# Patient Record
Sex: FEMALE | Race: BLACK OR AFRICAN AMERICAN | NOT HISPANIC OR LATINO | Employment: OTHER | URBAN - METROPOLITAN AREA
[De-identification: names, ages, dates, MRNs, and addresses within clinical notes are randomized per-mention and may not be internally consistent; named-entity substitution may affect disease eponyms.]

---

## 2017-03-27 ENCOUNTER — TELEPHONE (OUTPATIENT)
Dept: INTERNAL MEDICINE | Facility: CLINIC | Age: 67
End: 2017-03-27

## 2017-03-27 NOTE — TELEPHONE ENCOUNTER
----- Message from Jeanine Cobb sent at 3/27/2017  7:48 AM CDT -----  Contact: Self.  Call her [phone]  Patient upset that she can't see you today. She want to be seen for lost of voice, sore throat.

## 2017-03-27 NOTE — TELEPHONE ENCOUNTER
Spoke with patient , she lost her part of her house,and is under stress, she was notified that she can take levocetirizine as has been ordered.

## 2017-04-03 ENCOUNTER — TELEPHONE (OUTPATIENT)
Dept: INTERNAL MEDICINE | Facility: CLINIC | Age: 67
End: 2017-04-03

## 2017-04-03 NOTE — TELEPHONE ENCOUNTER
----- Message from Markus Casey sent at 4/3/2017  3:16 PM CDT -----  Contact: self   Patient would like to get medical advice.  Symptoms (please be specific):  Coughing   How long has patient had these symptoms:  1wk  Pharmacy name and phone #:  [id number]   [phone] (Phone)  [phone] (Fax)  Any drug allergies:  None   Comments: Pt state Dr. Jimenez call in a medication for her and she is still not able to sleep, pt didn't give name of medicine being rude

## 2017-04-04 NOTE — TELEPHONE ENCOUNTER
Tried to accomodate patient with an appt., she angry and wants to the ER. Called patient back and she took an appt. With Dr. Metz.

## 2017-05-18 ENCOUNTER — OFFICE VISIT (OUTPATIENT)
Dept: OPTOMETRY | Facility: CLINIC | Age: 67
End: 2017-05-18
Payer: MEDICARE

## 2017-05-18 DIAGNOSIS — H10.13 ALLERGIC CONJUNCTIVITIS, BILATERAL: Primary | ICD-10-CM

## 2017-05-18 DIAGNOSIS — H25.13 NUCLEAR SCLEROSIS, BILATERAL: ICD-10-CM

## 2017-05-18 PROCEDURE — 99212 OFFICE O/P EST SF 10 MIN: CPT | Mod: PBBFAC,PO | Performed by: OPTOMETRIST

## 2017-05-18 PROCEDURE — 99999 PR PBB SHADOW E&M-EST. PATIENT-LVL II: CPT | Mod: PBBFAC,,, | Performed by: OPTOMETRIST

## 2017-05-18 PROCEDURE — 92014 COMPRE OPH EXAM EST PT 1/>: CPT | Mod: S$PBB,,, | Performed by: OPTOMETRIST

## 2017-05-18 RX ORDER — FLUOROMETHOLONE 1 MG/ML
1 SUSPENSION/ DROPS OPHTHALMIC 3 TIMES DAILY
Qty: 5 ML | Refills: 0 | Status: SHIPPED | OUTPATIENT
Start: 2017-05-18 | End: 2017-05-28

## 2017-05-18 NOTE — PROGRESS NOTES
HPI     FB sensation OU past week, AT's help very little, uncomfortable to read.   Seasonal allergies.         Last edited by Scotty Rivera, OD on 5/18/2017  8:51 AM.     ROS     Negative for: Constitutional, Gastrointestinal, Neurological, Skin,   Genitourinary, Musculoskeletal, HENT, Endocrine, Cardiovascular, Eyes,   Respiratory, Psychiatric, Allergic/Imm, Heme/Lymph    Last edited by Scotty Rivera, OD on 5/18/2017  8:51 AM. (History)        Assessment /Plan     For exam results, see Encounter Report.    Allergic conjunctivitis, bilateral  -     fluorometholone 0.1% (FML) 0.1 % DrpS; Place 1 drop into both eyes 3 (three) times daily.  Dispense: 5 mL; Refill: 0    Nuclear sclerosis, bilateral      1. Start FML drops 1 drop 3x/day x 1 week, then resume art tears daily. RTC if no better with steroid.  2. Educated pt on presence of cataracts and effects on vision. No surgery at this time. Recheck in one year.

## 2017-05-31 ENCOUNTER — TELEPHONE (OUTPATIENT)
Dept: INTERNAL MEDICINE | Facility: CLINIC | Age: 67
End: 2017-05-31

## 2017-05-31 DIAGNOSIS — Z12.31 VISIT FOR SCREENING MAMMOGRAM: Primary | ICD-10-CM

## 2017-05-31 NOTE — TELEPHONE ENCOUNTER
----- Message from Eddie Valdes sent at 5/31/2017 12:09 PM CDT -----  Contact: Pt at [phone]  Pt requesting orders to be put in for her to receive a mammogram.

## 2017-06-14 ENCOUNTER — OFFICE VISIT (OUTPATIENT)
Dept: SURGERY | Facility: CLINIC | Age: 67
End: 2017-06-14
Payer: MEDICARE

## 2017-06-14 VITALS
HEART RATE: 55 BPM | HEIGHT: 66 IN | DIASTOLIC BLOOD PRESSURE: 77 MMHG | TEMPERATURE: 98 F | WEIGHT: 200.94 LBS | SYSTOLIC BLOOD PRESSURE: 166 MMHG | BODY MASS INDEX: 32.29 KG/M2

## 2017-06-14 DIAGNOSIS — N64.4 BREAST PAIN: Primary | ICD-10-CM

## 2017-06-14 PROCEDURE — 99214 OFFICE O/P EST MOD 30 MIN: CPT | Mod: S$PBB,,, | Performed by: NURSE PRACTITIONER

## 2017-06-14 PROCEDURE — 1125F AMNT PAIN NOTED PAIN PRSNT: CPT | Mod: ,,, | Performed by: NURSE PRACTITIONER

## 2017-06-14 PROCEDURE — 99999 PR PBB SHADOW E&M-EST. PATIENT-LVL IV: CPT | Mod: PBBFAC,,, | Performed by: NURSE PRACTITIONER

## 2017-06-14 PROCEDURE — 99214 OFFICE O/P EST MOD 30 MIN: CPT | Mod: PBBFAC,PO | Performed by: NURSE PRACTITIONER

## 2017-06-14 PROCEDURE — 1159F MED LIST DOCD IN RCRD: CPT | Mod: ,,, | Performed by: NURSE PRACTITIONER

## 2017-06-14 RX ORDER — LOSARTAN POTASSIUM AND HYDROCHLOROTHIAZIDE 25; 100 MG/1; MG/1
TABLET ORAL
Refills: 0 | COMMUNITY
Start: 2017-05-02 | End: 2019-12-09

## 2017-06-20 ENCOUNTER — HOSPITAL ENCOUNTER (OUTPATIENT)
Dept: RADIOLOGY | Facility: HOSPITAL | Age: 67
Discharge: HOME | End: 2017-06-20
Attending: NURSE PRACTITIONER
Payer: MEDICARE

## 2017-06-20 VITALS — WEIGHT: 200 LBS | HEIGHT: 65 IN | BODY MASS INDEX: 33.32 KG/M2

## 2017-06-20 DIAGNOSIS — N64.4 BREAST PAIN: ICD-10-CM

## 2017-06-20 PROCEDURE — 77066 DX MAMMO INCL CAD BI: CPT | Mod: TC

## 2017-06-20 PROCEDURE — 77062 BREAST TOMOSYNTHESIS BI: CPT | Mod: 26,,, | Performed by: RADIOLOGY

## 2017-06-20 PROCEDURE — 77066 DX MAMMO INCL CAD BI: CPT | Mod: 26,,, | Performed by: RADIOLOGY

## 2017-08-01 ENCOUNTER — TELEPHONE (OUTPATIENT)
Dept: INTERNAL MEDICINE | Facility: CLINIC | Age: 67
End: 2017-08-01

## 2017-08-01 DIAGNOSIS — N64.4 PAINFUL BREASTS: Primary | ICD-10-CM

## 2017-08-01 NOTE — TELEPHONE ENCOUNTER
----- Message from Maria Fernanda Simon sent at 8/1/2017  9:38 AM CDT -----  Contact: self/ [phone]  Patient had a mammogram done on June 20, 2017 and patient says that she need orders from Dr. Jimenez for ultra sound of her breast. Patient is still experiencing some pain. Please call and advise.

## 2017-08-01 NOTE — TELEPHONE ENCOUNTER
The patient is still noting breast pain.  Symptoms are more prominent on the right side.  A breast ultrasound as requested.  The mammogram report was reviewed.

## 2017-08-04 ENCOUNTER — HOSPITAL ENCOUNTER (OUTPATIENT)
Dept: RADIOLOGY | Facility: HOSPITAL | Age: 67
Discharge: HOME | End: 2017-08-04
Attending: INTERNAL MEDICINE
Payer: MEDICARE

## 2017-08-04 DIAGNOSIS — N64.4 PAINFUL BREASTS: ICD-10-CM

## 2017-11-10 ENCOUNTER — TELEPHONE (OUTPATIENT)
Dept: INTERNAL MEDICINE | Facility: CLINIC | Age: 67
End: 2017-11-10

## 2017-11-10 RX ORDER — LORAZEPAM 1 MG/1
1 TABLET ORAL EVERY 8 HOURS PRN
Qty: 60 TABLET | Refills: 1 | Status: SHIPPED | OUTPATIENT
Start: 2017-11-10 | End: 2018-02-24 | Stop reason: SDUPTHER

## 2017-11-10 NOTE — TELEPHONE ENCOUNTER
Brother being put in hospice. She is very anxious and not sleeping well.    She is on lexapro but out of ativan. Wants something to help her cope. Just refill ativan?    Pharmacy [facility] is correct.    please advise.  Thanks lorin

## 2017-11-10 NOTE — TELEPHONE ENCOUNTER
I called rx to pharmacy recorder as dr martinez approved.  Patient advised and to continue lexapro with it.

## 2017-12-26 ENCOUNTER — OFFICE VISIT (OUTPATIENT)
Dept: URGENT CARE | Facility: CLINIC | Age: 67
End: 2017-12-26
Payer: MEDICARE

## 2017-12-26 VITALS
OXYGEN SATURATION: 95 % | RESPIRATION RATE: 18 BRPM | HEART RATE: 69 BPM | DIASTOLIC BLOOD PRESSURE: 85 MMHG | SYSTOLIC BLOOD PRESSURE: 145 MMHG | BODY MASS INDEX: 33.32 KG/M2 | TEMPERATURE: 99 F | HEIGHT: 65 IN | WEIGHT: 200 LBS

## 2017-12-26 DIAGNOSIS — J20.9 ACUTE BRONCHITIS, UNSPECIFIED ORGANISM: ICD-10-CM

## 2017-12-26 DIAGNOSIS — J02.9 SORE THROAT: Primary | ICD-10-CM

## 2017-12-26 LAB
CTP QC/QA: YES
S PYO RRNA THROAT QL PROBE: NEGATIVE

## 2017-12-26 PROCEDURE — 87880 STREP A ASSAY W/OPTIC: CPT | Mod: QW,S$GLB,, | Performed by: EMERGENCY MEDICINE

## 2017-12-26 PROCEDURE — 99214 OFFICE O/P EST MOD 30 MIN: CPT | Mod: S$GLB,,, | Performed by: EMERGENCY MEDICINE

## 2017-12-26 RX ORDER — POLYETHYLENE GLYCOL-3350 AND ELECTROLYTES WITH FLAVOR PACK 240; 5.84; 2.98; 6.72; 22.72 G/278.26G; G/278.26G; G/278.26G; G/278.26G; G/278.26G
POWDER, FOR SOLUTION ORAL
Refills: 0 | COMMUNITY
Start: 2017-09-19 | End: 2018-08-14

## 2017-12-26 RX ORDER — PREDNISONE 20 MG/1
20 TABLET ORAL DAILY
Qty: 5 TABLET | Refills: 0 | Status: SHIPPED | OUTPATIENT
Start: 2017-12-26 | End: 2017-12-31

## 2017-12-26 RX ORDER — HYDROCODONE POLISTIREX AND CHLORPHENIRAMINE POLISTIREX 10; 8 MG/5ML; MG/5ML
5 SUSPENSION, EXTENDED RELEASE ORAL EVERY 12 HOURS PRN
Qty: 100 ML | Refills: 0 | Status: SHIPPED | OUTPATIENT
Start: 2017-12-26 | End: 2018-07-13

## 2017-12-26 RX ORDER — OMEPRAZOLE 40 MG/1
CAPSULE, DELAYED RELEASE ORAL
Refills: 0 | COMMUNITY
Start: 2017-12-15 | End: 2019-12-09

## 2017-12-26 NOTE — PROGRESS NOTES
Subjective:       Patient ID: Nan Palafox is a 67 y.o. female.    Vitals:    12/26/17 1507   BP: (!) 145/85   Pulse: 69   Resp: 18   Temp: 98.7 °F (37.1 °C)       Chief Complaint: Cough    Pt comes into the clinic with the complaint of a cough, chills and low grade fever for the past 3 days. Pt has tried taking day/nyquil for symptoms.       Cough   This is a new problem. The current episode started in the past 7 days. The problem has been unchanged. The cough is non-productive. Associated symptoms include chills, headaches, myalgias and a sore throat. Pertinent negatives include no chest pain, ear pain, eye redness, fever, shortness of breath or wheezing. Nothing aggravates the symptoms. She has tried nothing for the symptoms. The treatment provided no relief. Her past medical history is significant for pneumonia.     Review of Systems   Constitution: Positive for chills and malaise/fatigue. Negative for fever.   HENT: Positive for sore throat. Negative for congestion, ear pain and hoarse voice.    Eyes: Negative for discharge and redness.   Cardiovascular: Negative for chest pain, dyspnea on exertion and leg swelling.   Respiratory: Positive for cough. Negative for shortness of breath, sputum production and wheezing.    Musculoskeletal: Positive for myalgias.   Gastrointestinal: Negative for abdominal pain and nausea.   Neurological: Positive for headaches.       Objective:      Physical Exam   Constitutional: She is oriented to person, place, and time. She appears well-developed and well-nourished. She is cooperative.  Non-toxic appearance. She does not appear ill. No distress.   Non productive cough present through out the exam       HENT:   Head: Normocephalic and atraumatic.   Right Ear: Hearing, tympanic membrane, external ear and ear canal normal.   Left Ear: Hearing, tympanic membrane, external ear and ear canal normal.   Nose: Nose normal. No mucosal edema, rhinorrhea or nasal deformity. No epistaxis.  Right sinus exhibits no maxillary sinus tenderness and no frontal sinus tenderness. Left sinus exhibits no maxillary sinus tenderness and no frontal sinus tenderness.   Mouth/Throat: Uvula is midline, oropharynx is clear and moist and mucous membranes are normal. No trismus in the jaw. Normal dentition. No uvula swelling. No posterior oropharyngeal erythema.   Eyes: Conjunctivae and lids are normal. No scleral icterus.   Sclera clear bilat   Neck: Trachea normal, full passive range of motion without pain and phonation normal. Neck supple.   Cardiovascular: Normal rate, regular rhythm, normal heart sounds, intact distal pulses and normal pulses.    Pulmonary/Chest: Effort normal and breath sounds normal. No respiratory distress.   Abdominal: Soft. Normal appearance and bowel sounds are normal. She exhibits no distension. There is no tenderness.   Musculoskeletal: Normal range of motion. She exhibits no edema or deformity.   Neurological: She is alert and oriented to person, place, and time. She exhibits normal muscle tone. Coordination normal.   Skin: Skin is warm, dry and intact. She is not diaphoretic. No pallor.   Psychiatric: She has a normal mood and affect. Her speech is normal and behavior is normal. Judgment and thought content normal. Cognition and memory are normal.   Nursing note and vitals reviewed.      Assessment:       1. Sore throat    2. Acute bronchitis, unspecified organism        Plan:       Nan was seen today for cough.    Diagnoses and all orders for this visit:    Sore throat  -     POCT rapid strep A  -     predniSONE (DELTASONE) 20 MG tablet; Take 1 tablet (20 mg total) by mouth once daily.    Acute bronchitis, unspecified organism  -     predniSONE (DELTASONE) 20 MG tablet; Take 1 tablet (20 mg total) by mouth once daily.  -     hydrocodone-chlorpheniramine (TUSSIONEX) 10-8 mg/5 mL suspension; Take 5 mLs by mouth every 12 (twelve) hours as needed for Cough.

## 2017-12-26 NOTE — PATIENT INSTRUCTIONS
Please return here or go to the Emergency Department for any concerns or worsening of condition.  If you were prescribed antibiotics, please take them to completion.  If you were prescribed a narcotic medication, do not drive or operate heavy equipment or machinery while taking these medications.  Please follow up with your primary care doctor or specialist as needed.  If you  smoke, please stop smoking.      What Is Acute Bronchitis?  Acute bronchitis is when the airways in your lungs (bronchial tubes) become red and swollen (inflamed). It is usually caused by a viral infection. But it can also occur because of a bacteria or allergen. Symptoms include a cough that produces yellow or greenish mucus and can last for days or sometimes weeks.  Inside healthy lungs    Air travels in and out of the lungs through the airways. The linings of these airways produce sticky mucus. This mucus traps particles that enter the lungs. Tiny structures called cilia then sweep the particles out of the airways.     Healthy airway: Airways are normally open. Air moves in and out easily.      Healthy cilia: Tiny, hairlike cilia sweep mucus and particles up and out of the airways.   Lungs with bronchitis  Bronchitis often occurs with a cold or the flu virus. The airways become inflamed (red and swollen). There is a deep hacking cough from the extra mucus. Other symptoms may include:  · Wheezing  · Chest discomfort  · Shortness of breath  · Mild fever  A second infection, this time due to bacteria, may then occur. And airways irritated by allergens or smoke are more likely to get infected.        Inflamed airway: Inflammation and extra mucus narrow the airway, causing shortness of breath.      Impaired cilia: Extra mucus impairs cilia, causing congestion and wheezing. Smoking makes the problem worse.   Making a diagnosis  A physical exam, health history, and certain tests help your healthcare provider make the  diagnosis.  Health history  Your healthcare provider will ask you about your symptoms.  The exam  Your provider listens to your chest for signs of congestion. He or she may also check your ears, nose, and throat.  Possible tests  · A sputum test for bacteria. This requires a sample of mucus from your lungs.  · A nasal or throat swab. This tests to see if you have a bacterial infection.  · A chest X-ray. This is done if your healthcare provider thinks you have pneumonia.  · Tests to check for an underlying condition. Other tests may be done to check for things such as allergies, asthma, or COPD (chronic obstructive pulmonary disease). You may need to see a specialist for more lung function testing.  Treating a cough  The main treatment for bronchitis is easing symptoms. Avoiding smoke, allergens, and other things that trigger coughing can often help. If the infection is bacterial, you may be given antibiotics. During the illness, it's important to get plenty of sleep. To ease symptoms:  · Dont smoke. Also avoid secondhand smoke.  · Use a humidifier. Or try breathing in steam from a hot shower. This may help loosen mucus.  · Drink a lot of water and juice. They can soothe the throat and may help thin mucus.  · Sit up or use extra pillows when in bed. This helps to lessen coughing and congestion.  · Ask your provider about using medicine. Ask about using cough medicine, pain and fever medicine, or a decongestant.  Antibiotics  Most cases of bronchitis are caused by cold or flu viruses. They dont need antibiotics to treat them, even if your mucus is thick and green or yellow. Antibiotics dont treat viral illness and antibiotics have not been shown to have any benefit in cases of acute bronchitis. Taking antibiotics when they are not needed increases your risk of getting an infection later that is antibiotic-resistant. Antibiotics can also cause severe cases of diarrhea that require other antibiotics to treat.  It is  important that you accept your healthcare provider's opinion to not use antibiotics. Your provider will prescribe antibiotics if the infection is caused by bacteria. If they are prescribed:  · Take all of the medicine. Take the medicine until it is used up, even if symptoms have improved. If you dont, the bronchitis may come back.  · Take the medicines as directed. For instance, some medicines should be taken with food.  · Ask about side effects. Ask your provider or pharmacist what side effects are common, and what to do about them.  Follow-up care  You should see your provider again in 2 to 3 weeks. By this time, symptoms should have improved. An infection that lasts longer may mean you have a more serious problem.  Prevention  · Avoid tobacco smoke. If you smoke, quit. Stay away from smoky places. Ask friends and family not to smoke around you, or in your home or car.  · Get checked for allergies.  · Ask your provider about getting a yearly flu shot. Also ask about pneumococcal or pneumonia shots.  · Wash your hands often. This helps reduce the chance of picking up viruses that cause colds and flu.  Call your healthcare provider if:  · Symptoms worsen, or you have new symptoms  · Breathing problems worsen or  become severe  · Symptoms dont get better within a week, or within 3 days of taking antibiotics   Date Last Reviewed: 2/1/2017  © 2707-2121 [employer]. [address] [place] [address], [place] [address]. All rights reserved. This information is not intended as a substitute for professional medical care. Always follow your healthcare professional's instructions.

## 2018-02-14 ENCOUNTER — TELEPHONE (OUTPATIENT)
Dept: INTERNAL MEDICINE | Facility: CLINIC | Age: 68
End: 2018-02-14

## 2018-02-14 NOTE — TELEPHONE ENCOUNTER
----- Message from Adri Harris sent at 2/12/2018 10:38 AM CST -----  Contact: self/[phone]  Pt called in regards to getting a er f/u. She would like to get fit into the schedule. She did not want the first available.      Please advise

## 2018-02-26 RX ORDER — LORAZEPAM 1 MG/1
TABLET ORAL
Qty: 60 TABLET | Refills: 0 | Status: SHIPPED | OUTPATIENT
Start: 2018-02-26 | End: 2018-04-18 | Stop reason: SDUPTHER

## 2018-04-18 RX ORDER — LORAZEPAM 1 MG/1
TABLET ORAL
Qty: 60 TABLET | Refills: 0 | Status: SHIPPED | OUTPATIENT
Start: 2018-04-18 | End: 2018-07-09 | Stop reason: SDUPTHER

## 2018-07-03 ENCOUNTER — HOSPITAL ENCOUNTER (OUTPATIENT)
Dept: RADIOLOGY | Facility: HOSPITAL | Age: 68
Discharge: HOME | End: 2018-07-03
Attending: INTERNAL MEDICINE
Payer: MEDICARE

## 2018-07-03 DIAGNOSIS — Z12.31 VISIT FOR SCREENING MAMMOGRAM: ICD-10-CM

## 2018-07-03 PROCEDURE — 77063 BREAST TOMOSYNTHESIS BI: CPT | Mod: TC

## 2018-07-03 PROCEDURE — 77067 SCR MAMMO BI INCL CAD: CPT | Mod: 26,,, | Performed by: RADIOLOGY

## 2018-07-03 PROCEDURE — 77063 BREAST TOMOSYNTHESIS BI: CPT | Mod: 26,,, | Performed by: RADIOLOGY

## 2018-07-09 RX ORDER — LORAZEPAM 1 MG/1
TABLET ORAL
Qty: 60 TABLET | Refills: 0 | Status: SHIPPED | OUTPATIENT
Start: 2018-07-09 | End: 2018-07-13 | Stop reason: SDUPTHER

## 2018-07-13 ENCOUNTER — OFFICE VISIT (OUTPATIENT)
Dept: INTERNAL MEDICINE | Facility: CLINIC | Age: 68
End: 2018-07-13
Payer: MEDICARE

## 2018-07-13 VITALS
OXYGEN SATURATION: 99 % | BODY MASS INDEX: 33.65 KG/M2 | TEMPERATURE: 98 F | HEIGHT: 65 IN | DIASTOLIC BLOOD PRESSURE: 72 MMHG | SYSTOLIC BLOOD PRESSURE: 110 MMHG | HEART RATE: 62 BPM | WEIGHT: 201.94 LBS

## 2018-07-13 DIAGNOSIS — I10 ESSENTIAL HYPERTENSION: Primary | Chronic | ICD-10-CM

## 2018-07-13 DIAGNOSIS — K21.9 GASTROESOPHAGEAL REFLUX DISEASE, ESOPHAGITIS PRESENCE NOT SPECIFIED: ICD-10-CM

## 2018-07-13 DIAGNOSIS — Z96.652 STATUS POST TOTAL LEFT KNEE REPLACEMENT: ICD-10-CM

## 2018-07-13 DIAGNOSIS — D63.8 ANEMIA OF CHRONIC DISEASE: ICD-10-CM

## 2018-07-13 PROCEDURE — 99214 OFFICE O/P EST MOD 30 MIN: CPT | Mod: S$PBB,,, | Performed by: INTERNAL MEDICINE

## 2018-07-13 PROCEDURE — 99213 OFFICE O/P EST LOW 20 MIN: CPT | Mod: PBBFAC,PO,25 | Performed by: INTERNAL MEDICINE

## 2018-07-13 PROCEDURE — 90732 PPSV23 VACC 2 YRS+ SUBQ/IM: CPT | Mod: PBBFAC,PO

## 2018-07-13 PROCEDURE — 99999 PR PBB SHADOW E&M-EST. PATIENT-LVL III: CPT | Mod: PBBFAC,,, | Performed by: INTERNAL MEDICINE

## 2018-07-13 RX ORDER — LORAZEPAM 1 MG/1
1 TABLET ORAL EVERY 12 HOURS PRN
Qty: 60 TABLET | Refills: 2 | Status: SHIPPED | OUTPATIENT
Start: 2018-07-13 | End: 2018-11-27 | Stop reason: SDUPTHER

## 2018-07-23 NOTE — PROGRESS NOTES
Subjective:       Patient ID: Nan Palafox is a 68 y.o. female.    Chief Complaint: Follow-up (knee replacement)    HPI     The patient presents for follow-up of medical conditions which include hypertension, anemia of chronic disease, GERD, and arthritis.  She is status post left total knee replacement in May 2018 by Dr. Kurt Davis. She is participating in outpatient physical therapy twice a week.  She reports that she is recovering well.    She recently saw her cardiologist Dr. ELIZABET molina for cardiac evaluation.  Some mild fluid retention was noted.  She denies having any shortness of breath, PND, orthopnea, or chest pain.  She does have a history of GERD however her symptoms have been stable.  She does report symptoms of situational anxiety at times.  She has taken herself off of Lexapro for the past 5 months.  She feels she has remained fairly stable off the medication.    She is using Zyrtec daily for control of rhinitis symptoms.    Her last colonoscopy was on 10/26/2017.  A follow-up examination in 5 years was recommended.    Review of Systems   Constitutional: Negative for activity change, appetite change, fatigue and unexpected weight change.   Eyes: Negative for visual disturbance.   Respiratory: Negative for cough and shortness of breath.    Cardiovascular: Positive for leg swelling. Negative for chest pain and palpitations.   Gastrointestinal: Negative for abdominal pain, blood in stool, constipation and diarrhea.   Genitourinary: Negative for dysuria and hematuria.   Musculoskeletal: Negative for arthralgias, back pain, neck pain and neck stiffness.   Skin: Negative for rash.   Neurological: Negative for dizziness, syncope and headaches.   Psychiatric/Behavioral: Negative for sleep disturbance. The patient is nervous/anxious.        Objective:      Physical Exam   Constitutional: She is oriented to person, place, and time. She appears well-developed and well-nourished. No distress.    HENT:   Head: Normocephalic and atraumatic.   Eyes: Conjunctivae and EOM are normal. No scleral icterus.   Neck: Normal range of motion. Neck supple. No JVD present. No thyromegaly present.   Cardiovascular: Normal rate, regular rhythm, normal heart sounds and intact distal pulses.  Exam reveals no gallop and no friction rub.    No murmur heard.  Pulmonary/Chest: Effort normal and breath sounds normal. No respiratory distress. She has no wheezes. She has no rales.   Abdominal: Soft. Bowel sounds are normal. She exhibits no mass. There is no tenderness.   Musculoskeletal: She exhibits no tenderness.   Mild postop swelling of the left knee is noted.  Surgical incision is intact.  Minimal discomfort is reported with flexion and extension.   Lymphadenopathy:     She has no cervical adenopathy.   Neurological: She is alert and oriented to person, place, and time.   Skin: Skin is warm and dry. No rash noted.   Psychiatric: She has a normal mood and affect. Her behavior is normal. Judgment and thought content normal.   There is no suicidal or homicidal ideation.  No hallucinosis or delusions are noted.   Nursing note and vitals reviewed.      Assessment:       1. Essential hypertension    2. Anemia of chronic disease    3. Gastroesophageal reflux disease, esophagitis presence not specified    4. Status post total left knee replacement        Plan:       Nan was seen today for follow-up.  The patient may use Tylenol as needed for knee pain.  Lorazepam will be renewed for occasional acute anxiety symptoms.  Pneumovax will be administered today.  A prescription for the Shingrix shingles vaccine was also given to the patient.    Diagnoses and all orders for this visit:    Essential hypertension    Anemia of chronic disease    Gastroesophageal reflux disease, esophagitis presence not specified    Status post total left knee replacement    Other orders  -     LORazepam (ATIVAN) 1 MG tablet; Take 1 tablet (1 mg total) by  mouth every 12 (twelve) hours as needed for Anxiety.  -     (In Office Administered) Pneumococcal Polysaccharide Vaccine (23 Valent) (SQ/IM)  -     varicella-zoster [id number], PF, (SHINGRIX, PF,) 50 mcg/0.5 mL injection; Inject 0.5 mLs into the muscle once. for 1 dose

## 2018-08-14 ENCOUNTER — OFFICE VISIT (OUTPATIENT)
Dept: OPTOMETRY | Facility: CLINIC | Age: 68
End: 2018-08-14
Payer: MEDICARE

## 2018-08-14 DIAGNOSIS — H52.7 REFRACTIVE ERROR: ICD-10-CM

## 2018-08-14 DIAGNOSIS — H25.13 NUCLEAR SCLEROSIS, BILATERAL: Primary | ICD-10-CM

## 2018-08-14 PROCEDURE — 92014 COMPRE OPH EXAM EST PT 1/>: CPT | Mod: S$PBB,,, | Performed by: OPTOMETRIST

## 2018-08-14 PROCEDURE — 99212 OFFICE O/P EST SF 10 MIN: CPT | Mod: PBBFAC,PO | Performed by: OPTOMETRIST

## 2018-08-14 PROCEDURE — 99999 PR PBB SHADOW E&M-EST. PATIENT-LVL II: CPT | Mod: PBBFAC,,, | Performed by: OPTOMETRIST

## 2018-08-14 RX ORDER — LORAZEPAM 0.5 MG/1
1 TABLET ORAL
COMMUNITY
End: 2018-08-14

## 2018-08-14 RX ORDER — ASPIRIN 325 MG
325 TABLET, DELAYED RELEASE (ENTERIC COATED) ORAL
COMMUNITY
Start: 2018-05-03 | End: 2018-08-14

## 2018-08-14 RX ORDER — CELECOXIB 200 MG/1
CAPSULE ORAL
COMMUNITY
Start: 2018-06-04 | End: 2018-12-07

## 2018-08-14 RX ORDER — DIAZEPAM 5 MG/1
5 TABLET ORAL EVERY 8 HOURS PRN
Refills: 3 | COMMUNITY
Start: 2018-07-11 | End: 2018-12-07 | Stop reason: ALTCHOICE

## 2018-08-14 RX ORDER — GABAPENTIN 300 MG/1
300 CAPSULE ORAL 2 TIMES DAILY
Refills: 0 | COMMUNITY
Start: 2018-05-29 | End: 2018-08-14

## 2018-08-14 RX ORDER — CHOLECALCIFEROL (VITAMIN D3) 25 MCG
1000 TABLET ORAL
Status: ON HOLD | COMMUNITY
End: 2021-02-08 | Stop reason: HOSPADM

## 2018-08-14 RX ORDER — GABAPENTIN 300 MG/1
300 CAPSULE ORAL
COMMUNITY
Start: 2018-05-03 | End: 2018-10-02

## 2018-08-14 RX ORDER — ROSUVASTATIN CALCIUM 20 MG/1
20 TABLET, COATED ORAL DAILY
Refills: 4 | COMMUNITY
Start: 2018-07-18 | End: 2021-08-16

## 2018-08-14 RX ORDER — TRAMADOL HYDROCHLORIDE 50 MG/1
50 TABLET ORAL 2 TIMES DAILY
Refills: 0 | COMMUNITY
Start: 2018-05-29 | End: 2018-10-02

## 2018-08-14 RX ORDER — AMLODIPINE BESYLATE 10 MG/1
10 TABLET ORAL
COMMUNITY
End: 2018-08-14

## 2018-08-14 RX ORDER — CHOLECALCIFEROL (VITAMIN D3) 1250 MCG
CAPSULE ORAL
Refills: 4 | COMMUNITY
Start: 2018-07-18 | End: 2019-12-09

## 2018-08-14 NOTE — PROGRESS NOTES
LESLIE DASILVA 05/2017 Near not as clear, has to hold reading farther away.    Distance seems fine. Glasses about 2 yrs. Old.  Not using  Any drops.    Last edited by Cookie Redd on 8/14/2018  1:59 PM. (History)            Assessment /Plan     For exam results, see Encounter Report.    Nuclear sclerosis, bilateral    Refractive error      1. Educated pt on presence of cataracts and effects on vision. No surgery at this time. Recheck in one year.  2. Spec Rx given. Different lens options discussed with patient. RTC 1 year full exam.

## 2018-08-28 ENCOUNTER — TELEPHONE (OUTPATIENT)
Dept: OBSTETRICS AND GYNECOLOGY | Facility: CLINIC | Age: 68
End: 2018-08-28

## 2018-08-28 NOTE — TELEPHONE ENCOUNTER
----- Message from Lorelei Lazo sent at 8/28/2018 11:33 AM CDT -----  Contact: self  Pt called in about wanting to schedule appt. annual/pt having issues and didnt want to discuss  Pt would like the nurse to give her a call back        Pt can be reached at [phone] ty

## 2018-09-04 ENCOUNTER — TELEPHONE (OUTPATIENT)
Dept: INTERNAL MEDICINE | Facility: CLINIC | Age: 68
End: 2018-09-04

## 2018-09-04 DIAGNOSIS — R53.83 FATIGUE, UNSPECIFIED TYPE: Primary | ICD-10-CM

## 2018-09-04 DIAGNOSIS — R23.2 HOT FLASHES: ICD-10-CM

## 2018-09-04 DIAGNOSIS — R09.89 ABNORMAL VASOMOTOR FUNCTION: ICD-10-CM

## 2018-09-04 NOTE — TELEPHONE ENCOUNTER
----- Message from Wing Mann sent at 9/4/2018  8:43 AM CDT -----  Contact: Patient [phone]  Patient calling regarding informing the PCP about the challenges that patient is having after the test, wants to know if Dr office place lab orders for more testing requesting a call back regarding please.    Please call an advise  Thank you

## 2018-09-04 NOTE — TELEPHONE ENCOUNTER
Patient c/o fatigue, hot and cold flashes,having  Trouble focusing ,feeling drained.Please advise.

## 2018-09-08 ENCOUNTER — LAB VISIT (OUTPATIENT)
Dept: LAB | Facility: HOSPITAL | Age: 68
End: 2018-09-08
Attending: INTERNAL MEDICINE
Payer: MEDICARE

## 2018-09-08 DIAGNOSIS — R23.2 HOT FLASHES: ICD-10-CM

## 2018-09-08 DIAGNOSIS — R09.89 ABNORMAL VASOMOTOR FUNCTION: ICD-10-CM

## 2018-09-08 DIAGNOSIS — R53.83 FATIGUE, UNSPECIFIED TYPE: ICD-10-CM

## 2018-09-08 LAB
ALBUMIN SERPL BCP-MCNC: 3.7 G/DL
ALP SERPL-CCNC: 48 U/L
ALT SERPL W/O P-5'-P-CCNC: 11 U/L
ANION GAP SERPL CALC-SCNC: 9 MMOL/L
AST SERPL-CCNC: 16 U/L
BASOPHILS # BLD AUTO: 0.04 K/UL
BASOPHILS NFR BLD: 0.7 %
BILIRUB SERPL-MCNC: 0.5 MG/DL
BUN SERPL-MCNC: 16 MG/DL
CALCIUM SERPL-MCNC: 9.7 MG/DL
CHLORIDE SERPL-SCNC: 99 MMOL/L
CO2 SERPL-SCNC: 29 MMOL/L
CORTIS SERPL-MCNC: 11.8 UG/DL
CREAT SERPL-MCNC: 0.7 MG/DL
DIFFERENTIAL METHOD: ABNORMAL
EOSINOPHIL # BLD AUTO: 0.3 K/UL
EOSINOPHIL NFR BLD: 4.3 %
ERYTHROCYTE [DISTWIDTH] IN BLOOD BY AUTOMATED COUNT: 15.9 %
EST. GFR  (AFRICAN AMERICAN): >60 ML/MIN/1.73 M^2
EST. GFR  (NON AFRICAN AMERICAN): >60 ML/MIN/1.73 M^2
ESTRADIOL SERPL-MCNC: <10 PG/ML
FSH SERPL-ACNC: 56.8 MIU/ML
GLUCOSE SERPL-MCNC: 94 MG/DL
HCT VFR BLD AUTO: 35.8 %
HGB BLD-MCNC: 11.3 G/DL
IMM GRANULOCYTES # BLD AUTO: 0.01 K/UL
IMM GRANULOCYTES NFR BLD AUTO: 0.2 %
LYMPHOCYTES # BLD AUTO: 2.4 K/UL
LYMPHOCYTES NFR BLD: 41.1 %
MCH RBC QN AUTO: 26.2 PG
MCHC RBC AUTO-ENTMCNC: 31.6 G/DL
MCV RBC AUTO: 83 FL
MONOCYTES # BLD AUTO: 0.4 K/UL
MONOCYTES NFR BLD: 7.3 %
NEUTROPHILS # BLD AUTO: 2.7 K/UL
NEUTROPHILS NFR BLD: 46.4 %
NRBC BLD-RTO: 0 /100 WBC
PLATELET # BLD AUTO: 389 K/UL
PMV BLD AUTO: 10.1 FL
POTASSIUM SERPL-SCNC: 3.8 MMOL/L
PROT SERPL-MCNC: 7.6 G/DL
RBC # BLD AUTO: 4.32 M/UL
SODIUM SERPL-SCNC: 137 MMOL/L
T3FREE SERPL-MCNC: 2.1 PG/ML
T4 FREE SERPL-MCNC: 0.94 NG/DL
TSH SERPL DL<=0.005 MIU/L-ACNC: 1.38 UIU/ML
WBC # BLD AUTO: 5.79 K/UL

## 2018-09-08 PROCEDURE — 84439 ASSAY OF FREE THYROXINE: CPT

## 2018-09-08 PROCEDURE — 82670 ASSAY OF TOTAL ESTRADIOL: CPT

## 2018-09-08 PROCEDURE — 83001 ASSAY OF GONADOTROPIN (FSH): CPT

## 2018-09-08 PROCEDURE — 84481 FREE ASSAY (FT-3): CPT

## 2018-09-08 PROCEDURE — 85025 COMPLETE CBC W/AUTO DIFF WBC: CPT

## 2018-09-08 PROCEDURE — 80053 COMPREHEN METABOLIC PANEL: CPT

## 2018-09-08 PROCEDURE — 36415 COLL VENOUS BLD VENIPUNCTURE: CPT | Mod: PO

## 2018-09-08 PROCEDURE — 84443 ASSAY THYROID STIM HORMONE: CPT

## 2018-09-08 PROCEDURE — 82533 TOTAL CORTISOL: CPT

## 2018-09-14 ENCOUNTER — TELEPHONE (OUTPATIENT)
Dept: INTERNAL MEDICINE | Facility: CLINIC | Age: 68
End: 2018-09-14

## 2018-09-14 NOTE — TELEPHONE ENCOUNTER
----- Message from Radha Rdz sent at 9/13/2018  4:06 PM CDT -----  Contact: [phone]  Patient would like to get test results    Name of test (lab, mammo, etc.):   labs    Date of test:  9/8    Ordering provider: Tony    Where was the test performed:  [place]    Would you prefer a response via Webcruncht?:  no    Comments:

## 2018-09-15 NOTE — TELEPHONE ENCOUNTER
Lab results were reviewed with the patient.  No acute changes noted.  She has fatigue.  She does not wish to restsart Lexapro at this time.

## 2018-10-02 ENCOUNTER — OFFICE VISIT (OUTPATIENT)
Dept: OBSTETRICS AND GYNECOLOGY | Facility: CLINIC | Age: 68
End: 2018-10-02
Payer: MEDICARE

## 2018-10-02 VITALS
BODY MASS INDEX: 33.43 KG/M2 | HEIGHT: 65 IN | SYSTOLIC BLOOD PRESSURE: 118 MMHG | WEIGHT: 200.63 LBS | DIASTOLIC BLOOD PRESSURE: 80 MMHG

## 2018-10-02 DIAGNOSIS — Z78.0 MENOPAUSE PRESENT: ICD-10-CM

## 2018-10-02 DIAGNOSIS — Z01.419 ENCOUNTER FOR GYNECOLOGICAL EXAMINATION WITHOUT ABNORMAL FINDING: ICD-10-CM

## 2018-10-02 DIAGNOSIS — Z63.4 DEATH OF RELATIVE: ICD-10-CM

## 2018-10-02 DIAGNOSIS — Z12.31 VISIT FOR SCREENING MAMMOGRAM: ICD-10-CM

## 2018-10-02 DIAGNOSIS — F43.20 ADJUSTMENT DISORDER, UNSPECIFIED TYPE: ICD-10-CM

## 2018-10-02 DIAGNOSIS — I10 ESSENTIAL HYPERTENSION: Primary | Chronic | ICD-10-CM

## 2018-10-02 DIAGNOSIS — Z87.42 HISTORY OF ABNORMAL CERVICAL PAP SMEAR: ICD-10-CM

## 2018-10-02 PROCEDURE — G0101 CA SCREEN;PELVIC/BREAST EXAM: HCPCS | Mod: PBBFAC

## 2018-10-02 PROCEDURE — 99999 PR PBB SHADOW E&M-EST. PATIENT-LVL III: CPT | Mod: PBBFAC,,, | Performed by: OBSTETRICS & GYNECOLOGY

## 2018-10-02 PROCEDURE — G0101 CA SCREEN;PELVIC/BREAST EXAM: HCPCS | Mod: S$PBB,,, | Performed by: OBSTETRICS & GYNECOLOGY

## 2018-10-02 PROCEDURE — 88175 CYTOPATH C/V AUTO FLUID REDO: CPT

## 2018-10-02 PROCEDURE — 99213 OFFICE O/P EST LOW 20 MIN: CPT | Mod: PBBFAC | Performed by: OBSTETRICS & GYNECOLOGY

## 2018-10-02 SDOH — SOCIAL DETERMINANTS OF HEALTH (SDOH): DISSAPEARANCE AND DEATH OF FAMILY MEMBER: Z63.4

## 2018-10-02 NOTE — PROGRESS NOTES
HISTORY OF PRESENT ILLNESS:    Nan Palafox is a 68 y.o. female, , No LMP recorded. Patient has had a hysterectomy.,  presents for a routine exam..  Patient does report increased hot flashes and mood swings.  Long history of menopause.  Patient also reports decreased tolerance and increased mood swings secondary to death of her family member recently.  Patient interested in seeing therapist for evaluation.  She denies any suicidal or homicidal ideations.    Past Medical History:   Diagnosis Date    Cataract     Cervical spondylosis with radiculopathy 2016    Hypertension     Nuclear sclerosis - Both Eyes 2013    PNA (pneumonia)        Past Surgical History:   Procedure Laterality Date    BREAST BIOPSY Right     excisional bx    BREAST SURGERY Right     Excisional bx    RETINAL DETACHMENT SURGERY      patient states that she had a retinal tear that was repaired in the past at [facility] but she is uncertain of which eye    STOMACH SURGERY      TOTAL KNEE ARTHROPLASTY Right        MEDICATIONS AND ALLERGIES:      Current Outpatient Medications:     amlodipine (NORVASC) 10 MG tablet, Take 1 tablet (10 mg total) by mouth once daily. For blood pressure control., Disp: 30 tablet, Rfl: 11    ASCORBATE CALCIUM (VITAMIN C ORAL), Take by mouth., Disp: , Rfl:     aspirin (ECOTRIN) 81 MG EC tablet, Take 81 mg by mouth once daily., Disp: , Rfl:     atorvastatin (LIPITOR) 40 MG tablet, Take 40 mg by mouth every evening., Disp: , Rfl: 0    celecoxib (CELEBREX) 200 MG capsule, TAKE 1 CAPSULE BY MOUTH EVERY DAY, Disp: , Rfl:     DECARA 50,000 unit capsule, TK 1 C PO WEEKLY, Disp: , Rfl: 4    diazePAM (VALIUM) 5 MG tablet, Take 5 mg by mouth every 8 (eight) hours as needed. ANXIETY, Disp: , Rfl: 3    fluticasone (FLONASE) 50 mcg/actuation nasal spray, 2 sprays by Each Nare route once daily., Disp: 1 Bottle, Rfl: 12    LORazepam (ATIVAN) 1 MG tablet, Take 1 tablet (1 mg total) by mouth every 12  (twelve) hours as needed for Anxiety., Disp: 60 tablet, Rfl: 2    losartan-hydrochlorothiazide 100-25 mg (HYZAAR) 100-25 mg per tablet, , Disp: , Rfl: 0    multivitamin-iron-folic acid (CENTRUM ULTRA WOMEN'S)  mg-mcg Tab, Take by mouth., Disp: , Rfl:     omeprazole (PRILOSEC) 40 MG capsule, , Disp: , Rfl: 0    rosuvastatin (CRESTOR) 20 MG tablet, TK 1 T PO QD, Disp: , Rfl: 4    vitamin D 1000 units Tab, Take 1,000 Units by mouth., Disp: , Rfl:     Review of patient's allergies indicates:  No Known Allergies    Family History   Problem Relation Age of Onset    Glaucoma Mother     Cataracts Mother     Blindness Mother     Cancer Mother 68        colon    Glaucoma Brother     Cataracts Brother     Cancer Brother         esophageal    No Known Problems Father     No Known Problems Sister     No Known Problems Maternal Aunt     No Known Problems Maternal Uncle     No Known Problems Paternal Aunt     No Known Problems Paternal Uncle     No Known Problems Maternal Grandmother     No Known Problems Maternal Grandfather     No Known Problems Paternal Grandmother     No Known Problems Paternal Grandfather     Amblyopia Neg Hx     Macular degeneration Neg Hx     Retinal detachment Neg Hx     Strabismus Neg Hx     Diabetes Neg Hx     Hypertension Neg Hx     Stroke Neg Hx     Thyroid disease Neg Hx     Breast cancer Neg Hx     Ovarian cancer Neg Hx        Social History     Socioeconomic History    Marital status:      Spouse name: Not on file    Number of children: Not on file    Years of education: Not on file    Highest education level: Not on file   Social Needs    Financial resource strain: Not on file    Food insecurity - worry: Not on file    Food insecurity - inability: Not on file    Transportation needs - medical: Not on file    Transportation needs - non-medical: Not on file   Occupational History    Not on file   Tobacco Use    Smoking status: Never Smoker     "Smokeless tobacco: Never Used   Substance and Sexual Activity    Alcohol use: Yes     Alcohol/week: 1.2 oz     Types: 2 Glasses of wine per week    Drug use: Not on file    Sexual activity: No     Partners: Male   Other Topics Concern    Not on file   Social History Narrative    Not on file       COMPREHENSIVE GYN HISTORY:  PAP History: Denies abnormal Paps.  Infection History: Denies STDs. Denies PID.  Benign History: Denies uterine fibroids. Denies ovarian cysts. Denies endometriosis. Denies other conditions.  Cancer History: Denies cervical cancer. Denies uterine cancer or hyperplasia. Denies ovarian cancer. Denies vulvar cancer or pre-cancer. Denies vaginal cancer or pre-cancer. Denies breast cancer. Denies colon cancer.  Sexual Activity History: Reports currently being sexually active  Menstrual History: Monthly. Mod then light flow.   Dysmenorrhea History: Reports mild dysmenorrhea.       ROS:  GENERAL: No weight changes. No swelling. No fatigue. No fever.  CARDIOVASCULAR: No chest pain. No shortness of breath. No leg cramps.   NEUROLOGICAL: No headaches. No vision changes.  BREASTS: No pain. No lumps. No discharge.  ABDOMEN: No pain. No nausea. No vomiting. No diarrhea. No constipation.  REPRODUCTIVE: No abnormal bleeding.   VULVA: No pain. No lesions. No itching.  VAGINA: No relaxation. No itching. No odor. No discharge. No lesions.  URINARY: No incontinence. No nocturia. No frequency. No dysuria.    /80   Ht 5' 5" (1.651 m)   Wt 91 kg (200 lb 9.9 oz)   BMI 33.38 kg/m²     PE:  APPEARANCE: Well nourished, well developed, in no acute distress.  AFFECT: WNL, alert and oriented x 3.  SKIN: No acne or hirsutism.  NECK: Neck symmetric, without masses or thyromegaly.  NODES: No inguinal, cervical, axillary or femoral lymph node enlargement.  CHEST: Good respiratory effort.   ABDOMEN: Soft. No tenderness or masses. No hepatosplenomegaly. No hernias.  BREASTS: Symmetrical, no skin changes, visible " lesions, palpable masses or nipple discharge bilaterally.  PELVIC: External female genitalia without lesions.  Female hair distribution. Adequate perineal body, Normal urethral meatus. Vagina moist and well rugated without lesions or discharge.  No significant cystocele or rectocele present. Cervix pink without lesions, discharge or tenderness. Uterus is 4-6 week size, regular, mobile and nontender. Adnexa without masses or tenderness.  EXTREMITIES: No edema    DIAGNOSIS:  1. Essential hypertension    2. Encounter for gynecological examination without abnormal finding    3. Visit for screening mammogram    4. Adjustment disorder, unspecified type    5. Death of relative    6. History of abnormal cervical Pap smear    7. Menopause present        PLAN:    Orders Placed This Encounter    Ambulatory consult to Psychology    Liquid-based pap smear, screening       COUNSELING:  The patient was counseled today on:  -A.C.S. Pap and pelvic exam guidelines (pap every 3 years), recomendations for yearly mammogram;  -to follow up with her PCP for other health maintenance.    FOLLOW-UP with me annually.

## 2018-10-02 NOTE — PATIENT INSTRUCTIONS
Chantal Edmonds, PhD   [address] [facility] [address]   Phone: [phone]   Fax: [phone]

## 2018-10-24 ENCOUNTER — PES CALL (OUTPATIENT)
Dept: ADMINISTRATIVE | Facility: CLINIC | Age: 68
End: 2018-10-24

## 2018-11-16 ENCOUNTER — TELEPHONE (OUTPATIENT)
Dept: INTERNAL MEDICINE | Facility: CLINIC | Age: 68
End: 2018-11-16

## 2018-11-16 RX ORDER — ESCITALOPRAM OXALATE 10 MG/1
10 TABLET ORAL DAILY
Qty: 30 TABLET | Refills: 3 | Status: SHIPPED | OUTPATIENT
Start: 2018-11-16 | End: 2019-05-17 | Stop reason: SDUPTHER

## 2018-11-16 NOTE — TELEPHONE ENCOUNTER
The patient is experiencing symptoms of depression.  She has had deaths in the family.  She is having crying spells.  She has not had any suicidal or homicidal thoughts.  She does feel somewhat anxious.  A review of her medical record shows that she has used Lexapro previously.  We will start with a low dose of Lexapro 10 mg daily.  The patient was advised that it takes a while for the antidepressant effect to kick in.  She will notify me if she has any adverse side effects.  A new prescription will be sent to the patient's [facility]'s pharmacy.

## 2018-11-16 NOTE — TELEPHONE ENCOUNTER
----- Message from Mildred Fair sent at 11/16/2018  2:21 PM CST -----  Contact: self/[phone]  Pt would like to speak with the doctor in regards to a private matter.

## 2018-11-28 RX ORDER — LORAZEPAM 1 MG/1
TABLET ORAL
Qty: 60 TABLET | Refills: 0 | Status: SHIPPED | OUTPATIENT
Start: 2018-11-28 | End: 2018-12-07 | Stop reason: SDUPTHER

## 2018-12-04 ENCOUNTER — NURSE TRIAGE (OUTPATIENT)
Dept: ADMINISTRATIVE | Facility: CLINIC | Age: 68
End: 2018-12-04

## 2018-12-04 NOTE — TELEPHONE ENCOUNTER
Reason for Disposition   Requesting regular office appointment    Protocols used: ST INFORMATION ONLY CALL-A-AH    Pt calling to schedule appt with PCP. States that last night she was dizzy but is not dizzy this AM or at this time. Requesting appt to refill anxiety medication and also to discuss BP medicine that she has been on for 12 years and is now linked to cancer. appt made for Friday.

## 2018-12-07 ENCOUNTER — HOSPITAL ENCOUNTER (OUTPATIENT)
Dept: RADIOLOGY | Facility: HOSPITAL | Age: 68
Discharge: HOME | End: 2018-12-07
Attending: INTERNAL MEDICINE
Payer: MEDICARE

## 2018-12-07 ENCOUNTER — OFFICE VISIT (OUTPATIENT)
Dept: INTERNAL MEDICINE | Facility: CLINIC | Age: 68
End: 2018-12-07
Payer: MEDICARE

## 2018-12-07 VITALS
HEART RATE: 67 BPM | HEIGHT: 65 IN | BODY MASS INDEX: 33.76 KG/M2 | OXYGEN SATURATION: 97 % | DIASTOLIC BLOOD PRESSURE: 62 MMHG | TEMPERATURE: 99 F | WEIGHT: 202.63 LBS | SYSTOLIC BLOOD PRESSURE: 118 MMHG

## 2018-12-07 DIAGNOSIS — K21.9 GASTROESOPHAGEAL REFLUX DISEASE, ESOPHAGITIS PRESENCE NOT SPECIFIED: ICD-10-CM

## 2018-12-07 DIAGNOSIS — S99.922A INJURY OF LEFT FOOT, INITIAL ENCOUNTER: ICD-10-CM

## 2018-12-07 DIAGNOSIS — D63.8 ANEMIA OF CHRONIC DISEASE: ICD-10-CM

## 2018-12-07 DIAGNOSIS — I10 ESSENTIAL HYPERTENSION: Primary | Chronic | ICD-10-CM

## 2018-12-07 DIAGNOSIS — R58 ECCHYMOSIS ON EXAMINATION: ICD-10-CM

## 2018-12-07 PROCEDURE — 73630 X-RAY EXAM OF FOOT: CPT | Mod: 26,LT,, | Performed by: RADIOLOGY

## 2018-12-07 PROCEDURE — 99214 OFFICE O/P EST MOD 30 MIN: CPT | Mod: S$PBB,,, | Performed by: INTERNAL MEDICINE

## 2018-12-07 PROCEDURE — 99999 PR PBB SHADOW E&M-EST. PATIENT-LVL III: CPT | Mod: PBBFAC,,, | Performed by: INTERNAL MEDICINE

## 2018-12-07 PROCEDURE — 73630 X-RAY EXAM OF FOOT: CPT | Mod: TC,PO,LT

## 2018-12-07 PROCEDURE — 99213 OFFICE O/P EST LOW 20 MIN: CPT | Mod: PBBFAC,25,PO | Performed by: INTERNAL MEDICINE

## 2018-12-07 RX ORDER — LORAZEPAM 1 MG/1
1 TABLET ORAL EVERY 8 HOURS PRN
Qty: 60 TABLET | Refills: 2 | Status: SHIPPED | OUTPATIENT
Start: 2018-12-07 | End: 2019-05-19 | Stop reason: SDUPTHER

## 2018-12-17 NOTE — PROGRESS NOTES
Subjective:       Patient ID: Nan Palafox is a 68 y.o. female.    Chief Complaint: Follow-up (medications); Neck Pain; Flank Pain (left side); and Bleeding/Bruising (left shoulder)    HPI   The patient presents for follow-up of medical conditions.  The patient has been experiencing emotional stress.  Two brothers  over the past year.  She is the only remaining sibling.  She has been using lorazepam usually once at night to help symptoms of anxiety and restlessness.    She has noted a bruise of the left shoulder for the past 2 days.  She does not recall sustain any injury to the area.    Slight tingling of the left lateral chest wall has been noted.    The patient also injured her left foot recently.  Minimal swelling has been noted.    She has hypertension and has been tolerating her medications well without side effects.  Other chronic conditions include anemia of chronic disease, GERD, and arthritis.  She status post left total knee replacement in May 2018 by Dr. Kurt Davis.     Review of Systems   Constitutional: Negative for activity change, appetite change, chills, fatigue, fever and unexpected weight change.   Eyes: Negative for visual disturbance.   Respiratory: Negative for cough and shortness of breath.    Cardiovascular: Negative for chest pain, palpitations and leg swelling.   Gastrointestinal: Negative for abdominal pain, blood in stool, constipation and diarrhea.   Genitourinary: Negative for dysuria and hematuria.   Musculoskeletal: Positive for arthralgias. Negative for neck pain and neck stiffness.   Skin: Negative for rash.   Neurological: Negative for dizziness, syncope and headaches.   Hematological: Negative for adenopathy. Does not bruise/bleed easily.   Psychiatric/Behavioral: Positive for dysphoric mood and sleep disturbance. Negative for hallucinations and suicidal ideas. The patient is nervous/anxious.        Objective:      Physical Exam   Constitutional: She is oriented to  person, place, and time. She appears well-developed and well-nourished. No distress.   HENT:   Head: Normocephalic and atraumatic.   Eyes: Conjunctivae are normal. No scleral icterus.   Neck: Normal range of motion. Neck supple. No JVD present. No thyromegaly present.   Cardiovascular: Normal rate, regular rhythm, normal heart sounds and intact distal pulses. Exam reveals no gallop and no friction rub.   No murmur heard.  Pulmonary/Chest: Effort normal and breath sounds normal. No respiratory distress. She has no wheezes. She has no rales.   Abdominal: Soft. Bowel sounds are normal. She exhibits no mass. There is no tenderness.   Musculoskeletal: Normal range of motion. She exhibits tenderness.   Tenderness to palpation is noted along the dorsal metatarsal area of the left foot.   Lymphadenopathy:     She has no cervical adenopathy.   Neurological: She is alert and oriented to person, place, and time.   Skin: Skin is warm and dry. No rash noted.   A small nontender area of ecchymosis is noted overlying the left shoulder.   Nursing note and vitals reviewed.      Assessment:       1. Essential hypertension    2. Anemia of chronic disease    3. Gastroesophageal reflux disease, esophagitis presence not specified    4. Injury of left foot, initial encounter    5. Ecchymosis on examination        Plan:       Nan was seen today for follow-up.  We discussed use of antidepressant medication however the patient does not wish to utilize any additional medications at this time.  Intermittent use of lorazepam will be allowed.  An x-ray of the left foot will be obtained.  Blood tests will also be obtained today.  A routine follow-up in 4 months is recommended.    Diagnoses and all orders for this visit:    Essential hypertension  -     Comprehensive metabolic panel; Future  -     CBC auto differential; Future    Anemia of chronic disease  -     Comprehensive metabolic panel; Future  -     CBC auto differential;  Future    Gastroesophageal reflux disease, esophagitis presence not specified    Injury of left foot, initial encounter  -     X-Ray Foot Complete Left; Future    Ecchymosis on examination  -     CBC auto differential; Future    Other orders  -     LORazepam (ATIVAN) 1 MG tablet; Take 1 tablet (1 mg total) by mouth every 8 (eight) hours as needed.

## 2019-02-28 ENCOUNTER — PES CALL (OUTPATIENT)
Dept: ADMINISTRATIVE | Facility: CLINIC | Age: 69
End: 2019-02-28

## 2019-03-08 ENCOUNTER — OFFICE VISIT (OUTPATIENT)
Dept: OPTOMETRY | Facility: CLINIC | Age: 69
End: 2019-03-08
Payer: MEDICARE

## 2019-03-08 DIAGNOSIS — H40.053 BILATERAL OCULAR HYPERTENSION: Primary | ICD-10-CM

## 2019-03-08 PROCEDURE — 92012 INTRM OPH EXAM EST PATIENT: CPT | Mod: S$PBB,,, | Performed by: OPTOMETRIST

## 2019-03-08 PROCEDURE — 92012 PR EYE EXAM, EST PATIENT,INTERMED: ICD-10-PCS | Mod: S$PBB,,, | Performed by: OPTOMETRIST

## 2019-03-08 PROCEDURE — 92020 GONIOSCOPY: CPT | Mod: S$PBB,,, | Performed by: OPTOMETRIST

## 2019-03-08 PROCEDURE — 92020 PR SPECIAL EYE EVAL,GONIOSCOPY: ICD-10-PCS | Mod: S$PBB,,, | Performed by: OPTOMETRIST

## 2019-03-08 PROCEDURE — 92020 GONIOSCOPY: CPT | Mod: PBBFAC,PO | Performed by: OPTOMETRIST

## 2019-03-08 PROCEDURE — 99999 PR PBB SHADOW E&M-EST. PATIENT-LVL II: CPT | Mod: PBBFAC,,, | Performed by: OPTOMETRIST

## 2019-03-08 PROCEDURE — 99999 PR PBB SHADOW E&M-EST. PATIENT-LVL II: ICD-10-PCS | Mod: PBBFAC,,, | Performed by: OPTOMETRIST

## 2019-03-08 PROCEDURE — 99212 OFFICE O/P EST SF 10 MIN: CPT | Mod: PBBFAC,PO,25 | Performed by: OPTOMETRIST

## 2019-03-08 RX ORDER — PANTOPRAZOLE SODIUM 40 MG/1
TABLET, DELAYED RELEASE ORAL
Refills: 2 | COMMUNITY
Start: 2018-12-30 | End: 2020-12-30 | Stop reason: CLARIF

## 2019-03-08 RX ORDER — LATANOPROST 50 UG/ML
1 SOLUTION/ DROPS OPHTHALMIC NIGHTLY
Qty: 2.5 ML | Refills: 12 | Status: SHIPPED | OUTPATIENT
Start: 2019-03-08 | End: 2020-03-25 | Stop reason: SDUPTHER

## 2019-03-08 NOTE — MEDICAL/APP STUDENT
C/o difficulty focusing at near x 1 wk  Pt reports issues were viral because it was itching along with focusing issue  FBS OD   (-) gtts

## 2019-03-08 NOTE — PROGRESS NOTES
HPI     Blur ou at dist, x mos, no assoc pain or red, no relief over time,   constant  Some foreign body sensation right eye  Neighbor recently diagnosed with toxo from cat  Did not get recent glasses update  No eye pain    Last edited by Scotty Rivera, OD on 3/8/2019 11:39 AM. (History)            Assessment /Plan     For exam results, see Encounter Report.    Bilateral ocular hypertension  -     latanoprost 0.005 % ophthalmic solution; Place 1 drop into both eyes every evening.  Dispense: 2.5 mL; Refill: 12      1. Not symptomatic, no pain, no steroid drops, no inhalers, gonio appears open, start Latanaprost qhs OU, RTC 1 week with [place] for eval.

## 2019-03-11 ENCOUNTER — INITIAL CONSULT (OUTPATIENT)
Dept: OPHTHALMOLOGY | Facility: CLINIC | Age: 69
End: 2019-03-11
Payer: MEDICARE

## 2019-03-11 DIAGNOSIS — H25.13 NUCLEAR SCLEROSIS OF BOTH EYES: ICD-10-CM

## 2019-03-11 DIAGNOSIS — H40.053 BILATERAL OCULAR HYPERTENSION: Primary | ICD-10-CM

## 2019-03-11 DIAGNOSIS — H40.1132 PRIMARY OPEN ANGLE GLAUCOMA (POAG) OF BOTH EYES, MODERATE STAGE: ICD-10-CM

## 2019-03-11 PROCEDURE — 92020 GONIOSCOPY: CPT | Mod: PBBFAC | Performed by: OPHTHALMOLOGY

## 2019-03-11 PROCEDURE — 99212 OFFICE O/P EST SF 10 MIN: CPT | Mod: PBBFAC,25 | Performed by: OPHTHALMOLOGY

## 2019-03-11 PROCEDURE — 99999 PR PBB SHADOW E&M-EST. PATIENT-LVL II: CPT | Mod: PBBFAC,,, | Performed by: OPHTHALMOLOGY

## 2019-03-11 PROCEDURE — 76514 ECHO EXAM OF EYE THICKNESS: CPT | Mod: PBBFAC | Performed by: OPHTHALMOLOGY

## 2019-03-11 PROCEDURE — 76514 PR  US, EYE, FOR CORNEAL THICKNESS: ICD-10-PCS | Mod: 26,S$PBB,, | Performed by: OPHTHALMOLOGY

## 2019-03-11 PROCEDURE — 92020 GONIOSCOPY: CPT | Mod: S$PBB,,, | Performed by: OPHTHALMOLOGY

## 2019-03-11 PROCEDURE — 99999 PR PBB SHADOW E&M-EST. PATIENT-LVL II: ICD-10-PCS | Mod: PBBFAC,,, | Performed by: OPHTHALMOLOGY

## 2019-03-11 PROCEDURE — 92014 PR EYE EXAM, EST PATIENT,COMPREHESV: ICD-10-PCS | Mod: S$PBB,,, | Performed by: OPHTHALMOLOGY

## 2019-03-11 PROCEDURE — 92020 PR SPECIAL EYE EVAL,GONIOSCOPY: ICD-10-PCS | Mod: S$PBB,,, | Performed by: OPHTHALMOLOGY

## 2019-03-11 PROCEDURE — 92250 COLOR FUNDUS PHOTOGRAPHY - OU - BOTH EYES: ICD-10-PCS | Mod: 26,S$PBB,, | Performed by: OPHTHALMOLOGY

## 2019-03-11 PROCEDURE — 92250 FUNDUS PHOTOGRAPHY W/I&R: CPT | Mod: PBBFAC | Performed by: OPHTHALMOLOGY

## 2019-03-11 PROCEDURE — 92014 COMPRE OPH EXAM EST PT 1/>: CPT | Mod: S$PBB,,, | Performed by: OPHTHALMOLOGY

## 2019-03-11 PROCEDURE — 76514 ECHO EXAM OF EYE THICKNESS: CPT | Mod: 26,S$PBB,, | Performed by: OPHTHALMOLOGY

## 2019-03-11 NOTE — PROGRESS NOTES
Assessment /Plan     For exam results, see Encounter Report.    Bilateral ocular hypertension  -     Posterior Segment OCT Optic Nerve- Both eyes; Future  -     Feliciano Visual Field - OU - Extended - Both Eyes; Future  -     Color Fundus Photography - OU - Both Eyes    Nuclear sclerosis of both eyes    Primary open angle glaucoma (POAG) of both eyes, moderate stage        Dr Miguel [facility] x 17 years  Now --> financial education    OHT  POAG OD > OS  Presented 2019  40 // 24    + FMHx Mother ( 96 yo) & Brother  Denies Blindness    CCT  568 // 567    Both eyes  Xal q day --> started 2019    NSC OU  CE PRN    Hx Retinal Tear OD  RD precautions:  Discussed symptoms of RD with increased flashes, floaters, decreasing vision.  Patient/Family to call and return immediately to clinic should the symptoms of RD occur. Voiced good understanding with Q & A.      Plan  RTC 3 weeks IOP, HVF / OCT RNFL  RTC sooner prn with good understanding

## 2019-03-21 ENCOUNTER — OFFICE VISIT (OUTPATIENT)
Dept: INTERNAL MEDICINE | Facility: CLINIC | Age: 69
End: 2019-03-21
Payer: MEDICARE

## 2019-03-21 VITALS
HEIGHT: 66 IN | SYSTOLIC BLOOD PRESSURE: 121 MMHG | HEART RATE: 60 BPM | TEMPERATURE: 98 F | WEIGHT: 203.94 LBS | BODY MASS INDEX: 32.78 KG/M2 | DIASTOLIC BLOOD PRESSURE: 77 MMHG

## 2019-03-21 DIAGNOSIS — J02.9 SORE THROAT: Primary | ICD-10-CM

## 2019-03-21 LAB — DEPRECATED S PYO AG THROAT QL EIA: NEGATIVE

## 2019-03-21 PROCEDURE — 87081 CULTURE SCREEN ONLY: CPT

## 2019-03-21 PROCEDURE — 99999 PR PBB SHADOW E&M-EST. PATIENT-LVL III: CPT | Mod: PBBFAC,,, | Performed by: INTERNAL MEDICINE

## 2019-03-21 PROCEDURE — 99213 OFFICE O/P EST LOW 20 MIN: CPT | Mod: PBBFAC | Performed by: INTERNAL MEDICINE

## 2019-03-21 PROCEDURE — 87880 STREP A ASSAY W/OPTIC: CPT

## 2019-03-21 PROCEDURE — 99212 PR OFFICE/OUTPT VISIT, EST, LEVL II, 10-19 MIN: ICD-10-PCS | Mod: S$PBB,,, | Performed by: INTERNAL MEDICINE

## 2019-03-21 PROCEDURE — 87147 CULTURE TYPE IMMUNOLOGIC: CPT

## 2019-03-21 PROCEDURE — 99212 OFFICE O/P EST SF 10 MIN: CPT | Mod: S$PBB,,, | Performed by: INTERNAL MEDICINE

## 2019-03-21 PROCEDURE — 99999 PR PBB SHADOW E&M-EST. PATIENT-LVL III: ICD-10-PCS | Mod: PBBFAC,,, | Performed by: INTERNAL MEDICINE

## 2019-03-21 NOTE — PROGRESS NOTES
Clinic Note  3/21/2019      Subjective:       Patient ID:  Nan is a 69 y.o. female being seen for an urgent care visit.    Chief Complaint: Chills; Fever (low grade); Cough (slight); and Fatigue    Cough   This is a new problem. Episode onset: 2 weeks. The problem has been gradually improving. The cough is non-productive. Associated symptoms include chills and a sore throat. Treatments tried: nyquil. The treatment provided moderate relief. Her past medical history is significant for pneumonia (3 years ago).   Fatigue   Associated symptoms include chills, coughing, fatigue and a sore throat.       Review of Systems   Constitutional: Positive for chills and fatigue.   HENT: Positive for sore throat.    Respiratory: Positive for cough.        Medication List with Changes/Refills   Current Medications    AMLODIPINE (NORVASC) 10 MG TABLET    Take 1 tablet (10 mg total) by mouth once daily. For blood pressure control.    ASCORBATE CALCIUM (VITAMIN C ORAL)    Take by mouth.    ASPIRIN (ECOTRIN) 81 MG EC TABLET    Take 81 mg by mouth once daily.    ATORVASTATIN (LIPITOR) 40 MG TABLET    Take 40 mg by mouth every evening.    DECARA 50,000 UNIT CAPSULE    TK 1 C PO WEEKLY    ESCITALOPRAM OXALATE (LEXAPRO) 10 MG TABLET    Take 1 tablet (10 mg total) by mouth once daily.    FLUTICASONE (FLONASE) 50 MCG/ACTUATION NASAL SPRAY    2 sprays by Each Nare route once daily.    LATANOPROST 0.005 % OPHTHALMIC SOLUTION    Place 1 drop into both eyes every evening.    LORAZEPAM (ATIVAN) 1 MG TABLET    Take 1 tablet (1 mg total) by mouth every 8 (eight) hours as needed.    LOSARTAN-HYDROCHLOROTHIAZIDE 100-25 MG (HYZAAR) 100-25 MG PER TABLET        MULTIVITAMIN-IRON-FOLIC ACID (CENTRUM ULTRA WOMEN'S)  MG-MCG TAB    Take by mouth.    OMEPRAZOLE (PRILOSEC) 40 MG CAPSULE        PANTOPRAZOLE (PROTONIX) 40 MG TABLET    TK 1 T PO QAM    ROSUVASTATIN (CRESTOR) 20 MG TABLET    TK 1 T PO QD    VITAMIN D 1000 UNITS TAB    Take 1,000 Units by  "mouth.       Patient Active Problem List   Diagnosis    Nuclear sclerosis - Both Eyes    History of colon polyps    GIST (gastrointestinal stromal tumor), non-malignant    Benign hypertension    Anemia of chronic disease    GERD without esophagitis    Cervical syndrome    Cervical spondylosis with radiculopathy    Bilateral ocular hypertension    Primary open angle glaucoma (POAG) of both eyes, moderate stage           Objective:      /77   Pulse 60   Temp 98.2 °F (36.8 °C)   Ht 5' 6" (1.676 m)   Wt 92.5 kg (203 lb 14.8 oz)   BMI 32.91 kg/m²   Estimated body mass index is 32.91 kg/m² as calculated from the following:    Height as of this encounter: 5' 6" (1.676 m).    Weight as of this encounter: 92.5 kg (203 lb 14.8 oz).  Physical Exam   Constitutional: She is well-developed, well-nourished, and in no distress.   HENT:   Right Ear: Tympanic membrane normal.   Left Ear: Tympanic membrane normal.   Mouth/Throat: Posterior oropharyngeal edema and posterior oropharyngeal erythema present. No oropharyngeal exudate or tonsillar abscesses.   Cardiovascular: Normal rate and normal heart sounds.   Pulmonary/Chest: Effort normal and breath sounds normal.         Assessment and Plan:         Problem List Items Addressed This Visit     None      Visit Diagnoses     Sore throat    -  Primary    Relevant Orders    Throat Screen, Rapid - if + will give abx o/w conservative otc meds for symptoms.           Follow Up:   Follow-up if symptoms worsen or fail to improve.        Gonzalo Kerr"

## 2019-03-23 LAB — BACTERIA THROAT CULT: NORMAL

## 2019-04-18 NOTE — PROGRESS NOTES
Assessment /Plan     For exam results, see Encounter Report.    Primary open angle glaucoma (POAG) of both eyes, moderate stage    Bilateral ocular hypertension    Nuclear sclerosis of both eyes        Dr Rivera      [address]  Now --> financial education      [employer] full scholarship  Business // law // technology    OHT  POAG OD > OS  Presented 2019  40 // 24    + FMHx Mother ( 96 yo) & Brother  Denies Blindness    CCT  568 // 567    Both eyes  Xal q day --> started 2019    NSC OU  CE PRN    Hx Retinal Tear OD  RD precautions:  Discussed symptoms of RD with increased flashes, floaters, decreasing vision.  Patient/Family to call and return immediately to clinic should the symptoms of RD occur. Voiced good understanding with Q & A.      Plan  RTC 2 months IOP,  OCT RNFL  RTC sooner prn with good understanding

## 2019-04-23 ENCOUNTER — CLINICAL SUPPORT (OUTPATIENT)
Dept: OPHTHALMOLOGY | Facility: CLINIC | Age: 69
End: 2019-04-23
Payer: MEDICARE

## 2019-04-23 ENCOUNTER — OFFICE VISIT (OUTPATIENT)
Dept: OPHTHALMOLOGY | Facility: CLINIC | Age: 69
End: 2019-04-23
Payer: MEDICARE

## 2019-04-23 DIAGNOSIS — H40.1132 PRIMARY OPEN ANGLE GLAUCOMA (POAG) OF BOTH EYES, MODERATE STAGE: Primary | ICD-10-CM

## 2019-04-23 DIAGNOSIS — H40.053 BILATERAL OCULAR HYPERTENSION: ICD-10-CM

## 2019-04-23 DIAGNOSIS — H25.13 NUCLEAR SCLEROSIS OF BOTH EYES: ICD-10-CM

## 2019-04-23 PROCEDURE — 92083 EXTENDED VISUAL FIELD XM: CPT | Mod: 26,S$PBB,, | Performed by: OPHTHALMOLOGY

## 2019-04-23 PROCEDURE — 92012 PR EYE EXAM, EST PATIENT,INTERMED: ICD-10-PCS | Mod: S$PBB,,, | Performed by: OPHTHALMOLOGY

## 2019-04-23 PROCEDURE — 92083 HUMPHREY VISUAL FIELD - OU - BOTH EYES: ICD-10-PCS | Mod: 26,S$PBB,, | Performed by: OPHTHALMOLOGY

## 2019-04-23 PROCEDURE — 92083 EXTENDED VISUAL FIELD XM: CPT | Mod: PBBFAC

## 2019-04-23 PROCEDURE — 99999 PR PBB SHADOW E&M-EST. PATIENT-LVL II: CPT | Mod: PBBFAC,,, | Performed by: OPHTHALMOLOGY

## 2019-04-23 PROCEDURE — 92012 INTRM OPH EXAM EST PATIENT: CPT | Mod: S$PBB,,, | Performed by: OPHTHALMOLOGY

## 2019-04-23 PROCEDURE — 99999 PR PBB SHADOW E&M-EST. PATIENT-LVL II: ICD-10-PCS | Mod: PBBFAC,,, | Performed by: OPHTHALMOLOGY

## 2019-04-23 PROCEDURE — 99212 OFFICE O/P EST SF 10 MIN: CPT | Mod: PBBFAC,25 | Performed by: OPHTHALMOLOGY

## 2019-05-06 ENCOUNTER — HOSPITAL ENCOUNTER (EMERGENCY)
Facility: OTHER | Age: 69
Discharge: HOME | End: 2019-05-06
Attending: EMERGENCY MEDICINE
Payer: MEDICARE

## 2019-05-06 VITALS
RESPIRATION RATE: 18 BRPM | SYSTOLIC BLOOD PRESSURE: 133 MMHG | WEIGHT: 201 LBS | HEART RATE: 73 BPM | OXYGEN SATURATION: 98 % | TEMPERATURE: 98 F | HEIGHT: 65 IN | DIASTOLIC BLOOD PRESSURE: 74 MMHG | BODY MASS INDEX: 33.49 KG/M2

## 2019-05-06 DIAGNOSIS — R05.9 COUGH: ICD-10-CM

## 2019-05-06 PROCEDURE — 99283 EMERGENCY DEPT VISIT LOW MDM: CPT

## 2019-05-06 RX ORDER — AZITHROMYCIN 250 MG/1
TABLET, FILM COATED ORAL
Qty: 6 TABLET | Refills: 0 | Status: SHIPPED | OUTPATIENT
Start: 2019-05-06 | End: 2019-06-26

## 2019-05-06 NOTE — ED PROVIDER NOTES
Encounter Date: 5/6/2019    SCRIBE #1 NOTE: I, Natalya Chan, am scribing for, and in the presence of, Dr. Anton.       History     Chief Complaint   Patient presents with    Cough     Pt reports clear productive cough and SOB with exertion for the past two weeks.      Time seen by provider: 4:25 PM    This is a 69 y.o. female who presents with complaint of a productive cough and shortness of breath that has been intermittent for two weeks. The patient reports shortness of breath is exacerbated with minor exertion such as walking across the street, and worse at night. She denies having to sit up in the middle of the night to breath better. She denies night diaphoresis, unintentional weight loss, fever, or chills. The patient reports taking cough medication with codeine in it, but had no relief. She denies sick contact. The patient also denies heart or kidney problems. She denies tobacco use.    The history is provided by the patient.     Review of patient's allergies indicates:  No Known Allergies  Past Medical History:   Diagnosis Date    Cataract     Cervical spondylosis with radiculopathy 4/11/2016    Hypertension     Nuclear sclerosis - Both Eyes 2/8/2013    PNA (pneumonia)     Primary open angle glaucoma (POAG) of both eyes, moderate stage 3/11/2019     Past Surgical History:   Procedure Laterality Date    BREAST BIOPSY Right     excisional bx    BREAST SURGERY Right     Excisional bx    RETINAL DETACHMENT SURGERY      patient states that she had a retinal tear that was repaired in the past at [facility] but she is uncertain of which eye    STOMACH SURGERY      TOTAL KNEE ARTHROPLASTY Right      Family History   Problem Relation Age of Onset    Glaucoma Mother     Cataracts Mother     Blindness Mother     Cancer Mother 68        colon    Glaucoma Brother     Cataracts Brother     Cancer Brother         esophageal    No Known Problems Father     No Known Problems Sister     No Known Problems  Maternal Aunt     No Known Problems Maternal Uncle     No Known Problems Paternal Aunt     No Known Problems Paternal Uncle     No Known Problems Maternal Grandmother     No Known Problems Maternal Grandfather     No Known Problems Paternal Grandmother     No Known Problems Paternal Grandfather     Amblyopia Neg Hx     Macular degeneration Neg Hx     Retinal detachment Neg Hx     Strabismus Neg Hx     Diabetes Neg Hx     Hypertension Neg Hx     Stroke Neg Hx     Thyroid disease Neg Hx     Breast cancer Neg Hx     Ovarian cancer Neg Hx      Social History     Tobacco Use    Smoking status: Never Smoker    Smokeless tobacco: Never Used   Substance Use Topics    Alcohol use: Yes     Alcohol/week: 1.2 oz     Types: 2 Glasses of wine per week    Drug use: Not on file     Review of Systems   Constitutional: Negative for chills, diaphoresis, fever and unexpected weight change.   HENT: Negative for sore throat.    Respiratory: Positive for cough and shortness of breath.    Cardiovascular: Negative for chest pain.   Gastrointestinal: Negative for nausea.   Genitourinary: Negative for dysuria.   Musculoskeletal: Negative for back pain.   Skin: Negative for rash.   Neurological: Negative for weakness.   Hematological: Does not bruise/bleed easily.       Physical Exam     Initial Vitals [05/06/19 1526]   BP Pulse Resp Temp SpO2   126/61 76 18 98.7 °F (37.1 °C) 99 %      MAP       --         Physical Exam    Nursing note and vitals reviewed.  Constitutional: She appears well-developed and well-nourished. She is not diaphoretic. No distress.   HENT:   Head: Normocephalic and atraumatic.   Minimal erythema of posterior oropharynx.   Eyes: Conjunctivae and EOM are normal. No scleral icterus.   Neck: Normal range of motion. Neck supple. No JVD present.   Cardiovascular: Normal rate, regular rhythm and normal heart sounds. Exam reveals no gallop and no friction rub.    No murmur heard.  Pulmonary/Chest: Breath  sounds normal. No respiratory distress. She has no wheezes. She has no rhonchi. She has no rales.   Musculoskeletal: Normal range of motion. She exhibits no edema or tenderness.   No leg edema.   Lymphadenopathy:     She has no cervical adenopathy.   Neurological: She is alert and oriented to person, place, and time.   Skin: Skin is warm and dry. No rash noted. No erythema. No pallor.         ED Course   Procedures  Labs Reviewed - No data to display       Imaging Results          X-Ray Chest PA And Lateral (Final result)  Result time 05/06/19 15:43:16    Final result by Perry Rhodes MD (05/06/19 15:43:16)                 Impression:      1. No acute cardiopulmonary process.      Electronically signed by: Perry Rhodes MD  Date:    05/06/2019  Time:    15:43             Narrative:    EXAMINATION:  XR CHEST PA AND LATERAL    CLINICAL HISTORY:  Cough    TECHNIQUE:  PA and lateral views of the chest were performed.    COMPARISON:  09/23/2016    FINDINGS:  The cardiomediastinal silhouette is not enlarged, noting calcification and tortuosity of the aorta..  There is no pleural effusion.  The trachea is midline.  The lungs are symmetrically expanded bilaterally without evidence of acute parenchymal process. No large focal consolidation seen.  There is no pneumothorax.  The osseous structures are remarkable for degenerative changes.                              X-Rays:   Independently Interpreted Readings:   Chest X-Ray: No large consolidation or effusion.     Medical Decision Making:   Independently Interpreted Test(s):   I have ordered and independently interpreted X-rays - see prior notes.  Clinical Tests:   Lab Tests: Ordered and Reviewed  Radiological Study: Ordered and Reviewed    Additional MDM:   Comments: 69-year-old female presents complaining of a nonproductive cough that has been intermittent x2 weeks with associated shortness of breath.  Vital signs within normal limits. Physical exam unremarkable. No  evidence of fluid overload or respiratory distress. Chest x-ray within normal limits. Presentation concerning for pertussis.  I did discuss this with the patient as well as the plan to tests were for testis.  Unfortunately, there was no nasal swab available in the entire hospital.  She was given a prescription for azithromycin instructed to follow up with her primary care doctor within the next 3-5 days for re-evaluation..          Scribe Attestation:   Scribe #1: I performed the above scribed service and the documentation accurately describes the services I performed. I attest to the accuracy of the note.    Attending Attestation:           Physician Attestation for Scribe:  Physician Attestation Statement for Scribe #1: I, Dr. Anton, reviewed documentation, as scribed by Natalya Chan in my presence, and it is both accurate and complete.                    Clinical Impression:     1. Cough                                   Staci Anton MD  05/06/19 1963

## 2019-05-06 NOTE — ED TRIAGE NOTES
Pt reports SOB x 2 weeks. Pt reports that she has had a cough on and off for the last week and having her voice going out over last few days. Pt reports no relief with OTC medication. Reports cough is worse at night. Reports cough is productive. Denies any new chills/sweats.

## 2019-05-17 RX ORDER — ESCITALOPRAM OXALATE 10 MG/1
TABLET ORAL
Qty: 30 TABLET | Refills: 0 | Status: SHIPPED | OUTPATIENT
Start: 2019-05-17 | End: 2019-06-26 | Stop reason: SDUPTHER

## 2019-05-21 RX ORDER — LORAZEPAM 1 MG/1
TABLET ORAL
Qty: 60 TABLET | Refills: 0 | Status: SHIPPED | OUTPATIENT
Start: 2019-05-21 | End: 2019-07-24 | Stop reason: SDUPTHER

## 2019-06-05 ENCOUNTER — TELEPHONE (OUTPATIENT)
Dept: OPTOMETRY | Facility: CLINIC | Age: 69
End: 2019-06-05

## 2019-06-23 NOTE — PROGRESS NOTES
Assessment /Plan     For exam results, see Encounter Report.    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  Patient/Family to call and return immediately to clinic should the symptoms of RD occur. Voiced good understanding with Q & A.      Plan  RTC 6 months IOP  RTC sooner prn with good understanding

## 2019-06-26 ENCOUNTER — OFFICE VISIT (OUTPATIENT)
Dept: OPHTHALMOLOGY | Facility: CLINIC | Age: 69
End: 2019-06-26
Payer: MEDICARE

## 2019-06-26 DIAGNOSIS — H40.1132 PRIMARY OPEN ANGLE GLAUCOMA (POAG) OF BOTH EYES, MODERATE STAGE: Primary | ICD-10-CM

## 2019-06-26 DIAGNOSIS — H25.13 NUCLEAR SCLEROSIS OF BOTH EYES: ICD-10-CM

## 2019-06-26 DIAGNOSIS — H40.053 BILATERAL OCULAR HYPERTENSION: ICD-10-CM

## 2019-06-26 PROCEDURE — 92012 INTRM OPH EXAM EST PATIENT: CPT | Mod: S$PBB,,, | Performed by: OPHTHALMOLOGY

## 2019-06-26 PROCEDURE — 92012 PR EYE EXAM, EST PATIENT,INTERMED: ICD-10-PCS | Mod: S$PBB,,, | Performed by: OPHTHALMOLOGY

## 2019-06-26 PROCEDURE — 92133 CPTRZD OPH DX IMG PST SGM ON: CPT | Mod: PBBFAC | Performed by: OPHTHALMOLOGY

## 2019-06-26 PROCEDURE — 99999 PR PBB SHADOW E&M-EST. PATIENT-LVL II: CPT | Mod: PBBFAC,,, | Performed by: OPHTHALMOLOGY

## 2019-06-26 PROCEDURE — 99999 PR PBB SHADOW E&M-EST. PATIENT-LVL II: ICD-10-PCS | Mod: PBBFAC,,, | Performed by: OPHTHALMOLOGY

## 2019-06-26 PROCEDURE — 99212 OFFICE O/P EST SF 10 MIN: CPT | Mod: PBBFAC,25 | Performed by: OPHTHALMOLOGY

## 2019-06-26 PROCEDURE — 92133 POSTERIOR SEGMENT OCT OPTIC NERVE(OCULAR COHERENCE TOMOGRAPHY) - OU - BOTH EYES: ICD-10-PCS | Mod: 26,S$PBB,, | Performed by: OPHTHALMOLOGY

## 2019-06-27 RX ORDER — ESCITALOPRAM OXALATE 10 MG/1
TABLET ORAL
Qty: 30 TABLET | Refills: 3 | Status: SHIPPED | OUTPATIENT
Start: 2019-06-27 | End: 2019-12-09

## 2019-07-24 RX ORDER — LORAZEPAM 1 MG/1
TABLET ORAL
Qty: 60 TABLET | Refills: 0 | Status: SHIPPED | OUTPATIENT
Start: 2019-07-24 | End: 2019-09-11 | Stop reason: SDUPTHER

## 2019-08-27 ENCOUNTER — HOSPITAL ENCOUNTER (OUTPATIENT)
Dept: RADIOLOGY | Facility: HOSPITAL | Age: 69
Discharge: HOME | End: 2019-08-27
Attending: HOSPITALIST
Payer: MEDICARE

## 2019-08-27 ENCOUNTER — TELEPHONE (OUTPATIENT)
Dept: INTERNAL MEDICINE | Facility: CLINIC | Age: 69
End: 2019-08-27

## 2019-08-27 ENCOUNTER — OFFICE VISIT (OUTPATIENT)
Dept: INTERNAL MEDICINE | Facility: CLINIC | Age: 69
End: 2019-08-27
Payer: MEDICARE

## 2019-08-27 VITALS
RESPIRATION RATE: 18 BRPM | BODY MASS INDEX: 33.21 KG/M2 | SYSTOLIC BLOOD PRESSURE: 104 MMHG | HEART RATE: 84 BPM | TEMPERATURE: 99 F | WEIGHT: 199.31 LBS | DIASTOLIC BLOOD PRESSURE: 80 MMHG | HEIGHT: 65 IN

## 2019-08-27 DIAGNOSIS — B96.89 ACUTE BACTERIAL BRONCHITIS: Primary | ICD-10-CM

## 2019-08-27 DIAGNOSIS — J20.8 ACUTE BACTERIAL BRONCHITIS: Primary | ICD-10-CM

## 2019-08-27 DIAGNOSIS — R52 GENERALIZED BODY ACHES: ICD-10-CM

## 2019-08-27 DIAGNOSIS — J20.9 ACUTE BRONCHITIS, UNSPECIFIED ORGANISM: ICD-10-CM

## 2019-08-27 DIAGNOSIS — I10 BENIGN HYPERTENSION: ICD-10-CM

## 2019-08-27 LAB
INFLUENZA A, MOLECULAR: NEGATIVE
INFLUENZA B, MOLECULAR: NEGATIVE
SPECIMEN SOURCE: NORMAL

## 2019-08-27 PROCEDURE — 71046 X-RAY EXAM CHEST 2 VIEWS: CPT | Mod: TC,PO

## 2019-08-27 PROCEDURE — 99214 PR OFFICE/OUTPT VISIT, EST, LEVL IV, 30-39 MIN: ICD-10-PCS | Mod: S$PBB,,, | Performed by: HOSPITALIST

## 2019-08-27 PROCEDURE — 87502 INFLUENZA DNA AMP PROBE: CPT | Mod: PO

## 2019-08-27 PROCEDURE — 71046 XR CHEST PA AND LATERAL: ICD-10-PCS | Mod: 26,,, | Performed by: RADIOLOGY

## 2019-08-27 PROCEDURE — 99999 PR PBB SHADOW E&M-EST. PATIENT-LVL III: CPT | Mod: PBBFAC,,, | Performed by: HOSPITALIST

## 2019-08-27 PROCEDURE — 99213 OFFICE O/P EST LOW 20 MIN: CPT | Mod: PBBFAC,25,PO | Performed by: HOSPITALIST

## 2019-08-27 PROCEDURE — 99999 PR PBB SHADOW E&M-EST. PATIENT-LVL III: ICD-10-PCS | Mod: PBBFAC,,, | Performed by: HOSPITALIST

## 2019-08-27 PROCEDURE — 99214 OFFICE O/P EST MOD 30 MIN: CPT | Mod: S$PBB,,, | Performed by: HOSPITALIST

## 2019-08-27 PROCEDURE — 71046 X-RAY EXAM CHEST 2 VIEWS: CPT | Mod: 26,,, | Performed by: RADIOLOGY

## 2019-08-27 RX ORDER — CODEINE PHOSPHATE AND GUAIFENESIN 10; 100 MG/5ML; MG/5ML
5 SOLUTION ORAL NIGHTLY PRN
Qty: 118 ML | Refills: 0 | Status: SHIPPED | OUTPATIENT
Start: 2019-08-27 | End: 2019-09-06

## 2019-08-27 RX ORDER — AMOXICILLIN AND CLAVULANATE POTASSIUM 875; 125 MG/1; MG/1
1 TABLET, FILM COATED ORAL 2 TIMES DAILY
Qty: 14 TABLET | Refills: 0 | Status: SHIPPED | OUTPATIENT
Start: 2019-08-27 | End: 2019-09-03

## 2019-08-27 RX ORDER — VALSARTAN AND HYDROCHLOROTHIAZIDE 320; 25 MG/1; MG/1
TABLET, FILM COATED ORAL
Refills: 3 | COMMUNITY
Start: 2019-08-19 | End: 2020-12-30 | Stop reason: CLARIF

## 2019-08-27 RX ORDER — BENZONATATE 100 MG/1
100 CAPSULE ORAL 3 TIMES DAILY PRN
Qty: 30 CAPSULE | Refills: 1 | Status: SHIPPED | OUTPATIENT
Start: 2019-08-27 | End: 2019-09-06

## 2019-08-27 NOTE — TELEPHONE ENCOUNTER
----- Message from Lalita Schilling MD sent at 8/27/2019 12:25 PM CDT -----  Please notify pt that CXR is normal. Flu swab is negative

## 2019-08-27 NOTE — PROGRESS NOTES
"Subjective:     @Patient ID: Nan Palafox is a 69 y.o. female.    Chief Complaint: Generalized Body Aches (Since Sunday); Fever; Cough (Dry cough); and Sore Throat    HPI    70 yo F presents for urgent visit with cough, sore throat, fever/chills and pain in L side with breathing that started 3 days ago  Endorses chills, Dry cough,  Endorses postnasal drip  Endorses congestion  No runny nose.  No sob.   Was recently in [place] seeing her grandson at [facility]  Has taken aspirin, tylenol ]  Pt reports concern for pneumonia which she has had in the past       Review of Systems   Constitutional: Positive for chills and fever.   HENT: Positive for congestion, postnasal drip and sore throat. Negative for rhinorrhea.    Eyes: Negative for pain and visual disturbance.   Respiratory: Positive for cough. Negative for shortness of breath.         L Pleuritic pain   Cardiovascular: Negative for chest pain and leg swelling.   Gastrointestinal: Negative for abdominal pain, nausea and vomiting.   Endocrine: Negative for polydipsia and polyuria.   Genitourinary: Negative for difficulty urinating and dysuria.   Musculoskeletal: Positive for myalgias. Negative for arthralgias and back pain.   Skin: Negative for rash and wound.   Neurological: Negative for dizziness, weakness and headaches.   Psychiatric/Behavioral: Negative for agitation and confusion.     Past medical history, surgical history, and family medical history reviewed and updated as appropriate.    Medications and allergies reviewed.     Objective:     Vitals:    08/27/19 0920   BP: 104/80   BP Location: Right arm   Patient Position: Sitting   BP Method: Large (Manual)   Pulse: 84   Resp: 18   Temp: 99 °F (37.2 °C)   TempSrc: Oral   Weight: 90.4 kg (199 lb 4.7 oz)   Height: 5' 5" (1.651 m)     Body mass index is 33.16 kg/m².  Physical Exam   Constitutional: She is oriented to person, place, and time. She appears well-developed. No distress.   HENT:   Head: Normocephalic and " atraumatic.   Right Ear: External ear normal.   Left Ear: External ear normal.   Mouth/Throat: Oropharynx is clear and moist. No oropharyngeal exudate.   Normal TM b/l    Eyes: Conjunctivae are normal. Right eye exhibits no discharge. Left eye exhibits no discharge.   Neck: Normal range of motion. Neck supple.   Cardiovascular: Normal rate, regular rhythm and intact distal pulses. Exam reveals no friction rub.   Pulmonary/Chest: Effort normal and breath sounds normal.   Abdominal: Soft. Bowel sounds are normal. She exhibits no distension. There is no tenderness. There is no guarding.   Musculoskeletal: Normal range of motion. She exhibits no edema.   Lymphadenopathy:     She has no cervical adenopathy.   Neurological: She is alert and oriented to person, place, and time.   Skin: Skin is warm and dry.   Psychiatric: She has a normal mood and affect. Her behavior is normal.   Vitals reviewed.      Lab Results   Component Value Date    WBC 5.84 12/07/2018    HGB 11.1 (L) 12/07/2018    HCT 34.6 (L) 12/07/2018     (H) 12/07/2018    CHOL 179 04/22/2016    TRIG 39 04/22/2016    HDL 72 04/22/2016    ALT 11 12/07/2018    AST 23 12/07/2018     12/07/2018    K 4.1 12/07/2018    CL 99 12/07/2018    CREATININE 0.8 12/07/2018    BUN 15 12/07/2018    CO2 31 (H) 12/07/2018    TSH 1.384 09/08/2018    INR 1.1 11/18/2015    HGBA1C 5.2 03/01/2006       Assessment:     1. Acute bronchitis, unspecified organism      Plan:   Nan was seen today for generalized body aches, fever, cough and sore throat.    Diagnoses and all orders for this visit:    Acute bacterial bronchitis  -     amoxicillin-clavulanate 875-125mg (AUGMENTIN) 875-125 mg per tablet; Take 1 tablet by mouth 2 (two) times daily. for 7 days  -     benzonatate (TESSALON) 100 MG capsule; Take 1 capsule (100 mg total) by mouth 3 (three) times daily as needed.  -     guaifenesin-codeine 100-10 mg/5 ml (TUSSI-ORGANIDIN NR)  mg/5 mL syrup; Take 5 mLs by mouth  nightly as needed for Cough (medically necessary greater than 7 days for bronchitis). Pt counseled on possible sedation  -     X-Ray Chest PA And Lateral; Future- will r/o pneumonia     Generalized body aches  -     Influenza A & B by Molecular    Benign hypertension        - BP controlled. Continue home meds          Follow up if symptoms worsen or fail to improve.    Lalita Schilling MD  Internal Medicine    8/27/2019

## 2019-08-30 RX ORDER — ESCITALOPRAM OXALATE 10 MG/1
TABLET ORAL
Qty: 30 TABLET | Refills: 3 | Status: SHIPPED | OUTPATIENT
Start: 2019-08-30 | End: 2019-12-09

## 2019-09-10 ENCOUNTER — TELEPHONE (OUTPATIENT)
Dept: INTERNAL MEDICINE | Facility: CLINIC | Age: 69
End: 2019-09-10

## 2019-09-10 NOTE — TELEPHONE ENCOUNTER
Patient still coughing. Completed antibiotics. States that the cough syrup did not help. Patient had low grade temp only. Please advise.

## 2019-09-10 NOTE — TELEPHONE ENCOUNTER
----- Message from Clarisa Hills sent at 9/10/2019  1:11 PM CDT -----  Contact: [phone]  Patient would like to get medical advice.  Symptoms (please be specific): cough   How long has patient had these symptoms:    Pharmacy name and phone # (copy/paste from chart): [id number] [address] AT [facility]   [phone] (Phone)  [phone] (Fax)  Would the patient rather a call back or a response via [facility]?:  Call back  Comments:  Patient was seen by MD on 8/27 she still has a severe cough.  Please advise, thanks

## 2019-09-11 ENCOUNTER — OFFICE VISIT (OUTPATIENT)
Dept: INTERNAL MEDICINE | Facility: CLINIC | Age: 69
End: 2019-09-11
Payer: MEDICARE

## 2019-09-11 VITALS
HEIGHT: 61 IN | DIASTOLIC BLOOD PRESSURE: 77 MMHG | TEMPERATURE: 98 F | SYSTOLIC BLOOD PRESSURE: 122 MMHG | WEIGHT: 201.94 LBS | BODY MASS INDEX: 38.13 KG/M2 | HEART RATE: 60 BPM

## 2019-09-11 DIAGNOSIS — B96.89 ACUTE BACTERIAL BRONCHITIS: Primary | ICD-10-CM

## 2019-09-11 DIAGNOSIS — I10 ESSENTIAL HYPERTENSION: Primary | ICD-10-CM

## 2019-09-11 DIAGNOSIS — J20.8 ACUTE BACTERIAL BRONCHITIS: Primary | ICD-10-CM

## 2019-09-11 PROCEDURE — 99999 PR PBB SHADOW E&M-EST. PATIENT-LVL III: CPT | Mod: PBBFAC,,, | Performed by: HOSPITALIST

## 2019-09-11 PROCEDURE — 99213 OFFICE O/P EST LOW 20 MIN: CPT | Mod: S$PBB,,, | Performed by: HOSPITALIST

## 2019-09-11 PROCEDURE — 96372 THER/PROPH/DIAG INJ SC/IM: CPT | Mod: PBBFAC,PO

## 2019-09-11 PROCEDURE — 99999 PR PBB SHADOW E&M-EST. PATIENT-LVL III: ICD-10-PCS | Mod: PBBFAC,,, | Performed by: HOSPITALIST

## 2019-09-11 PROCEDURE — 99213 PR OFFICE/OUTPT VISIT, EST, LEVL III, 20-29 MIN: ICD-10-PCS | Mod: S$PBB,,, | Performed by: HOSPITALIST

## 2019-09-11 PROCEDURE — 99213 OFFICE O/P EST LOW 20 MIN: CPT | Mod: PBBFAC,PO,25 | Performed by: HOSPITALIST

## 2019-09-11 RX ORDER — ALBUTEROL SULFATE 90 UG/1
2 AEROSOL, METERED RESPIRATORY (INHALATION) EVERY 6 HOURS PRN
Qty: 1 INHALER | Refills: 0 | Status: SHIPPED | OUTPATIENT
Start: 2019-09-11 | End: 2021-07-01

## 2019-09-11 RX ORDER — BENZONATATE 200 MG/1
200 CAPSULE ORAL 3 TIMES DAILY PRN
Qty: 30 CAPSULE | Refills: 1 | Status: SHIPPED | OUTPATIENT
Start: 2019-09-11 | End: 2019-09-21

## 2019-09-11 RX ORDER — CODEINE PHOSPHATE AND GUAIFENESIN 10; 100 MG/5ML; MG/5ML
5 SOLUTION ORAL EVERY 4 HOURS PRN
Qty: 236 ML | Refills: 0 | Status: SHIPPED | OUTPATIENT
Start: 2019-09-11 | End: 2019-09-21

## 2019-09-11 RX ORDER — TRIAMCINOLONE ACETONIDE 40 MG/ML
40 INJECTION, SUSPENSION INTRA-ARTICULAR; INTRAMUSCULAR
Status: COMPLETED | OUTPATIENT
Start: 2019-09-11 | End: 2019-09-11

## 2019-09-11 RX ORDER — AZITHROMYCIN 250 MG/1
TABLET, FILM COATED ORAL
Qty: 6 TABLET | Refills: 0 | Status: SHIPPED | OUTPATIENT
Start: 2019-09-11 | End: 2019-09-16

## 2019-09-11 RX ADMIN — TRIAMCINOLONE ACETONIDE 40 MG: 40 INJECTION, SUSPENSION INTRA-ARTICULAR; INTRAMUSCULAR at 09:09

## 2019-09-11 NOTE — PROGRESS NOTES
"Subjective:     @Patient ID: Nan Palafox is a 69 y.o. female.    Chief Complaint: Cough and Sore Throat    HPI  70 yo F   Presents with cough and subjective fevers/chills. Seen 2 weeks ago for similar sx and treated with augmentin for sinusitis. Endorses continued cough. Unable to sleep since then.   Reports she had cough and clear sputum now.   No postnasal drip and congestion  Sore throat is improving.   Not yet feeling herself. Feels tired  Sometimes feels sob with exertion    Review of Systems   Constitutional: Positive for chills, fatigue and fever.   HENT: Negative for congestion, postnasal drip and sore throat.    Eyes: Negative for pain and visual disturbance.   Respiratory: Positive for cough. Negative for shortness of breath.    Cardiovascular: Negative for chest pain and leg swelling.   Gastrointestinal: Negative for abdominal pain, nausea and vomiting.   Endocrine: Negative for polydipsia and polyuria.   Genitourinary: Negative for difficulty urinating and dysuria.   Musculoskeletal: Negative for arthralgias and back pain.   Skin: Negative for rash and wound.   Neurological: Negative for dizziness, weakness and headaches.   Psychiatric/Behavioral: Negative for agitation and confusion.     Past medical history, surgical history, and family medical history reviewed and updated as appropriate.    Medications and allergies reviewed.     Objective:     Vitals:    09/11/19 0914   BP: 122/77   BP Location: Right arm   Patient Position: Sitting   BP Method: Medium (Manual)   Pulse: 60   Temp: 98.4 °F (36.9 °C)   TempSrc: Oral   Weight: 91.6 kg (201 lb 15.1 oz)   Height: 5' 1" (1.549 m)     Body mass index is 38.16 kg/m².  Physical Exam   Constitutional: She is oriented to person, place, and time. She appears well-developed and well-nourished. No distress.   HENT:   Head: Normocephalic and atraumatic.   Mouth/Throat: Oropharynx is clear and moist. No oropharyngeal exudate.   Eyes: Conjunctivae are normal. " Right eye exhibits no discharge. Left eye exhibits no discharge.   Neck: Normal range of motion. Neck supple.   Cardiovascular: Normal rate, regular rhythm and intact distal pulses. Exam reveals no friction rub.   No murmur heard.  Pulmonary/Chest: Effort normal and breath sounds normal.   Musculoskeletal: Normal range of motion. She exhibits no edema.   Neurological: She is alert and oriented to person, place, and time.   Skin: Skin is warm and dry.   Psychiatric: She has a normal mood and affect. Her behavior is normal.   Vitals reviewed.      Lab Results   Component Value Date    WBC 5.84 12/07/2018    HGB 11.1 (L) 12/07/2018    HCT 34.6 (L) 12/07/2018     (H) 12/07/2018    CHOL 179 04/22/2016    TRIG 39 04/22/2016    HDL 72 04/22/2016    ALT 11 12/07/2018    AST 23 12/07/2018     12/07/2018    K 4.1 12/07/2018    CL 99 12/07/2018    CREATININE 0.8 12/07/2018    BUN 15 12/07/2018    CO2 31 (H) 12/07/2018    TSH 1.384 09/08/2018    INR 1.1 11/18/2015    HGBA1C 5.2 03/01/2006       Assessment:     1. Acute bacterial bronchitis      Plan:   Nan was seen today for cough and sore throat.    Diagnoses and all orders for this visit:    Acute bacterial bronchitis  - Encourage hydration  -     guaifenesin-codeine 100-10 mg/5 ml (TUSSI-ORGANIDIN NR)  mg/5 mL syrup; Take 5 mLs by mouth every 4 (four) hours as needed for Cough. Medically necessary for greater than 7 days for bronchitis. Pt aware about possible sedation  -     triamcinolone acetonide injection 40 mg  -     albuterol (VENTOLIN HFA) 90 mcg/actuation inhaler; Inhale 2 puffs into the lungs every 6 (six) hours as needed for Wheezing or Shortness of Breath. Rescue  -     benzonatate (TESSALON) 200 MG capsule; Take 1 capsule (200 mg total) by mouth 3 (three) times daily as needed for Cough.  -     azithromycin (Z-CRISS) 250 MG tablet; Take 2 tablets by mouth on day 1; Take 1 tablet by mouth on days 2-5          Follow up if symptoms worsen or fail  to improve.    Lalita Schilling MD  Internal Medicine    9/11/2019

## 2019-09-12 RX ORDER — LORAZEPAM 1 MG/1
TABLET ORAL
Qty: 60 TABLET | Refills: 0 | Status: SHIPPED | OUTPATIENT
Start: 2019-09-12 | End: 2019-11-09 | Stop reason: SDUPTHER

## 2019-11-11 RX ORDER — LORAZEPAM 1 MG/1
TABLET ORAL
Qty: 60 TABLET | Refills: 0 | Status: SHIPPED | OUTPATIENT
Start: 2019-11-11 | End: 2020-01-15

## 2019-12-06 ENCOUNTER — LAB VISIT (OUTPATIENT)
Dept: LAB | Facility: HOSPITAL | Age: 69
End: 2019-12-06
Attending: INTERNAL MEDICINE
Payer: MEDICARE

## 2019-12-06 DIAGNOSIS — I10 ESSENTIAL HYPERTENSION: ICD-10-CM

## 2019-12-06 LAB
ALBUMIN SERPL BCP-MCNC: 3.5 G/DL (ref 3.5–5.2)
ALP SERPL-CCNC: 50 U/L (ref 55–135)
ALT SERPL W/O P-5'-P-CCNC: 10 U/L (ref 10–44)
ANION GAP SERPL CALC-SCNC: 9 MMOL/L (ref 8–16)
AST SERPL-CCNC: 16 U/L (ref 10–40)
BASOPHILS # BLD AUTO: 0.04 K/UL (ref 0–0.2)
BASOPHILS NFR BLD: 0.7 % (ref 0–1.9)
BILIRUB SERPL-MCNC: 0.5 MG/DL (ref 0.1–1)
BUN SERPL-MCNC: 8 MG/DL (ref 8–23)
CALCIUM SERPL-MCNC: 9.4 MG/DL (ref 8.7–10.5)
CHLORIDE SERPL-SCNC: 103 MMOL/L (ref 95–110)
CHOLEST SERPL-MCNC: 255 MG/DL (ref 120–199)
CHOLEST/HDLC SERPL: 3.8 {RATIO} (ref 2–5)
CO2 SERPL-SCNC: 30 MMOL/L (ref 23–29)
CREAT SERPL-MCNC: 0.7 MG/DL (ref 0.5–1.4)
DIFFERENTIAL METHOD: ABNORMAL
EOSINOPHIL # BLD AUTO: 0.2 K/UL (ref 0–0.5)
EOSINOPHIL NFR BLD: 2.9 % (ref 0–8)
ERYTHROCYTE [DISTWIDTH] IN BLOOD BY AUTOMATED COUNT: 16 % (ref 11.5–14.5)
EST. GFR  (AFRICAN AMERICAN): >60 ML/MIN/1.73 M^2
EST. GFR  (NON AFRICAN AMERICAN): >60 ML/MIN/1.73 M^2
GLUCOSE SERPL-MCNC: 86 MG/DL (ref 70–110)
HCT VFR BLD AUTO: 37.5 % (ref 37–48.5)
HDLC SERPL-MCNC: 67 MG/DL (ref 40–75)
HDLC SERPL: 26.3 % (ref 20–50)
HGB BLD-MCNC: 11.7 G/DL (ref 12–16)
IMM GRANULOCYTES # BLD AUTO: 0.01 K/UL (ref 0–0.04)
IMM GRANULOCYTES NFR BLD AUTO: 0.2 % (ref 0–0.5)
LDLC SERPL CALC-MCNC: 176 MG/DL (ref 63–159)
LYMPHOCYTES # BLD AUTO: 1.6 K/UL (ref 1–4.8)
LYMPHOCYTES NFR BLD: 29.9 % (ref 18–48)
MCH RBC QN AUTO: 26.8 PG (ref 27–31)
MCHC RBC AUTO-ENTMCNC: 31.2 G/DL (ref 32–36)
MCV RBC AUTO: 86 FL (ref 82–98)
MONOCYTES # BLD AUTO: 0.5 K/UL (ref 0.3–1)
MONOCYTES NFR BLD: 8.4 % (ref 4–15)
NEUTROPHILS # BLD AUTO: 3.2 K/UL (ref 1.8–7.7)
NEUTROPHILS NFR BLD: 57.9 % (ref 38–73)
NONHDLC SERPL-MCNC: 188 MG/DL
NRBC BLD-RTO: 0 /100 WBC
PLATELET # BLD AUTO: 390 K/UL (ref 150–350)
PMV BLD AUTO: 10.5 FL (ref 9.2–12.9)
POTASSIUM SERPL-SCNC: 3.8 MMOL/L (ref 3.5–5.1)
PROT SERPL-MCNC: 7.4 G/DL (ref 6–8.4)
RBC # BLD AUTO: 4.36 M/UL (ref 4–5.4)
SODIUM SERPL-SCNC: 142 MMOL/L (ref 136–145)
TRIGL SERPL-MCNC: 60 MG/DL (ref 30–150)
TSH SERPL DL<=0.005 MIU/L-ACNC: 1.24 UIU/ML (ref 0.4–4)
WBC # BLD AUTO: 5.48 K/UL (ref 3.9–12.7)

## 2019-12-06 PROCEDURE — 36415 COLL VENOUS BLD VENIPUNCTURE: CPT | Mod: PO

## 2019-12-06 PROCEDURE — 84443 ASSAY THYROID STIM HORMONE: CPT

## 2019-12-06 PROCEDURE — 80061 LIPID PANEL: CPT

## 2019-12-06 PROCEDURE — 85025 COMPLETE CBC W/AUTO DIFF WBC: CPT

## 2019-12-06 PROCEDURE — 80053 COMPREHEN METABOLIC PANEL: CPT

## 2019-12-09 ENCOUNTER — OFFICE VISIT (OUTPATIENT)
Dept: INTERNAL MEDICINE | Facility: CLINIC | Age: 69
End: 2019-12-09
Payer: MEDICARE

## 2019-12-09 VITALS
SYSTOLIC BLOOD PRESSURE: 112 MMHG | OXYGEN SATURATION: 98 % | BODY MASS INDEX: 37.24 KG/M2 | DIASTOLIC BLOOD PRESSURE: 72 MMHG | TEMPERATURE: 98 F | RESPIRATION RATE: 15 BRPM | HEART RATE: 52 BPM | WEIGHT: 197.06 LBS

## 2019-12-09 DIAGNOSIS — Z12.31 OTHER SCREENING MAMMOGRAM: ICD-10-CM

## 2019-12-09 DIAGNOSIS — Z00.00 WELL ADULT EXAM: Primary | ICD-10-CM

## 2019-12-09 DIAGNOSIS — I10 ESSENTIAL HYPERTENSION: ICD-10-CM

## 2019-12-09 DIAGNOSIS — I10 BENIGN HYPERTENSION: ICD-10-CM

## 2019-12-09 PROCEDURE — 99213 OFFICE O/P EST LOW 20 MIN: CPT | Mod: PBBFAC,PO,25 | Performed by: INTERNAL MEDICINE

## 2019-12-09 PROCEDURE — 99397 PER PM REEVAL EST PAT 65+ YR: CPT | Mod: S$PBB,,, | Performed by: INTERNAL MEDICINE

## 2019-12-09 PROCEDURE — 99999 PR PBB SHADOW E&M-EST. PATIENT-LVL III: ICD-10-PCS | Mod: PBBFAC,,, | Performed by: INTERNAL MEDICINE

## 2019-12-09 PROCEDURE — 99999 PR PBB SHADOW E&M-EST. PATIENT-LVL III: CPT | Mod: PBBFAC,,, | Performed by: INTERNAL MEDICINE

## 2019-12-09 PROCEDURE — 90662 IIV NO PRSV INCREASED AG IM: CPT | Mod: PBBFAC,PO

## 2019-12-09 PROCEDURE — 99397 PR PREVENTIVE VISIT,EST,65 & OVER: ICD-10-PCS | Mod: S$PBB,,, | Performed by: INTERNAL MEDICINE

## 2019-12-09 RX ORDER — ATORVASTATIN CALCIUM 20 MG/1
20 TABLET, FILM COATED ORAL DAILY
Qty: 30 TABLET | Refills: 6 | Status: SHIPPED | OUTPATIENT
Start: 2019-12-09 | End: 2021-12-06

## 2019-12-09 NOTE — PROGRESS NOTES
Subjective:       Patient ID: Nan Palafox is a 69 y.o. female.    Chief Complaint: Annual Exam and Hypertension    HPI   The patient presents for annual physical examination.  Patient reports she exercises occasionally.  She is resting well night.  Active medical conditions include chronic rhinitis, hypertension, and hyperlipidemia.  She reports she has not been using Lipitor as prescribed.  She does not monitor her blood pressures.  Other active medical conditions include glaucoma.  She is followed by her ophthalmologist.  She has been treated for GERD but has been asymptomatic for the most part and only uses Protonix as needed.  Chronic rhinitis symptoms have been alleviated with use of Flonase.  She sees her cardiologist Dr. Yinka Rivas.  She reports an echocardiogram showed good heart muscle function.  She had a negative nuclear stress test over the past year.  She also has seen a pulmonologist Dr. Javad Franks.  She was noted to had have a lung density which has been stable over the past year.  No interventions have been indicated.  She does report also having a sensitivity to chemical fumes.    Immunization record was reviewed.  Screening tests reviewed.  Her last colonoscopy was on 10/26/2017.    Review of Systems   Constitutional: Negative for fatigue, fever and unexpected weight change.   HENT: Positive for congestion and postnasal drip. Negative for rhinorrhea and sore throat.    Eyes: Negative for visual disturbance.   Respiratory: Negative for cough, chest tightness, shortness of breath and wheezing.    Cardiovascular: Negative for chest pain, palpitations and leg swelling.   Gastrointestinal: Negative for abdominal pain and blood in stool.   Genitourinary: Negative for dysuria, frequency and hematuria.   Musculoskeletal: Negative for arthralgias, back pain, joint swelling and myalgias.   Skin: Negative for rash.   Neurological: Negative for dizziness, syncope, weakness, numbness and headaches.    Psychiatric/Behavioral: Negative for sleep disturbance. The patient is not nervous/anxious.        Objective:      Physical Exam   Constitutional: She is oriented to person, place, and time. Vital signs are normal. She appears well-developed and well-nourished. No distress.   The patient has lost 5 lb since 12/07/2018.   HENT:   Head: Normocephalic and atraumatic.   Right Ear: External ear normal.   Left Ear: External ear normal.   Nose: Nose normal.   Mouth/Throat: Oropharynx is clear and moist. No oropharyngeal exudate.   Eyes: Pupils are equal, round, and reactive to light. Conjunctivae and EOM are normal. No scleral icterus.   Neck: Normal range of motion. Neck supple. No JVD present. Carotid bruit is not present. No thyromegaly present.   Cardiovascular: Normal rate, regular rhythm, normal heart sounds, intact distal pulses and normal pulses. Exam reveals no gallop and no friction rub.   No murmur heard.  Pulmonary/Chest: Effort normal and breath sounds normal. No respiratory distress. She has no wheezes. She has no rales.   Abdominal: Soft. Bowel sounds are normal. She exhibits no abdominal bruit and no mass. There is no splenomegaly or hepatomegaly. There is no tenderness. No hernia.   Musculoskeletal: Normal range of motion. She exhibits no edema or tenderness.        Right shoulder: She exhibits no effusion and no deformity.   Lymphadenopathy:     She has no cervical adenopathy.     She has no axillary adenopathy.        Right: No supraclavicular adenopathy present.        Left: No supraclavicular adenopathy present.   Neurological: She is alert and oriented to person, place, and time. She has normal strength. No cranial nerve deficit.   Skin: Skin is warm and dry. No rash noted.   Psychiatric: She has a normal mood and affect. Her speech is normal and behavior is normal.   Nursing note and vitals reviewed.      Lab Visit on 12/06/2019   Component Date Value Ref Range Status    WBC 12/06/2019 5.48  3.90  - 12.70 K/uL Final    RBC 12/06/2019 4.36  4.00 - 5.40 M/uL Final    Hemoglobin 12/06/2019 11.7* 12.0 - 16.0 g/dL Final    Hematocrit 12/06/2019 37.5  37.0 - 48.5 % Final    Mean Corpuscular Volume 12/06/2019 86  82 - 98 fL Final    Mean Corpuscular Hemoglobin 12/06/2019 26.8* 27.0 - 31.0 pg Final    Mean Corpuscular Hemoglobin Conc 12/06/2019 31.2* 32.0 - 36.0 g/dL Final    RDW 12/06/2019 16.0* 11.5 - 14.5 % Final    Platelets 12/06/2019 390* 150 - 350 K/uL Final    MPV 12/06/2019 10.5  9.2 - 12.9 fL Final    Immature Granulocytes 12/06/2019 0.2  0.0 - 0.5 % Final    Gran # (ANC) 12/06/2019 3.2  1.8 - 7.7 K/uL Final    Immature Grans (Abs) 12/06/2019 0.01  0.00 - 0.04 K/uL Final    Comment: Mild elevation in immature granulocytes is non specific and   can be seen in a variety of conditions including stress response,   acute inflammation, trauma and pregnancy. Correlation with other   laboratory and clinical findings is essential.      Lymph # 12/06/2019 1.6  1.0 - 4.8 K/uL Final    Mono # 12/06/2019 0.5  0.3 - 1.0 K/uL Final    Eos # 12/06/2019 0.2  0.0 - 0.5 K/uL Final    Baso # 12/06/2019 0.04  0.00 - 0.20 K/uL Final    nRBC 12/06/2019 0  0 /100 WBC Final    Gran% 12/06/2019 57.9  38.0 - 73.0 % Final    Lymph% 12/06/2019 29.9  18.0 - 48.0 % Final    Mono% 12/06/2019 8.4  4.0 - 15.0 % Final    Eosinophil% 12/06/2019 2.9  0.0 - 8.0 % Final    Basophil% 12/06/2019 0.7  0.0 - 1.9 % Final    Differential Method 12/06/2019 Automated   Final    Sodium 12/06/2019 142  136 - 145 mmol/L Final    Potassium 12/06/2019 3.8  3.5 - 5.1 mmol/L Final    Chloride 12/06/2019 103  95 - 110 mmol/L Final    CO2 12/06/2019 30* 23 - 29 mmol/L Final    Glucose 12/06/2019 86  70 - 110 mg/dL Final    BUN, Bld 12/06/2019 8  8 - 23 mg/dL Final    Creatinine 12/06/2019 0.7  0.5 - 1.4 mg/dL Final    Calcium 12/06/2019 9.4  8.7 - 10.5 mg/dL Final    Total Protein 12/06/2019 7.4  6.0 - 8.4 g/dL Final    Albumin  12/06/2019 3.5  3.5 - 5.2 g/dL Final    Total Bilirubin 12/06/2019 0.5  0.1 - 1.0 mg/dL Final    Comment: For infants and newborns, interpretation of results should be based  on gestational age, weight and in agreement with clinical  observations.  Premature Infant recommended reference ranges:  Up to 24 hours.............<8.0 mg/dL  Up to 48 hours............<12.0 mg/dL  3-5 days..................<15.0 mg/dL  6-29 days.................<15.0 mg/dL      Alkaline Phosphatase 12/06/2019 50* 55 - 135 U/L Final    AST 12/06/2019 16  10 - 40 U/L Final    ALT 12/06/2019 10  10 - 44 U/L Final    Anion Gap 12/06/2019 9  8 - 16 mmol/L Final    eGFR if African American 12/06/2019 >60.0  >60 mL/min/1.73 m^2 Final    eGFR if non African American 12/06/2019 >60.0  >60 mL/min/1.73 m^2 Final    Comment: Calculation used to obtain the estimated glomerular filtration  rate (eGFR) is the CKD-EPI equation.       Cholesterol 12/06/2019 255* 120 - 199 mg/dL Final    Comment: The National Cholesterol Education Program (NCEP) has set the  following guidelines (reference ranges) for Cholesterol:  Optimal.....................<200 mg/dL  Borderline High.............200-239 mg/dL  High........................> or = 240 mg/dL      Triglycerides 12/06/2019 60  30 - 150 mg/dL Final    Comment: The National Cholesterol Education Program (NCEP) has set the  following guidelines (reference values) for triglycerides:  Normal......................<150 mg/dL  Borderline High.............150-199 mg/dL  High........................200-499 mg/dL      HDL 12/06/2019 67  40 - 75 mg/dL Final    Comment: The National Cholesterol Education Program (NCEP) has set the  following guidelines (reference values) for HDL Cholesterol:  Low...............<40 mg/dL  Optimal...........>60 mg/dL      LDL Cholesterol 12/06/2019 176.0* 63.0 - 159.0 mg/dL Final    Comment: The National Cholesterol Education Program (NCEP) has set the  following guidelines  (reference values) for LDL Cholesterol:  Optimal.......................<130 mg/dL  Borderline High...............130-159 mg/dL  High..........................160-189 mg/dL  Very High.....................>190 mg/dL      Hdl/Cholesterol Ratio 12/06/2019 26.3  20.0 - 50.0 % Final    Total Cholesterol/HDL Ratio 12/06/2019 3.8  2.0 - 5.0 Final    Non-HDL Cholesterol 12/06/2019 188  mg/dL Final    Comment: Risk category and Non-HDL cholesterol goals:  Coronary heart disease (CHD)or equivalent (10-year risk of CHD >20%):  Non-HDL cholesterol goal     <130 mg/dL  Two or more CHD risk factors and 10-year risk of CHD <= 20%:  Non-HDL cholesterol goal     <160 mg/dL  0 to 1 CHD risk factor:  Non-HDL cholesterol goal     <190 mg/dL      TSH 12/06/2019 1.241  0.400 - 4.000 uIU/mL Final   Lab Visit on 12/06/2019   Component Date Value Ref Range Status    Specimen UA 12/06/2019 Urine, Clean Catch   Final    Color, UA 12/06/2019 Yellow  Yellow, Straw, Niharika Final    Appearance, UA 12/06/2019 Clear  Clear Final    pH, UA 12/06/2019 8.0  5.0 - 8.0 Final    Specific Gravity, UA 12/06/2019 1.015  1.005 - 1.030 Final    Protein, UA 12/06/2019 Negative  Negative Final    Comment: Recommend a 24 hour urine protein or a urine   protein/creatinine ratio if globulin induced proteinuria is  clinically suspected.      Glucose, UA 12/06/2019 Negative  Negative Final    Ketones, UA 12/06/2019 Negative  Negative Final    Bilirubin (UA) 12/06/2019 Negative  Negative Final    Occult Blood UA 12/06/2019 Negative  Negative Final    Nitrite, UA 12/06/2019 Negative  Negative Final    Leukocytes, UA 12/06/2019 Negative  Negative Final       Assessment:       1. Well adult exam    2. Other screening mammogram    3. Essential hypertension        Plan:     Nan was seen today for annual exam and hypertension.  A screening mammogram will be ordered.  A prescription for the shingles vaccine was given to the patient to take to her pharmacy.   The patient was advised to resume Lipitor therapy.  Blood tests will repeated in 3 months to check response to therapy.    Diagnoses and all orders for this visit:    Well adult exam    Other screening mammogram  -     Mammo Digital Screening Bilat; Future    Essential hypertension  -     Comprehensive metabolic panel; Future  -     Lipid panel; Future    Benign hypertension    Other orders  -     Influenza - High Dose (65+) (PF) (IM)  -     varicella-zoster [id number], PF, (SHINGRIX, PF,) 50 mcg/0.5 mL injection; Inject 0.5 mLs into the muscle once. for 1 dose  -     atorvastatin (LIPITOR) 20 MG tablet; Take 1 tablet (20 mg total) by mouth once daily. For cholesterol control

## 2019-12-30 ENCOUNTER — TELEPHONE (OUTPATIENT)
Dept: OPHTHALMOLOGY | Facility: CLINIC | Age: 69
End: 2019-12-30

## 2019-12-30 NOTE — TELEPHONE ENCOUNTER
----- Message from Nikole Ha sent at 12/30/2019  1:53 PM CST -----  Pt called to request an appt. Pt stated she not seeing clear and cant focus. Pt asked for a call back.      Pt contact # [phone].      Thanks

## 2020-01-15 RX ORDER — LORAZEPAM 1 MG/1
TABLET ORAL
Qty: 60 TABLET | Refills: 2 | Status: SHIPPED | OUTPATIENT
Start: 2020-01-15 | End: 2020-06-19

## 2020-01-15 NOTE — TELEPHONE ENCOUNTER
----- Message from Gaby Luciano sent at 1/15/2020  3:32 PM CST -----  Contact: Corey  2nd Request    Prescription Request:     Name of medication: LORazepam (ATIVAN) 1 MG tablet    Reason for request: Refill    Pharmacy: [id number] - [facility] [id number] [place] [address] AT [facility]    Please advise.    Thank You

## 2020-03-13 ENCOUNTER — LAB VISIT (OUTPATIENT)
Dept: LAB | Facility: HOSPITAL | Age: 70
End: 2020-03-13
Attending: INTERNAL MEDICINE
Payer: MEDICARE

## 2020-03-13 DIAGNOSIS — I10 ESSENTIAL HYPERTENSION: ICD-10-CM

## 2020-03-13 LAB
ALBUMIN SERPL BCP-MCNC: 3.4 G/DL (ref 3.5–5.2)
ALP SERPL-CCNC: 54 U/L (ref 55–135)
ALT SERPL W/O P-5'-P-CCNC: 9 U/L (ref 10–44)
ANION GAP SERPL CALC-SCNC: 5 MMOL/L (ref 8–16)
AST SERPL-CCNC: 17 U/L (ref 10–40)
BILIRUB SERPL-MCNC: 0.4 MG/DL (ref 0.1–1)
BUN SERPL-MCNC: 10 MG/DL (ref 8–23)
CALCIUM SERPL-MCNC: 9.2 MG/DL (ref 8.7–10.5)
CHLORIDE SERPL-SCNC: 103 MMOL/L (ref 95–110)
CHOLEST SERPL-MCNC: 184 MG/DL (ref 120–199)
CHOLEST/HDLC SERPL: 2.6 {RATIO} (ref 2–5)
CO2 SERPL-SCNC: 33 MMOL/L (ref 23–29)
CREAT SERPL-MCNC: 0.7 MG/DL (ref 0.5–1.4)
EST. GFR  (AFRICAN AMERICAN): >60 ML/MIN/1.73 M^2
EST. GFR  (NON AFRICAN AMERICAN): >60 ML/MIN/1.73 M^2
GLUCOSE SERPL-MCNC: 91 MG/DL (ref 70–110)
HDLC SERPL-MCNC: 71 MG/DL (ref 40–75)
HDLC SERPL: 38.6 % (ref 20–50)
LDLC SERPL CALC-MCNC: 102.6 MG/DL (ref 63–159)
NONHDLC SERPL-MCNC: 113 MG/DL
POTASSIUM SERPL-SCNC: 3.9 MMOL/L (ref 3.5–5.1)
PROT SERPL-MCNC: 7.2 G/DL (ref 6–8.4)
SODIUM SERPL-SCNC: 141 MMOL/L (ref 136–145)
TRIGL SERPL-MCNC: 52 MG/DL (ref 30–150)

## 2020-03-13 PROCEDURE — 36415 COLL VENOUS BLD VENIPUNCTURE: CPT | Mod: PO

## 2020-03-13 PROCEDURE — 80061 LIPID PANEL: CPT

## 2020-03-13 PROCEDURE — 80053 COMPREHEN METABOLIC PANEL: CPT

## 2020-03-13 NOTE — PROGRESS NOTES
Assessment /Plan     For exam results, see Encounter Report.    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  Patient/Family to call and return immediately to clinic should the symptoms of RD occur. Voiced good understanding with Q & A.        Plan  RTC 3-4 weeks IOP & adherence  RTC sooner prn with good understanding

## 2020-03-16 ENCOUNTER — HOSPITAL ENCOUNTER (OUTPATIENT)
Dept: RADIOLOGY | Facility: HOSPITAL | Age: 70
Discharge: HOME | End: 2020-03-16
Attending: INTERNAL MEDICINE
Payer: MEDICARE

## 2020-03-16 ENCOUNTER — OFFICE VISIT (OUTPATIENT)
Dept: INTERNAL MEDICINE | Facility: CLINIC | Age: 70
End: 2020-03-16
Payer: MEDICARE

## 2020-03-16 VITALS
HEIGHT: 66 IN | HEART RATE: 70 BPM | DIASTOLIC BLOOD PRESSURE: 72 MMHG | RESPIRATION RATE: 17 BRPM | BODY MASS INDEX: 31.99 KG/M2 | WEIGHT: 199.06 LBS | SYSTOLIC BLOOD PRESSURE: 124 MMHG | TEMPERATURE: 98 F

## 2020-03-16 DIAGNOSIS — I10 BENIGN HYPERTENSION: ICD-10-CM

## 2020-03-16 DIAGNOSIS — J31.0 CHRONIC RHINITIS: ICD-10-CM

## 2020-03-16 DIAGNOSIS — M79.671 RIGHT FOOT PAIN: Primary | ICD-10-CM

## 2020-03-16 DIAGNOSIS — Z12.31 ENCOUNTER FOR SCREENING MAMMOGRAM FOR BREAST CANCER: ICD-10-CM

## 2020-03-16 DIAGNOSIS — M79.671 RIGHT FOOT PAIN: ICD-10-CM

## 2020-03-16 PROCEDURE — 99999 PR PBB SHADOW E&M-EST. PATIENT-LVL III: CPT | Mod: PBBFAC,,, | Performed by: INTERNAL MEDICINE

## 2020-03-16 PROCEDURE — 73630 X-RAY EXAM OF FOOT: CPT | Mod: 26,RT,, | Performed by: RADIOLOGY

## 2020-03-16 PROCEDURE — 99213 OFFICE O/P EST LOW 20 MIN: CPT | Mod: PBBFAC,25,PO | Performed by: INTERNAL MEDICINE

## 2020-03-16 PROCEDURE — 73630 X-RAY EXAM OF FOOT: CPT | Mod: TC,PO,RT

## 2020-03-16 PROCEDURE — 99213 PR OFFICE/OUTPT VISIT, EST, LEVL III, 20-29 MIN: ICD-10-PCS | Mod: S$PBB,,, | Performed by: INTERNAL MEDICINE

## 2020-03-16 PROCEDURE — 73630 XR FOOT COMPLETE 3 VIEW RIGHT: ICD-10-PCS | Mod: 26,RT,, | Performed by: RADIOLOGY

## 2020-03-16 PROCEDURE — 99213 OFFICE O/P EST LOW 20 MIN: CPT | Mod: S$PBB,,, | Performed by: INTERNAL MEDICINE

## 2020-03-16 PROCEDURE — 99999 PR PBB SHADOW E&M-EST. PATIENT-LVL III: ICD-10-PCS | Mod: PBBFAC,,, | Performed by: INTERNAL MEDICINE

## 2020-03-16 RX ORDER — AMLODIPINE BESYLATE 10 MG/1
10 TABLET ORAL DAILY
Qty: 90 TABLET | Refills: 3 | Status: SHIPPED | OUTPATIENT
Start: 2020-03-16 | End: 2021-04-14 | Stop reason: SDUPTHER

## 2020-03-16 NOTE — PROGRESS NOTES
Subjective:       Patient ID: Nan Palafox is a 70 y.o. female.    Chief Complaint: Follow-up (3 Mo); Rhinitis; and Foot Pain    HPI   The patient presents with complaints right foot pain.  She has been noting pain on the top of the right for the past week and a.  There is no history of trauma.    The patient has chronic allergic rhinitis.  Symptoms have been fairly well controlled recently.    Review of Systems   Constitutional: Negative for activity change, chills and fever.   HENT: Positive for congestion and postnasal drip.    Cardiovascular: Negative for leg swelling.   Musculoskeletal: Positive for arthralgias. Negative for myalgias.   Skin: Negative for rash.       Objective:      Physical Exam   Constitutional: She is oriented to person, place, and time. She appears well-developed and well-nourished. No distress.   HENT:   Head: Normocephalic and atraumatic.   Sinuses are nontender to palpation.   Eyes: Conjunctivae and EOM are normal. No scleral icterus.   Neck: Normal range of motion. Neck supple.   Cardiovascular: Normal rate, regular rhythm, normal heart sounds and intact distal pulses.   Pulmonary/Chest: Effort normal and breath sounds normal. No respiratory distress.   Musculoskeletal: She exhibits tenderness.   The right foot exhibits tenderness on the dorsal metatarsal area to palpation.  No localized swelling is noted.   Lymphadenopathy:     She has no cervical adenopathy.   Neurological: She is alert and oriented to person, place, and time.   Skin: Skin is warm and dry. No rash noted.       Results for orders placed or performed in visit on 03/13/20   Comprehensive metabolic panel   Result Value Ref Range    Sodium 141 136 - 145 mmol/L    Potassium 3.9 3.5 - 5.1 mmol/L    Chloride 103 95 - 110 mmol/L    CO2 33 (H) 23 - 29 mmol/L    Glucose 91 70 - 110 mg/dL    BUN, Bld 10 8 - 23 mg/dL    Creatinine 0.7 0.5 - 1.4 mg/dL    Calcium 9.2 8.7 - 10.5 mg/dL    Total Protein 7.2 6.0 - 8.4 g/dL     Albumin 3.4 (L) 3.5 - 5.2 g/dL    Total Bilirubin 0.4 0.1 - 1.0 mg/dL    Alkaline Phosphatase 54 (L) 55 - 135 U/L    AST 17 10 - 40 U/L    ALT 9 (L) 10 - 44 U/L    Anion Gap 5 (L) 8 - 16 mmol/L    eGFR if African American >60.0 >60 mL/min/1.73 m^2    eGFR if non African American >60.0 >60 mL/min/1.73 m^2   Lipid panel   Result Value Ref Range    Cholesterol 184 120 - 199 mg/dL    Triglycerides 52 30 - 150 mg/dL    HDL 71 40 - 75 mg/dL    LDL Cholesterol 102.6 63.0 - 159.0 mg/dL    Hdl/Cholesterol Ratio 38.6 20.0 - 50.0 %    Total Cholesterol/HDL Ratio 2.6 2.0 - 5.0    Non-HDL Cholesterol 113 mg/dL       Assessment:       1. Right foot pain    2. Chronic rhinitis    3. Benign hypertension    4. Encounter for screening mammogram for breast cancer        Plan:     Nan was seen today for follow-up, an x-ray of the right foot will be obtained.  The patient may need to see orthopedics or podiatry depending on the x-ray results.  Current medications will be continued.  Patient will need to reschedule mammogram in view of the current pandemic with COVID -19 virus.    Diagnoses and all orders for this visit:    Right foot pain  -     X-Ray Foot Complete Right; Future    Chronic rhinitis    Benign hypertension    Encounter for screening mammogram for breast cancer  -     Mammo Digital Screening Bilateral With CAD; Future    Other orders  -     amLODIPine (NORVASC) 10 MG tablet; Take 1 tablet (10 mg total) by mouth once daily. For blood pressure control.

## 2020-03-17 ENCOUNTER — TELEPHONE (OUTPATIENT)
Dept: INTERNAL MEDICINE | Facility: CLINIC | Age: 70
End: 2020-03-17

## 2020-03-17 DIAGNOSIS — M79.671 RIGHT FOOT PAIN: ICD-10-CM

## 2020-03-17 DIAGNOSIS — S92.254A CLOSED NONDISPLACED FRACTURE OF NAVICULAR BONE OF RIGHT FOOT, INITIAL ENCOUNTER: Primary | ICD-10-CM

## 2020-03-17 NOTE — TELEPHONE ENCOUNTER
----- Message from Nohemy Souza sent at 3/17/2020  7:47 AM CDT -----  Contact: self/[phone]  Name of test: xray    Date of test: 03/16/20    Ordering provider: Dr Jimenez    Where was the test performed:[facility]    Comments:

## 2020-03-17 NOTE — TELEPHONE ENCOUNTER
There is a suspected navicular fracture of the right foot.  The patient is complaining of worsening foot pain with weight-bearing.  Urgent consultation with orthopedics will be requested.

## 2020-03-18 NOTE — TELEPHONE ENCOUNTER
Patient was notified that the referral coordinators will be contacting her regarding the Ortho referral.

## 2020-03-23 ENCOUNTER — PATIENT OUTREACH (OUTPATIENT)
Dept: ADMINISTRATIVE | Facility: OTHER | Age: 70
End: 2020-03-23

## 2020-03-24 ENCOUNTER — TELEPHONE (OUTPATIENT)
Dept: ORTHOPEDICS | Facility: CLINIC | Age: 70
End: 2020-03-24

## 2020-03-24 NOTE — TELEPHONE ENCOUNTER
----- Message from Oskar Baker sent at 3/24/2020  9:30 AM CDT -----  Contact: Patient   Good Morning,      placed an order for the Patient to be seen By an Orthopedic for a Navicular Fracture, Im not able to see any openings at there moment for scheduling but  is asking of the patient can be seen as soon as possible. Please advise. And thank you in advanced.     Closed nondisplaced fracture of navicular bone of right foot, initial encounter [S92.254A]  Right foot pain [M79.671]

## 2020-03-24 NOTE — TELEPHONE ENCOUNTER
Ortho Telephone Triage Message  [phone]  Pt assisted with activation of [facility] portal. Virtual Visit scheduled with Dr. Aguirre on 3/26/20 at 9:00am for R foot fracture per Dr. Jimenez/Int. Med referral. Pt to test benton and provided [facility] Patient Support contact number for assistance, if needed.

## 2020-03-25 ENCOUNTER — OFFICE VISIT (OUTPATIENT)
Dept: OPHTHALMOLOGY | Facility: CLINIC | Age: 70
End: 2020-03-25
Payer: MEDICARE

## 2020-03-25 DIAGNOSIS — H25.13 NUCLEAR SCLEROSIS OF BOTH EYES: ICD-10-CM

## 2020-03-25 DIAGNOSIS — H40.1132 PRIMARY OPEN ANGLE GLAUCOMA (POAG) OF BOTH EYES, MODERATE STAGE: Primary | ICD-10-CM

## 2020-03-25 DIAGNOSIS — H40.053 BILATERAL OCULAR HYPERTENSION: ICD-10-CM

## 2020-03-25 PROCEDURE — 92012 PR EYE EXAM, EST PATIENT,INTERMED: ICD-10-PCS | Mod: S$PBB,,, | Performed by: OPHTHALMOLOGY

## 2020-03-25 PROCEDURE — 99999 PR PBB SHADOW E&M-EST. PATIENT-LVL II: ICD-10-PCS | Mod: PBBFAC,,, | Performed by: OPHTHALMOLOGY

## 2020-03-25 PROCEDURE — 92012 INTRM OPH EXAM EST PATIENT: CPT | Mod: S$PBB,,, | Performed by: OPHTHALMOLOGY

## 2020-03-25 PROCEDURE — 99999 PR PBB SHADOW E&M-EST. PATIENT-LVL II: CPT | Mod: PBBFAC,,, | Performed by: OPHTHALMOLOGY

## 2020-03-25 PROCEDURE — 99212 OFFICE O/P EST SF 10 MIN: CPT | Mod: PBBFAC | Performed by: OPHTHALMOLOGY

## 2020-03-25 RX ORDER — DORZOLAMIDE HYDROCHLORIDE AND TIMOLOL MALEATE 20; 5 MG/ML; MG/ML
1 SOLUTION/ DROPS OPHTHALMIC 2 TIMES DAILY
Qty: 10 ML | Refills: 12 | Status: SHIPPED | OUTPATIENT
Start: 2020-03-25 | End: 2021-03-10 | Stop reason: SDUPTHER

## 2020-03-25 RX ORDER — HYDROXYZINE PAMOATE 25 MG/1
CAPSULE ORAL
COMMUNITY
Start: 2020-02-20 | End: 2020-12-09

## 2020-03-25 RX ORDER — LATANOPROST 50 UG/ML
1 SOLUTION/ DROPS OPHTHALMIC NIGHTLY
Qty: 2.5 ML | Refills: 12 | Status: SHIPPED | OUTPATIENT
Start: 2020-03-25 | End: 2021-03-10 | Stop reason: SDUPTHER

## 2020-03-25 RX ORDER — DORZOLAMIDE HYDROCHLORIDE AND TIMOLOL MALEATE 20; 5 MG/ML; MG/ML
1 SOLUTION/ DROPS OPHTHALMIC 2 TIMES DAILY
COMMUNITY
End: 2020-03-25 | Stop reason: SDUPTHER

## 2020-03-26 ENCOUNTER — TELEPHONE (OUTPATIENT)
Dept: ORTHOPEDICS | Facility: CLINIC | Age: 70
End: 2020-03-26

## 2020-03-26 NOTE — TELEPHONE ENCOUNTER
Spoke to pt and r/s her virtual visit to Monday at 10am due to technical issues from her other one, pt verbalized understanding.

## 2020-03-30 ENCOUNTER — PATIENT OUTREACH (OUTPATIENT)
Dept: ADMINISTRATIVE | Facility: OTHER | Age: 70
End: 2020-03-30

## 2020-03-30 ENCOUNTER — OFFICE VISIT (OUTPATIENT)
Dept: ORTHOPEDICS | Facility: CLINIC | Age: 70
End: 2020-03-30
Payer: MEDICARE

## 2020-03-30 DIAGNOSIS — Q74.2 PAIN ASSOCIATED WITH ACCESSORY NAVICULAR BONE OF FOOT, RIGHT: ICD-10-CM

## 2020-03-30 DIAGNOSIS — M79.671 PAIN ASSOCIATED WITH ACCESSORY NAVICULAR BONE OF FOOT, RIGHT: ICD-10-CM

## 2020-03-30 DIAGNOSIS — M19.071 OSTEOARTHRITIS OF RIGHT MIDFOOT: ICD-10-CM

## 2020-03-30 PROCEDURE — 99203 OFFICE O/P NEW LOW 30 MIN: CPT | Mod: 95,,, | Performed by: ORTHOPAEDIC SURGERY

## 2020-03-30 PROCEDURE — 99203 PR OFFICE/OUTPT VISIT, NEW, LEVL III, 30-44 MIN: ICD-10-PCS | Mod: 95,,, | Performed by: ORTHOPAEDIC SURGERY

## 2020-03-30 NOTE — LETTER
March 30, 2020      Glenroy Jimenez MD  [address] [facility] [address]           [facility] - Orthopedics  [address]  [facility] [address]  Phone: [phone]          Patient: Nan Palafox   MR Number: [id number]   YOB: 1950   Date of Visit: 3/30/2020       Dear Dr. Glenroy Jimenez:    Thank you for referring Nan Palafox to me for evaluation. Attached you will find relevant portions of my assessment and plan of care.    If you have questions, please do not hesitate to call me. I look forward to following Nan Palafox along with you.    Sincerely,    Caroline Aguirre MD    Enclosure  CC:  No Recipients    If you would like to receive this communication electronically, please contact [email or web address] or [phone] to request more information on [employer] access.    For providers and/or their staff who would like to refer a patient to [facility], please contact us through our one-stop-shop provider referral line, [facility], at [phone].    If you feel you have received this communication in error or would no longer like to receive these types of communications, please e-mail [email or web address]

## 2020-03-30 NOTE — PROGRESS NOTES
Orthopedic Telemedicine Visit    The patient location is: [place]  The chief complaint leading to consultation is: right foot pain  Visit type: Virtual visit with synchronous audio and video  Total time spent with patient: 20  Each patient to whom he or she provides medical services by telemedicine is:  (1) informed of the relationship between the physician and patient and the respective role of any other health care provider with respect to management of the patient; and (2) notified that he or she may decline to receive medical services by telemedicine and may withdraw from such care at any time.    Subjective:     HPI:   Nan Palafox is a 70 y.o. female who presents today for evaluation of right foot pain.  Rates pain as 6/10.  Pain has been ongoing for 1 month.  Inciting event: none known.  Treatments tried: soaks, ice, shoe modifications.    No ecchymosis, but notes some slight swelling on dorsum of foot.  Episodic pain localized to medial arch and dorsum of midfoot.  Worsened with walking.  Helped with orthopedic shoes.  No medications.  No injury when it started and pain has been steady and unchanged since then.  Felt like heat helped more than cold.  Generally not tender but notes first steps seems to start to feel better.    H/o Bilateral knee replacements.  Denies history of gout.    ROS:  Musculoskeletal: per HPI  Constitutional: Negative for fever  Cardiovascular: Negative for chest pain  Respiratory: Negative for shortness of breath  Skin: Negative for ulcers or lesions  Neurological: Negative for burning, tingling and numbness  Vascular: Negative for peripheral edema  Heme: Negative for chronic anticoagulation; Negative for history of blood clot  Endocrine: Negative for diabetes  Immune: Negative for inflammatory arthritis  /Nephrology: Negative for ESRD-on hemodialysis       Objective:   Exam:  General: No acute distress, well-appearing  Neurologic: Alert and oriented x3  Psychiatric:  Appropriate mood and affect, cooperative    Imaging:  Prior radiographs were personally reviewed by me.    3v non-weight bearing right foot demonstrate osteopenia, midfoot 1-2nd TMT OA, accessory navicular with ?cortical irregularity, age indeterminate    Additional records/labs reviewed:  Cr 0.7      Assessment:     1. Pain associated with accessory navicular bone of foot, right    2. Osteoarthritis of right midfoot         I reviewed imaging, clinical history, and diagnosis as above with the patient. I attempted to use layman's terms to educate the patient as well as utilize foot models and/or pictures.   I personally went through imaging with the patient.  I emphasized the role for non-operative treatment.  Non-operative treatment discussed with patient include: Anti-inflammatory medications or creams, RICE modalities, Braces and/or shoe ware modifications and Stretching program.  Surgery would be reserved for refractory symptoms.    I suspect midfoot OA is larger pain generator and patient indicates to dorsum of midfoot greater than instep for her pain.  In addition without injury, this is unlikely and pain quality is more arthritic in nature.  Though there could be component of PTTT mixed in as well to clinical presentation.       Plan:       1.  Therapy: AOFAS ankle sprain protocol exercises provided  2.  Symptomatic treatment: OTC NSAIDs recommended, RICE modalities recommended with emphasis on ice and elevation as needed and APAP  3.  Restrictions: Advance activity as tolerated, use pain as guide  4.  Brace/orthotics/etc: Supportive shoeware and avoiding barefoot walking  5.  Follow-up: PRN if wants to discuss injection in future      No orders of the defined types were placed in this encounter.      Past Medical History:   Diagnosis Date    Cataract     Cervical spondylosis with radiculopathy 4/11/2016    Hypertension     Nuclear sclerosis - Both Eyes 2/8/2013    PNA (pneumonia)     Primary open angle  glaucoma (POAG) of both eyes, moderate stage 3/11/2019       Past Surgical History:   Procedure Laterality Date    BREAST BIOPSY Right     excisional bx    BREAST SURGERY Right     Excisional bx    RETINAL DETACHMENT SURGERY      patient states that she had a retinal tear that was repaired in the past at [facility] but she is uncertain of which eye    STOMACH SURGERY      TOTAL KNEE ARTHROPLASTY Right        Family History   Problem Relation Age of Onset    Glaucoma Mother     Cataracts Mother     Blindness Mother     Cancer Mother 68        colon    Glaucoma Brother     Cataracts Brother     Cancer Brother         esophageal    No Known Problems Father     No Known Problems Sister     No Known Problems Maternal Aunt     No Known Problems Maternal Uncle     No Known Problems Paternal Aunt     No Known Problems Paternal Uncle     No Known Problems Maternal Grandmother     No Known Problems Maternal Grandfather     No Known Problems Paternal Grandmother     No Known Problems Paternal Grandfather     Amblyopia Neg Hx     Macular degeneration Neg Hx     Retinal detachment Neg Hx     Strabismus Neg Hx     Diabetes Neg Hx     Hypertension Neg Hx     Stroke Neg Hx     Thyroid disease Neg Hx     Breast cancer Neg Hx     Ovarian cancer Neg Hx        Social History     Socioeconomic History    Marital status:      Spouse name: Not on file    Number of children: Not on file    Years of education: Not on file    Highest education level: Not on file   Occupational History    Not on file   Social Needs    Financial resource strain: Not on file    Food insecurity:     Worry: Not on file     Inability: Not on file    Transportation needs:     Medical: Not on file     Non-medical: Not on file   Tobacco Use    Smoking status: Never Smoker    Smokeless tobacco: Never Used   Substance and Sexual Activity    Alcohol use: Yes     Alcohol/week: 2.0 standard drinks     Types: 2 Glasses of wine  per week    Drug use: Not on file    Sexual activity: Never     Partners: Male   Lifestyle    Physical activity:     Days per week: Not on file     Minutes per session: Not on file    Stress: Not on file   Relationships    Social connections:     Talks on phone: Not on file     Gets together: Not on file     Attends Zoroastrianism service: Not on file     Active member of club or organization: Not on file     Attends meetings of clubs or organizations: Not on file     Relationship status: Not on file   Other Topics Concern    Not on file   Social History Narrative    Not on file

## 2020-03-30 NOTE — PATIENT INSTRUCTIONS
"Today, you saw Dr. Aguirre and were seen for midfoot (arch) arthritis as well as irritation of the posterior tibial tendon where it attaches on the navicular bone.  There may be a small fracture or injury to bone there but it is stable    We decided the next step(s) in in treatment is/are   RICE guidelines (see below)   Anti-inflammatory medications (see below)   Therapy: Ankle exercises available on the web -    o Https://www.youGreen Energy Optionsube.com/watch?v=0iOGFs5nDWK  o https://www.footcaremd.org/resources/how-to-help/how-to-strengthen-your-ankle-after-a-sprain   Activity: Use pain as your guide, advance your activities as tolerated    As last resort, injections into inflamed/arthitic area can provide relief.  We are not doing steroid injections at this time due to risk of infection, but could be considered in future.    Thank you for allowing me to participate in your care.  We will see you back as needed to discuss next steps in treatment if current treatment plan does not provide relief.    How to treat inflammation?  1.) What does your doctor mean when they tell you to follow R.I.C.E. Guidelines?     Rest your ankle/foot by limiting activities that cause pain.  A good indicator of activity level is your pain the night following the activity and the day after.  If you have pain that lasts until the next day, you did too much.   Ice it to keep the swelling down. Don't put ice directly on the skin (use a thin piece of cloth between the ice bag and skin) and don't ice more than 20 minutes at a time to avoid frostbite.   Compressive bandages immobilize and support your injury.  Make sure it isn't too tight - your toes should not be turning purple and the wrap should not hurt.   Elevate your ankle above your heart level - "toes above your nose". This applies to acute injuries and should be followed for first 48 hours as well as afterward when you have increased pain and swelling after activity or therapy.    2.) Another " common way of treating pain and inflammation from an injury is anti-inflammatory medications.  Dr. Aguirre may prescribe you a medication for inflammation or you can take an over-the-counter anti-inflammatory (also known as NSAIDs - nonsteroidal anti-inflammatory drugs).  These include such medications as aleve, motrin, ibuprofen, naproxen, etc.  They should be taken as prescribed or according to the over-the-counter packaging instructions.  These medications can upset the stomach or rare cases causes ulcer disease or kidney injury.  If you are concerned about using these medications long-term, you should discuss it with you primary doctor.  You can also combine or substitute an NSAID with acetaminophen (commonly known as Tylenol).  Take according to bottle instructions, and you can take up to 3000 mg daily (important to keep in mind if you take other medications that contain acetaminophen).  Again long term use should be discussed with your primary doctor.

## 2020-03-31 NOTE — PROGRESS NOTES
Assessment /Plan     For exam results, see Encounter Report.    Primary open angle glaucoma (POAG) of both eyes, moderate stage    Bilateral ocular hypertension    Nuclear sclerosis of both eyes        Dr Rivera      [address]  Now --> financial education      Grandson [employer] full scholarship  Business // law // technology    OHT  POAG OD > OS  Presented 2019  39 // 23    + FMHx Mother ( 96 yo) & Brother  Denies Blindness    CCT  568 // 567    < 21    Both eyes --> denies steroid exposure  Xal q day  Cosopt BID --> tolerating well --> good response        NSC OU  CE PRN  Try MRx +275    Hx Retinal Tear OD  RD precautions:  Discussed symptoms of RD with increased flashes, floaters, decreasing vision.  Patient/Family to call and return immediately to clinic should the symptoms of RD occur. Voiced good understanding with Q & A.        Plan  RTC 3 months IOP & adherence --> HVF / OCT RNFL  RTC sooner prn with good understanding

## 2020-04-13 ENCOUNTER — PATIENT OUTREACH (OUTPATIENT)
Dept: ADMINISTRATIVE | Facility: OTHER | Age: 70
End: 2020-04-13

## 2020-04-14 ENCOUNTER — OFFICE VISIT (OUTPATIENT)
Dept: OPHTHALMOLOGY | Facility: CLINIC | Age: 70
End: 2020-04-14
Payer: MEDICARE

## 2020-04-14 DIAGNOSIS — H25.13 NUCLEAR SCLEROSIS OF BOTH EYES: ICD-10-CM

## 2020-04-14 DIAGNOSIS — H40.053 BILATERAL OCULAR HYPERTENSION: ICD-10-CM

## 2020-04-14 DIAGNOSIS — H40.1132 PRIMARY OPEN ANGLE GLAUCOMA (POAG) OF BOTH EYES, MODERATE STAGE: Primary | ICD-10-CM

## 2020-04-14 PROCEDURE — 99999 PR PBB SHADOW E&M-EST. PATIENT-LVL II: CPT | Mod: PBBFAC,,, | Performed by: OPHTHALMOLOGY

## 2020-04-14 PROCEDURE — 92012 PR EYE EXAM, EST PATIENT,INTERMED: ICD-10-PCS | Mod: S$PBB,,, | Performed by: OPHTHALMOLOGY

## 2020-04-14 PROCEDURE — 99999 PR PBB SHADOW E&M-EST. PATIENT-LVL II: ICD-10-PCS | Mod: PBBFAC,,, | Performed by: OPHTHALMOLOGY

## 2020-04-14 PROCEDURE — 92012 INTRM OPH EXAM EST PATIENT: CPT | Mod: S$PBB,,, | Performed by: OPHTHALMOLOGY

## 2020-04-14 PROCEDURE — 99212 OFFICE O/P EST SF 10 MIN: CPT | Mod: PBBFAC | Performed by: OPHTHALMOLOGY

## 2020-05-11 ENCOUNTER — LAB VISIT (OUTPATIENT)
Dept: PRIMARY CARE CLINIC | Facility: CLINIC | Age: 70
End: 2020-05-11
Payer: MEDICARE

## 2020-05-11 DIAGNOSIS — Z00.6 RESEARCH STUDY PATIENT: Primary | ICD-10-CM

## 2020-05-11 LAB — SARS-COV-2 IGG SERPLBLD QL IA.RAPID: NEGATIVE

## 2020-05-11 PROCEDURE — U0002 COVID-19 LAB TEST NON-CDC: HCPCS

## 2020-05-11 PROCEDURE — 86769 SARS-COV-2 COVID-19 ANTIBODY: CPT

## 2020-05-11 NOTE — RESEARCH
Date of Consent: 05/11/2020     Sponsor: [facility]    Study Title/IRB Number: Observational study of Sars-CoV2 Immunoglobulin G (IgG) seroprevalence among the [facility] population over time 2020.163  Principle Investigator: Mabel Matta, PhD    Did the patient need translation services? No   name: N/A    Prior to the Informed Consent (IC) being signed, or any study protocol required data collection, testing, procedure, or intervention being performed, the following was done and/or discussed:   Patient was given a paper copy of the IC for review    Patient was given study FAQ   Purpose of the study and qualifications to participate    Study design and tests or procedures done at this visit   Confidentiality and HIPAA Authorization for Release of Medical Records for the research trial/ subject's rights/research related injury   Risk, Benefits, Alternative Treatments, Compensation and Costs   Participation in the research trial is voluntary and patient may withdraw at anytime   Contact information for study related questions    Patient verbalizes understanding of the above: Yes  Contact information for PI and IRB given to patient: Yes  Patient able to adequately summarize: the purpose of the study, the risks associated with the study, and all procedures, testing, and follow-ups associated with the study: Yes    The consent was discussed verbally with the patient and all questions were answered satisfactorily. Patient gave verbal consent for the Seroprevalence research study with an IRB approval date of 05/08/2020.      The Consent, Consent Witness and name of Clinical Research Coordinator consenting was captured and documented in REDCap.    All Inclusion and Exclusion Criteria reviewed, subject meets all Inclusion criteria and does not meet any Exclusion Criteria at this time.     Patient Eligibility was confirmed.    Patient responded to survey questions.    The following biospecimen  collection procedures were collected:    -Nasopharyngeal Swab Collection  -Blood collection  Date of Consent: 05/11/2020     Sponsor: [facility]    Study Title/IRB Number: Observational study of Sars-CoV2 Immunoglobulin G (IgG) seroprevalence among the [facility] population over time 2020.163  Principle Investigator: Mabel Matta, PhD    Did the patient need translation services? No   name: N/A    Prior to the Informed Consent (IC) being signed, or any study protocol required data collection, testing, procedure, or intervention being performed, the following was done and/or discussed:   Patient was given a paper copy of the IC for review    Patient was given study FAQ   Purpose of the study and qualifications to participate    Study design and tests or procedures done at this visit   Confidentiality and HIPAA Authorization for Release of Medical Records for the research trial/ subject's rights/research related injury   Risk, Benefits, Alternative Treatments, Compensation and Costs   Participation in the research trial is voluntary and patient may withdraw at anytime   Contact information for study related questions    Patient verbalizes understanding of the above: Yes  Contact information for PI and IRB given to patient: Yes  Patient able to adequately summarize: the purpose of the study, the risks associated with the study, and all procedures, testing, and follow-ups associated with the study: Yes    The consent was discussed verbally with the patient and all questions were answered satisfactorily. Patient gave verbal consent for the Seroprevalence research study with an IRB approval date of 05/08/2020.      The Consent, Consent Witness and name of Clinical Research Coordinator consenting was captured and documented in REDCap.    All Inclusion and Exclusion Criteria reviewed, subject meets all Inclusion criteria and does not meet any Exclusion Criteria at this time.     Patient  Eligibility was confirmed.    Patient responded to survey questions.    The following biospecimen collection procedures were collected:    -Nasopharyngeal Swab Collection  -Blood collection

## 2020-05-12 LAB — SARS-COV-2 RNA RESP QL NAA+PROBE: NOT DETECTED

## 2020-06-25 ENCOUNTER — TELEPHONE (OUTPATIENT)
Dept: INTERNAL MEDICINE | Facility: CLINIC | Age: 70
End: 2020-06-25

## 2020-06-25 NOTE — TELEPHONE ENCOUNTER
----- Message from Katie Correia sent at 6/25/2020  3:42 PM CDT -----  Is this a refill or new RX:  Refill    RX name and strength: LORazepam (ATIVAN) 1 MG tablet    Pharmacy name and phone # [id number] - [facility] [address] AT [facility] [phone] (Phone)  [phone] (Fax)

## 2020-06-25 NOTE — TELEPHONE ENCOUNTER
rx was sent 6/19.    LORazepam (ATIVAN) 1 MG tablet 60 tablet 1 6/19/2020  No   Sig: TAKE 1 TABLET BY MOUTH EVERY 8 HOURS AS NEEDED FOR ANXIETY   Sent to pharmacy as: LORazepam (ATIVAN) 1 MG tablet   Class: Normal   Order: [id number]   Date/Time Signed: 6/19/2020 21:26       E-Prescribing Status: Receipt confirmed by pharmacy (6/19/2020  9:26 PM CDT)         I left msg giving rx to pharmacy again.

## 2020-07-13 ENCOUNTER — OFFICE VISIT (OUTPATIENT)
Dept: URGENT CARE | Facility: CLINIC | Age: 70
End: 2020-07-13
Payer: MEDICARE

## 2020-07-13 VITALS
TEMPERATURE: 97 F | WEIGHT: 199 LBS | OXYGEN SATURATION: 98 % | BODY MASS INDEX: 31.98 KG/M2 | HEIGHT: 66 IN | HEART RATE: 74 BPM

## 2020-07-13 DIAGNOSIS — B34.9 VIRAL ILLNESS: Primary | ICD-10-CM

## 2020-07-13 DIAGNOSIS — R05.9 COUGH: ICD-10-CM

## 2020-07-13 PROCEDURE — 99213 PR OFFICE/OUTPT VISIT, EST, LEVL III, 20-29 MIN: ICD-10-PCS | Mod: S$GLB,,, | Performed by: NURSE PRACTITIONER

## 2020-07-13 PROCEDURE — U0003 INFECTIOUS AGENT DETECTION BY NUCLEIC ACID (DNA OR RNA); SEVERE ACUTE RESPIRATORY SYNDROME CORONAVIRUS 2 (SARS-COV-2) (CORONAVIRUS DISEASE [COVID-19]), AMPLIFIED PROBE TECHNIQUE, MAKING USE OF HIGH THROUGHPUT TECHNOLOGIES AS DESCRIBED BY CMS-2020-01-R: HCPCS

## 2020-07-13 PROCEDURE — 99213 OFFICE O/P EST LOW 20 MIN: CPT | Mod: S$GLB,,, | Performed by: NURSE PRACTITIONER

## 2020-07-13 NOTE — PATIENT INSTRUCTIONS
Viral Illness  If your condition worsens or fails to improve we recommend that you receive another evaluation at the ER immediately or contact your PCP to discuss your concerns or return here. You must understand that you've received an urgent care treatment only and that you may be released before all your medical problems are known or treated. You the patient will arrange for follouwp care as instructed.   We discussed that your illness is viral in nature so you will treat this symptomatically.    Tylenol or Motrin for fever, pain or sore throat  Rest and fluids are important    Instructions for Patients with Confirmed or Suspected COVID-19    If you are awaiting your test result, you will either be called or it will be released to the patient portal.  If you have any questions about your test, please visit [email or web address] or call our COVID-19 information line at [phone].      Preventing the Spread of Coronavirus Disease 2019 (COVID-19) in Homes and Residential Communities -- Patients     Prevention steps for people with confirmed or suspected COVID-19 (including persons under investigation) who do not need to be hospitalized and people with confirmed COVID-19 who were hospitalized and determined to be medically stable to go home.      Stay home except to get medical care.    Separate yourself from other people and animals in your home.    Call ahead before visiting your doctor.    Wear a face mask.    Cover your coughs and sneezes.    Clean your hands often.    Avoid sharing personal household items.    Clean all high-touch surfaces every day.    Monitor your symptoms. Seek prompt medical attention if your illness is worsening (e.g., difficulty breathing). Before seeking care, call your healthcare provider.    If you have a medical emergency and must call 911, notify the dispatcher that you have or are being evaluated for  COVID-19. If possible, put on a face mask before emergency medical services arrive.    Use the following symptom-based strategy to return to normal activity following a suspected or confirmed case of COVID-19. Continue isolation until:   o At least 3 days (72 hours) have passed since recovery defined as resolution of fever without the use of fever-reducing medications and improvement in respiratory symptoms (e.g. cough, shortness of breath), and   o At least 10 days have passed since symptoms first appeared.     Precautions for household members, intimate partners and caregivers in a non-healthcare setting of a patient with symptomatic laboratory-confirmed COVID-19 or a patient under investigation.     Household members, intimate partners and caregivers in a non-healthcare setting may have close contact with a person with symptomatic, laboratory-confirmed COVID-19 or a person under investigation. Close contacts should monitor their health; they should call their healthcare provider right away if they develop symptoms suggestive of COVID-19 (e.g., fever, cough, shortness of breath). Close contacts should also follow these recommendations:     · Stay home for the duration of the time recommended by healthcare provider, except to get medical care. Separate yourself from other people and animals in the home.  · Monitor the patients symptoms. If the patient is getting sicker, call his or her healthcare provider. If the patient has a medical emergency and you need to call 911, notify the dispatch personnel that the patient has or is being evaluated for COVID-19.   · Wear a facemask when around other people such as sharing a room or vehicle and before entering a healthcare provider's office.  · Cover coughs and sneezes with a tissue. Throw used tissues in a lined trash can immediately and wash hands.  · Clean hands often with soap and water for at least 20 seconds or with an alcohol-based hand , rubbing hands  "together until they feel dry. Avoid touching your eyes, nose, and mouth with unwashed hands.  · Clean all high-touch; surfaces every day, including counters, tabletops, doorknobs, bathroom fixtures, toilets, phones, keyboards, tablets, bedside tables, etc. Use a household cleaning spray or wipe according to label instructions.  · Avoid sharing personal household items such as dishes, drinking glasses, cups, towels, bedding, etc. After these items are used, they should be washed thoroughly with soap and water.  · Use the following symptom-based strategy to return to normal activity following a suspected or confirmed case of COVID-19. Continue isolation until:   · At least 3 days (72 hours) have passed since recovery defined as resolution of fever without the use of fever-reducing medications and improvement in respiratory symptoms (e.g. cough, shortness of breath), and   · At least 10 days have passed since symptoms first appeared.    Viral Syndrome (Adult)  A viral illness may cause a number of symptoms. The symptoms depend on the part of the body that the virus affects. If it settles in your nose, throat, and lungs, it may cause cough, sore throat, congestion, and sometimes headache. If it settles in your stomach and intestinal tract, it may cause vomiting and diarrhea. Sometimes it causes vague symptoms like "aching all over," feeling tired, loss of appetite, or fever.  A viral illness usually lasts 1 to 2 weeks, but sometimes it lasts longer. In some cases, a more serious infection can look like a viral syndrome in the first few days of the illness. You may need another exam and additional tests to know the difference. Watch for the warning signs listed below.  Home care  Follow these guidelines for taking care of yourself at home:  · If symptoms are severe, rest at home for the first 2 to 3 days.  · Stay away from cigarette smoke - both your smoke and the smoke from others.  · You may use " over-the-counter acetaminophen or ibuprofen for fever, muscle aching, and headache, unless another medicine was prescribed for this. If you have chronic liver or kidney disease or ever had a stomach ulcer or GI bleeding, talk with your doctor before using these medicines. No one who is younger than 18 and ill with a fever should take aspirin. It may cause severe disease or death.  · Your appetite may be poor, so a light diet is fine. Avoid dehydration by drinking 8 to 12 8-ounce glasses of fluids each day. This may include water; orange juice; lemonade; apple, grape, and cranberry juice; clear fruit drinks; electrolyte replacement and sports drinks; and decaffeinated teas and coffee. If you have been diagnosed with a kidney disease, ask your doctor how much and what types of fluids you should drink to prevent dehydration. If you have kidney disease, drinking too much fluid can cause it build up in the your body and be dangerous to your health.  · Over-the-counter remedies won't shorten the length of the illness but may be helpful for cough, sore throat; and nasal and sinus congestion. Don't use decongestants if you have high blood pressure.  Follow-up care  Follow up with your healthcare provider if you do not improve over the next week.  Call 911  Get emergency medical care if any of the following occur:  · Convulsion  · Feeling weak, dizzy, or like you are going to faint  · Chest pain, shortness of breath, wheezing, or difficulty breathing  When to seek medical advice  Call your healthcare provider right away if any of these occur:  · Cough with lots of colored sputum (mucus) or blood in your sputum  · Chest pain, shortness of breath, wheezing, or difficulty breathing  · Severe headache; face, neck, or ear pain  · Severe, constant pain in the lower right side of your belly (abdominal)  · Continued vomiting (cant keep liquids down)  · Frequent diarrhea (more than 5 times a day); blood (red or black color) or mucus  in diarrhea  · Feeling weak, dizzy, or like you are going to faint  · Extreme thirst  · Fever of 100.4°F (38°C) or higher, or as directed by your healthcare provider  Date Last Reviewed: 9/25/2015 © 2000-2017 [employer]. [address] [place] [id number]. All rights reserved. This information is not intended as a substitute for professional medical care. Always follow your healthcare professional's instructions.        Cough, Chronic, Uncertain Cause (Adult)    Everyone has had a cough as part of the common cold, flu, or bronchitis. This kind of cough occurs along with an achy feeling, low-grade fever, nasal and sinus congestion, and a scratchy or sore throat. This usually gets better in 2 to 3 weeks. A cough that lasts longer than 3 weeks may be due to other causes.  If your cough does not improve over the next 2 weeks, further testing may be needed. Follow up with your healthcare provider as advised. Cough suppressants may be recommended. Based on your exam today, the exact cause of your cough is not certain. Below are some common causes for persistent cough.  Smokers cough  Smokers cough doesnt go away. If you continue to smoke, it only gets worse. The cough is from irritation in the air passages. Talk to your healthcare provider about quitting. Medicines or nicotine-replacement products, like gum or the patch, may make quitting easier.  Postnasal drip  A cough that is worse at night may be due to postnasal drip. Excess mucus in the nose drains from the back of your nose to your throat. This triggers the cough reflex. Postnasal drip may be due to a sinus infection or allergy. Common allergens include dust, tobacco smoke (both inhaled and secondhand smoke), environmental pollutants, pollen, mold, pets, cleaning agents, room deodorizers, and chemical fumes. Over-the-counter antihistamines or decongestants may be helpful for allergies. A sinus infection may requires antibiotic treatment.  See your healthcare provider if symptoms continue.  Medicines  Certain prescribed medicines can cause a chronic cough in some people:  · ACE inhibitors for high blood pressure. These include benazepril, captopril, enalapril, fosinopril, lisinopril, quinapril, ramipril, and others.  · Beta-blockers for high blood pressure and other conditions. These include propranolol, atenolol, metoprolol, nadolol, and others.  Let your healthcare provider know if you are taking any of these.  Asthma  Cough may be the only sign of mild asthma. You may have tests to find out if asthma is causing your cough. You may also take asthma medicine on a trial basis.  Acid reflux (heartburn, GERD)  The esophagus is the tube that carries food from the mouth to the stomach. A valve at its lower end prevents stomach acids from flowing upward. If this valve does not work properly, acid from the stomach enters the esophagus. This may cause a burning pain in the upper abdomen or lower chest, belching, or cough. Symptoms are often worse when lying flat. Avoid eating or drinking before bedtime. Try using extra pillows to raise your upper body, or place 4-inch blocks under the head of your bed. You may try an over-the-counter antacid or an acid-blocking medicine such as famotidine, cimetidine, ranitidine, esomeprazole, lansoprazole, or omeprazole. Stronger medicines for this condition can be prescribed by your healthcare provider.  Follow-up care  Follow up with your healthcare provider, or as advised, if your cough does not improve. Further testing may be needed.  Note: If an X-ray was taken, a specialist will review it. You will be notified of any new findings that may affect your care.  When to seek medical advice  Call your healthcare provider right away if any of these occur:  · Mild wheezing or difficulty breathing  · Fever of 100.4ºF (38ºC) or higher, or as directed by your healthcare provider  · Unexpected weight loss  · Coughing up large  amounts of colored sputum  · Night sweats (sheets and pajamas get soaking wet)  Call 911, or get immediate medical care  Contact emergency services right away if any of these occur:  · Coughing up blood  · Moderate to severe trouble breathing or wheezing  Date Last Reviewed: 9/13/2015  © 2214-9720 [employer]. [address] [place] [address], [place] [id number]. All rights reserved. This information is not intended as a substitute for professional medical care. Always follow your healthcare professional's instructions.

## 2020-07-13 NOTE — PROGRESS NOTES
"Subjective:       Patient ID: Nan Palafox is a 70 y.o. female.    Vitals:  height is 5' 6" (1.676 m) and weight is 90.3 kg (199 lb). Her temperature is 97.1 °F (36.2 °C). Her pulse is 74. Her oxygen saturation is 98%.     Chief Complaint: COVID-19 Concerns    Fatigue  This is a new problem. The current episode started 1 to 4 weeks ago (1 week). The problem occurs constantly. Associated symptoms include chills, coughing, diaphoresis, fatigue, a fever, headaches and myalgias. Pertinent negatives include no arthralgias, chest pain, congestion, joint swelling, nausea, rash, sore throat, vertigo, vomiting or weakness. Nothing aggravates the symptoms. She has tried nothing for the symptoms.       Constitution: Positive for chills, sweating, fatigue and fever.   HENT: Negative for congestion and sore throat.    Neck: Negative for painful lymph nodes.   Cardiovascular: Negative for chest pain and leg swelling.   Eyes: Negative for double vision and blurred vision.   Respiratory: Positive for cough and sputum production. Negative for shortness of breath.    Gastrointestinal: Negative for nausea, vomiting and diarrhea.   Genitourinary: Negative for dysuria, frequency, urgency and history of kidney stones.   Musculoskeletal: Positive for muscle ache. Negative for joint pain, joint swelling and muscle cramps.   Skin: Negative for color change, pale, rash and bruising.   Allergic/Immunologic: Negative for seasonal allergies.   Neurological: Positive for headaches. Negative for dizziness, history of vertigo, light-headedness and passing out.   Hematologic/Lymphatic: Negative for swollen lymph nodes.   Psychiatric/Behavioral: Negative for nervous/anxious, sleep disturbance and depression. The patient is not nervous/anxious.        Objective:      Physical Exam   Constitutional: She is oriented to person, place, and time.   HENT:   Head: Normocephalic and atraumatic.   Right Ear: Hearing, tympanic membrane, external ear and " ear canal normal.   Left Ear: Hearing, tympanic membrane, external ear and ear canal normal.   Nose: Nose normal.   Eyes: Pupils are equal, round, and reactive to light. Conjunctivae are normal.   Neck: Normal range of motion. Neck supple.   Cardiovascular: Normal rate, regular rhythm, S1 normal, S2 normal, normal heart sounds and normal pulses. Exam reveals no decreased pulses.   Pulmonary/Chest: Effort normal and breath sounds normal. No accessory muscle usage. No respiratory distress. She has no decreased breath sounds. She has no wheezes. She has no rhonchi. She has no rales.   Abdominal: Normal appearance.   Lymphadenopathy:     She has no cervical adenopathy.   Neurological: She is alert and oriented to person, place, and time.   Skin: Skin is warm and dry.   Nursing note and vitals reviewed.        Assessment:       1. Viral illness    2. Cough        Plan:         Viral illness  -     COVID-19 Routine Screening    Cough  -     COVID-19 Routine Screening      Patient Instructions                                                        Viral Illness  If your condition worsens or fails to improve we recommend that you receive another evaluation at the ER immediately or contact your PCP to discuss your concerns or return here. You must understand that you've received an urgent care treatment only and that you may be released before all your medical problems are known or treated. You the [facility].   We discussed that your illness is viral in nature so you will treat this symptomatically.    Tylenol or Motrin for fever, pain or sore throat  Rest and fluids are important    Instructions for Patients with Confirmed or Suspected COVID-19    If you are awaiting your test result, you will either be called or it will be released to the patient portal.  If you have any questions about your test, please visit [email or web address] or call our COVID-19 information line at  [phone].      Preventing the Spread of Coronavirus Disease 2019 (COVID-19) in Homes and Residential Communities -- Patients     Prevention steps for people with confirmed or suspected COVID-19 (including persons under investigation) who do not need to be hospitalized and people with confirmed COVID-19 who were hospitalized and determined to be medically stable to go home.      Stay home except to get medical care.    Separate yourself from other people and animals in your home.    Call ahead before visiting your doctor.    Wear a face mask.    Cover your coughs and sneezes.    Clean your hands often.    Avoid sharing personal household items.    Clean all high-touch surfaces every day.    Monitor your symptoms. Seek prompt medical attention if your illness is worsening (e.g., difficulty breathing). Before seeking care, call your healthcare provider.    If you have a medical emergency and must call 911, notify the dispatcher that you have or are being evaluated for COVID-19. If possible, put on a face mask before emergency medical services arrive.    Use the following symptom-based strategy to return to normal activity following a suspected or confirmed case of COVID-19. Continue isolation until:   o At least 3 days (72 hours) have passed since recovery defined as resolution of fever without the use of fever-reducing medications and improvement in respiratory symptoms (e.g. cough, shortness of breath), and   o At least 10 days have passed since symptoms first appeared.     Precautions for household members, intimate partners and caregivers in a non-healthcare setting of a patient with symptomatic laboratory-confirmed COVID-19 or a patient under investigation.     Household members, intimate partners and caregivers in a non-healthcare setting may have close contact with a person with symptomatic, laboratory-confirmed COVID-19 or a person under investigation. Close contacts should monitor their  health; they should call their healthcare provider right away if they develop symptoms suggestive of COVID-19 (e.g., fever, cough, shortness of breath). Close contacts should also follow these recommendations:     · Stay home for the duration of the time recommended by healthcare provider, except to get medical care. Separate yourself from other people and animals in the home.  · Monitor the patients symptoms. If the patient is getting sicker, call his or her healthcare provider. If the patient has a medical emergency and you need to call 911, notify the dispatch personnel that the patient has or is being evaluated for COVID-19.   · Wear a facemask when around other people such as sharing a room or vehicle and before entering a healthcare provider's office.  · Cover coughs and sneezes with a tissue. Throw used tissues in a lined trash can immediately and wash hands.  · Clean hands often with soap and water for at least 20 seconds or with an alcohol-based hand , rubbing hands together until they feel dry. Avoid touching your eyes, nose, and mouth with unwashed hands.  · Clean all high-touch; surfaces every day, including counters, tabletops, doorknobs, bathroom fixtures, toilets, phones, keyboards, tablets, bedside tables, etc. Use a household cleaning spray or wipe according to label instructions.  · Avoid sharing personal household items such as dishes, drinking glasses, cups, towels, bedding, etc. After these items are used, they should be washed thoroughly with soap and water.  · Use the following symptom-based strategy to return to normal activity following a suspected or confirmed case of COVID-19. Continue isolation until:   · At least 3 days (72 hours) have passed since recovery defined as resolution of fever without the use of fever-reducing medications and improvement in respiratory symptoms (e.g. cough, shortness of breath), and   · At least 10 days have passed since symptoms first  "appeared.    Viral Syndrome (Adult)  A viral illness may cause a number of symptoms. The symptoms depend on the part of the body that the virus affects. If it settles in your nose, throat, and lungs, it may cause cough, sore throat, congestion, and sometimes headache. If it settles in your stomach and intestinal tract, it may cause vomiting and diarrhea. Sometimes it causes vague symptoms like "aching all over," feeling tired, loss of appetite, or fever.  A viral illness usually lasts 1 to 2 weeks, but sometimes it lasts longer. In some cases, a more serious infection can look like a viral syndrome in the first few days of the illness. You may need another exam and additional tests to know the difference. Watch for the warning signs listed below.  Home care  Follow these guidelines for taking care of yourself at home:  · If symptoms are severe, rest at home for the first 2 to 3 days.  · Stay away from cigarette smoke - both your smoke and the smoke from others.  · You may use over-the-counter acetaminophen or ibuprofen for fever, muscle aching, and headache, unless another medicine was prescribed for this. If you have chronic liver or kidney disease or ever had a stomach ulcer or GI bleeding, talk with your doctor before using these medicines. No one who is younger than 18 and ill with a fever should take aspirin. It may cause severe disease or death.  · Your appetite may be poor, so a light diet is fine. Avoid dehydration by drinking 8 to 12 8-ounce glasses of fluids each day. This may include water; orange juice; lemonade; apple, grape, and cranberry juice; clear fruit drinks; electrolyte replacement and sports drinks; and decaffeinated teas and coffee. If you have been diagnosed with a kidney disease, ask your doctor how much and what types of fluids you should drink to prevent dehydration. If you have kidney disease, drinking too much fluid can cause it build up in the your body and be dangerous to your " health.  · Over-the-counter remedies won't shorten the length of the illness but may be helpful for cough, sore throat; and nasal and sinus congestion. Don't use decongestants if you have high blood pressure.  Follow-up care  Follow up with your healthcare provider if you do not improve over the next week.  Call 911  Get emergency medical care if any of the following occur:  · Convulsion  · Feeling weak, dizzy, or like you are going to faint  · Chest pain, shortness of breath, wheezing, or difficulty breathing  When to seek medical advice  Call your healthcare provider right away if any of these occur:  · Cough with lots of colored sputum (mucus) or blood in your sputum  · Chest pain, shortness of breath, wheezing, or difficulty breathing  · Severe headache; face, neck, or ear pain  · Severe, constant pain in the lower right side of your belly (abdominal)  · Continued vomiting (cant keep liquids down)  · Frequent diarrhea (more than 5 times a day); blood (red or black color) or mucus in diarrhea  · Feeling weak, dizzy, or like you are going to faint  · Extreme thirst  · Fever of 100.4°F (38°C) or higher, or as directed by your healthcare provider  Date Last Reviewed: 9/25/2015  © 7821-6182 [employer]. [address] [place] [address], [place] [address]. All rights reserved. This information is not intended as a substitute for professional medical care. Always follow your healthcare professional's instructions.        Cough, Chronic, Uncertain Cause (Adult)    Everyone has had a cough as part of the common cold, flu, or bronchitis. This kind of cough occurs along with an achy feeling, low-grade fever, nasal and sinus congestion, and a scratchy or sore throat. This usually gets better in 2 to 3 weeks. A cough that lasts longer than 3 weeks may be due to other causes.  If your cough does not improve over the next 2 weeks, further testing may be needed. Follow up with your healthcare provider as advised. Cough  suppressants may be recommended. Based on your exam today, the exact cause of your cough is not certain. Below are some common causes for persistent cough.  Smokers cough  Smokers cough doesnt go away. If you continue to smoke, it only gets worse. The cough is from irritation in the air passages. Talk to your healthcare provider about quitting. Medicines or nicotine-replacement products, like gum or the patch, may make quitting easier.  Postnasal drip  A cough that is worse at night may be due to postnasal drip. Excess mucus in the nose drains from the back of your nose to your throat. This triggers the cough reflex. Postnasal drip may be due to a sinus infection or allergy. Common allergens include dust, tobacco smoke (both inhaled and secondhand smoke), environmental pollutants, pollen, mold, pets, cleaning agents, room deodorizers, and chemical fumes. Over-the-counter antihistamines or decongestants may be helpful for allergies. A sinus infection may requires antibiotic treatment. See your healthcare provider if symptoms continue.  Medicines  Certain prescribed medicines can cause a chronic cough in some people:  · ACE inhibitors for high blood pressure. These include benazepril, captopril, enalapril, fosinopril, lisinopril, quinapril, ramipril, and others.  · Beta-blockers for high blood pressure and other conditions. These include propranolol, atenolol, metoprolol, nadolol, and others.  Let your healthcare provider know if you are taking any of these.  Asthma  Cough may be the only sign of mild asthma. You may have tests to find out if asthma is causing your cough. You may also take asthma medicine on a trial basis.  Acid reflux (heartburn, GERD)  The esophagus is the tube that carries food from the mouth to the stomach. A valve at its lower end prevents stomach acids from flowing upward. If this valve does not work properly, acid from the stomach enters the esophagus. This may cause a burning pain in the  upper abdomen or lower chest, belching, or cough. Symptoms are often worse when lying flat. Avoid eating or drinking before bedtime. Try using extra pillows to raise your upper body, or place 4-inch blocks under the head of your bed. You may try an over-the-counter antacid or an acid-blocking medicine such as famotidine, cimetidine, ranitidine, esomeprazole, lansoprazole, or omeprazole. Stronger medicines for this condition can be prescribed by your healthcare provider.  Follow-up care  Follow up with your healthcare provider, or as advised, if your cough does not improve. Further testing may be needed.  Note: If an X-ray was taken, a specialist will review it. You will be notified of any new findings that may affect your care.  When to seek medical advice  Call your healthcare provider right away if any of these occur:  · Mild wheezing or difficulty breathing  · Fever of 100.4ºF (38ºC) or higher, or as directed by your healthcare provider  · Unexpected weight loss  · Coughing up large amounts of colored sputum  · Night sweats (sheets and pajamas get soaking wet)  Call 911, or get immediate medical care  Contact emergency services right away if any of these occur:  · Coughing up blood  · Moderate to severe trouble breathing or wheezing  Date Last Reviewed: 9/13/2015  © 2986-8924 [employer]. [address] [place] [address], [place] [address]. All rights reserved. This information is not intended as a substitute for professional medical care. Always follow your healthcare professional's instructions.

## 2020-07-15 LAB — SARS-COV-2 RNA RESP QL NAA+PROBE: NOT DETECTED

## 2020-07-16 ENCOUNTER — TELEPHONE (OUTPATIENT)
Dept: URGENT CARE | Facility: CLINIC | Age: 70
End: 2020-07-16

## 2020-07-17 DIAGNOSIS — Z71.89 COMPLEX CARE COORDINATION: ICD-10-CM

## 2020-07-21 NOTE — PROGRESS NOTES
Assessment /Plan     For exam results, see Encounter Report.    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  Patient/Family to call and return immediately to clinic should the symptoms of RD occur. Voiced good understanding with Q & A.        Plan  RTC 3 months IOP & DFE & adherence --> HVF  RTC sooner prn with good understanding

## 2020-07-26 ENCOUNTER — PATIENT OUTREACH (OUTPATIENT)
Dept: ADMINISTRATIVE | Facility: OTHER | Age: 70
End: 2020-07-26

## 2020-07-26 NOTE — PROGRESS NOTES
Requested updates within Care Everywhere.  Patient's chart was reviewed for overdue THANH topics.  Immunizations reconciled.    Orders placed:n/a  Tasked appts:n/a  Labs Linked:n/a

## 2020-07-27 ENCOUNTER — OFFICE VISIT (OUTPATIENT)
Dept: OPHTHALMOLOGY | Facility: CLINIC | Age: 70
End: 2020-07-27
Payer: MEDICARE

## 2020-07-27 DIAGNOSIS — H40.053 BILATERAL OCULAR HYPERTENSION: ICD-10-CM

## 2020-07-27 DIAGNOSIS — H25.13 NUCLEAR SCLEROSIS OF BOTH EYES: ICD-10-CM

## 2020-07-27 DIAGNOSIS — H40.1132 PRIMARY OPEN ANGLE GLAUCOMA (POAG) OF BOTH EYES, MODERATE STAGE: Primary | ICD-10-CM

## 2020-07-27 PROCEDURE — 92012 INTRM OPH EXAM EST PATIENT: CPT | Mod: S$PBB,,, | Performed by: OPHTHALMOLOGY

## 2020-07-27 PROCEDURE — 92133 CPTRZD OPH DX IMG PST SGM ON: CPT | Mod: PBBFAC | Performed by: OPHTHALMOLOGY

## 2020-07-27 PROCEDURE — 99999 PR PBB SHADOW E&M-EST. PATIENT-LVL III: ICD-10-PCS | Mod: PBBFAC,,, | Performed by: OPHTHALMOLOGY

## 2020-07-27 PROCEDURE — 92133 POSTERIOR SEGMENT OCT OPTIC NERVE(OCULAR COHERENCE TOMOGRAPHY) - OU - BOTH EYES: ICD-10-PCS | Mod: 26,S$PBB,, | Performed by: OPHTHALMOLOGY

## 2020-07-27 PROCEDURE — 99999 PR PBB SHADOW E&M-EST. PATIENT-LVL III: CPT | Mod: PBBFAC,,, | Performed by: OPHTHALMOLOGY

## 2020-07-27 PROCEDURE — 92012 PR EYE EXAM, EST PATIENT,INTERMED: ICD-10-PCS | Mod: S$PBB,,, | Performed by: OPHTHALMOLOGY

## 2020-07-27 PROCEDURE — 99213 OFFICE O/P EST LOW 20 MIN: CPT | Mod: PBBFAC,25 | Performed by: OPHTHALMOLOGY

## 2020-07-27 RX ORDER — GABAPENTIN 300 MG/1
CAPSULE ORAL
COMMUNITY
Start: 2020-07-24 | End: 2020-12-02

## 2020-07-27 RX ORDER — METHYLPREDNISOLONE 4 MG/1
TABLET ORAL
COMMUNITY
Start: 2020-07-24 | End: 2020-12-09

## 2020-07-27 RX ORDER — PROMETHAZINE HYDROCHLORIDE AND DEXTROMETHORPHAN HYDROBROMIDE 6.25; 15 MG/5ML; MG/5ML
SYRUP ORAL
COMMUNITY
Start: 2020-07-13 | End: 2021-01-15

## 2020-07-27 RX ORDER — AZITHROMYCIN 250 MG/1
TABLET, FILM COATED ORAL
COMMUNITY
Start: 2020-07-13 | End: 2020-12-02

## 2020-07-27 RX ORDER — TIZANIDINE 4 MG/1
TABLET ORAL
COMMUNITY
Start: 2020-07-24 | End: 2021-01-15

## 2020-08-25 ENCOUNTER — OFFICE VISIT (OUTPATIENT)
Dept: OPTOMETRY | Facility: CLINIC | Age: 70
End: 2020-08-25
Payer: MEDICARE

## 2020-08-25 DIAGNOSIS — H25.13 NUCLEAR SCLEROSIS OF BOTH EYES: Primary | ICD-10-CM

## 2020-08-25 DIAGNOSIS — H52.7 REFRACTIVE ERROR: ICD-10-CM

## 2020-08-25 PROCEDURE — 92012 INTRM OPH EXAM EST PATIENT: CPT | Mod: S$PBB,,, | Performed by: OPTOMETRIST

## 2020-08-25 PROCEDURE — 99213 OFFICE O/P EST LOW 20 MIN: CPT | Mod: PBBFAC,PO | Performed by: OPTOMETRIST

## 2020-08-25 PROCEDURE — 92012 PR EYE EXAM, EST PATIENT,INTERMED: ICD-10-PCS | Mod: S$PBB,,, | Performed by: OPTOMETRIST

## 2020-08-25 PROCEDURE — 92015 PR REFRACTION: ICD-10-PCS | Mod: ,,, | Performed by: OPTOMETRIST

## 2020-08-25 PROCEDURE — 92015 DETERMINE REFRACTIVE STATE: CPT | Mod: ,,, | Performed by: OPTOMETRIST

## 2020-08-25 PROCEDURE — 99999 PR PBB SHADOW E&M-EST. PATIENT-LVL III: CPT | Mod: PBBFAC,,, | Performed by: OPTOMETRIST

## 2020-08-25 PROCEDURE — 99999 PR PBB SHADOW E&M-EST. PATIENT-LVL III: ICD-10-PCS | Mod: PBBFAC,,, | Performed by: OPTOMETRIST

## 2020-08-25 NOTE — PROGRESS NOTES
HPI     DLS  07/27/2020  Patient here for routine eye exam.  Patient states she is now having problem with reading.    Last edited by Dhruv Clifford on 8/25/2020  9:36 AM. (History)            Assessment /Plan     For exam results, see Encounter Report.    Nuclear sclerosis of both eyes    Refractive error      1. Educated pt on presence of cataracts and effects on vision. No surgery at this time. Recheck in one year.  2. New Spec Rx given. Different lens options discussed with patient. RTC 1 year full exam.

## 2020-09-14 PROBLEM — I77.1 TORTUOUS AORTA: Status: ACTIVE | Noted: 2020-09-14

## 2020-09-14 PROBLEM — I70.0 ATHEROSCLEROSIS OF AORTA: Status: ACTIVE | Noted: 2020-09-14

## 2020-09-16 ENCOUNTER — OFFICE VISIT (OUTPATIENT)
Dept: INTERNAL MEDICINE | Facility: CLINIC | Age: 70
End: 2020-09-16
Payer: MEDICARE

## 2020-09-16 VITALS
RESPIRATION RATE: 15 BRPM | BODY MASS INDEX: 31.5 KG/M2 | OXYGEN SATURATION: 99 % | HEIGHT: 66 IN | DIASTOLIC BLOOD PRESSURE: 88 MMHG | HEART RATE: 71 BPM | WEIGHT: 196 LBS | SYSTOLIC BLOOD PRESSURE: 120 MMHG

## 2020-09-16 DIAGNOSIS — G54.2 CERVICAL SYNDROME: ICD-10-CM
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
## 2020-09-16 DIAGNOSIS — E55.9 VITAMIN D DEFICIENCY: ICD-10-CM

## 2020-09-16 DIAGNOSIS — H25.10 NUCLEAR SCLEROSIS, UNSPECIFIED LATERALITY: ICD-10-CM

## 2020-09-16 DIAGNOSIS — Z12.31 ENCOUNTER FOR SCREENING MAMMOGRAM FOR MALIGNANT NEOPLASM OF BREAST: ICD-10-CM

## 2020-09-16 DIAGNOSIS — H40.1132 PRIMARY OPEN ANGLE GLAUCOMA (POAG) OF BOTH EYES, MODERATE STAGE: ICD-10-CM

## 2020-09-16 DIAGNOSIS — D21.4 GIST (GASTROINTESTINAL STROMAL TUMOR), NON-MALIGNANT: ICD-10-CM

## 2020-09-16 DIAGNOSIS — Z00.00 ENCOUNTER FOR PREVENTIVE HEALTH EXAMINATION: Primary | ICD-10-CM

## 2020-09-16 DIAGNOSIS — Z13.820 SCREENING FOR OSTEOPOROSIS: ICD-10-CM

## 2020-09-16 DIAGNOSIS — Z12.39 SCREENING FOR BREAST CANCER: ICD-10-CM

## 2020-09-16 DIAGNOSIS — M85.89 OTHER SPECIFIED DISORDERS OF BONE DENSITY AND STRUCTURE, MULTIPLE SITES: ICD-10-CM

## 2020-09-16 DIAGNOSIS — K21.9 GERD WITHOUT ESOPHAGITIS: ICD-10-CM

## 2020-09-16 DIAGNOSIS — E66.09 CLASS 1 OBESITY DUE TO EXCESS CALORIES WITH SERIOUS COMORBIDITY AND BODY MASS INDEX (BMI) OF 32.0 TO 32.9 IN ADULT: ICD-10-CM

## 2020-09-16 DIAGNOSIS — J98.4 ABNORMALITY OF LUNG: ICD-10-CM

## 2020-09-16 DIAGNOSIS — D63.8 ANEMIA OF CHRONIC DISEASE: ICD-10-CM

## 2020-09-16 DIAGNOSIS — I77.1 TORTUOUS AORTA: ICD-10-CM

## 2020-09-16 DIAGNOSIS — M47.22 CERVICAL SPONDYLOSIS WITH RADICULOPATHY: ICD-10-CM

## 2020-09-16 DIAGNOSIS — Z01.419 WELL WOMAN EXAM WITH ROUTINE GYNECOLOGICAL EXAM: ICD-10-CM

## 2020-09-16 DIAGNOSIS — I10 BENIGN HYPERTENSION: ICD-10-CM

## 2020-09-16 DIAGNOSIS — H40.053 BILATERAL OCULAR HYPERTENSION: ICD-10-CM

## 2020-09-16 DIAGNOSIS — Z86.010 HISTORY OF COLON POLYPS: ICD-10-CM

## 2020-09-16 DIAGNOSIS — I70.0 ATHEROSCLEROSIS OF AORTA: ICD-10-CM

## 2020-09-16 PROBLEM — E66.811 CLASS 1 OBESITY DUE TO EXCESS CALORIES WITH SERIOUS COMORBIDITY IN ADULT: Status: ACTIVE | Noted: 2020-09-16

## 2020-09-16 PROCEDURE — G0439 PPPS, SUBSEQ VISIT: HCPCS | Mod: S$GLB,,, | Performed by: NURSE PRACTITIONER

## 2020-09-16 PROCEDURE — 99215 OFFICE O/P EST HI 40 MIN: CPT | Mod: PBBFAC,PO | Performed by: NURSE PRACTITIONER

## 2020-09-16 PROCEDURE — 99999 PR PBB SHADOW E&M-EST. PATIENT-LVL V: CPT | Mod: PBBFAC,,, | Performed by: NURSE PRACTITIONER

## 2020-09-16 PROCEDURE — 99999 PR PBB SHADOW E&M-EST. PATIENT-LVL V: ICD-10-PCS | Mod: PBBFAC,,, | Performed by: NURSE PRACTITIONER

## 2020-09-16 PROCEDURE — G0439 PR MEDICARE ANNUAL WELLNESS SUBSEQUENT VISIT: ICD-10-PCS | Mod: S$GLB,,, | Performed by: NURSE PRACTITIONER

## 2020-09-16 RX ORDER — LORAZEPAM 1 MG/1
1 TABLET ORAL EVERY 8 HOURS PRN
Qty: 60 TABLET | Refills: 1 | Status: CANCELLED | OUTPATIENT
Start: 2020-09-16

## 2020-09-16 NOTE — PATIENT INSTRUCTIONS
Counseling and Referral of Other Preventative  (Italic type indicates deductible and co-insurance are waived)    Patient Name: Nan Palafox  Today's Date: 9/16/2020    Health Maintenance       Date Due Completion Date    DEXA SCAN Ordered   ---    Mammogram Ordered   7/3/2018    Influenza Vaccine (1) 09/16/2020 (Originally 8/1/2020) 12/9/2019    TETANUS VACCINE In the future for injury   ---    Shingles Vaccine (2 of 3) Deferred- pandemic   4/29/2013    High Dose Statin 09/16/2021 9/16/2020    Colorectal Cancer Screening Due-external- pt to call to schedule   10/26/2017    Lipid Panel    Annual eye exam 03/13/2021    Current     3/13/2020            Orders Placed This Encounter   Procedures    Mammo Digital Screening Bilat    DXA Bone Density Spine And Hip    Ambulatory referral/consult to Gynecology    Ambulatory referral/consult to Physical Medicine Rehab     The following information is provided to all patients.  This information is to help you find resources for any of the problems found today that may be affecting your health:                Living healthy guide: www.[facility].[place].[facility]      Understanding Diabetes: www.diabetes.org      Eating healthy: www.cdc.gov/healthyweight      [facility] home safety checklist: www.cdc.gov/steadi/patient.html      Agency on Aging: www.goea.[place].[facility]      Alcoholics anonymous (AA): www.aa.org      Physical Activity: www.darlin.nih.gov/oy8qvyy      Tobacco use: www.quitwithusla.org     Preventing Falls: Making Changes in Your Living Space  Is your living space filled with hazards that could cause you to fall? Changes can make you safer. They could even save your life. Take a careful look around your home. Change what you can on your own. Hire someone or ask friends or family to help with harder tasks.      Be sure to add a nonslip mat to the inside of your shower or bathtub. Always keep a nightlight on. Keep a clear path from your bed to the bathroom. Move items from  higher shelves to lower ones.   Remove hazards  · Remove things that can trip you, like throw rugs, boxes, piles of paper, or cords.  · Nail down rugs or carpeting if you don't want to remove them.  · Don't store items on stairs.  · Keep walkways clear.  · Clean up spills right away.  · Replace glass tables with wooden ones. They're safer if you fall.  Add safety devices  · Add handrails to both sides of stairs.  · Buy a raised toilet seat.  · Add grab bars near the toilet and in the shower.  · Get grabbers to help you reach things and avoid climbing.  Improve lighting  · Add nightlights to halls, bedrooms, and bathrooms.   · Put light switches at the top and bottom of stairs.  · Be sure each room and flight of stairs has proper lighting.  · Use shades or curtains to cut glare from windows.  · Put flashlights in each room. Replace burned-out bulbs.  · Get glowing light switches for room entrances.  Take other precautions  · Use nonskid floor wax.  · Buy a nonslip mat and a liquid soap dispenser for the shower.  · Tack down carpets or use slip-resistant backing.  · Put most-used items within easy reach.  · Add bright paint or tape on the top front edge of steps.  · Save big jobs, such as moving furniture or other heavy objects, for family or friends.  · Get professional help installing grab bars. They can be unsafe if not installed the right way.  Fix riskier rooms first  Don't tackle everything at once. Focus on one room at a time. The bathroom is a common spot for falls, so you may start there. Or start with a room you spend lots of time in, such as your bedroom. Make only a few changes at once. This will give you time to adjust to them.     Outside your home  You might arrange for these changes yourself, or you might need to talk to your  or homeowners' association about them.  · Have loose boards on porches or damaged stairs repaired.  · Have rough edges, holes, or large cracks in sidewalks or  driveways repaired.  · Have hazards that could trip you, such as hoses or edith, removed.  · Use high-wattage light bulbs (100 or greater) near outside doors and stairs.  · Get handrails added to outside stairs. Have them extend beyond the bottom step.  · Get help in winter weather with ice or snow removal.   Date Last Reviewed: 6/12/2015  © 2965-5304 [employer]. [address] [place] [address], [place] [address]. All rights reserved. This information is not intended as a substitute for professional medical care. Always follow your healthcare professional's instructions.        Low-Cholesterol Diet  Your body needs cholesterol to build new cells and create certain hormones. There are 2 kinds of cholesterol in your blood:     · HDL (good) cholesterol. This prevents fat deposits (plaque) from building up in your arteries. In this way it protects against heart disease and stroke.  · LDL (bad) cholesterol. This stays in your body and sticks to artery walls. Over time it may block blood flow to the heart and brain. This can cause a heart attack or stroke.  The cholesterol in your blood comes from 2 sources: cholesterol in food that you eat and cholesterol that your liver makes. You should limit the amount of cholesterol in your diet. But the cholesterol that your body makes has the greatest disease risk. And your body makes more cholesterol when your diet is high in bad fats (saturated and trans fats). There are 2 kinds of fats you can eat:  · Good fats, or unsaturated fats (mono-unsaturated and poly-unsaturated). They raise the level of good cholesterol and lower the level of bad cholesterol. Good fats are found in vegetable oils such as olive, sunflower, corn, and soybean oils, and in nuts and seeds.  · Bad fats, or saturated fats (including foods high in cholesterol) and trans fats. These raise your risk of disease. They lower the good cholesterol and raise the level of bad cholesterol. Bad fats are found in  animal products, including meat, whole-milk dairy products, and butter. Some plants are also high in bad fats (coconut and palm plants). Trans fats are found in hard (stick) margarines. They are also in many fast foods and commercially baked goods. Soft margarine sold in tubs has fewer trans fats and is safer to use.  High blood cholesterol is usually due to a diet high in saturated fat, along with not being physically active. In some cases, genetics plays a role in causing high cholesterol. The tips below will help you create healthy eating habits that will help lower your blood cholesterol level.  Create a diet high in good fats, low in bad fats (and low in cholesterol)  The following steps will help you create a diet high in good fats and low in bad fats:  · Talk with your doctor before starting a low cholesterol diet or weight loss program.  · Learn to read nutrition labels and select appropriate portion sizes.  · When cooking, use plant-based unsaturated vegetable oils (sunflower, corn, soybean, canola, peanut, and olive oils).  · Avoid saturated fats found in animal products such as meat, dairy (whole-milk, cheese and ice cream), poultry skin, and egg yolks. Plants high in saturated oils include coconut oil, palm oil, and palm kernel oil.  · If you eat meat, choose smaller portions and lean cuts, such as round, ejsu, sirloin, or loin. Eat more meatless meals.  · Replace meat with fish at least 2 times a week. Fish is an important source of the unsaturated fat called omega-3 fatty acids. This fat has potential to lower the risk of heart disease.  · Replace whole-milk dairy products with low-fat or nonfat products. Try soy products. Soy helps to reduce total cholesterol.  · Supplement your diet with protective fibers. Eat nuts, seeds, and whole grains rather than white rice and bread. These foods lower both cholesterol and triglyceride levels. (Triglycerides are another fat found in the blood.) Walnuts are one  of the best sources of omega-3 fatty acids.  · Eat plenty of fresh fruits and vegetables daily.  · Avoid fast foods and commercial baked goods. Assume they contain saturated fat unless labeled otherwise.  Date Last Reviewed: 8/1/2016 © 2000-2017 [employer]. [address] [place] [address]. All rights reserved. This information is not intended as a substitute for professional medical care. Always follow your healthcare professional's instructions.        Low-Fat Diet    A low-fat diet will help you lose weight. It also can lower cholesterol and prevent symptoms of gallbladder disease. The average American diet contains up to 50% fat. This means that 50% of all calories come from fat (about 80 grams to 100 grams of fat per day). Choosing normal portions of foods from the list below can help lower your fat intake. Experts recommend that only 20% to 35% of your daily calories come from fat. The remaining 65% to 80% of calories will come from protein and carbohydrates. This is much healthier for you.  Breads  Ok: Whole-wheat or rye bread, kristyn or soda crackers, rosanna toast, plain rolls, bagels, English muffins  Avoid: Rolls and breads containing whole milk or egg; waffles, pancakes, biscuits, corn bread; cheese crackers, other flavored crackers, pastries, doughnuts  Cereals  Ok: Oatmeal, whole-wheat, bran, multigrain, rice  Avoid: Granola or other cereals that have oil, coconut, or more than 2 grams of fat per serving  Cheese and eggs  Ok: Cheeses labeled low-fat; 3 whole eggs per week; egg whites and egg substitutes as desired  Avoid: All other cheeses  Desserts  Ok: Gelatin, slushy, darren food cake, meringues, nonfat yogurt, and puddings or sherbet made with nonfat milk  Avoid: Any other store-bought desserts, or desserts that have fat, whole milk, cream, chocolate, and coconut  Drinks  Ok: Nonfat milk, coffee, tea, fizzy (carbonated) drinks  Avoid: Whole and reduced-fat milk, evaporated and  condensed milk, hot chocolate mixes, milk shakes, malts, eggnog  Fats  Ok: You may have up to 3 teaspoons of fat daily. This can be butter, margarine, mayonnaise, or healthy oils (canola or olive)  Avoid: Cream, nondairy creams, cream cheese, gravies, and cream sauces  Fruits  Ok: All fruits made without fat  Avoid: Coconut, olives  Meats, poultry, fish  Ok: Limit meat to 6 ounces daily (broiled, roasted, baked, grilled, or boiled). Buy lean cuts, and trim off the fat. Try beef, fish, lamb, pork, and canned fish packed in water; also chicken and turkey with the skin removed.  Avoid: Fried meats, fish, or poultry; fried eggs, and fish canned in oils; fatty meats such as fontenot, sausage, corned beef, hot dogs, and lunch meats; meats with gravies and sauces  Potatoes, beans, pasta  Ok: Dried beans, split peas, lentils, potatoes, rice, pasta made without added fat  Avoid: French fries, potato chips, potatoes prepared with butter, refried beans  Soups  Ok: Clear broth soups without fat and with allowed vegetables  Avoid: Cream-based soups  Vegetables  Ok: Fresh, frozen, canned or dried vegetables, all made without added fat  Avoid: Fried vegetables and those prepared with butter, cream, sauces  Miscellaneous  Ok: Salt, sugar, jelly, hard candy, marshmallows, honey, syrup, spices and herbs, mustard, ketchup, lemon, and vinegar. Try to limit sweets and added sugars.  Avoid: Chocolate, nuts, coconut, and cream candies; sunflower, sesame, and other seeds; fried foods; cream sauces and gravies; pizza  Date Last Reviewed: 8/1/2016  © 5791-8332 [employer]. [address] [place] [address]. All rights reserved. This information is not intended as a substitute for professional medical care. Always follow your healthcare professional's instructions.
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
## 2020-09-17 ENCOUNTER — OFFICE VISIT (OUTPATIENT)
Dept: PHYSICAL MEDICINE AND REHAB | Facility: CLINIC | Age: 70
End: 2020-09-17
Payer: MEDICARE

## 2020-09-17 ENCOUNTER — PATIENT OUTREACH (OUTPATIENT)
Dept: ADMINISTRATIVE | Facility: OTHER | Age: 70
End: 2020-09-17

## 2020-09-17 ENCOUNTER — TELEPHONE (OUTPATIENT)
Dept: PHYSICAL MEDICINE AND REHAB | Facility: CLINIC | Age: 70
End: 2020-09-17

## 2020-09-17 VITALS
HEIGHT: 66 IN | HEART RATE: 88 BPM | SYSTOLIC BLOOD PRESSURE: 127 MMHG | WEIGHT: 196 LBS | BODY MASS INDEX: 31.5 KG/M2 | DIASTOLIC BLOOD PRESSURE: 74 MMHG

## 2020-09-17 DIAGNOSIS — G47.33 OBSTRUCTIVE SLEEP APNEA: ICD-10-CM

## 2020-09-17 DIAGNOSIS — H40.9 GLAUCOMA, UNSPECIFIED GLAUCOMA TYPE, UNSPECIFIED LATERALITY: ICD-10-CM

## 2020-09-17 DIAGNOSIS — Z74.09 IMPAIRED FUNCTIONAL MOBILITY, BALANCE, AND ENDURANCE: Primary | ICD-10-CM

## 2020-09-17 DIAGNOSIS — R53.83 FATIGUE, UNSPECIFIED TYPE: ICD-10-CM

## 2020-09-17 DIAGNOSIS — H54.7 VISUAL PROBLEMS: ICD-10-CM

## 2020-09-17 DIAGNOSIS — M47.812 CERVICAL SPONDYLOSIS: ICD-10-CM
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
## 2020-09-17 PROCEDURE — 99999 PR PBB SHADOW E&M-EST. PATIENT-LVL III: ICD-10-PCS | Mod: PBBFAC,,, | Performed by: PHYSICAL MEDICINE & REHABILITATION

## 2020-09-17 PROCEDURE — 99213 OFFICE O/P EST LOW 20 MIN: CPT | Mod: PBBFAC,PN | Performed by: PHYSICAL MEDICINE & REHABILITATION

## 2020-09-17 PROCEDURE — 99205 OFFICE O/P NEW HI 60 MIN: CPT | Mod: S$PBB,,, | Performed by: PHYSICAL MEDICINE & REHABILITATION

## 2020-09-17 PROCEDURE — 99205 PR OFFICE/OUTPT VISIT, NEW, LEVL V, 60-74 MIN: ICD-10-PCS | Mod: S$PBB,,, | Performed by: PHYSICAL MEDICINE & REHABILITATION

## 2020-09-17 PROCEDURE — 99999 PR PBB SHADOW E&M-EST. PATIENT-LVL III: CPT | Mod: PBBFAC,,, | Performed by: PHYSICAL MEDICINE & REHABILITATION

## 2020-09-17 NOTE — LETTER
September 18, 2020      Adri Taveras NP  [address] [facility]  [place] [address]           [facility]  [address] [place] [address] [address]  [place] [address]  Phone: [phone]  Fax: [phone]          Patient: Nan Palafox   MR Number: [id number]   YOB: 1950   Date of Visit: 9/17/2020       Dear Adri Taveras:    Thank you for referring Nan Palafox to me for evaluation. Attached you will find relevant portions of my assessment and plan of care.    If you have questions, please do not hesitate to call me. I look forward to following Nan Palafox along with you.    Sincerely,    Christopher Blue MD    Enclosure  CC:  No Recipients    If you would like to receive this communication electronically, please contact [email or web address] or [phone] to request more information on [employer] access.    For providers and/or their staff who would like to refer a patient to [facility], please contact us through our one-stop-shop provider referral line, [facility], at [phone].    If you feel you have received this communication in error or would no longer like to receive these types of communications, please e-mail [email or web address]

## 2020-09-17 NOTE — PROGRESS NOTES
Chart was reviewed for overdue Proactive [facility] Encounters (THANH)  topics  Updates were requested from care everywhere  Health Maintenance has been updated  [place] immunization registry triggered

## 2020-09-18 NOTE — PROGRESS NOTES
Today's Date: 09/18/2020     Referring Provider: Adri Taveras     Chief Complaint:   Chief Complaint   Patient presents with    Joint Pain     mobility evaluation       HPI: Nan Palafox is a 70 y.o. female  who presents today for evaluation and treatment of balance and gait abnormalities.  She states that she fell approximately 3 weeks ago.  She was ironing her wash room.  She is unsure of why she fell.  She denies any syncopal or presyncopal events.  She feels like she tripped over the ironing board.  She fell on her right side.  She was evaluated in prescribed gabapentin in tizanidine.  She took gabapentin for approximately 1 week.  She took tizanidine for about 2 days.  She states that she has felt a little bit off balance for the last month.  She denies any other falls other than that 1 episode.  She does state that her arms feel weak and started the shoulder radiates down the lateral arm and into the elbow.  She describes pain and weakness with abduction on the right and left.  This is specifically worse with overhead activities such as with reaching and prolonged periods of immobility with subsequent activity.  She denies any weakness in lower extremities.  She denies any bowel or bladder changes, numbness or tingling in the legs feet or hands.  She denies any saddle anesthesia.  She does complain of fatigue.  She only sleeps approximately 5-6 hours per night.  She does have a diagnosis of sleep apnea.  She does not wear sleep apnea machine.  She has a history of visual problems secondary to glaucoma.  She denies any dizziness or vestibular issues.      Review of Systems   Constitutional: Positive for malaise/fatigue. Negative for chills and fever.   HENT: Negative for congestion and tinnitus.    Eyes: Positive for blurred vision. Negative for photophobia.   Respiratory: Negative for shortness of breath and wheezing.    Cardiovascular: Negative for chest pain, palpitations and leg swelling.    Gastrointestinal: Negative for nausea and vomiting.   Genitourinary: Negative for dysuria and frequency.   Musculoskeletal: Positive for back pain and falls. Negative for joint pain and myalgias.   Skin: Negative for itching and rash.   Neurological: Negative for dizziness, tingling, sensory change, speech change, focal weakness and headaches.   Endo/Heme/Allergies: Negative for environmental allergies. Does not bruise/bleed easily.   Psychiatric/Behavioral: Negative for depression. The patient has insomnia. The patient is not nervous/anxious.         Social History     Socioeconomic History    Marital status:      Spouse name: Not on file    Number of children: Not on file    Years of education: Not on file    Highest education level: Not on file   Occupational History    Not on file   Social Needs    Financial resource strain: Patient refused    Food insecurity     Worry: Never true     Inability: Never true    Transportation needs     Medical: No     Non-medical: No   Tobacco Use    Smoking status: Never Smoker    Smokeless tobacco: Never Used   Substance and Sexual Activity    Alcohol use: Yes     Alcohol/week: 3.0 standard drinks     Types: 3 Glasses of wine per week     Frequency: 4 or more times a week     Drinks per session: 3 or 4     Binge frequency: Never    Drug use: Never    Sexual activity: Not on file   Lifestyle    Physical activity     Days per week: 0 days     Minutes per session: Not on file    Stress: Very much   Relationships    Social connections     Talks on phone: Not on file     Gets together: Not on file     Attends Hindu service: Not on file     Active member of club or organization: Not on file     Attends meetings of clubs or organizations: Not on file     Relationship status: Not on file   Other Topics Concern    Not on file   Social History Narrative    Not on file       Family History   Problem Relation Age of Onset    Glaucoma Mother     Cataracts  Mother     Blindness Mother     Cancer Mother 68        colon    Glaucoma Brother     Cataracts Brother     Cancer Brother         esophageal    No Known Problems Father     No Known Problems Sister     No Known Problems Maternal Aunt     No Known Problems Maternal Uncle     No Known Problems Paternal Aunt     No Known Problems Paternal Uncle     No Known Problems Maternal Grandmother     No Known Problems Maternal Grandfather     No Known Problems Paternal Grandmother     No Known Problems Paternal Grandfather     Amblyopia Neg Hx     Macular degeneration Neg Hx     Retinal detachment Neg Hx     Strabismus Neg Hx     Diabetes Neg Hx     Hypertension Neg Hx     Stroke Neg Hx     Thyroid disease Neg Hx     Breast cancer Neg Hx     Ovarian cancer Neg Hx        Current Outpatient Medications on File Prior to Visit   Medication Sig Dispense Refill    albuterol (VENTOLIN HFA) 90 mcg/actuation inhaler Inhale 2 puffs into the lungs every 6 (six) hours as needed for Wheezing or Shortness of Breath. Rescue (Patient not taking: Reported on 9/16/2020) 1 Inhaler 0    amLODIPine (NORVASC) 10 MG tablet Take 1 tablet (10 mg total) by mouth once daily. For blood pressure control. 90 tablet 3    ASCORBATE CALCIUM (VITAMIN C ORAL) Take by mouth.      aspirin (ECOTRIN) 81 MG EC tablet Take 81 mg by mouth once daily.      atorvastatin (LIPITOR) 20 MG tablet Take 1 tablet (20 mg total) by mouth once daily. For cholesterol control 30 tablet 6    azithromycin (Z-CRISS) 250 MG tablet       dorzolamide-timolol 2-0.5% (COSOPT) 22.3-6.8 mg/mL ophthalmic solution Place 1 drop into both eyes 2 (two) times daily. 10 mL 12    fluticasone (FLONASE) 50 mcg/actuation nasal spray 2 sprays by Each Nare route once daily. 1 Bottle 12    gabapentin (NEURONTIN) 300 MG capsule       hydrOXYzine pamoate (VISTARIL) 25 MG Cap TK ONE C PO Q 8 H PRA      latanoprost 0.005 % ophthalmic solution Place 1 drop into both eyes every  evening. 2.5 mL 12    LORazepam (ATIVAN) 1 MG tablet TAKE 1 TABLET BY MOUTH EVERY 8 HOURS AS NEEDED FOR ANXIETY 60 tablet 1    methylPREDNISolone (MEDROL DOSEPACK) 4 mg tablet       multivitamin-iron-folic acid (CENTRUM ULTRA WOMEN'S)  mg-mcg Tab Take by mouth.      pantoprazole (PROTONIX) 40 MG tablet TK 1 T PO QAM  2    promethazine-dextromethorphan (PROMETHAZINE-DM) 6.25-15 mg/5 mL Syrp TK 1 TO 2 TEA PO Q 6 H PRF COUGH      rosuvastatin (CRESTOR) 20 MG tablet TK 1 T PO QD  4    tiZANidine (ZANAFLEX) 4 MG tablet       valsartan-hydrochlorothiazide (DIOVAN-HCT) 320-25 mg per tablet TK 1 T PO QAM  3    vitamin D 1000 units Tab Take 1,000 Units by mouth.       No current facility-administered medications on file prior to visit.         Review of patient's allergies indicates:  No Known Allergies    Past Surgical History:   Procedure Laterality Date    BREAST BIOPSY Right     excisional bx    BREAST SURGERY Right     Excisional bx    RETINAL DETACHMENT SURGERY      patient states that she had a retinal tear that was repaired in the past at [facility] but she is uncertain of which eye    STOMACH SURGERY      TOTAL KNEE ARTHROPLASTY Right        Past Medical History:   Diagnosis Date    Cataract     Cervical spondylosis with radiculopathy 4/11/2016    Hypertension     Nuclear sclerosis - Both Eyes 2/8/2013    PNA (pneumonia)     Primary open angle glaucoma (POAG) of both eyes, moderate stage 3/11/2019         Physical Exam  Constitutional:       Appearance: Normal appearance.   HENT:      Head: Normocephalic and atraumatic.      Nose: Nose normal. No congestion.      Mouth/Throat:      Mouth: Mucous membranes are dry.   Eyes:      General: Lids are normal.      Extraocular Movements: Extraocular movements intact.      Pupils: Pupils are equal, round, and reactive to light.   Neck:      Trachea: Trachea and phonation normal.   Pulmonary:      Effort: Pulmonary effort is normal.   Abdominal:       General: Abdomen is flat. There is no distension.   Musculoskeletal:      Right lower leg: No edema.      Left lower leg: No edema.   Skin:     General: Skin is warm and dry.      Nails: There is no clubbing.     Neurological:      General: No focal deficit present.      Mental Status: She is alert and oriented to person, place, and time.      GCS: GCS eye subscore is 4. GCS verbal subscore is 5. GCS motor subscore is 6.      Cranial Nerves: Cranial nerves are intact.      Sensory: Sensation is intact.      Motor: Motor function is intact.      Gait: Gait is intact.      Deep Tendon Reflexes:      Reflex Scores:       Tricep reflexes are 2+ on the right side and 2+ on the left side.       Bicep reflexes are 2+ on the right side and 2+ on the left side.       Brachioradialis reflexes are 2+ on the right side and 2+ on the left side.       Patellar reflexes are 2+ on the right side and 2+ on the left side.       Achilles reflexes are 2+ on the right side and 2+ on the left side.     Comments: Timed get up and go test around 10 sec   Psychiatric:         Mood and Affect: Mood and affect normal.         Behavior: Behavior normal.         Thought Content: Thought content normal.        Ortho Exam     Impaired functional mobility, balance, and endurance  -     Ambulatory referral/consult to Physical/Occupational Therapy; Future; Expected date: 09/24/2020    Fall, sequela  -     Ambulatory referral/consult to Physical Medicine Rehab  -     Ambulatory referral/consult to Physical/Occupational Therapy; Future; Expected date: 09/24/2020    Fatigue, unspecified type    Obstructive sleep apnea    Visual problems    Glaucoma, unspecified glaucoma type, unspecified laterality    Cervical spondylosis           PLAN:   aNn Palafox is a 70 y.o. female with a fall.  On history and physical examination, sounds like this was a 1 time episode.  Her timed get up and go test today was within normal limits.  She appears well balance.   I will refer her to physical therapy for gait training.  I do not suspect any other pathologies although she does have a CT scan of head which shows chronic microvascular ischemic changes.  She also has fatigue.  She has a history of obstructive sleep apnea.  She does not wear her CPAP machine.  I advised her to make a follow-up appointment with sleep medicine to have her CPAP machine adjusted. This could play a significant portion of her fatigue. She also does take lorazepam at night.  She does not take any other psychoactive medications.  She was prescribed gabapentin which can cause imbalance, dizziness, and fatigue, but she states that she is not taking this.  She also has tizanidine on her medication list, however she states that she is not taking this as well.  I would advise against both of these medications in this patient.    She will follow up in clinic as needed.      Christopher Blue D.O.  Physical Medicine and Rehabilitation          CC: Patients PCP: Glenroy Jimenez MD  CC: Referring Provider: Adri Taveras

## 2020-09-29 ENCOUNTER — HOSPITAL ENCOUNTER (OUTPATIENT)
Dept: RADIOLOGY | Facility: HOSPITAL | Age: 70
Discharge: HOME | End: 2020-09-29
Attending: NURSE PRACTITIONER
Payer: MEDICARE

## 2020-09-29 ENCOUNTER — PATIENT MESSAGE (OUTPATIENT)
Dept: OTHER | Facility: OTHER | Age: 70
End: 2020-09-29

## 2020-09-29 DIAGNOSIS — Z12.39 SCREENING FOR BREAST CANCER: ICD-10-CM

## 2020-09-29 DIAGNOSIS — Z12.31 ENCOUNTER FOR SCREENING MAMMOGRAM FOR MALIGNANT NEOPLASM OF BREAST: ICD-10-CM

## 2020-09-29 PROCEDURE — 77067 SCR MAMMO BI INCL CAD: CPT | Mod: 26,,, | Performed by: RADIOLOGY

## 2020-09-29 PROCEDURE — 77067 MAMMO DIGITAL SCREENING BILAT WITH TOMO: ICD-10-PCS | Mod: 26,,, | Performed by: RADIOLOGY

## 2020-09-29 PROCEDURE — 77063 MAMMO DIGITAL SCREENING BILAT WITH TOMO: ICD-10-PCS | Mod: 26,,, | Performed by: RADIOLOGY

## 2020-09-29 PROCEDURE — 77063 BREAST TOMOSYNTHESIS BI: CPT | Mod: 26,,, | Performed by: RADIOLOGY

## 2020-09-29 PROCEDURE — 77067 SCR MAMMO BI INCL CAD: CPT | Mod: TC,PO

## 2020-10-03 ENCOUNTER — IMMUNIZATION (OUTPATIENT)
Dept: PRIMARY CARE CLINIC | Facility: CLINIC | Age: 70
End: 2020-10-03
Payer: MEDICARE

## 2020-10-03 PROCEDURE — 90694 VACC AIIV4 NO PRSRV 0.5ML IM: CPT | Mod: PBBFAC,PN

## 2020-10-03 PROCEDURE — G0008 ADMIN INFLUENZA VIRUS VAC: HCPCS | Mod: PBBFAC,PN

## 2020-11-02 ENCOUNTER — CLINICAL SUPPORT (OUTPATIENT)
Dept: OPHTHALMOLOGY | Facility: CLINIC | Age: 70
End: 2020-11-02
Payer: MEDICARE

## 2020-11-02 ENCOUNTER — OFFICE VISIT (OUTPATIENT)
Dept: OPHTHALMOLOGY | Facility: CLINIC | Age: 70
End: 2020-11-02
Payer: MEDICARE

## 2020-11-02 DIAGNOSIS — H40.053 BILATERAL OCULAR HYPERTENSION: ICD-10-CM

## 2020-11-02 DIAGNOSIS — H25.13 NUCLEAR SCLEROSIS OF BOTH EYES: ICD-10-CM

## 2020-11-02 DIAGNOSIS — H40.1132 PRIMARY OPEN ANGLE GLAUCOMA (POAG) OF BOTH EYES, MODERATE STAGE: Primary | ICD-10-CM

## 2020-11-02 DIAGNOSIS — H40.1132 PRIMARY OPEN ANGLE GLAUCOMA (POAG) OF BOTH EYES, MODERATE STAGE: ICD-10-CM

## 2020-11-02 PROCEDURE — 99213 OFFICE O/P EST LOW 20 MIN: CPT | Mod: PBBFAC | Performed by: OPHTHALMOLOGY

## 2020-11-02 PROCEDURE — 92014 PR EYE EXAM, EST PATIENT,COMPREHESV: ICD-10-PCS | Mod: S$PBB,,, | Performed by: OPHTHALMOLOGY

## 2020-11-02 PROCEDURE — 99999 PR PBB SHADOW E&M-EST. PATIENT-LVL III: CPT | Mod: PBBFAC,,, | Performed by: OPHTHALMOLOGY

## 2020-11-02 PROCEDURE — 92014 COMPRE OPH EXAM EST PT 1/>: CPT | Mod: S$PBB,,, | Performed by: OPHTHALMOLOGY

## 2020-11-02 PROCEDURE — 99999 PR PBB SHADOW E&M-EST. PATIENT-LVL III: ICD-10-PCS | Mod: PBBFAC,,, | Performed by: OPHTHALMOLOGY

## 2020-11-02 NOTE — PROGRESS NOTES
HVF done/rel/fix poor ou coop. Good ou./chart checked and asked patient about latex allergy none noted. Rx used : .25 + 1.75 x 170/od .25 + .75 x 180/os-h

## 2020-11-03 NOTE — PROGRESS NOTES
Assessment /Plan     For exam results, see Encounter Report.    Primary open angle glaucoma (POAG) of both eyes, moderate stage    Nuclear sclerosis of both eyes    Bilateral ocular hypertension        Dr Rivera      [address] 17 [address]  Now --> financial education      Grandson [employer] full scholarship  Business // law // technology    Grieving best friends x 2 loss  COVID May 2020  Difficult    OHT  POAG OD > OS  Presented 2019  40 // 24    + FMHx Mother ( 96 yo) & Brother  Denies Blindness    CCT  568 // 567    < 21    Both eyes --> variable adherence --> confused --> MIREYA / written  Xal q day  Cosopt BID      NSC OU  CE PRN  MRx +275      Hx Retinal Tear OD  RD precautions:  Discussed symptoms of RD with increased flashes, floaters, decreasing vision.  Patient/Family to call and return immediately to clinic should the symptoms of RD occur. Voiced good understanding with Q & A.        Laser Trabeculoplasty  both eyes    Glaucoma Laser Trabeculoplasty Surgery Consent:  Patient with glaucoma and IOP control not at target for the health of the eye.  Discussed with patient options, risks and benefits, expectations of glaucoma laser surgery with questions and answers to facilitate discussion.    The  patient voice good understanding and wishes to proceed with surgery.  The patient will likely benefit from laser surgery and patient  signed consent for Right and Left Eye.    May require formal Sx as discussed      Plan  RTC 2-4 weeks IOP &  adherence --> possible SLT OS // OD  RTC sooner prn with good understanding

## 2020-11-17 ENCOUNTER — OFFICE VISIT (OUTPATIENT)
Dept: URGENT CARE | Facility: CLINIC | Age: 70
End: 2020-11-17
Payer: MEDICARE

## 2020-11-17 VITALS
TEMPERATURE: 97 F | RESPIRATION RATE: 19 BRPM | WEIGHT: 190 LBS | OXYGEN SATURATION: 96 % | HEART RATE: 62 BPM | HEIGHT: 66 IN | BODY MASS INDEX: 30.53 KG/M2 | DIASTOLIC BLOOD PRESSURE: 88 MMHG | SYSTOLIC BLOOD PRESSURE: 162 MMHG

## 2020-11-17 DIAGNOSIS — R68.83 CHILLS: ICD-10-CM

## 2020-11-17 DIAGNOSIS — J06.9 VIRAL URI WITH COUGH: Primary | ICD-10-CM

## 2020-11-17 LAB
CTP QC/QA: YES
SARS-COV-2 RDRP RESP QL NAA+PROBE: NEGATIVE

## 2020-11-17 PROCEDURE — 99214 OFFICE O/P EST MOD 30 MIN: CPT | Mod: S$GLB,,, | Performed by: NURSE PRACTITIONER

## 2020-11-17 PROCEDURE — U0002 COVID-19 LAB TEST NON-CDC: HCPCS | Mod: QW,S$GLB,, | Performed by: NURSE PRACTITIONER

## 2020-11-17 PROCEDURE — 99214 PR OFFICE/OUTPT VISIT, EST, LEVL IV, 30-39 MIN: ICD-10-PCS | Mod: S$GLB,,, | Performed by: NURSE PRACTITIONER

## 2020-11-17 PROCEDURE — U0002: ICD-10-PCS | Mod: QW,S$GLB,, | Performed by: NURSE PRACTITIONER

## 2020-11-17 RX ORDER — FLUTICASONE PROPIONATE 50 MCG
2 SPRAY, SUSPENSION (ML) NASAL DAILY
Qty: 16 ML | Refills: 0 | Status: SHIPPED | OUTPATIENT
Start: 2020-11-17 | End: 2020-11-17

## 2020-11-17 NOTE — PROGRESS NOTES
"Subjective:       Patient ID: Nan Palafox is a 70 y.o. female.    Vitals:  height is 5' 5.5" (1.664 m) and weight is 86.2 kg (190 lb). Her temperature is 97.3 °F (36.3 °C). Her blood pressure is 162/88 (abnormal) and her pulse is 62. Her respiration is 19 and oxygen saturation is 96%.     Chief Complaint: Sinus Problem    Pt states her symptoms started 4 days ago with chills and runny nose with dry cough and hoarseness.  Denies any sick contacts.  She is taking tylenol and nyquil.     Sinus Problem  This is a new problem. The current episode started in the past 7 days. The problem is unchanged. There has been no fever. She is experiencing no pain. Associated symptoms include chills, coughing, a hoarse voice and sinus pressure. Pertinent negatives include no congestion, diaphoresis, ear pain, headaches, neck pain, shortness of breath, sneezing, sore throat or swollen glands. Past treatments include acetaminophen.       Constitution: Positive for chills. Negative for sweating, fatigue and fever.   HENT: Positive for sinus pressure. Negative for ear pain, congestion, sinus pain, sore throat and voice change.    Neck: Negative for neck pain and painful lymph nodes.   Eyes: Negative for eye redness.   Respiratory: Positive for cough. Negative for chest tightness, sputum production, bloody sputum, COPD, shortness of breath, stridor, wheezing and asthma.    Gastrointestinal: Negative for nausea and vomiting.   Musculoskeletal: Negative for muscle ache.   Skin: Negative for rash.   Allergic/Immunologic: Negative for seasonal allergies, asthma and sneezing.   Neurological: Negative for headaches.   Hematologic/Lymphatic: Negative for swollen lymph nodes.       Objective:      Physical Exam   Constitutional: She is oriented to person, place, and time. She appears well-developed. She is cooperative.  Non-toxic appearance. She does not appear ill. No distress.   HENT:   Head: Normocephalic and atraumatic.   Ears:   Right " Ear: Hearing, tympanic membrane, external ear and ear canal normal.   Left Ear: Hearing, tympanic membrane, external ear and ear canal normal.   Nose: Nose normal. No mucosal edema, rhinorrhea or nasal deformity. No epistaxis. Right sinus exhibits no maxillary sinus tenderness and no frontal sinus tenderness. Left sinus exhibits no maxillary sinus tenderness and no frontal sinus tenderness.   Mouth/Throat: Uvula is midline, oropharynx is clear and moist and mucous membranes are normal. No trismus in the jaw. Normal dentition. No uvula swelling. No oropharyngeal exudate, posterior oropharyngeal edema or posterior oropharyngeal erythema.   Eyes: Conjunctivae and lids are normal. No scleral icterus.   Neck: Trachea normal, full passive range of motion without pain and phonation normal. Neck supple. No neck rigidity. No edema and no erythema present.   Cardiovascular: Normal rate, regular rhythm, normal heart sounds and normal pulses.   Pulmonary/Chest: Effort normal and breath sounds normal. No tachypnea. No respiratory distress. She has no decreased breath sounds. She has no rhonchi.   Abdominal: Normal appearance.   Musculoskeletal: Normal range of motion.         General: No deformity.   Neurological: She is alert and oriented to person, place, and time. She exhibits normal muscle tone. Coordination normal.   Skin: Skin is warm, dry, intact, not diaphoretic and not pale. Psychiatric: Her speech is normal and behavior is normal. Judgment and thought content normal.   Nursing note and vitals reviewed.    Results for orders placed or performed in visit on 11/17/20   POCT COVID-19 Rapid Screening   Result Value Ref Range    POC Rapid COVID Negative Negative     Acceptable Yes          Assessment:       1. Viral URI with cough    2. Chills        Plan:       Lab reviewed.  Viral URI with cough  -     fluticasone propionate (FLONASE) 50 mcg/actuation nasal spray; 2 sprays (100 mcg total) by Each Nostril  "route once daily.  Dispense: 16 mL; Refill: 0    Chills  -     POCT COVID-19 Rapid Screening      Patient Instructions   Your test was NEGATIVE for COVID-19 (coronavirus).      You may leave home and/or return to work when the following conditions are met:   24 hours fever free without fever-reducing medications AND   Improved symptoms   You have not met the conditions of a close exposure       A "close exposure" is defined as anyone who has had an exposure (masked or unmasked) to a known COVID -19 positive person within 6 ft for longer than 15 minutes. If your exposure meets this definition you are required by CDC guidelines to quarantine for 14 days from time of exposure regardless of test status.      Additional instructions:  · Social distance per your local guidelines  · Call ahead before visiting your doctor.  · Wear a facemask when around others who do not live in your household.  · Cover your coughs and sneezes.  · Wash your hands often with soap and water; hand  can be used, too.      If your symptoms worsen or if you have any other concerns, please contact [facility] at [phone].     Sincerely,        Lisa Sharp, BRET         Please drink plenty of fluids.  Please get plenty of rest.  Please return here or go to the Emergency Department for any concerns or worsening of condition.  If you were prescribed antibiotics, please take them to completion.  If you do not have Hypertension or any history of palpitations, it is ok to take over the counter Sudafed or Mucinex D or Allegra-D or Claritin-D or Zyrtec-D.  If you do take one of the above, it is ok to combine that with plain over the counter Mucinex or Allegra or Claritin or Zyrtec.  If for example you are taking Zyrtec -D, you can combine that with Mucinex, but not Mucinex-D.  If you are taking Mucinex-D, you can combine that with plain Allegra or Claritin or Zyrtec.   If you do have Hypertension or palpitations, it is safe to " take Coricidin HBP for relief of sinus symptoms.  If not allergic, please take over the counter Tylenol (Acetaminophen) and/or Motrin (Ibuprofen) as directed for control of pain and/or fever.  Please follow up with your primary care doctor or specialist as needed.    If you  smoke, please stop smoking.  Viral Upper Respiratory Illness (Adult)  You have a viral upper respiratory illness (URI), which is another term for the common cold. This illness is contagious during the first few days. It is spread through the air by coughing and sneezing. It may also be spread by direct contact (touching the sick person and then touching your own eyes, nose, or mouth). Frequent handwashing will decrease risk of spread. Most viral illnesses go away within 7 to 10 days with rest and simple home remedies. Sometimes the illness may last for several weeks. Antibiotics will not kill a virus, and they are generally not prescribed for this condition.    Home care  · If symptoms are severe, rest at home for the first 2 to 3 days. When you resume activity, don't let yourself get too tired.  · Avoid being exposed to cigarette smoke (yours or others).  · You may use acetaminophen or ibuprofen to control pain and fever, unless another medicine was prescribed. (Note: If you have chronic liver or kidney disease, have ever had a stomach ulcer or gastrointestinal bleeding, or are taking blood-thinning medicines, talk with your healthcare provider before using these medicines.) Aspirin should never be given to anyone under 18 years of age who is ill with a viral infection or fever. It may cause severe liver or brain damage.  · Your appetite may be poor, so a light diet is fine. Avoid dehydration by drinking 6 to 8 glasses of fluids per day (water, soft drinks, juices, tea, or soup). Extra fluids will help loosen secretions in the nose and lungs.  · Over-the-counter cold medicines will not shorten the length of time youre sick, but they may be  helpful for the following symptoms: cough, sore throat, and nasal and sinus congestion. (Note: Do not use decongestants if you have high blood pressure.)  Follow-up care  Follow up with your healthcare provider, or as advised.  When to seek medical advice  Call your healthcare provider right away if any of these occur:  · Cough with lots of colored sputum (mucus)  · Severe headache; face, neck, or ear pain  · Difficulty swallowing due to throat pain  · Fever of 100.4°F (38°C)  Call 911, or get immediate medical care  Call emergency services right away if any of these occur:  · Chest pain, shortness of breath, wheezing, or difficulty breathing  · Coughing up blood  · Inability to swallow due to throat pain  Date Last Reviewed: 9/13/2015  © 5791-0137 [employer]. [address] [place] [address], [place] [address]. All rights reserved. This information is not intended as a substitute for professional medical care. Always follow your healthcare professional's instructions.

## 2020-11-17 NOTE — PATIENT INSTRUCTIONS
"Your test was NEGATIVE for COVID-19 (coronavirus).      You may leave home and/or return to work when the following conditions are met:   24 hours fever free without fever-reducing medications AND   Improved symptoms   You have not met the conditions of a close exposure       A "close exposure" is defined as anyone who has had an exposure (masked or unmasked) to a known COVID -19 positive person within 6 ft for longer than 15 minutes. If your exposure meets this definition you are required by CDC guidelines to quarantine for 14 days from time of exposure regardless of test status.      Additional instructions:  · Social distance per your local guidelines  · Call ahead before visiting your doctor.  · Wear a facemask when around others who do not live in your household.  · Cover your coughs and sneezes.  · Wash your hands often with soap and water; hand  can be used, too.      If your symptoms worsen or if you have any other concerns, please contact [facility] at [phone].     Sincerely,        Lisa Sharp NP         Please drink plenty of fluids.  Please get plenty of rest.  Please return here or go to the Emergency Department for any concerns or worsening of condition.  If you were prescribed antibiotics, please take them to completion.  If you do not have Hypertension or any history of palpitations, it is ok to take over the counter Sudafed or Mucinex D or Allegra-D or Claritin-D or Zyrtec-D.  If you do take one of the above, it is ok to combine that with plain over the counter Mucinex or Allegra or Claritin or Zyrtec.  If for example you are taking Zyrtec -D, you can combine that with Mucinex, but not Mucinex-D.  If you are taking Mucinex-D, you can combine that with plain Allegra or Claritin or Zyrtec.   If you do have Hypertension or palpitations, it is safe to take Coricidin HBP for relief of sinus symptoms.  If not allergic, please take over the counter Tylenol (Acetaminophen) " and/or Motrin (Ibuprofen) as directed for control of pain and/or fever.  Please follow up with your primary care doctor or specialist as needed.    If you  smoke, please stop smoking.  Viral Upper Respiratory Illness (Adult)  You have a viral upper respiratory illness (URI), which is another term for the common cold. This illness is contagious during the first few days. It is spread through the air by coughing and sneezing. It may also be spread by direct contact (touching the sick person and then touching your own eyes, nose, or mouth). Frequent handwashing will decrease risk of spread. Most viral illnesses go away within 7 to 10 days with rest and simple home remedies. Sometimes the illness may last for several weeks. Antibiotics will not kill a virus, and they are generally not prescribed for this condition.    Home care  · If symptoms are severe, rest at home for the first 2 to 3 days. When you resume activity, don't let yourself get too tired.  · Avoid being exposed to cigarette smoke (yours or others).  · You may use acetaminophen or ibuprofen to control pain and fever, unless another medicine was prescribed. (Note: If you have chronic liver or kidney disease, have ever had a stomach ulcer or gastrointestinal bleeding, or are taking blood-thinning medicines, talk with your healthcare provider before using these medicines.) Aspirin should never be given to anyone under 18 years of age who is ill with a viral infection or fever. It may cause severe liver or brain damage.  · Your appetite may be poor, so a light diet is fine. Avoid dehydration by drinking 6 to 8 glasses of fluids per day (water, soft drinks, juices, tea, or soup). Extra fluids will help loosen secretions in the nose and lungs.  · Over-the-counter cold medicines will not shorten the length of time youre sick, but they may be helpful for the following symptoms: cough, sore throat, and nasal and sinus congestion. (Note: Do not use decongestants if  you have high blood pressure.)  Follow-up care  Follow up with your healthcare provider, or as advised.  When to seek medical advice  Call your healthcare provider right away if any of these occur:  · Cough with lots of colored sputum (mucus)  · Severe headache; face, neck, or ear pain  · Difficulty swallowing due to throat pain  · Fever of 100.4°F (38°C)  Call 911, or get immediate medical care  Call emergency services right away if any of these occur:  · Chest pain, shortness of breath, wheezing, or difficulty breathing  · Coughing up blood  · Inability to swallow due to throat pain  Date Last Reviewed: 9/13/2015  © 5694-2725 [employer]. [address] [place] [address], [place] [address]. All rights reserved. This information is not intended as a substitute for professional medical care. Always follow your healthcare professional's instructions.

## 2020-11-30 ENCOUNTER — DOCUMENTATION ONLY (OUTPATIENT)
Dept: NEUROLOGY | Facility: HOSPITAL | Age: 70
End: 2020-11-30

## 2020-11-30 ENCOUNTER — TELEPHONE (OUTPATIENT)
Dept: NEUROLOGY | Facility: HOSPITAL | Age: 70
End: 2020-11-30

## 2020-11-30 DIAGNOSIS — C7A.8 NEUROENDOCRINE CANCER: Primary | ICD-10-CM

## 2020-12-01 ENCOUNTER — HOSPITAL ENCOUNTER (OUTPATIENT)
Dept: RADIOLOGY | Facility: HOSPITAL | Age: 70
Discharge: HOME | End: 2020-12-01
Attending: INTERNAL MEDICINE
Payer: MEDICARE

## 2020-12-01 DIAGNOSIS — C7A.8 NEUROENDOCRINE CANCER: ICD-10-CM

## 2020-12-01 PROCEDURE — A9587 GALLIUM GA-68: HCPCS

## 2020-12-01 PROCEDURE — 78815 NM PET 68GA DOTATATE WHOLE BODY: ICD-10-PCS | Mod: 26,PS,, | Performed by: RADIOLOGY

## 2020-12-01 PROCEDURE — 78815 PET IMAGE W/CT SKULL-THIGH: CPT | Mod: 26,PS,, | Performed by: RADIOLOGY

## 2020-12-01 PROCEDURE — 78815 PET IMAGE W/CT SKULL-THIGH: CPT | Mod: TC

## 2020-12-02 ENCOUNTER — CLINICAL SUPPORT (OUTPATIENT)
Dept: HEMATOLOGY/ONCOLOGY | Facility: CLINIC | Age: 70
End: 2020-12-02
Payer: MEDICARE

## 2020-12-02 ENCOUNTER — OFFICE VISIT (OUTPATIENT)
Dept: CARDIOTHORACIC SURGERY | Facility: CLINIC | Age: 70
End: 2020-12-02
Payer: MEDICARE

## 2020-12-02 ENCOUNTER — OFFICE VISIT (OUTPATIENT)
Dept: NEUROLOGY | Facility: HOSPITAL | Age: 70
End: 2020-12-02
Attending: INTERNAL MEDICINE
Payer: MEDICARE

## 2020-12-02 VITALS
TEMPERATURE: 98 F | OXYGEN SATURATION: 99 % | SYSTOLIC BLOOD PRESSURE: 130 MMHG | DIASTOLIC BLOOD PRESSURE: 79 MMHG | BODY MASS INDEX: 31.99 KG/M2 | HEIGHT: 65 IN | WEIGHT: 192 LBS | HEART RATE: 81 BPM

## 2020-12-02 VITALS
WEIGHT: 192 LBS | SYSTOLIC BLOOD PRESSURE: 130 MMHG | OXYGEN SATURATION: 99 % | DIASTOLIC BLOOD PRESSURE: 79 MMHG | HEIGHT: 65 IN | HEART RATE: 81 BPM | BODY MASS INDEX: 31.99 KG/M2

## 2020-12-02 DIAGNOSIS — R06.02 SHORTNESS OF BREATH: ICD-10-CM

## 2020-12-02 DIAGNOSIS — C7A.090 MALIGNANT CARCINOID TUMOR OF BRONCHUS AND LUNG: Primary | ICD-10-CM

## 2020-12-02 DIAGNOSIS — Z85.09 HISTORY OF GASTROINTESTINAL STROMAL TUMOR (GIST): ICD-10-CM

## 2020-12-02 DIAGNOSIS — C7A.090 CARCINOID BRONCHIAL ADENOMA OF RIGHT LUNG: ICD-10-CM

## 2020-12-02 DIAGNOSIS — I25.10 CORONARY ARTERY DISEASE, ANGINA PRESENCE UNSPECIFIED, UNSPECIFIED VESSEL OR LESION TYPE, UNSPECIFIED WHETHER NATIVE OR TRANSPLANTED HEART: Primary | ICD-10-CM

## 2020-12-02 DIAGNOSIS — R94.6 ABNORMAL THYROID SCAN: ICD-10-CM

## 2020-12-02 PROCEDURE — 99999 PR PBB SHADOW E&M-EST. PATIENT-LVL V: ICD-10-PCS | Mod: PBBFAC,,, | Performed by: THORACIC SURGERY (CARDIOTHORACIC VASCULAR SURGERY)

## 2020-12-02 PROCEDURE — 99205 OFFICE O/P NEW HI 60 MIN: CPT | Mod: ,,, | Performed by: INTERNAL MEDICINE

## 2020-12-02 PROCEDURE — 99999 PR PBB SHADOW E&M-EST. PATIENT-LVL V: CPT | Mod: PBBFAC,,, | Performed by: THORACIC SURGERY (CARDIOTHORACIC VASCULAR SURGERY)

## 2020-12-02 PROCEDURE — 99204 PR OFFICE/OUTPT VISIT, NEW, LEVL IV, 45-59 MIN: ICD-10-PCS | Mod: S$PBB,,, | Performed by: THORACIC SURGERY (CARDIOTHORACIC VASCULAR SURGERY)

## 2020-12-02 PROCEDURE — 99215 OFFICE O/P EST HI 40 MIN: CPT | Performed by: INTERNAL MEDICINE

## 2020-12-02 PROCEDURE — 99205 PR OFFICE/OUTPT VISIT, NEW, LEVL V, 60-74 MIN: ICD-10-PCS | Mod: ,,, | Performed by: INTERNAL MEDICINE

## 2020-12-02 PROCEDURE — 99204 OFFICE O/P NEW MOD 45 MIN: CPT | Mod: S$PBB,,, | Performed by: THORACIC SURGERY (CARDIOTHORACIC VASCULAR SURGERY)

## 2020-12-02 PROCEDURE — 99215 OFFICE O/P EST HI 40 MIN: CPT | Mod: PBBFAC,27 | Performed by: THORACIC SURGERY (CARDIOTHORACIC VASCULAR SURGERY)

## 2020-12-02 RX ORDER — LEVETIRACETAM 500 MG/1
TABLET ORAL
Status: ON HOLD | COMMUNITY
Start: 2020-11-28 | End: 2021-02-08 | Stop reason: HOSPADM

## 2020-12-02 RX ORDER — ACETAMINOPHEN 500 MG
5 TABLET ORAL
COMMUNITY
Start: 2020-11-28 | End: 2020-12-30 | Stop reason: CLARIF

## 2020-12-02 NOTE — NURSING
Nurse Navigator Note  Met with patient and daughter during consult appointment with Dr. Hernandez to discuss recent diagnosis of Carcinoid Lung Cancer.  Patient and I reviewed the information discussed with Dr. Hernandez , including treatment diagnosis, and future plans for workup.  Scheduled pre-op stress echo 12/7 and PFT's.  Instructions given on NPO for stress test 4 hrs before test with verbalized understanding.  Also informed that a covid test would be needed in order to have PFT's done.  Pre-op instructions also given for surgery on 1/4/21: Instructions given during clinic visit (directions on where to report morning of surgery).   - NPO instructions   - Directions on where to report to AM of surgery   - Bathing/ Personal care information   - Written instructions for medication              - Packs of Hibiclens to bath with   - Covid-19 test will be done before surgery  Patient and daughter verbalized understanding of all instructions. Education provided by surgery handbook  Oncology Navigation   Intake  Date of Diagnosis: 11/25/20  Cancer Type: Neuroendocrine;Thoracic  Internal / External Referral: Internal  Referral Source: Dr. Hernandez  Date of Referral: 12/02/20  Initial Nurse Navigator Contact: 12/02/20  Referral to Initial Contact Timeline (days): 0  First Appointment Available: 12/02/20  Appointment Date: 12/02/20  First Available Date vs. Scheduled Date (days): 0  Multiple appointments: No     Treatment  Surgery: Planned  Surgical Oncologist: Dr. Hernandez  Type of Surgery: Robot-Assisted right middle lobectomy with MLND  Consult Date: 12/02/20  Surgery Schedule Date: 01/04/21    Procedures: Echo (PFT's 12/7/20)  Echo Schedule Date: 12/07/20    Hospital Rounds: No  Plan Reviewed with MD: Yes  Visited with: Family    Support Systems: Children;Family members     Acuity  Surgical Procedure Complexity: 2  Treatment Tolerability: Has not started treatment yet/treatment fully completed and side effects  resolved  ECO  Hospitalization Within the Past Month: 1   Needed: 0  Support: 0  Verbalizes Financial Concerns: 0  Transportation: 0  Mental Health: PHQ Score: 0  History of noncompliance/frequent no shows and cancellations: 0  Verbalizes the need for more education: 0  Other Factors (+1 for Each): 0  Navigation Acuity: 4     Follow Up  After surgery

## 2020-12-02 NOTE — LETTER
December 2, 2020        Deangelo Yu MD  [address]  [address]  [place] [address]             [facility] [place] [address]  Phone: [phone]  Fax: [phone]   Patient: Nan Palafox   MR Number: [id number]   YOB: 1950   Date of Visit: 12/2/2020       Dear Dr. Yu:    Thank you for referring Nan Palafox to me for evaluation. Attached you will find relevant portions of my assessment and plan of care.    If you have questions, please do not hesitate to call me. I look forward to following Nan Palafox along with you.    Sincerely,      Basim Wong DO, FACP            CC  No Recipients    Enclosure

## 2020-12-02 NOTE — PATIENT INSTRUCTIONS
Have thyroid U/S tomorrow    Thoracic surgery will call you about appt with Dr. Hernandez sometime this week.

## 2020-12-02 NOTE — PROGRESS NOTES
History & Physical    SUBJECTIVE:     History of Present Illness:  70 year old female with PMH of HTN, HLD, GIST, GERD, obesity, cervical spondylosis and newly diagnosed right middle typical carcinoid tumor. From outside records, RML mass first noted on chest CT in July 2018. Patient reports she was first notified of its presence about a year ago. She followed with Pulmonology at [facility] for surveillance imaging. Repeat chest CT in November 2020 showed a slight increase in size. Underwent CT guided biopsy at [facility] on 11/23/20 with an eventful post-procedure course. Stroke code was called after she developed right sided weakness and aphasia in PACU. CTA head/neck, MRI brain transcranial dopplers, TTE and LP were all unrevealing. She did have an EEG that showed some left-sided epileptiform irregularities thus she was started on Keppra. Per patient's daughter her weakness, memory and speech all spontaneously improved 48 hours after procedure. She has been completely neurologically intact since discharge.  Pathology returned as low-grade carcinoid tumor, Ki-67 < 2%. Today she reports feeling well with no respiratory complaints. Denies dyspnea on exertion, wheeze or hemoptysis. No blood thinner or systemic steroid use. No history of cardiac or thoracic surgeries.     PSH of bilateral TKR, endoscopic resection of GIST complicated by full thickness perforation requiring emergent laparoscopy for wedge gastroplasty and washout.   Never smoker. Drinks 2-3 glasses of red wine nightly. Retired from [employer] but still works part time in insurance sales.     Chief Complaint   Patient presents with    Consult       Review of patient's allergies indicates:   Allergen Reactions    Iodine Hives       Current Outpatient Medications   Medication Sig Dispense Refill    albuterol (VENTOLIN HFA) 90 mcg/actuation inhaler Inhale 2 puffs into the lungs every 6 (six) hours as needed for Wheezing or Shortness of Breath. Rescue 1 Inhaler 0     amLODIPine (NORVASC) 10 MG tablet Take 1 tablet (10 mg total) by mouth once daily. For blood pressure control. 90 tablet 3    ASCORBATE CALCIUM (VITAMIN C ORAL) Take by mouth.      aspirin (ECOTRIN) 81 MG EC tablet Take 81 mg by mouth once daily.      atorvastatin (LIPITOR) 20 MG tablet Take 1 tablet (20 mg total) by mouth once daily. For cholesterol control 30 tablet 6    dorzolamide-timolol 2-0.5% (COSOPT) 22.3-6.8 mg/mL ophthalmic solution Place 1 drop into both eyes 2 (two) times daily. 10 mL 12    fluticasone (FLONASE) 50 mcg/actuation nasal spray 2 sprays by Each Nare route once daily. 1 Bottle 12    fluticasone propionate (FLONASE) 50 mcg/actuation nasal spray SHAKE LIQUID AND USE 2 SPRAYS(100 MCG) IN EACH NOSTRIL EVERY DAY 48 g 0    hydrOXYzine pamoate (VISTARIL) 25 MG Cap TK ONE C PO Q 8 H PRA      latanoprost 0.005 % ophthalmic solution Place 1 drop into both eyes every evening. 2.5 mL 12    levETIRAcetam (KEPPRA) 500 MG Tab TK 1 T PO BID      LORazepam (ATIVAN) 1 MG tablet TAKE 1 TABLET BY MOUTH EVERY 8 HOURS AS NEEDED FOR ANXIETY 60 tablet 0    melatonin (MELATIN) Take 5 mg by mouth.      methylPREDNISolone (MEDROL DOSEPACK) 4 mg tablet       multivitamin-iron-folic acid (CENTRUM ULTRA WOMEN'S)  mg-mcg Tab Take by mouth.      pantoprazole (PROTONIX) 40 MG tablet TK 1 T PO QAM  2    promethazine-dextromethorphan (PROMETHAZINE-DM) 6.25-15 mg/5 mL Syrp TK 1 TO 2 TEA PO Q 6 H PRF COUGH      rosuvastatin (CRESTOR) 20 MG tablet TK 1 T PO QD  4    tiZANidine (ZANAFLEX) 4 MG tablet       valsartan-hydrochlorothiazide (DIOVAN-HCT) 320-25 mg per tablet TK 1 T PO QAM  3    vitamin D 1000 units Tab Take 1,000 Units by mouth.       No current facility-administered medications for this visit.        Past Medical History:   Diagnosis Date    Arthritis     Cataract     Cervical spondylosis with radiculopathy 4/11/2016    History of gastrointestinal stromal tumor (GIST) 12/2/2020     "Hypertension     Malignant carcinoid tumor of bronchus and lung 12/2/2020    Nuclear sclerosis - Both Eyes 2/8/2013    PNA (pneumonia)     Primary open angle glaucoma (POAG) of both eyes, moderate stage 3/11/2019     Past Surgical History:   Procedure Laterality Date    BREAST BIOPSY Right     excisional bx    BREAST SURGERY Right     Excisional bx    RETINAL DETACHMENT SURGERY      patient states that she had a retinal tear that was repaired in the past at [facility] but she is uncertain of which eye    STOMACH SURGERY      TOTAL KNEE ARTHROPLASTY Right      Social History     Tobacco Use    Smoking status: Never Smoker    Smokeless tobacco: Never Used   Substance Use Topics    Alcohol use: Yes     Alcohol/week: 3.0 standard drinks     Types: 3 Glasses of wine per week     Frequency: 4 or more times a week     Drinks per session: 3 or 4     Binge frequency: Never     Comment: 2-3 glasses per night    Drug use: Never        Review of Systems:  Review of Systems   Constitutional: Negative for activity change, appetite change and fever.   HENT: Negative for trouble swallowing and voice change.    Eyes: Negative for visual disturbance.   Respiratory: Negative for chest tightness, shortness of breath and wheezing.    Cardiovascular: Negative for palpitations and leg swelling.   Gastrointestinal: Negative for diarrhea, nausea and vomiting.   Musculoskeletal: Negative for arthralgias and myalgias.   Neurological: Positive for seizures (Post procedural seizure activity).       OBJECTIVE:     Vital Signs (Most Recent)  Pulse: 81 (12/02/20 1101)  BP: 130/79 (12/02/20 1101)  SpO2: 99 % (12/02/20 1101)  5' 5" (1.651 m)  87.1 kg (192 lb)   Body mass index is 31.95 kg/m².  ECOG Performance Scale:  0 - Asymptomatic    Physical Exam:  Physical Exam  Constitutional:       Appearance: Normal appearance.   Eyes:      General: No scleral icterus.  Cardiovascular:      Rate and Rhythm: Normal rate and regular rhythm.      " Pulses: Normal pulses.   Pulmonary:      Effort: Pulmonary effort is normal.      Breath sounds: Normal breath sounds. No wheezing.   Abdominal:      General: Abdomen is flat.      Palpations: Abdomen is soft.      Tenderness: There is no abdominal tenderness.   Musculoskeletal:      Right lower leg: No edema.      Left lower leg: No edema.   Lymphadenopathy:      Cervical: No cervical adenopathy.   Neurological:      Mental Status: She is alert and oriented to person, place, and time.       Laboratory  11/28/20- BUN/Crt- 14/0.57    Diagnostic Results:    11/6/20- Chest CT without Contrast- I reviewed the images  1. There is a 0.5 cm small linear nodule in the anterior right middle lobe is stable compared to the prior study.  2. There is a 1.0 x 1.5 cm lobular soft tissue nodular density in the more proximal right middle lobe as discussed above and slightly larger than on prior exam. Differential diagnosis includes both benign (noncalcified granuloma) and malignant (adenocarcinoma) etiologies.  3. Atherosclerotic vascular disease.  4. There is appears to be a loop of surgical sutures at the level of the esophagogastric junction.  5. There is a calcified nodule in the right lobe of thyroid gland and stable compared to the prior study but can be further evaluated with thyroid ultrasound.    11/23/20- CTA Head and Neck-   No stenosis, occlusion, aneurysm, or thrombus of the intracranial vasculature.    11/23/20- TTE-I reviewed the report  Summary:   1. Normal left ventricular systolic function.   2. LV diastolic function is mildly abnormal (Grade I).   3. Bubble study was negative for intracardiac shunt.   4. Right ventricular systolic function is normal    11/25/20- MRI Brain with and without Contrast-I reviewed the images  1. No acute intracranial process is identified with no abnormal enhancement or restricted diffusion.   2. Nonenhancing supratentorial white matter changes are nonspecific but are statistically most  likely white matter microvascular ischemic changes or focal gliosis given the patient's age.  3. Mild diffuse involutional changes for the patient's age.  4. Scattered chronic sinus disease more prominent on the left.  5. Heterogeneous enhancing small left mastoid effusion.    12/1/20- PET Gallium Dotatate-I reviewed the images  Right middle lobe pulmonary nodule without significant somatostatin receptor avidity.  Note is made of prior outside hospital FNA with pathology consistent with low-grade carcinoid tumor.  Further evaluation with FDG PET-CT may be considered.  6 mm right middle pulmonary nodule below the size threshold for PET.  Attention on follow-up.  Incidental somatostatin receptor avid hypodense nodule within the region of the posterior left thyroid lobe.  Differential considerations include thyroid or parathyroid neuroendocrine tumor. Recommend further evaluation with thyroid ultrasound and FNA.    ASSESSMENT/PLAN:     70 year old female with PMH of HTN, HLD, GIST, GERD, obesity, cervical spondylosis and right middle lobe typical carcinoid tumor. Newly diagnosed with seizure disorder after CT guided biopsy.       PLAN:Plan   All [facility] records in Care Everywhere from recent hospital and ICU stay reviewed. All recent CT and PET imaging reviewed.  Recommend Dobutamine Stress ECHO and PFTs for preop cardiopulmonary work up. Will follow up on results of Thyroid US ordered by Dr. oWng.   Pending the above, recommend proceeding with Robot-Assisted right middle lobectomy with MLND.  Unclear about the etiology of the seizure that occurred at the time of the CT-guided biopsy.  The patient's neurologic status will be monitored perioperatively.  Appropriate patient education regarding the barbi-operative period as well as intraoperative details were discussed. Risks, including but not limited to, bleeding, infection, pain and anesthetic complication were discussed. Patient was given the opportunity to ask  questions and to have those questions answered to their satisfaction. Patient verbalized understanding to both procedure and associated risks. Consent was obtained.

## 2020-12-02 NOTE — LETTER
[facility]  [address]  [place] [address]  Phone: [phone]  Fax: [phone] December 2, 2020        Basim Wong, DO, FACP  [address]  [address]  [place] [address]    Patient: Nan Palafox   MR Number: [id number]   YOB: 1950   Date of Visit: 12/2/2020     Dear Dr. Wong:    Thank you for referring Nan Palafox to me for evaluation. Ms. Palafox presents with a diagnosis of typical carcinoid tumor of the right middle lobe.  The diagnosis was made via CT-guided biopsy.  The biopsy procedure was complicated by a seizure.  An extensive workup did not reveal any brain or cervical vascular abnormalities.     I recommend a robotic right middle lobectomy with mediastinal lymph node dissection.  I will obtain a preoperative dobutamine Stress ECHO and PFTs for preop cardiopulmonary work up.     If you have questions, please do not hesitate to call me. I look forward to following Nan along with you.    Sincerely,    Ru Hernandez MD    BLP/gonzalo    CC  Glenroy Jimenez MD

## 2020-12-03 ENCOUNTER — HOSPITAL ENCOUNTER (OUTPATIENT)
Dept: RADIOLOGY | Facility: OTHER | Age: 70
Discharge: HOME | End: 2020-12-03
Attending: INTERNAL MEDICINE
Payer: MEDICARE

## 2020-12-03 DIAGNOSIS — R94.6 ABNORMAL THYROID SCAN: ICD-10-CM

## 2020-12-03 DIAGNOSIS — C7A.090 MALIGNANT CARCINOID TUMOR OF BRONCHUS AND LUNG: ICD-10-CM

## 2020-12-03 PROCEDURE — 76536 US EXAM OF HEAD AND NECK: CPT | Mod: TC

## 2020-12-03 PROCEDURE — 76536 US EXAM OF HEAD AND NECK: CPT | Mod: 26,,, | Performed by: RADIOLOGY

## 2020-12-03 PROCEDURE — 76536 US THYROID: ICD-10-PCS | Mod: 26,,, | Performed by: RADIOLOGY

## 2020-12-04 ENCOUNTER — LAB VISIT (OUTPATIENT)
Dept: URGENT CARE | Facility: CLINIC | Age: 70
End: 2020-12-04
Payer: MEDICARE

## 2020-12-04 DIAGNOSIS — R06.02 SHORTNESS OF BREATH: ICD-10-CM

## 2020-12-04 PROCEDURE — U0003 INFECTIOUS AGENT DETECTION BY NUCLEIC ACID (DNA OR RNA); SEVERE ACUTE RESPIRATORY SYNDROME CORONAVIRUS 2 (SARS-COV-2) (CORONAVIRUS DISEASE [COVID-19]), AMPLIFIED PROBE TECHNIQUE, MAKING USE OF HIGH THROUGHPUT TECHNOLOGIES AS DESCRIBED BY CMS-2020-01-R: HCPCS

## 2020-12-05 LAB — SARS-COV-2 RNA RESP QL NAA+PROBE: NOT DETECTED

## 2020-12-07 ENCOUNTER — PATIENT MESSAGE (OUTPATIENT)
Dept: INFECTIOUS DISEASES | Facility: CLINIC | Age: 70
End: 2020-12-07

## 2020-12-07 ENCOUNTER — HOSPITAL ENCOUNTER (OUTPATIENT)
Dept: PULMONOLOGY | Facility: CLINIC | Age: 70
Discharge: HOME | End: 2020-12-07
Payer: MEDICARE

## 2020-12-07 ENCOUNTER — ANESTHESIA EVENT (OUTPATIENT)
Dept: SURGERY | Facility: HOSPITAL | Age: 70
DRG: 165 | End: 2020-12-07
Payer: MEDICARE

## 2020-12-07 ENCOUNTER — TELEPHONE (OUTPATIENT)
Dept: NEUROLOGY | Facility: HOSPITAL | Age: 70
End: 2020-12-07

## 2020-12-07 DIAGNOSIS — I25.10 CORONARY ARTERY DISEASE, ANGINA PRESENCE UNSPECIFIED, UNSPECIFIED VESSEL OR LESION TYPE, UNSPECIFIED WHETHER NATIVE OR TRANSPLANTED HEART: ICD-10-CM

## 2020-12-07 DIAGNOSIS — R93.89 ABNORMAL ULTRASOUND OF THYROID GLAND: ICD-10-CM

## 2020-12-07 DIAGNOSIS — C7A.090 MALIGNANT CARCINOID TUMOR OF BRONCHUS AND LUNG: Primary | ICD-10-CM

## 2020-12-07 DIAGNOSIS — C7A.090 CARCINOID BRONCHIAL ADENOMA OF RIGHT LUNG: ICD-10-CM

## 2020-12-07 PROCEDURE — 94060 EVALUATION OF WHEEZING: CPT | Mod: 26,S$PBB,, | Performed by: INTERNAL MEDICINE

## 2020-12-07 PROCEDURE — 94729 DIFFUSING CAPACITY: CPT | Mod: 26,S$PBB,, | Performed by: INTERNAL MEDICINE

## 2020-12-07 PROCEDURE — 94727 GAS DIL/WSHOT DETER LNG VOL: CPT | Mod: PBBFAC | Performed by: INTERNAL MEDICINE

## 2020-12-07 PROCEDURE — 94060 EVALUATION OF WHEEZING: CPT | Mod: PBBFAC | Performed by: INTERNAL MEDICINE

## 2020-12-07 PROCEDURE — 94727 GAS DIL/WSHOT DETER LNG VOL: CPT | Mod: 26,S$PBB,, | Performed by: INTERNAL MEDICINE

## 2020-12-07 PROCEDURE — 94727 PR PULM FUNCTION TEST BY GAS: ICD-10-PCS | Mod: 26,S$PBB,, | Performed by: INTERNAL MEDICINE

## 2020-12-07 PROCEDURE — 94729 PR C02/MEMBANE DIFFUSE CAPACITY: ICD-10-PCS | Mod: 26,S$PBB,, | Performed by: INTERNAL MEDICINE

## 2020-12-07 PROCEDURE — 94060 PR EVAL OF BRONCHOSPASM: ICD-10-PCS | Mod: 26,S$PBB,, | Performed by: INTERNAL MEDICINE

## 2020-12-07 PROCEDURE — 94729 DIFFUSING CAPACITY: CPT | Mod: PBBFAC | Performed by: INTERNAL MEDICINE

## 2020-12-08 ENCOUNTER — OFFICE VISIT (OUTPATIENT)
Dept: OPHTHALMOLOGY | Facility: CLINIC | Age: 70
End: 2020-12-08
Payer: MEDICARE

## 2020-12-08 DIAGNOSIS — H25.13 NUCLEAR SCLEROSIS OF BOTH EYES: ICD-10-CM

## 2020-12-08 DIAGNOSIS — H40.053 BILATERAL OCULAR HYPERTENSION: ICD-10-CM

## 2020-12-08 DIAGNOSIS — H40.1132 PRIMARY OPEN ANGLE GLAUCOMA (POAG) OF BOTH EYES, MODERATE STAGE: Primary | ICD-10-CM

## 2020-12-08 PROCEDURE — 99999 PR PBB SHADOW E&M-EST. PATIENT-LVL II: ICD-10-PCS | Mod: PBBFAC,,, | Performed by: OPHTHALMOLOGY

## 2020-12-08 PROCEDURE — 65855 TRABECULOPLASTY LASER SURG: CPT | Mod: PBBFAC,LT | Performed by: OPHTHALMOLOGY

## 2020-12-08 PROCEDURE — 99212 OFFICE O/P EST SF 10 MIN: CPT | Mod: PBBFAC | Performed by: OPHTHALMOLOGY

## 2020-12-08 PROCEDURE — 92012 INTRM OPH EXAM EST PATIENT: CPT | Mod: 25,S$PBB,, | Performed by: OPHTHALMOLOGY

## 2020-12-08 PROCEDURE — 92012 PR EYE EXAM, EST PATIENT,INTERMED: ICD-10-PCS | Mod: 25,S$PBB,, | Performed by: OPHTHALMOLOGY

## 2020-12-08 PROCEDURE — 99999 PR PBB SHADOW E&M-EST. PATIENT-LVL II: CPT | Mod: PBBFAC,,, | Performed by: OPHTHALMOLOGY

## 2020-12-08 PROCEDURE — 65855 ARGON TRABECULOPLASTY: ICD-10-PCS | Mod: S$PBB,LT,, | Performed by: OPHTHALMOLOGY

## 2020-12-08 NOTE — PROGRESS NOTES
Assessment /Plan     For exam results, see Encounter Report.    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  Patient/Family to call and return immediately to clinic should the symptoms of RD occur. Voiced good understanding with Q & A.        Laser Trabeculoplasty  both eyes    Glaucoma Laser Trabeculoplasty Surgery Consent:  Patient with glaucoma and IOP control not at target for the health of the eye.  Discussed with patient options, risks and benefits, expectations of glaucoma laser surgery with questions and answers to facilitate discussion.    The  patient voice good understanding and wishes to proceed with surgery.  The patient will likely benefit from laser surgery and patient  signed consent for Right and Left Eye.    May require formal Sx as discussed        Laser Trabeculoplasty  left eye      The correct eye was identified by patient prior to start of the procedure.      Performed with Selective LT Yag    Total Energy:   88.4 mJ  Extent   360 Degrees  Total # of Exposures: 100 Applications    Patient tolerated procedure well       Nan understands the information Dr. Hathaway provided at the time of visit.  The patient voices good understanding of these  these instructions and agrees with the plan.  Patient will return to clinic sooner as needed for pain, decreasing vision etc.    Possible:  PF 1 % 4x/day for 4 Days in eye with laser procedure PRN - patient to call with good understanding      Plan  Recent Dx Carcinoid Right Lung  RTC 2-4 weeks IOP &  adherence --> p SLT OS & Possible SLT OD   RTC sooner prn with good understanding

## 2020-12-09 ENCOUNTER — HOSPITAL ENCOUNTER (OUTPATIENT)
Dept: CARDIOLOGY | Facility: HOSPITAL | Age: 70
Discharge: HOME | End: 2020-12-09
Attending: PHYSICIAN ASSISTANT
Payer: MEDICARE

## 2020-12-09 VITALS
BODY MASS INDEX: 31.99 KG/M2 | DIASTOLIC BLOOD PRESSURE: 64 MMHG | SYSTOLIC BLOOD PRESSURE: 114 MMHG | RESPIRATION RATE: 16 BRPM | HEIGHT: 65 IN | WEIGHT: 192 LBS

## 2020-12-09 DIAGNOSIS — I25.10 CORONARY ARTERY DISEASE, ANGINA PRESENCE UNSPECIFIED, UNSPECIFIED VESSEL OR LESION TYPE, UNSPECIFIED WHETHER NATIVE OR TRANSPLANTED HEART: ICD-10-CM

## 2020-12-09 DIAGNOSIS — R06.02 SHORTNESS OF BREATH: ICD-10-CM

## 2020-12-09 LAB
ASCENDING AORTA: 3.09 CM
BSA FOR ECHO PROCEDURE: 2 M2
CV ECHO LV RWT: 0.35 CM
CV STRESS BASE HR: 72 BPM
DIASTOLIC BLOOD PRESSURE: 76 MMHG
DOP CALC LVOT AREA: 3.3 CM2
DOP CALC LVOT DIAMETER: 2.05 CM
DOP CALC LVOT PEAK VEL: 1.07 M/S
DOP CALC LVOT STROKE VOLUME: 75.45 CM3
DOP CALCLVOT PEAK VEL VTI: 22.87 CM
E WAVE DECELERATION TIME: 183.41 MSEC
E/A RATIO: 0.83
E/E' RATIO: 10.8 M/S
ECHO LV POSTERIOR WALL: 0.72 CM (ref 0.6–1.1)
FRACTIONAL SHORTENING: 31 % (ref 28–44)
INTERVENTRICULAR SEPTUM: 0.81 CM (ref 0.6–1.1)
IVRT: 79.92 MSEC
LA MAJOR: 5.26 CM
LA MINOR: 5.17 CM
LA WIDTH: 3.73 CM
LEFT ATRIUM SIZE: 2.79 CM
LEFT ATRIUM VOLUME INDEX: 23.7 ML/M2
LEFT ATRIUM VOLUME: 46.13 CM3
LEFT INTERNAL DIMENSION IN SYSTOLE: 2.81 CM (ref 2.1–4)
LEFT VENTRICLE DIASTOLIC VOLUME INDEX: 37.64 ML/M2
LEFT VENTRICLE DIASTOLIC VOLUME: 73.17 ML
LEFT VENTRICLE MASS INDEX: 47 G/M2
LEFT VENTRICLE SYSTOLIC VOLUME INDEX: 15.3 ML/M2
LEFT VENTRICLE SYSTOLIC VOLUME: 29.72 ML
LEFT VENTRICULAR INTERNAL DIMENSION IN DIASTOLE: 4.08 CM (ref 3.5–6)
LEFT VENTRICULAR MASS: 90.99 G
LV LATERAL E/E' RATIO: 9 M/S
LV SEPTAL E/E' RATIO: 13.5 M/S
MV A" WAVE DURATION": 8.85 MSEC
MV PEAK A VEL: 0.98 M/S
MV PEAK E VEL: 0.81 M/S
OHS CV CPX 1 MINUTE RECOVERY HEART RATE: 134 BPM
OHS CV CPX 85 PERCENT MAX PREDICTED HEART RATE MALE: 122
OHS CV CPX MAX PREDICTED HEART RATE: 144
OHS CV CPX PATIENT IS FEMALE: 1
OHS CV CPX PATIENT IS MALE: 0
OHS CV CPX PEAK DIASTOLIC BLOOD PRESSURE: 81 MMHG
OHS CV CPX PEAK HEAR RATE: 137 BPM
OHS CV CPX PEAK RATE PRESSURE PRODUCT: NORMAL
OHS CV CPX PEAK SYSTOLIC BLOOD PRESSURE: 160 MMHG
OHS CV CPX PERCENT MAX PREDICTED HEART RATE ACHIEVED: 95
OHS CV CPX RATE PRESSURE PRODUCT PRESENTING: NORMAL
PISA TR MAX VEL: 2.76 M/S
PULM VEIN S/D RATIO: 1.5
PV PEAK D VEL: 0.34 M/S
PV PEAK S VEL: 0.51 M/S
RA MAJOR: 4.44 CM
RA PRESSURE: 3 MMHG
RA WIDTH: 3.01 CM
RIGHT VENTRICULAR END-DIASTOLIC DIMENSION: 3.1 CM
RV TISSUE DOPPLER FREE WALL SYSTOLIC VELOCITY 1 (APICAL 4 CHAMBER VIEW): 10.65 CM/S
SINUS: 3.45 CM
STJ: 2.66 CM
SYSTOLIC BLOOD PRESSURE: 139 MMHG
TDI LATERAL: 0.09 M/S
TDI SEPTAL: 0.06 M/S
TDI: 0.08 M/S
TR MAX PG: 30 MMHG
TRICUSPID ANNULAR PLANE SYSTOLIC EXCURSION: 1.95 CM
TV REST PULMONARY ARTERY PRESSURE: 33 MMHG

## 2020-12-09 PROCEDURE — 93351 STRESS TTE COMPLETE: CPT

## 2020-12-09 PROCEDURE — 63600175 PHARM REV CODE 636 W HCPCS: Performed by: PHYSICIAN ASSISTANT

## 2020-12-09 PROCEDURE — 93351 STRESS ECHO (CUPID ONLY): ICD-10-PCS | Mod: 26,,, | Performed by: INTERNAL MEDICINE

## 2020-12-09 PROCEDURE — 93351 STRESS TTE COMPLETE: CPT | Mod: 26,,, | Performed by: INTERNAL MEDICINE

## 2020-12-09 RX ORDER — LORAZEPAM 1 MG/1
1 TABLET ORAL EVERY 8 HOURS PRN
Qty: 60 TABLET | Refills: 0 | Status: ON HOLD | OUTPATIENT
Start: 2020-12-09 | End: 2021-02-08 | Stop reason: HOSPADM

## 2020-12-09 RX ORDER — DOBUTAMINE HYDROCHLORIDE 400 MG/100ML
10 INJECTION INTRAVENOUS
Status: COMPLETED | OUTPATIENT
Start: 2020-12-09 | End: 2020-12-09

## 2020-12-09 RX ADMIN — DOBUTAMINE HYDROCHLORIDE 10 MCG/KG/MIN: 400 INJECTION INTRAVENOUS at 09:12

## 2020-12-09 NOTE — TELEPHONE ENCOUNTER
Faxed request 12/8 for refill.  This was last filled 10/12/20.    I called her to get an appt because lov 3/6/20 and never made her 4 mos fol up.    She has been going thru cancer dx / lung.  Dr killian saw her at [facility].  Was paralyzed on right side for 6 days and that is better now.  [facility] dr pettiford is planning lung surgery on 1/4/20 and she needs preop.  She wanted to see us. She is in good spirits.    I fit her in next Thursday 12/17 1pm to see you for preop.    Thanks lorin

## 2020-12-10 ENCOUNTER — TELEPHONE (OUTPATIENT)
Dept: NEUROLOGY | Facility: HOSPITAL | Age: 70
End: 2020-12-10

## 2020-12-10 DIAGNOSIS — G45.9 TIA (TRANSIENT ISCHEMIC ATTACK): ICD-10-CM

## 2020-12-10 DIAGNOSIS — R94.6 ABNORMAL THYROID SCAN: Primary | ICD-10-CM

## 2020-12-10 DIAGNOSIS — R56.9 SEIZURES: Primary | ICD-10-CM

## 2020-12-10 NOTE — TELEPHONE ENCOUNTER
Spoke with patient who was concerned about nuclear medicine study to evaluate parathyroid.  I explained rationale for this test.  She is agreeable to proceed.  She also is very anxious about a neurologic event during any other work up including a biopsy or surgery as she has not yet been told definitive answers as to what happened previously. She is requesting neurology evaluation at [facility] and will make referral.

## 2020-12-11 ENCOUNTER — PATIENT MESSAGE (OUTPATIENT)
Dept: OTHER | Facility: OTHER | Age: 70
End: 2020-12-11

## 2020-12-15 ENCOUNTER — HOSPITAL ENCOUNTER (OUTPATIENT)
Dept: RADIOLOGY | Facility: HOSPITAL | Age: 70
Discharge: HOME | End: 2020-12-15
Attending: INTERNAL MEDICINE
Payer: MEDICARE

## 2020-12-15 DIAGNOSIS — R94.6 ABNORMAL THYROID SCAN: ICD-10-CM

## 2020-12-15 PROCEDURE — 78072 NM PARATHYROID SCAN WITH SPECT AND CT: ICD-10-PCS | Mod: 26,,, | Performed by: RADIOLOGY

## 2020-12-15 PROCEDURE — 78072 PARATHYRD PLANAR W/SPECT&CT: CPT | Mod: TC

## 2020-12-15 PROCEDURE — 78072 PARATHYRD PLANAR W/SPECT&CT: CPT | Mod: 26,,, | Performed by: RADIOLOGY

## 2020-12-16 ENCOUNTER — TELEPHONE (OUTPATIENT)
Dept: NEUROLOGY | Facility: CLINIC | Age: 70
End: 2020-12-16

## 2020-12-16 NOTE — TELEPHONE ENCOUNTER
----- Message from Yaakov Elias MD sent at 12/15/2020  9:43 PM CST -----  Seeing if we can get her added in to resident clinic.  ----- Message -----  From: Rama Galan MA  Sent: 12/15/2020   4:31 PM CST  To: Yaakov Elias MD    Can pt be added to the altagracia before next year?  ----- Message -----  From: Tanya Tolbert RN  Sent: 12/11/2020   8:25 AM CST  To: [facility] Neurology Clinical Support Staff    Patient had a TIA and seizures after a CT biopsy at [facility].  She is going to be undergoing a surgery for a carcinoid tumor of the lung and was going to have a CT of her parathyroid for a node noted on Ga68 scan, but it was noted in her chart that the iodine was noted to have caused the seizure/TIA.      Dr. Wong would like her to be seen ASAP for this as she has had no follow up with neurology after this incident other than inpatient after incident.  Images are in Epic.

## 2020-12-17 ENCOUNTER — OFFICE VISIT (OUTPATIENT)
Dept: INTERNAL MEDICINE | Facility: CLINIC | Age: 70
End: 2020-12-17
Payer: MEDICARE

## 2020-12-17 ENCOUNTER — LAB VISIT (OUTPATIENT)
Dept: LAB | Facility: HOSPITAL | Age: 70
End: 2020-12-17
Attending: INTERNAL MEDICINE
Payer: MEDICARE

## 2020-12-17 VITALS
HEART RATE: 73 BPM | SYSTOLIC BLOOD PRESSURE: 122 MMHG | OXYGEN SATURATION: 97 % | BODY MASS INDEX: 30.9 KG/M2 | TEMPERATURE: 98 F | DIASTOLIC BLOOD PRESSURE: 74 MMHG | RESPIRATION RATE: 18 BRPM | WEIGHT: 192.25 LBS | HEIGHT: 66 IN

## 2020-12-17 DIAGNOSIS — M47.22 CERVICAL SPONDYLOSIS WITH RADICULOPATHY: ICD-10-CM

## 2020-12-17 DIAGNOSIS — E35 DISORDERS OF ENDOCRINE GLANDS IN DISEASES CLASSIFIED ELSEWHERE: ICD-10-CM

## 2020-12-17 DIAGNOSIS — D3A.090 CARCINOID TUMOR DETERMINED BY BIOPSY OF LUNG: ICD-10-CM

## 2020-12-17 DIAGNOSIS — K21.9 GERD WITHOUT ESOPHAGITIS: ICD-10-CM

## 2020-12-17 DIAGNOSIS — R93.89 ABNORMAL FINDINGS ON DIAGNOSTIC IMAGING OF OTHER SPECIFIED BODY STRUCTURES: ICD-10-CM

## 2020-12-17 DIAGNOSIS — Z01.818 PREOP EXAM FOR INTERNAL MEDICINE: Primary | ICD-10-CM

## 2020-12-17 DIAGNOSIS — Z01.818 PREOP EXAM FOR INTERNAL MEDICINE: ICD-10-CM

## 2020-12-17 DIAGNOSIS — I10 ESSENTIAL HYPERTENSION: ICD-10-CM

## 2020-12-17 DIAGNOSIS — I70.0 ATHEROSCLEROSIS OF AORTA: ICD-10-CM

## 2020-12-17 LAB
BASOPHILS # BLD AUTO: 0.05 K/UL (ref 0–0.2)
BASOPHILS NFR BLD: 0.9 % (ref 0–1.9)
DIFFERENTIAL METHOD: ABNORMAL
EOSINOPHIL # BLD AUTO: 0.1 K/UL (ref 0–0.5)
EOSINOPHIL NFR BLD: 2 % (ref 0–8)
ERYTHROCYTE [DISTWIDTH] IN BLOOD BY AUTOMATED COUNT: 15.4 % (ref 11.5–14.5)
HCT VFR BLD AUTO: 37.3 % (ref 37–48.5)
HGB BLD-MCNC: 11.7 G/DL (ref 12–16)
IMM GRANULOCYTES # BLD AUTO: 0.01 K/UL (ref 0–0.04)
IMM GRANULOCYTES NFR BLD AUTO: 0.2 % (ref 0–0.5)
LYMPHOCYTES # BLD AUTO: 1.9 K/UL (ref 1–4.8)
LYMPHOCYTES NFR BLD: 35 % (ref 18–48)
MCH RBC QN AUTO: 26.8 PG (ref 27–31)
MCHC RBC AUTO-ENTMCNC: 31.4 G/DL (ref 32–36)
MCV RBC AUTO: 85 FL (ref 82–98)
MONOCYTES # BLD AUTO: 0.4 K/UL (ref 0.3–1)
MONOCYTES NFR BLD: 6.5 % (ref 4–15)
NEUTROPHILS # BLD AUTO: 3.1 K/UL (ref 1.8–7.7)
NEUTROPHILS NFR BLD: 55.4 % (ref 38–73)
NRBC BLD-RTO: 0 /100 WBC
PLATELET # BLD AUTO: 428 K/UL (ref 150–350)
PMV BLD AUTO: 10.9 FL (ref 9.2–12.9)
RBC # BLD AUTO: 4.37 M/UL (ref 4–5.4)
WBC # BLD AUTO: 5.54 K/UL (ref 3.9–12.7)

## 2020-12-17 PROCEDURE — 99999 PR PBB SHADOW E&M-EST. PATIENT-LVL V: CPT | Mod: PBBFAC,,, | Performed by: INTERNAL MEDICINE

## 2020-12-17 PROCEDURE — 84443 ASSAY THYROID STIM HORMONE: CPT

## 2020-12-17 PROCEDURE — 36415 COLL VENOUS BLD VENIPUNCTURE: CPT | Mod: PO

## 2020-12-17 PROCEDURE — 99214 PR OFFICE/OUTPT VISIT, EST, LEVL IV, 30-39 MIN: ICD-10-PCS | Mod: S$PBB,,, | Performed by: INTERNAL MEDICINE

## 2020-12-17 PROCEDURE — 99999 PR PBB SHADOW E&M-EST. PATIENT-LVL V: ICD-10-PCS | Mod: PBBFAC,,, | Performed by: INTERNAL MEDICINE

## 2020-12-17 PROCEDURE — 85025 COMPLETE CBC W/AUTO DIFF WBC: CPT

## 2020-12-17 PROCEDURE — 99214 OFFICE O/P EST MOD 30 MIN: CPT | Mod: S$PBB,,, | Performed by: INTERNAL MEDICINE

## 2020-12-17 PROCEDURE — 84439 ASSAY OF FREE THYROXINE: CPT

## 2020-12-17 PROCEDURE — 99215 OFFICE O/P EST HI 40 MIN: CPT | Mod: PBBFAC,PO | Performed by: INTERNAL MEDICINE

## 2020-12-17 PROCEDURE — 83970 ASSAY OF PARATHORMONE: CPT

## 2020-12-17 PROCEDURE — 80053 COMPREHEN METABOLIC PANEL: CPT

## 2020-12-18 ENCOUNTER — TELEPHONE (OUTPATIENT)
Dept: NEUROLOGY | Facility: HOSPITAL | Age: 70
End: 2020-12-18

## 2020-12-18 DIAGNOSIS — R93.89 ABNORMAL IMAGING OF THYROID: ICD-10-CM

## 2020-12-18 DIAGNOSIS — C7A.090 CARCINOID BRONCHIAL ADENOMA OF RIGHT LUNG: Primary | ICD-10-CM

## 2020-12-18 LAB
ALBUMIN SERPL BCP-MCNC: 4.1 G/DL (ref 3.5–5.2)
ALP SERPL-CCNC: 54 U/L (ref 55–135)
ALT SERPL W/O P-5'-P-CCNC: 13 U/L (ref 10–44)
ANION GAP SERPL CALC-SCNC: 7 MMOL/L (ref 8–16)
AST SERPL-CCNC: 21 U/L (ref 10–40)
BILIRUB SERPL-MCNC: 0.3 MG/DL (ref 0.1–1)
BUN SERPL-MCNC: 7 MG/DL (ref 8–23)
CALCIUM SERPL-MCNC: 9.5 MG/DL (ref 8.7–10.5)
CHLORIDE SERPL-SCNC: 101 MMOL/L (ref 95–110)
CO2 SERPL-SCNC: 32 MMOL/L (ref 23–29)
CREAT SERPL-MCNC: 0.7 MG/DL (ref 0.5–1.4)
EST. GFR  (AFRICAN AMERICAN): >60 ML/MIN/1.73 M^2
EST. GFR  (NON AFRICAN AMERICAN): >60 ML/MIN/1.73 M^2
GLUCOSE SERPL-MCNC: 117 MG/DL (ref 70–110)
POTASSIUM SERPL-SCNC: 4.1 MMOL/L (ref 3.5–5.1)
PROT SERPL-MCNC: 7.8 G/DL (ref 6–8.4)
PTH-INTACT SERPL-MCNC: 78 PG/ML (ref 9–77)
SODIUM SERPL-SCNC: 140 MMOL/L (ref 136–145)
T4 FREE SERPL-MCNC: 1.02 NG/DL (ref 0.71–1.51)
TSH SERPL DL<=0.005 MIU/L-ACNC: 0.47 UIU/ML (ref 0.4–4)

## 2020-12-21 ENCOUNTER — OFFICE VISIT (OUTPATIENT)
Dept: OTOLARYNGOLOGY | Facility: CLINIC | Age: 70
End: 2020-12-21
Payer: MEDICARE

## 2020-12-21 DIAGNOSIS — C7A.090 CARCINOID BRONCHIAL ADENOMA OF RIGHT LUNG: ICD-10-CM

## 2020-12-21 DIAGNOSIS — R93.89 ABNORMAL IMAGING OF THYROID: ICD-10-CM

## 2020-12-21 PROCEDURE — 99204 OFFICE O/P NEW MOD 45 MIN: CPT | Mod: 95,,, | Performed by: OTOLARYNGOLOGY

## 2020-12-21 PROCEDURE — 99204 PR OFFICE/OUTPT VISIT, NEW, LEVL IV, 45-59 MIN: ICD-10-PCS | Mod: 95,,, | Performed by: OTOLARYNGOLOGY

## 2020-12-21 NOTE — PROGRESS NOTES
The patient location is: home  The chief complaint leading to consultation is: abnormal thyroid imaging    Visit type: audiovisual    Face to Face time with patient: 15  30 minutes of total time spent on the encounter, which includes face to face time and non-face to face time preparing to see the patient (eg, review of tests), Obtaining and/or reviewing separately obtained history, Documenting clinical information in the electronic or other health record, Independently interpreting results (not separately reported) and communicating results to the patient/family/caregiver, or Care coordination (not separately reported).         Each patient to whom he or she provides medical services by telemedicine is:  (1) informed of the relationship between the physician and patient and the respective role of any other health care provider with respect to management of the patient; and (2) notified that he or she may decline to receive medical services by telemedicine and may withdraw from such care at any time.    Notes:     No chief complaint on file.        70 y.o. 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 PET/CT on 12/1 demonstrated a hypodense nodule within the region of the posterior left thyroid lobe. Follow up imaging with US and SPECT CT also demonstrate a neoplastic process in the posterior left thyroid, thyroid nodule vs parathyroid. She is scheduled for surgery with Dr. Hernandez on 1/4/21    TSH: 0.466  PTH: 78    Review of Systems     Constitutional: Negative for fatigue and unexpected weight change.   HENT: per HPI  Eyes: Negative for visual disturbance.   Respiratory: Negative for shortness of breath, hemoptysis  Cardiovascular: Negative for chest pain and palpitations.   Genitourinary: Negative for dysuria and difficulty urinating.   Musculoskeletal: Negative for decreased ROM, back pain.   Skin: Negative for rash, sunburn, itching.    Neurological: Negative for dizziness and seizures.   Hematological: Negative for adenopathy. Does not bruise/bleed easily.   Psychiatric/Behavioral: Negative for agitation. The patient is not nervous/anxious.   Endocrine: Negative for rapid weight loss/weight gain, heat/cold intolerance.     Past Medical History:   Diagnosis Date    Arthritis     Cataract     Cervical spondylosis with radiculopathy 4/11/2016    History of gastrointestinal stromal tumor (GIST) 12/2/2020    Hypertension     Malignant carcinoid tumor of bronchus and lung 12/2/2020    Nuclear sclerosis - Both Eyes 2/8/2013    PNA (pneumonia)     Primary open angle glaucoma (POAG) of both eyes, moderate stage 3/11/2019       Past Surgical History:   Procedure Laterality Date    BREAST BIOPSY Right     excisional bx    BREAST SURGERY Right     Excisional bx    RETINAL DETACHMENT SURGERY      patient states that she had a retinal tear that was repaired in the past at [facility] but she is uncertain of which eye    STOMACH SURGERY      TOTAL KNEE ARTHROPLASTY Right        family history includes Blindness in her mother; Cancer in her brother; Cancer (age of onset: 68) in her mother; Cataracts in her brother and mother; Glaucoma in her brother and mother; No Known Problems in her father, maternal aunt, maternal grandfather, maternal grandmother, maternal uncle, paternal aunt, paternal grandfather, paternal grandmother, paternal uncle, and sister.    Pt  reports that she has never smoked. She has never used smokeless tobacco. She reports current alcohol use of about 3.0 standard drinks of alcohol per week. She reports that she does not use drugs.    Review of patient's allergies indicates:   Allergen Reactions    Iodine Hives        Physical Exam    There were no vitals filed for this visit.  There is no height or weight on file to calculate BMI.    General: AOx3, NAD  Exam limited due to virtual nature of visit    Studies reviewed:  US Neck:  12/3/20:  Solid nodule along the posterior aspect of the left thyroid lobe corresponding to PET-CT abnormality.  Diagnostic considerations include exophytic thyroid nodule as well as adjacent parathyroid adenoma.  Consider further evaluation with parathyroid protocol, contrast enhanced CT neck.    NM Parathyroid SPECT CT: 12/15/20  Abnormal delayed uptake along the posterior aspect the mid left lobe of the thyroid and corresponding with findings on recent ultrasound and PET-CT.  The differential diagnosis includes, but is not limited to, benign and malignant neoplasms of the thyroid and parathyroid glands. Tissue sampling would be required for definitive diagnosis.      Assessment     1. Carcinoid bronchial adenoma of right lung    2. Abnormal imaging of thyroid          Plan  In summary, Ms. Palafox is a 70 year old female with incidental finding of left posterior thyroid mass, thyroid nodule vs parathyroid. Discussed findings with the patient. Recommend US-guided FNA for further evaluation. All questions were answered and she is in agreement with the plan.

## 2020-12-21 NOTE — LETTER
December 22, 2020      Basim Wong, , FACP  [address]  [address]  [place] [address]           [id number]  [address]  [facility] [address]  Phone: [phone]  Fax: [phone]          Patient: Nan Palafox   MR Number: [id number]   YOB: 1950   Date of Visit: 12/21/2020       Dear Dr. Basim Wong:    Thank you for referring Nan Palafox to me for evaluation. Attached you will find relevant portions of my assessment and plan of care.    If you have questions, please do not hesitate to call me. I look forward to following Nan Palafox along with you.    Sincerely,    Tricia Last MD    Enclosure  CC:  No Recipients    If you would like to receive this communication electronically, please contact [email or web address] or [phone] to request more information on [facility] [employer] access.    For providers and/or their staff who would like to refer a patient to [facility], please contact us through our one-stop-shop provider referral line, [facility], at [phone].    If you feel you have received this communication in error or would no longer like to receive these types of communications, please e-mail [email or web address]

## 2020-12-22 ENCOUNTER — TELEPHONE (OUTPATIENT)
Dept: OTOLARYNGOLOGY | Facility: CLINIC | Age: 70
End: 2020-12-22

## 2020-12-22 NOTE — TELEPHONE ENCOUNTER
----- Message from Tricia Last MD sent at 12/22/2020  4:32 PM CST -----  Regarding: US-guided FNA  Need to be set up with [facility]- it can be after her surgery with David.    Thanks,  Tricia

## 2021-01-02 ENCOUNTER — LAB VISIT (OUTPATIENT)
Dept: INTERNAL MEDICINE | Facility: CLINIC | Age: 71
DRG: 165 | End: 2021-01-02
Payer: MEDICARE

## 2021-01-02 DIAGNOSIS — R06.02 SHORTNESS OF BREATH: ICD-10-CM

## 2021-01-02 PROCEDURE — U0003 INFECTIOUS AGENT DETECTION BY NUCLEIC ACID (DNA OR RNA); SEVERE ACUTE RESPIRATORY SYNDROME CORONAVIRUS 2 (SARS-COV-2) (CORONAVIRUS DISEASE [COVID-19]), AMPLIFIED PROBE TECHNIQUE, MAKING USE OF HIGH THROUGHPUT TECHNOLOGIES AS DESCRIBED BY CMS-2020-01-R: HCPCS

## 2021-01-03 LAB — SARS-COV-2 RNA RESP QL NAA+PROBE: NOT DETECTED

## 2021-01-03 RX ORDER — OCTREOTIDE ACETATE 1000 UG/ML
500 INJECTION INTRAVENOUS
Status: CANCELLED | OUTPATIENT
Start: 2021-01-03

## 2021-01-04 ENCOUNTER — HOSPITAL ENCOUNTER (INPATIENT)
Facility: HOSPITAL | Age: 71
LOS: 1 days | Discharge: HOME | DRG: 165 | End: 2021-01-05
Attending: THORACIC SURGERY (CARDIOTHORACIC VASCULAR SURGERY) | Admitting: THORACIC SURGERY (CARDIOTHORACIC VASCULAR SURGERY)
Payer: MEDICARE

## 2021-01-04 ENCOUNTER — ANESTHESIA (OUTPATIENT)
Dept: SURGERY | Facility: HOSPITAL | Age: 71
DRG: 165 | End: 2021-01-04
Payer: MEDICARE

## 2021-01-04 DIAGNOSIS — C7A.090 CARCINOID BRONCHIAL ADENOMA, RIGHT: ICD-10-CM

## 2021-01-04 LAB
ABO + RH BLD: NORMAL
BLD GP AB SCN CELLS X3 SERPL QL: NORMAL
GLUCOSE SERPL-MCNC: 187 MG/DL (ref 70–110)
GLUCOSE SERPL-MCNC: 209 MG/DL (ref 70–110)
GLUCOSE SERPL-MCNC: 224 MG/DL (ref 70–110)
HCO3 UR-SCNC: 27.5 MMOL/L (ref 24–28)
HCO3 UR-SCNC: 28.2 MMOL/L (ref 24–28)
HCO3 UR-SCNC: 28.9 MMOL/L (ref 24–28)
HCT VFR BLD CALC: 34 %PCV (ref 36–54)
HCT VFR BLD CALC: 34 %PCV (ref 36–54)
HCT VFR BLD CALC: 35 %PCV (ref 36–54)
PCO2 BLDA: 46.4 MMHG (ref 35–45)
PCO2 BLDA: 52.6 MMHG (ref 35–45)
PCO2 BLDA: 62.7 MMHG (ref 35–45)
PH SMN: 7.27 [PH] (ref 7.35–7.45)
PH SMN: 7.33 [PH] (ref 7.35–7.45)
PH SMN: 7.39 [PH] (ref 7.35–7.45)
PO2 BLDA: 493 MMHG (ref 80–100)
PO2 BLDA: 75 MMHG (ref 80–100)
PO2 BLDA: 83 MMHG (ref 80–100)
POC BE: 1 MMOL/L
POC BE: 2 MMOL/L
POC BE: 3 MMOL/L
POC IONIZED CALCIUM: 1.14 MMOL/L (ref 1.06–1.42)
POC IONIZED CALCIUM: 1.17 MMOL/L (ref 1.06–1.42)
POC IONIZED CALCIUM: 1.18 MMOL/L (ref 1.06–1.42)
POC SATURATED O2: 100 % (ref 95–100)
POC SATURATED O2: 95 % (ref 95–100)
POC SATURATED O2: 95 % (ref 95–100)
POC TCO2: 29 MMOL/L (ref 23–27)
POC TCO2: 30 MMOL/L (ref 23–27)
POC TCO2: 31 MMOL/L (ref 23–27)
POTASSIUM BLD-SCNC: 3.1 MMOL/L (ref 3.5–5.1)
POTASSIUM BLD-SCNC: 3.2 MMOL/L (ref 3.5–5.1)
POTASSIUM BLD-SCNC: 3.3 MMOL/L (ref 3.5–5.1)
SAMPLE: ABNORMAL
SODIUM BLD-SCNC: 137 MMOL/L (ref 136–145)
SODIUM BLD-SCNC: 138 MMOL/L (ref 136–145)
SODIUM BLD-SCNC: 138 MMOL/L (ref 136–145)

## 2021-01-04 PROCEDURE — 94761 N-INVAS EAR/PLS OXIMETRY MLT: CPT

## 2021-01-04 PROCEDURE — 32663 PR THORACOSCOPY SURG LOBECTOMY: ICD-10-PCS | Mod: RT,,, | Performed by: THORACIC SURGERY (CARDIOTHORACIC VASCULAR SURGERY)

## 2021-01-04 PROCEDURE — 88342 CHG IMMUNOCYTOCHEMISTRY: ICD-10-PCS | Mod: 26,,, | Performed by: PATHOLOGY

## 2021-01-04 PROCEDURE — 88341 PR IHC OR ICC EACH ADD'L SINGLE ANTIBODY  STAINPR: ICD-10-PCS | Mod: 26,,, | Performed by: PATHOLOGY

## 2021-01-04 PROCEDURE — 71000015 HC POSTOP RECOV 1ST HR: Performed by: THORACIC SURGERY (CARDIOTHORACIC VASCULAR SURGERY)

## 2021-01-04 PROCEDURE — 25000242 PHARM REV CODE 250 ALT 637 W/ HCPCS: Performed by: PHYSICIAN ASSISTANT

## 2021-01-04 PROCEDURE — 32674 PR THORACOSCOPY LYMPH NODE EXC: ICD-10-PCS | Mod: ,,, | Performed by: THORACIC SURGERY (CARDIOTHORACIC VASCULAR SURGERY)

## 2021-01-04 PROCEDURE — 88309 PR  SURG PATH,LEVEL VI: ICD-10-PCS | Mod: 26,,, | Performed by: PATHOLOGY

## 2021-01-04 PROCEDURE — 25000003 PHARM REV CODE 250: Performed by: STUDENT IN AN ORGANIZED HEALTH CARE EDUCATION/TRAINING PROGRAM

## 2021-01-04 PROCEDURE — 32674 PR THORACOSCOPY LYMPH NODE EXC: ICD-10-PCS | Mod: AS,,, | Performed by: PHYSICIAN ASSISTANT

## 2021-01-04 PROCEDURE — 27201423 OPTIME MED/SURG SUP & DEVICES STERILE SUPPLY: Performed by: THORACIC SURGERY (CARDIOTHORACIC VASCULAR SURGERY)

## 2021-01-04 PROCEDURE — 88341 IMHCHEM/IMCYTCHM EA ADD ANTB: CPT | Mod: 59 | Performed by: PATHOLOGY

## 2021-01-04 PROCEDURE — 36620 ARTERIAL: ICD-10-PCS | Mod: 59,,, | Performed by: ANESTHESIOLOGY

## 2021-01-04 PROCEDURE — C9290 INJ, BUPIVACAINE LIPOSOME: HCPCS | Performed by: THORACIC SURGERY (CARDIOTHORACIC VASCULAR SURGERY)

## 2021-01-04 PROCEDURE — 63600175 PHARM REV CODE 636 W HCPCS: Performed by: ANESTHESIOLOGY

## 2021-01-04 PROCEDURE — 88309 TISSUE EXAM BY PATHOLOGIST: CPT | Mod: 26,,, | Performed by: PATHOLOGY

## 2021-01-04 PROCEDURE — 63600175 PHARM REV CODE 636 W HCPCS: Performed by: STUDENT IN AN ORGANIZED HEALTH CARE EDUCATION/TRAINING PROGRAM

## 2021-01-04 PROCEDURE — D9220A PRA ANESTHESIA: ICD-10-PCS | Mod: ,,, | Performed by: ANESTHESIOLOGY

## 2021-01-04 PROCEDURE — 94640 AIRWAY INHALATION TREATMENT: CPT

## 2021-01-04 PROCEDURE — 36620 INSERTION CATHETER ARTERY: CPT | Mod: 59,,, | Performed by: ANESTHESIOLOGY

## 2021-01-04 PROCEDURE — 32674 THORACOSCOPY LYMPH NODE EXC: CPT | Mod: AS,,, | Performed by: PHYSICIAN ASSISTANT

## 2021-01-04 PROCEDURE — 63600175 PHARM REV CODE 636 W HCPCS: Performed by: PHYSICIAN ASSISTANT

## 2021-01-04 PROCEDURE — D9220A PRA ANESTHESIA: Mod: ,,, | Performed by: ANESTHESIOLOGY

## 2021-01-04 PROCEDURE — 71000033 HC RECOVERY, INTIAL HOUR: Performed by: THORACIC SURGERY (CARDIOTHORACIC VASCULAR SURGERY)

## 2021-01-04 PROCEDURE — 63600175 PHARM REV CODE 636 W HCPCS: Performed by: THORACIC SURGERY (CARDIOTHORACIC VASCULAR SURGERY)

## 2021-01-04 PROCEDURE — 36000712 HC OR TIME LEV V 1ST 15 MIN: Performed by: THORACIC SURGERY (CARDIOTHORACIC VASCULAR SURGERY)

## 2021-01-04 PROCEDURE — 88341 IMHCHEM/IMCYTCHM EA ADD ANTB: CPT | Mod: 26,,, | Performed by: PATHOLOGY

## 2021-01-04 PROCEDURE — 71000016 HC POSTOP RECOV ADDL HR: Performed by: THORACIC SURGERY (CARDIOTHORACIC VASCULAR SURGERY)

## 2021-01-04 PROCEDURE — 27000221 HC OXYGEN, UP TO 24 HOURS

## 2021-01-04 PROCEDURE — 88307 TISSUE EXAM BY PATHOLOGIST: CPT | Performed by: PATHOLOGY

## 2021-01-04 PROCEDURE — 36000713 HC OR TIME LEV V EA ADD 15 MIN: Performed by: THORACIC SURGERY (CARDIOTHORACIC VASCULAR SURGERY)

## 2021-01-04 PROCEDURE — 32674 THORACOSCOPY LYMPH NODE EXC: CPT | Mod: ,,, | Performed by: THORACIC SURGERY (CARDIOTHORACIC VASCULAR SURGERY)

## 2021-01-04 PROCEDURE — 27201037 HC PRESSURE MONITORING SET UP

## 2021-01-04 PROCEDURE — 32663 THORACOSCOPY W/LOBECTOMY: CPT | Mod: RT,,, | Performed by: THORACIC SURGERY (CARDIOTHORACIC VASCULAR SURGERY)

## 2021-01-04 PROCEDURE — 88342 IMHCHEM/IMCYTCHM 1ST ANTB: CPT | Performed by: PATHOLOGY

## 2021-01-04 PROCEDURE — 37000008 HC ANESTHESIA 1ST 15 MINUTES: Performed by: THORACIC SURGERY (CARDIOTHORACIC VASCULAR SURGERY)

## 2021-01-04 PROCEDURE — 88305 TISSUE EXAM BY PATHOLOGIST: CPT | Mod: 26,,, | Performed by: PATHOLOGY

## 2021-01-04 PROCEDURE — 88305 TISSUE EXAM BY PATHOLOGIST: ICD-10-PCS | Mod: 26,,, | Performed by: PATHOLOGY

## 2021-01-04 PROCEDURE — 86900 BLOOD TYPING SEROLOGIC ABO: CPT

## 2021-01-04 PROCEDURE — 37000009 HC ANESTHESIA EA ADD 15 MINS: Performed by: THORACIC SURGERY (CARDIOTHORACIC VASCULAR SURGERY)

## 2021-01-04 PROCEDURE — 32663 THORACOSCOPY W/LOBECTOMY: CPT | Mod: 82,AS,RT, | Performed by: PHYSICIAN ASSISTANT

## 2021-01-04 PROCEDURE — 94799 UNLISTED PULMONARY SVC/PX: CPT

## 2021-01-04 PROCEDURE — 25000003 PHARM REV CODE 250: Performed by: THORACIC SURGERY (CARDIOTHORACIC VASCULAR SURGERY)

## 2021-01-04 PROCEDURE — 99900035 HC TECH TIME PER 15 MIN (STAT)

## 2021-01-04 PROCEDURE — 32663 PR THORACOSCOPY SURG LOBECTOMY: ICD-10-PCS | Mod: 82,AS,RT, | Performed by: PHYSICIAN ASSISTANT

## 2021-01-04 PROCEDURE — 88342 IMHCHEM/IMCYTCHM 1ST ANTB: CPT | Mod: 26,,, | Performed by: PATHOLOGY

## 2021-01-04 PROCEDURE — 20600001 HC STEP DOWN PRIVATE ROOM

## 2021-01-04 RX ORDER — DEXAMETHASONE SODIUM PHOSPHATE 4 MG/ML
INJECTION, SOLUTION INTRA-ARTICULAR; INTRALESIONAL; INTRAMUSCULAR; INTRAVENOUS; SOFT TISSUE
Status: DISCONTINUED | OUTPATIENT
Start: 2021-01-04 | End: 2021-01-04

## 2021-01-04 RX ORDER — OXYCODONE HYDROCHLORIDE 10 MG/1
10 TABLET ORAL EVERY 4 HOURS PRN
Status: DISCONTINUED | OUTPATIENT
Start: 2021-01-04 | End: 2021-01-05 | Stop reason: HOSPADM

## 2021-01-04 RX ORDER — ENOXAPARIN SODIUM 100 MG/ML
40 INJECTION SUBCUTANEOUS EVERY 24 HOURS
Status: DISCONTINUED | OUTPATIENT
Start: 2021-01-04 | End: 2021-01-05 | Stop reason: HOSPADM

## 2021-01-04 RX ORDER — LIDOCAINE HYDROCHLORIDE 10 MG/ML
1 INJECTION, SOLUTION EPIDURAL; INFILTRATION; INTRACAUDAL; PERINEURAL ONCE
Status: DISCONTINUED | OUTPATIENT
Start: 2021-01-04 | End: 2021-01-04

## 2021-01-04 RX ORDER — FAMOTIDINE 10 MG/ML
INJECTION INTRAVENOUS
Status: DISCONTINUED | OUTPATIENT
Start: 2021-01-04 | End: 2021-01-04

## 2021-01-04 RX ORDER — FAMOTIDINE 10 MG/ML
20 INJECTION INTRAVENOUS EVERY 12 HOURS
Status: DISCONTINUED | OUTPATIENT
Start: 2021-01-04 | End: 2021-01-05 | Stop reason: HOSPADM

## 2021-01-04 RX ORDER — LIDOCAINE HCL/PF 100 MG/5ML
SYRINGE (ML) INTRAVENOUS
Status: DISCONTINUED | OUTPATIENT
Start: 2021-01-04 | End: 2021-01-04

## 2021-01-04 RX ORDER — MUPIROCIN 20 MG/G
1 OINTMENT TOPICAL 2 TIMES DAILY
Status: DISCONTINUED | OUTPATIENT
Start: 2021-01-04 | End: 2021-01-05 | Stop reason: HOSPADM

## 2021-01-04 RX ORDER — AMOXICILLIN 250 MG
1 CAPSULE ORAL 2 TIMES DAILY
Status: DISCONTINUED | OUTPATIENT
Start: 2021-01-04 | End: 2021-01-05 | Stop reason: HOSPADM

## 2021-01-04 RX ORDER — ONDANSETRON 8 MG/1
8 TABLET, ORALLY DISINTEGRATING ORAL EVERY 8 HOURS PRN
Status: DISCONTINUED | OUTPATIENT
Start: 2021-01-04 | End: 2021-01-05 | Stop reason: HOSPADM

## 2021-01-04 RX ORDER — CEFAZOLIN SODIUM 1 G/3ML
2 INJECTION, POWDER, FOR SOLUTION INTRAMUSCULAR; INTRAVENOUS
Status: DISCONTINUED | OUTPATIENT
Start: 2021-01-04 | End: 2021-01-05 | Stop reason: HOSPADM

## 2021-01-04 RX ORDER — PHENYLEPHRINE HCL IN 0.9% NACL 1 MG/10 ML
SYRINGE (ML) INTRAVENOUS
Status: DISCONTINUED | OUTPATIENT
Start: 2021-01-04 | End: 2021-01-04

## 2021-01-04 RX ORDER — ROCURONIUM BROMIDE 10 MG/ML
INJECTION, SOLUTION INTRAVENOUS
Status: DISCONTINUED | OUTPATIENT
Start: 2021-01-04 | End: 2021-01-04

## 2021-01-04 RX ORDER — ONDANSETRON 2 MG/ML
4 INJECTION INTRAMUSCULAR; INTRAVENOUS DAILY PRN
Status: DISCONTINUED | OUTPATIENT
Start: 2021-01-04 | End: 2021-01-04 | Stop reason: HOSPADM

## 2021-01-04 RX ORDER — OCTREOTIDE ACETATE 100 UG/ML
500 INJECTION, SOLUTION INTRAVENOUS; SUBCUTANEOUS
Status: DISCONTINUED | OUTPATIENT
Start: 2021-01-04 | End: 2021-01-04

## 2021-01-04 RX ORDER — IPRATROPIUM BROMIDE AND ALBUTEROL SULFATE 2.5; .5 MG/3ML; MG/3ML
3 SOLUTION RESPIRATORY (INHALATION)
Status: DISCONTINUED | OUTPATIENT
Start: 2021-01-04 | End: 2021-01-05 | Stop reason: HOSPADM

## 2021-01-04 RX ORDER — FENTANYL CITRATE 50 UG/ML
INJECTION, SOLUTION INTRAMUSCULAR; INTRAVENOUS
Status: DISCONTINUED | OUTPATIENT
Start: 2021-01-04 | End: 2021-01-04

## 2021-01-04 RX ORDER — BUPIVACAINE HYDROCHLORIDE 5 MG/ML
INJECTION, SOLUTION EPIDURAL; INTRACAUDAL
Status: DISCONTINUED | OUTPATIENT
Start: 2021-01-04 | End: 2021-01-04 | Stop reason: HOSPADM

## 2021-01-04 RX ORDER — PROPOFOL 10 MG/ML
VIAL (ML) INTRAVENOUS
Status: DISCONTINUED | OUTPATIENT
Start: 2021-01-04 | End: 2021-01-04

## 2021-01-04 RX ORDER — OXYCODONE HYDROCHLORIDE 5 MG/1
5 TABLET ORAL EVERY 4 HOURS PRN
Status: DISCONTINUED | OUTPATIENT
Start: 2021-01-04 | End: 2021-01-05 | Stop reason: HOSPADM

## 2021-01-04 RX ORDER — LEVETIRACETAM 500 MG/1
500 TABLET ORAL DAILY
Status: DISCONTINUED | OUTPATIENT
Start: 2021-01-04 | End: 2021-01-05 | Stop reason: HOSPADM

## 2021-01-04 RX ORDER — ONDANSETRON 2 MG/ML
INJECTION INTRAMUSCULAR; INTRAVENOUS
Status: DISCONTINUED | OUTPATIENT
Start: 2021-01-04 | End: 2021-01-04

## 2021-01-04 RX ORDER — METOCLOPRAMIDE HYDROCHLORIDE 5 MG/ML
5 INJECTION INTRAMUSCULAR; INTRAVENOUS EVERY 6 HOURS PRN
Status: DISCONTINUED | OUTPATIENT
Start: 2021-01-04 | End: 2021-01-05 | Stop reason: HOSPADM

## 2021-01-04 RX ORDER — FLUTICASONE PROPIONATE 50 MCG
2 SPRAY, SUSPENSION (ML) NASAL DAILY
Status: DISCONTINUED | OUTPATIENT
Start: 2021-01-04 | End: 2021-01-05 | Stop reason: HOSPADM

## 2021-01-04 RX ORDER — NEOSTIGMINE METHYLSULFATE 1 MG/ML
INJECTION, SOLUTION INTRAVENOUS
Status: DISCONTINUED | OUTPATIENT
Start: 2021-01-04 | End: 2021-01-04

## 2021-01-04 RX ORDER — ROSUVASTATIN CALCIUM 20 MG/1
20 TABLET, COATED ORAL DAILY
Status: DISCONTINUED | OUTPATIENT
Start: 2021-01-05 | End: 2021-01-05 | Stop reason: HOSPADM

## 2021-01-04 RX ORDER — FENTANYL CITRATE 50 UG/ML
25 INJECTION, SOLUTION INTRAMUSCULAR; INTRAVENOUS EVERY 5 MIN PRN
Status: DISCONTINUED | OUTPATIENT
Start: 2021-01-04 | End: 2021-01-04 | Stop reason: HOSPADM

## 2021-01-04 RX ORDER — VASOPRESSIN 20 [USP'U]/ML
INJECTION, SOLUTION INTRAMUSCULAR; SUBCUTANEOUS
Status: DISCONTINUED | OUTPATIENT
Start: 2021-01-04 | End: 2021-01-04

## 2021-01-04 RX ORDER — ACETAMINOPHEN 10 MG/ML
INJECTION, SOLUTION INTRAVENOUS
Status: DISCONTINUED | OUTPATIENT
Start: 2021-01-04 | End: 2021-01-04

## 2021-01-04 RX ORDER — LORAZEPAM 1 MG/1
1 TABLET ORAL EVERY 8 HOURS PRN
Status: DISCONTINUED | OUTPATIENT
Start: 2021-01-04 | End: 2021-01-05 | Stop reason: HOSPADM

## 2021-01-04 RX ORDER — ACETAMINOPHEN 500 MG
1000 TABLET ORAL EVERY 8 HOURS
Status: DISCONTINUED | OUTPATIENT
Start: 2021-01-04 | End: 2021-01-05 | Stop reason: HOSPADM

## 2021-01-04 RX ORDER — MIDAZOLAM HYDROCHLORIDE 1 MG/ML
INJECTION INTRAMUSCULAR; INTRAVENOUS
Status: DISCONTINUED | OUTPATIENT
Start: 2021-01-04 | End: 2021-01-04

## 2021-01-04 RX ORDER — CEFAZOLIN SODIUM 1 G/3ML
2 INJECTION, POWDER, FOR SOLUTION INTRAMUSCULAR; INTRAVENOUS
Status: COMPLETED | OUTPATIENT
Start: 2021-01-04 | End: 2021-01-04

## 2021-01-04 RX ORDER — POLYETHYLENE GLYCOL 3350 17 G/17G
17 POWDER, FOR SOLUTION ORAL DAILY
Status: DISCONTINUED | OUTPATIENT
Start: 2021-01-04 | End: 2021-01-05 | Stop reason: HOSPADM

## 2021-01-04 RX ORDER — ATORVASTATIN CALCIUM 20 MG/1
20 TABLET, FILM COATED ORAL DAILY
Status: DISCONTINUED | OUTPATIENT
Start: 2021-01-04 | End: 2021-01-04

## 2021-01-04 RX ORDER — DIPHENHYDRAMINE HYDROCHLORIDE 50 MG/ML
INJECTION INTRAMUSCULAR; INTRAVENOUS
Status: DISCONTINUED | OUTPATIENT
Start: 2021-01-04 | End: 2021-01-04

## 2021-01-04 RX ADMIN — ACETAMINOPHEN 1000 MG: 500 TABLET ORAL at 09:01

## 2021-01-04 RX ADMIN — VASOPRESSIN 1 UNITS: 20 INJECTION INTRAVENOUS at 10:01

## 2021-01-04 RX ADMIN — ROCURONIUM BROMIDE 50 MG: 10 INJECTION, SOLUTION INTRAVENOUS at 07:01

## 2021-01-04 RX ADMIN — VASOPRESSIN 2 UNITS: 20 INJECTION INTRAVENOUS at 10:01

## 2021-01-04 RX ADMIN — SODIUM CHLORIDE: 0.9 INJECTION, SOLUTION INTRAVENOUS at 06:01

## 2021-01-04 RX ADMIN — FENTANYL CITRATE 100 MCG: 50 INJECTION INTRAMUSCULAR; INTRAVENOUS at 08:01

## 2021-01-04 RX ADMIN — NEOSTIGMINE METHYLSULFATE 5 MG: 1 INJECTION INTRAVENOUS at 10:01

## 2021-01-04 RX ADMIN — PROPOFOL 120 MG: 10 INJECTION, EMULSION INTRAVENOUS at 07:01

## 2021-01-04 RX ADMIN — ACETAMINOPHEN 1000 MG: 10 INJECTION, SOLUTION INTRAVENOUS at 10:01

## 2021-01-04 RX ADMIN — IPRATROPIUM BROMIDE AND ALBUTEROL SULFATE 3 ML: .5; 2.5 SOLUTION RESPIRATORY (INHALATION) at 03:01

## 2021-01-04 RX ADMIN — DIPHENHYDRAMINE HYDROCHLORIDE 50 MG: 50 INJECTION, SOLUTION INTRAMUSCULAR; INTRAVENOUS at 06:01

## 2021-01-04 RX ADMIN — ACETAMINOPHEN 1000 MG: 500 TABLET ORAL at 05:01

## 2021-01-04 RX ADMIN — Medication 100 MCG: at 10:01

## 2021-01-04 RX ADMIN — ROCURONIUM BROMIDE 50 MG: 10 INJECTION, SOLUTION INTRAVENOUS at 08:01

## 2021-01-04 RX ADMIN — SODIUM CHLORIDE 3 UNITS/HR: 9 INJECTION, SOLUTION INTRAVENOUS at 09:01

## 2021-01-04 RX ADMIN — FENTANYL CITRATE 25 MCG: 50 INJECTION INTRAMUSCULAR; INTRAVENOUS at 01:01

## 2021-01-04 RX ADMIN — DEXAMETHASONE SODIUM PHOSPHATE 8 MG: 4 INJECTION INTRA-ARTICULAR; INTRALESIONAL; INTRAMUSCULAR; INTRAVENOUS; SOFT TISSUE at 07:01

## 2021-01-04 RX ADMIN — DOCUSATE SODIUM AND SENNOSIDES 1 TABLET: 8.6; 5 TABLET, FILM COATED ORAL at 09:01

## 2021-01-04 RX ADMIN — MUPIROCIN 1 G: 20 OINTMENT TOPICAL at 09:01

## 2021-01-04 RX ADMIN — FAMOTIDINE 20 MG: 10 INJECTION INTRAVENOUS at 06:01

## 2021-01-04 RX ADMIN — ENOXAPARIN SODIUM 40 MG: 40 INJECTION SUBCUTANEOUS at 05:01

## 2021-01-04 RX ADMIN — CEFAZOLIN 2 G: 1 INJECTION, POWDER, FOR SOLUTION INTRAMUSCULAR; INTRAVENOUS at 08:01

## 2021-01-04 RX ADMIN — ROCURONIUM BROMIDE 30 MG: 10 INJECTION, SOLUTION INTRAVENOUS at 10:01

## 2021-01-04 RX ADMIN — ONDANSETRON 8 MG: 2 INJECTION INTRAMUSCULAR; INTRAVENOUS at 06:01

## 2021-01-04 RX ADMIN — CEFAZOLIN 2 G: 1 INJECTION, POWDER, FOR SOLUTION INTRAMUSCULAR; INTRAVENOUS at 05:01

## 2021-01-04 RX ADMIN — FENTANYL CITRATE 100 MCG: 50 INJECTION INTRAMUSCULAR; INTRAVENOUS at 07:01

## 2021-01-04 RX ADMIN — LEVETIRACETAM 500 MG: 500 TABLET, FILM COATED ORAL at 09:01

## 2021-01-04 RX ADMIN — OCTREOTIDE ACETATE 500 MCG: 100 INJECTION, SOLUTION INTRAVENOUS; SUBCUTANEOUS at 06:01

## 2021-01-04 RX ADMIN — MIDAZOLAM 2 MG: 1 INJECTION INTRAMUSCULAR; INTRAVENOUS at 07:01

## 2021-01-04 RX ADMIN — FAMOTIDINE 20 MG: 10 INJECTION INTRAVENOUS at 09:01

## 2021-01-04 RX ADMIN — OCTREOTIDE ACETATE 100 MCG/HR: 500 INJECTION, SOLUTION INTRAVENOUS; SUBCUTANEOUS at 07:01

## 2021-01-04 RX ADMIN — VASOPRESSIN 1 UNITS: 20 INJECTION INTRAVENOUS at 08:01

## 2021-01-04 RX ADMIN — IPRATROPIUM BROMIDE AND ALBUTEROL SULFATE 3 ML: .5; 2.5 SOLUTION RESPIRATORY (INHALATION) at 08:01

## 2021-01-04 RX ADMIN — LIDOCAINE HYDROCHLORIDE 100 MG: 20 INJECTION, SOLUTION INTRAVENOUS at 07:01

## 2021-01-04 RX ADMIN — OXYCODONE HYDROCHLORIDE 10 MG: 10 TABLET ORAL at 01:01

## 2021-01-05 VITALS
RESPIRATION RATE: 19 BRPM | TEMPERATURE: 99 F | SYSTOLIC BLOOD PRESSURE: 113 MMHG | WEIGHT: 192 LBS | DIASTOLIC BLOOD PRESSURE: 66 MMHG | HEIGHT: 65 IN | HEART RATE: 68 BPM | OXYGEN SATURATION: 99 % | BODY MASS INDEX: 31.99 KG/M2

## 2021-01-05 DIAGNOSIS — C7A.090 CARCINOID BRONCHIAL ADENOMA OF RIGHT LUNG: Primary | ICD-10-CM

## 2021-01-05 PROCEDURE — 94799 UNLISTED PULMONARY SVC/PX: CPT

## 2021-01-05 PROCEDURE — 94640 AIRWAY INHALATION TREATMENT: CPT

## 2021-01-05 PROCEDURE — 25000242 PHARM REV CODE 250 ALT 637 W/ HCPCS: Performed by: STUDENT IN AN ORGANIZED HEALTH CARE EDUCATION/TRAINING PROGRAM

## 2021-01-05 PROCEDURE — 94761 N-INVAS EAR/PLS OXIMETRY MLT: CPT

## 2021-01-05 PROCEDURE — 25000003 PHARM REV CODE 250: Performed by: STUDENT IN AN ORGANIZED HEALTH CARE EDUCATION/TRAINING PROGRAM

## 2021-01-05 PROCEDURE — 99900035 HC TECH TIME PER 15 MIN (STAT)

## 2021-01-05 PROCEDURE — 63600175 PHARM REV CODE 636 W HCPCS: Performed by: STUDENT IN AN ORGANIZED HEALTH CARE EDUCATION/TRAINING PROGRAM

## 2021-01-05 PROCEDURE — 25000242 PHARM REV CODE 250 ALT 637 W/ HCPCS: Performed by: PHYSICIAN ASSISTANT

## 2021-01-05 PROCEDURE — 25000003 PHARM REV CODE 250: Performed by: PHYSICIAN ASSISTANT

## 2021-01-05 RX ORDER — DIPHENHYDRAMINE HCL 25 MG
25 CAPSULE ORAL ONCE
Status: COMPLETED | OUTPATIENT
Start: 2021-01-05 | End: 2021-01-05

## 2021-01-05 RX ORDER — OXYCODONE HYDROCHLORIDE 5 MG/1
5 TABLET ORAL EVERY 4 HOURS PRN
Qty: 41 TABLET | Refills: 0 | Status: SHIPPED | OUTPATIENT
Start: 2021-01-05 | End: 2021-07-01

## 2021-01-05 RX ORDER — CETIRIZINE HYDROCHLORIDE 5 MG/1
5 TABLET ORAL DAILY
Status: DISCONTINUED | OUTPATIENT
Start: 2021-01-05 | End: 2021-01-05 | Stop reason: HOSPADM

## 2021-01-05 RX ORDER — DIPHENHYDRAMINE HCL 25 MG
25 CAPSULE ORAL EVERY 6 HOURS PRN
Status: DISCONTINUED | OUTPATIENT
Start: 2021-01-05 | End: 2021-01-05

## 2021-01-05 RX ORDER — DIPHENHYDRAMINE HYDROCHLORIDE 50 MG/ML
12.5 INJECTION INTRAMUSCULAR; INTRAVENOUS ONCE
Status: DISCONTINUED | OUTPATIENT
Start: 2021-01-05 | End: 2021-01-05

## 2021-01-05 RX ADMIN — ROSUVASTATIN CALCIUM 20 MG: 20 TABLET, FILM COATED ORAL at 09:01

## 2021-01-05 RX ADMIN — CEFAZOLIN 2 G: 1 INJECTION, POWDER, FOR SOLUTION INTRAMUSCULAR; INTRAVENOUS at 12:01

## 2021-01-05 RX ADMIN — OXYCODONE HYDROCHLORIDE 10 MG: 10 TABLET ORAL at 12:01

## 2021-01-05 RX ADMIN — DOCUSATE SODIUM AND SENNOSIDES 1 TABLET: 8.6; 5 TABLET, FILM COATED ORAL at 09:01

## 2021-01-05 RX ADMIN — MUPIROCIN 1 G: 20 OINTMENT TOPICAL at 09:01

## 2021-01-05 RX ADMIN — CETIRIZINE HYDROCHLORIDE 5 MG: 5 TABLET ORAL at 01:01

## 2021-01-05 RX ADMIN — POLYETHYLENE GLYCOL 3350 17 G: 17 POWDER, FOR SOLUTION ORAL at 09:01

## 2021-01-05 RX ADMIN — FAMOTIDINE 20 MG: 10 INJECTION INTRAVENOUS at 09:01

## 2021-01-05 RX ADMIN — IPRATROPIUM BROMIDE AND ALBUTEROL SULFATE 3 ML: .5; 2.5 SOLUTION RESPIRATORY (INHALATION) at 12:01

## 2021-01-05 RX ADMIN — OXYCODONE 5 MG: 5 TABLET ORAL at 04:01

## 2021-01-05 RX ADMIN — ACETAMINOPHEN 1000 MG: 500 TABLET ORAL at 01:01

## 2021-01-05 RX ADMIN — IPRATROPIUM BROMIDE AND ALBUTEROL SULFATE 3 ML: .5; 2.5 SOLUTION RESPIRATORY (INHALATION) at 08:01

## 2021-01-05 RX ADMIN — ACETAMINOPHEN 1000 MG: 500 TABLET ORAL at 05:01

## 2021-01-05 RX ADMIN — LEVETIRACETAM 500 MG: 500 TABLET, FILM COATED ORAL at 09:01

## 2021-01-05 RX ADMIN — DIPHENHYDRAMINE HYDROCHLORIDE 25 MG: 25 CAPSULE ORAL at 01:01

## 2021-01-05 RX ADMIN — FLUTICASONE PROPIONATE 100 MCG: 50 SPRAY, METERED NASAL at 09:01

## 2021-01-05 RX ADMIN — CEFAZOLIN 2 G: 1 INJECTION, POWDER, FOR SOLUTION INTRAMUSCULAR; INTRAVENOUS at 07:01

## 2021-01-05 RX ADMIN — OXYCODONE 5 MG: 5 TABLET ORAL at 07:01

## 2021-01-14 ENCOUNTER — NURSE TRIAGE (OUTPATIENT)
Dept: ADMINISTRATIVE | Facility: CLINIC | Age: 71
End: 2021-01-14

## 2021-01-15 ENCOUNTER — OFFICE VISIT (OUTPATIENT)
Dept: CARDIOTHORACIC SURGERY | Facility: CLINIC | Age: 71
End: 2021-01-15
Payer: MEDICARE

## 2021-01-15 ENCOUNTER — PATIENT MESSAGE (OUTPATIENT)
Dept: OTOLARYNGOLOGY | Facility: CLINIC | Age: 71
End: 2021-01-15

## 2021-01-15 ENCOUNTER — HOSPITAL ENCOUNTER (OUTPATIENT)
Dept: RADIOLOGY | Facility: HOSPITAL | Age: 71
Discharge: HOME | End: 2021-01-15
Attending: PHYSICIAN ASSISTANT
Payer: MEDICARE

## 2021-01-15 ENCOUNTER — TELEPHONE (OUTPATIENT)
Dept: OTOLARYNGOLOGY | Facility: CLINIC | Age: 71
End: 2021-01-15

## 2021-01-15 VITALS
HEART RATE: 77 BPM | WEIGHT: 189.63 LBS | BODY MASS INDEX: 31.59 KG/M2 | DIASTOLIC BLOOD PRESSURE: 83 MMHG | HEIGHT: 65 IN | OXYGEN SATURATION: 93 % | SYSTOLIC BLOOD PRESSURE: 137 MMHG

## 2021-01-15 DIAGNOSIS — C7A.090 CARCINOID BRONCHIAL ADENOMA OF RIGHT LUNG: Primary | ICD-10-CM

## 2021-01-15 DIAGNOSIS — C7A.090 CARCINOID BRONCHIAL ADENOMA OF RIGHT LUNG: ICD-10-CM

## 2021-01-15 PROCEDURE — 71046 XR CHEST PA AND LATERAL: ICD-10-PCS | Mod: 26,,, | Performed by: RADIOLOGY

## 2021-01-15 PROCEDURE — 99999 PR PBB SHADOW E&M-EST. PATIENT-LVL V: CPT | Mod: PBBFAC,,, | Performed by: THORACIC SURGERY (CARDIOTHORACIC VASCULAR SURGERY)

## 2021-01-15 PROCEDURE — 99024 PR POST-OP FOLLOW-UP VISIT: ICD-10-PCS | Mod: POP,,, | Performed by: THORACIC SURGERY (CARDIOTHORACIC VASCULAR SURGERY)

## 2021-01-15 PROCEDURE — 71046 X-RAY EXAM CHEST 2 VIEWS: CPT | Mod: 26,,, | Performed by: RADIOLOGY

## 2021-01-15 PROCEDURE — 99024 POSTOP FOLLOW-UP VISIT: CPT | Mod: POP,,, | Performed by: THORACIC SURGERY (CARDIOTHORACIC VASCULAR SURGERY)

## 2021-01-15 PROCEDURE — 99999 PR PBB SHADOW E&M-EST. PATIENT-LVL V: ICD-10-PCS | Mod: PBBFAC,,, | Performed by: THORACIC SURGERY (CARDIOTHORACIC VASCULAR SURGERY)

## 2021-01-15 PROCEDURE — 71046 X-RAY EXAM CHEST 2 VIEWS: CPT | Mod: TC

## 2021-01-15 PROCEDURE — 99215 OFFICE O/P EST HI 40 MIN: CPT | Mod: PBBFAC,25 | Performed by: THORACIC SURGERY (CARDIOTHORACIC VASCULAR SURGERY)

## 2021-01-15 RX ORDER — GABAPENTIN 300 MG/1
300 CAPSULE ORAL 3 TIMES DAILY
Qty: 90 CAPSULE | Refills: 11 | Status: ON HOLD | OUTPATIENT
Start: 2021-01-15 | End: 2021-02-08 | Stop reason: HOSPADM

## 2021-01-19 LAB
FINAL PATHOLOGIC DIAGNOSIS: NORMAL
Lab: NORMAL

## 2021-01-27 ENCOUNTER — NURSE TRIAGE (OUTPATIENT)
Dept: ADMINISTRATIVE | Facility: CLINIC | Age: 71
End: 2021-01-27

## 2021-01-27 ENCOUNTER — TELEPHONE (OUTPATIENT)
Dept: CARDIOTHORACIC SURGERY | Facility: HOSPITAL | Age: 71
End: 2021-01-27

## 2021-01-27 ENCOUNTER — HOSPITAL ENCOUNTER (EMERGENCY)
Facility: HOSPITAL | Age: 71
Discharge: HOME | End: 2021-01-27
Attending: EMERGENCY MEDICINE
Payer: MEDICARE

## 2021-01-27 VITALS
HEIGHT: 65 IN | BODY MASS INDEX: 31.49 KG/M2 | RESPIRATION RATE: 20 BRPM | TEMPERATURE: 98 F | SYSTOLIC BLOOD PRESSURE: 167 MMHG | OXYGEN SATURATION: 97 % | HEART RATE: 88 BPM | WEIGHT: 189 LBS | DIASTOLIC BLOOD PRESSURE: 77 MMHG

## 2021-01-27 DIAGNOSIS — M51.9 LUMBAR DISC DISEASE: Primary | ICD-10-CM

## 2021-01-27 LAB
ALBUMIN SERPL BCP-MCNC: 3.9 G/DL (ref 3.5–5.2)
ALP SERPL-CCNC: 50 U/L (ref 55–135)
ALT SERPL W/O P-5'-P-CCNC: 11 U/L (ref 10–44)
ANION GAP SERPL CALC-SCNC: 11 MMOL/L (ref 8–16)
AST SERPL-CCNC: 21 U/L (ref 10–40)
BASOPHILS # BLD AUTO: 0.03 K/UL (ref 0–0.2)
BASOPHILS NFR BLD: 0.5 % (ref 0–1.9)
BILIRUB SERPL-MCNC: 0.4 MG/DL (ref 0.1–1)
BUN SERPL-MCNC: 4 MG/DL (ref 6–30)
BUN SERPL-MCNC: 5 MG/DL (ref 8–23)
CALCIUM SERPL-MCNC: 9.6 MG/DL (ref 8.7–10.5)
CHLORIDE SERPL-SCNC: 101 MMOL/L (ref 95–110)
CHLORIDE SERPL-SCNC: 98 MMOL/L (ref 95–110)
CK SERPL-CCNC: 49 U/L (ref 20–180)
CO2 SERPL-SCNC: 26 MMOL/L (ref 23–29)
CREAT SERPL-MCNC: 0.4 MG/DL (ref 0.5–1.4)
CREAT SERPL-MCNC: 0.6 MG/DL (ref 0.5–1.4)
DIFFERENTIAL METHOD: ABNORMAL
EOSINOPHIL # BLD AUTO: 0 K/UL (ref 0–0.5)
EOSINOPHIL NFR BLD: 0.5 % (ref 0–8)
ERYTHROCYTE [DISTWIDTH] IN BLOOD BY AUTOMATED COUNT: 14.6 % (ref 11.5–14.5)
EST. GFR  (AFRICAN AMERICAN): >60 ML/MIN/1.73 M^2
EST. GFR  (NON AFRICAN AMERICAN): >60 ML/MIN/1.73 M^2
GLUCOSE SERPL-MCNC: 121 MG/DL (ref 70–110)
GLUCOSE SERPL-MCNC: 121 MG/DL (ref 70–110)
HCT VFR BLD AUTO: 38.4 % (ref 37–48.5)
HCT VFR BLD CALC: 41 %PCV (ref 36–54)
HGB BLD-MCNC: 12.3 G/DL (ref 12–16)
IMM GRANULOCYTES # BLD AUTO: 0.01 K/UL (ref 0–0.04)
IMM GRANULOCYTES NFR BLD AUTO: 0.2 % (ref 0–0.5)
LYMPHOCYTES # BLD AUTO: 1.3 K/UL (ref 1–4.8)
LYMPHOCYTES NFR BLD: 20.7 % (ref 18–48)
MAGNESIUM SERPL-MCNC: 1.7 MG/DL (ref 1.6–2.6)
MCH RBC QN AUTO: 26.5 PG (ref 27–31)
MCHC RBC AUTO-ENTMCNC: 32 G/DL (ref 32–36)
MCV RBC AUTO: 83 FL (ref 82–98)
MONOCYTES # BLD AUTO: 0.4 K/UL (ref 0.3–1)
MONOCYTES NFR BLD: 7 % (ref 4–15)
NEUTROPHILS # BLD AUTO: 4.3 K/UL (ref 1.8–7.7)
NEUTROPHILS NFR BLD: 71.1 % (ref 38–73)
NRBC BLD-RTO: 0 /100 WBC
PHOSPHATE SERPL-MCNC: 4.1 MG/DL (ref 2.7–4.5)
PLATELET # BLD AUTO: 503 K/UL (ref 150–350)
PMV BLD AUTO: 10.1 FL (ref 9.2–12.9)
POC IONIZED CALCIUM: 1.21 MMOL/L (ref 1.06–1.42)
POC TCO2 (MEASURED): 31 MMOL/L (ref 23–29)
POTASSIUM BLD-SCNC: 3.6 MMOL/L (ref 3.5–5.1)
POTASSIUM SERPL-SCNC: 3.7 MMOL/L (ref 3.5–5.1)
PROT SERPL-MCNC: 8.2 G/DL (ref 6–8.4)
RBC # BLD AUTO: 4.64 M/UL (ref 4–5.4)
SAMPLE: ABNORMAL
SODIUM BLD-SCNC: 138 MMOL/L (ref 136–145)
SODIUM SERPL-SCNC: 138 MMOL/L (ref 136–145)
WBC # BLD AUTO: 6.04 K/UL (ref 3.9–12.7)

## 2021-01-27 PROCEDURE — 96361 HYDRATE IV INFUSION ADD-ON: CPT

## 2021-01-27 PROCEDURE — A9585 GADOBUTROL INJECTION: HCPCS | Performed by: EMERGENCY MEDICINE

## 2021-01-27 PROCEDURE — 82330 ASSAY OF CALCIUM: CPT

## 2021-01-27 PROCEDURE — 63600175 PHARM REV CODE 636 W HCPCS: Performed by: EMERGENCY MEDICINE

## 2021-01-27 PROCEDURE — 99284 EMERGENCY DEPT VISIT MOD MDM: CPT | Mod: ,,, | Performed by: EMERGENCY MEDICINE

## 2021-01-27 PROCEDURE — 82550 ASSAY OF CK (CPK): CPT

## 2021-01-27 PROCEDURE — 99285 EMERGENCY DEPT VISIT HI MDM: CPT | Mod: 25

## 2021-01-27 PROCEDURE — 85025 COMPLETE CBC W/AUTO DIFF WBC: CPT

## 2021-01-27 PROCEDURE — 80053 COMPREHEN METABOLIC PANEL: CPT

## 2021-01-27 PROCEDURE — 84100 ASSAY OF PHOSPHORUS: CPT

## 2021-01-27 PROCEDURE — 80047 BASIC METABLC PNL IONIZED CA: CPT

## 2021-01-27 PROCEDURE — 83735 ASSAY OF MAGNESIUM: CPT

## 2021-01-27 PROCEDURE — 96360 HYDRATION IV INFUSION INIT: CPT

## 2021-01-27 PROCEDURE — 25500020 PHARM REV CODE 255: Performed by: EMERGENCY MEDICINE

## 2021-01-27 PROCEDURE — 99284 PR EMERGENCY DEPT VISIT,LEVEL IV: ICD-10-PCS | Mod: ,,, | Performed by: EMERGENCY MEDICINE

## 2021-01-27 RX ORDER — GADOBUTROL 604.72 MG/ML
10 INJECTION INTRAVENOUS
Status: COMPLETED | OUTPATIENT
Start: 2021-01-27 | End: 2021-01-27

## 2021-01-27 RX ADMIN — SODIUM CHLORIDE, SODIUM LACTATE, POTASSIUM CHLORIDE, AND CALCIUM CHLORIDE 1000 ML: .6; .31; .03; .02 INJECTION, SOLUTION INTRAVENOUS at 01:01

## 2021-01-27 RX ADMIN — GADOBUTROL 10 ML: 604.72 INJECTION INTRAVENOUS at 05:01

## 2021-01-28 ENCOUNTER — TELEPHONE (OUTPATIENT)
Dept: INTERNAL MEDICINE | Facility: CLINIC | Age: 71
End: 2021-01-28

## 2021-01-28 ENCOUNTER — TELEPHONE (OUTPATIENT)
Dept: NEUROSURGERY | Facility: CLINIC | Age: 71
End: 2021-01-28

## 2021-01-29 ENCOUNTER — TELEPHONE (OUTPATIENT)
Dept: INTERNAL MEDICINE | Facility: CLINIC | Age: 71
End: 2021-01-29

## 2021-01-29 ENCOUNTER — HOSPITAL ENCOUNTER (INPATIENT)
Facility: HOSPITAL | Age: 71
LOS: 9 days | Discharge: NURSING, REHAB OR LONG-TERM CARE | DRG: 095 | End: 2021-02-10
Attending: EMERGENCY MEDICINE | Admitting: HOSPITALIST
Payer: MEDICARE

## 2021-01-29 DIAGNOSIS — D21.4 GIST (GASTROINTESTINAL STROMAL TUMOR), NON-MALIGNANT: ICD-10-CM

## 2021-01-29 DIAGNOSIS — R29.898 WEAKNESS OF BOTH LOWER EXTREMITIES: ICD-10-CM

## 2021-01-29 DIAGNOSIS — G61.0 GBS (GUILLAIN-BARRE SYNDROME): Primary | ICD-10-CM

## 2021-01-29 DIAGNOSIS — C7A.090 MALIGNANT CARCINOID TUMOR OF BRONCHUS AND LUNG: ICD-10-CM

## 2021-01-29 DIAGNOSIS — M48.061 SPINAL STENOSIS OF LUMBAR REGION, UNSPECIFIED WHETHER NEUROGENIC CLAUDICATION PRESENT: ICD-10-CM

## 2021-01-29 DIAGNOSIS — R07.9 CHEST PAIN: ICD-10-CM

## 2021-01-29 DIAGNOSIS — R26.2 UNABLE TO AMBULATE: ICD-10-CM

## 2021-01-29 DIAGNOSIS — R53.1 WEAKNESS: ICD-10-CM

## 2021-01-29 LAB
ALBUMIN SERPL BCP-MCNC: 4 G/DL (ref 3.5–5.2)
ALP SERPL-CCNC: 54 U/L (ref 55–135)
ALT SERPL W/O P-5'-P-CCNC: 11 U/L (ref 10–44)
ANION GAP SERPL CALC-SCNC: 11 MMOL/L (ref 8–16)
AST SERPL-CCNC: 19 U/L (ref 10–40)
BACTERIA #/AREA URNS AUTO: NORMAL /HPF
BASOPHILS # BLD AUTO: 0.05 K/UL (ref 0–0.2)
BASOPHILS NFR BLD: 0.7 % (ref 0–1.9)
BILIRUB SERPL-MCNC: 0.5 MG/DL (ref 0.1–1)
BILIRUB UR QL STRIP: NEGATIVE
BUN SERPL-MCNC: 9 MG/DL (ref 6–30)
BUN SERPL-MCNC: 9 MG/DL (ref 8–23)
CALCIUM SERPL-MCNC: 9.9 MG/DL (ref 8.7–10.5)
CAOX CRY UR QL COMP ASSIST: NORMAL
CHLORIDE SERPL-SCNC: 100 MMOL/L (ref 95–110)
CHLORIDE SERPL-SCNC: 99 MMOL/L (ref 95–110)
CLARITY UR REFRACT.AUTO: CLEAR
CO2 SERPL-SCNC: 30 MMOL/L (ref 23–29)
COLOR UR AUTO: YELLOW
CREAT SERPL-MCNC: 0.5 MG/DL (ref 0.5–1.4)
CREAT SERPL-MCNC: 0.7 MG/DL (ref 0.5–1.4)
CTP QC/QA: YES
DIFFERENTIAL METHOD: ABNORMAL
EOSINOPHIL # BLD AUTO: 0 K/UL (ref 0–0.5)
EOSINOPHIL NFR BLD: 0.3 % (ref 0–8)
ERYTHROCYTE [DISTWIDTH] IN BLOOD BY AUTOMATED COUNT: 14.7 % (ref 11.5–14.5)
EST. GFR  (AFRICAN AMERICAN): >60 ML/MIN/1.73 M^2
EST. GFR  (NON AFRICAN AMERICAN): >60 ML/MIN/1.73 M^2
GLUCOSE SERPL-MCNC: 109 MG/DL (ref 70–110)
GLUCOSE SERPL-MCNC: 111 MG/DL (ref 70–110)
GLUCOSE UR QL STRIP: NEGATIVE
HCT VFR BLD AUTO: 43 % (ref 37–48.5)
HCT VFR BLD CALC: 47 %PCV (ref 36–54)
HGB BLD-MCNC: 13.9 G/DL (ref 12–16)
HGB UR QL STRIP: NEGATIVE
HYALINE CASTS UR QL AUTO: 0 /LPF
IMM GRANULOCYTES # BLD AUTO: 0.02 K/UL (ref 0–0.04)
IMM GRANULOCYTES NFR BLD AUTO: 0.3 % (ref 0–0.5)
KETONES UR QL STRIP: ABNORMAL
LEUKOCYTE ESTERASE UR QL STRIP: ABNORMAL
LYMPHOCYTES # BLD AUTO: 1.6 K/UL (ref 1–4.8)
LYMPHOCYTES NFR BLD: 21.5 % (ref 18–48)
MAGNESIUM SERPL-MCNC: 1.8 MG/DL (ref 1.6–2.6)
MCH RBC QN AUTO: 26.9 PG (ref 27–31)
MCHC RBC AUTO-ENTMCNC: 32.3 G/DL (ref 32–36)
MCV RBC AUTO: 83 FL (ref 82–98)
MICROSCOPIC COMMENT: NORMAL
MONOCYTES # BLD AUTO: 0.6 K/UL (ref 0.3–1)
MONOCYTES NFR BLD: 8.4 % (ref 4–15)
NEUTROPHILS # BLD AUTO: 5.2 K/UL (ref 1.8–7.7)
NEUTROPHILS NFR BLD: 68.8 % (ref 38–73)
NITRITE UR QL STRIP: NEGATIVE
NRBC BLD-RTO: 0 /100 WBC
PH UR STRIP: 6 [PH] (ref 5–8)
PLATELET # BLD AUTO: 498 K/UL (ref 150–350)
PMV BLD AUTO: 9.7 FL (ref 9.2–12.9)
POC IONIZED CALCIUM: 1.12 MMOL/L (ref 1.06–1.42)
POC TCO2 (MEASURED): 29 MMOL/L (ref 23–29)
POTASSIUM BLD-SCNC: 3.4 MMOL/L (ref 3.5–5.1)
POTASSIUM SERPL-SCNC: 3.7 MMOL/L (ref 3.5–5.1)
PROT SERPL-MCNC: 8.6 G/DL (ref 6–8.4)
PROT UR QL STRIP: ABNORMAL
RBC # BLD AUTO: 5.17 M/UL (ref 4–5.4)
RBC #/AREA URNS AUTO: 1 /HPF (ref 0–4)
SAMPLE: ABNORMAL
SARS-COV-2 RDRP RESP QL NAA+PROBE: NEGATIVE
SODIUM BLD-SCNC: 139 MMOL/L (ref 136–145)
SODIUM SERPL-SCNC: 141 MMOL/L (ref 136–145)
SP GR UR STRIP: 1.02 (ref 1–1.03)
SQUAMOUS #/AREA URNS AUTO: 1 /HPF
URN SPEC COLLECT METH UR: ABNORMAL
WBC # BLD AUTO: 7.53 K/UL (ref 3.9–12.7)
WBC #/AREA URNS AUTO: 2 /HPF (ref 0–5)

## 2021-01-29 PROCEDURE — 83735 ASSAY OF MAGNESIUM: CPT

## 2021-01-29 PROCEDURE — 99285 EMERGENCY DEPT VISIT HI MDM: CPT | Mod: 25

## 2021-01-29 PROCEDURE — 93010 ELECTROCARDIOGRAM REPORT: CPT | Mod: ,,, | Performed by: INTERNAL MEDICINE

## 2021-01-29 PROCEDURE — 80047 BASIC METABLC PNL IONIZED CA: CPT

## 2021-01-29 PROCEDURE — 80053 COMPREHEN METABOLIC PANEL: CPT

## 2021-01-29 PROCEDURE — 99285 PR EMERGENCY DEPT VISIT,LEVEL V: ICD-10-PCS | Mod: CS,,, | Performed by: EMERGENCY MEDICINE

## 2021-01-29 PROCEDURE — 25000003 PHARM REV CODE 250: Performed by: PHYSICIAN ASSISTANT

## 2021-01-29 PROCEDURE — 81001 URINALYSIS AUTO W/SCOPE: CPT

## 2021-01-29 PROCEDURE — G0378 HOSPITAL OBSERVATION PER HR: HCPCS

## 2021-01-29 PROCEDURE — U0002 COVID-19 LAB TEST NON-CDC: HCPCS | Performed by: EMERGENCY MEDICINE

## 2021-01-29 PROCEDURE — 85025 COMPLETE CBC W/AUTO DIFF WBC: CPT

## 2021-01-29 PROCEDURE — 25500020 PHARM REV CODE 255: Performed by: HOSPITALIST

## 2021-01-29 PROCEDURE — 96374 THER/PROPH/DIAG INJ IV PUSH: CPT

## 2021-01-29 PROCEDURE — 99220 PR INITIAL OBSERVATION CARE,LEVL III: ICD-10-PCS | Mod: ,,, | Performed by: PHYSICIAN ASSISTANT

## 2021-01-29 PROCEDURE — 63600175 PHARM REV CODE 636 W HCPCS: Performed by: EMERGENCY MEDICINE

## 2021-01-29 PROCEDURE — 93010 EKG 12-LEAD: ICD-10-PCS | Mod: ,,, | Performed by: INTERNAL MEDICINE

## 2021-01-29 PROCEDURE — 99285 EMERGENCY DEPT VISIT HI MDM: CPT | Mod: CS,,, | Performed by: EMERGENCY MEDICINE

## 2021-01-29 PROCEDURE — A9585 GADOBUTROL INJECTION: HCPCS | Performed by: HOSPITALIST

## 2021-01-29 PROCEDURE — 99220 PR INITIAL OBSERVATION CARE,LEVL III: CPT | Mod: ,,, | Performed by: PHYSICIAN ASSISTANT

## 2021-01-29 PROCEDURE — 93005 ELECTROCARDIOGRAM TRACING: CPT

## 2021-01-29 RX ORDER — TALC
6 POWDER (GRAM) TOPICAL NIGHTLY PRN
Status: DISCONTINUED | OUTPATIENT
Start: 2021-01-29 | End: 2021-02-10 | Stop reason: HOSPADM

## 2021-01-29 RX ORDER — ASPIRIN 81 MG/1
81 TABLET ORAL DAILY
Status: DISCONTINUED | OUTPATIENT
Start: 2021-01-30 | End: 2021-02-10 | Stop reason: HOSPADM

## 2021-01-29 RX ORDER — LORAZEPAM 1 MG/1
1 TABLET ORAL EVERY 8 HOURS PRN
Status: DISCONTINUED | OUTPATIENT
Start: 2021-01-29 | End: 2021-02-06

## 2021-01-29 RX ORDER — CHOLECALCIFEROL (VITAMIN D3) 25 MCG
1000 TABLET ORAL DAILY
Status: DISCONTINUED | OUTPATIENT
Start: 2021-01-30 | End: 2021-02-10 | Stop reason: HOSPADM

## 2021-01-29 RX ORDER — IBUPROFEN 200 MG
16 TABLET ORAL
Status: DISCONTINUED | OUTPATIENT
Start: 2021-01-29 | End: 2021-02-10 | Stop reason: HOSPADM

## 2021-01-29 RX ORDER — GADOBUTROL 604.72 MG/ML
9 INJECTION INTRAVENOUS
Status: COMPLETED | OUTPATIENT
Start: 2021-01-29 | End: 2021-01-29

## 2021-01-29 RX ORDER — LEVETIRACETAM 500 MG/1
500 TABLET ORAL 2 TIMES DAILY
Status: DISCONTINUED | OUTPATIENT
Start: 2021-01-29 | End: 2021-02-10 | Stop reason: HOSPADM

## 2021-01-29 RX ORDER — ACETAMINOPHEN 325 MG/1
650 TABLET ORAL EVERY 4 HOURS PRN
Status: DISCONTINUED | OUTPATIENT
Start: 2021-01-29 | End: 2021-02-10 | Stop reason: HOSPADM

## 2021-01-29 RX ORDER — IBUPROFEN 200 MG
24 TABLET ORAL
Status: DISCONTINUED | OUTPATIENT
Start: 2021-01-29 | End: 2021-02-10 | Stop reason: HOSPADM

## 2021-01-29 RX ORDER — GABAPENTIN 300 MG/1
300 CAPSULE ORAL 2 TIMES DAILY
Status: DISCONTINUED | OUTPATIENT
Start: 2021-01-29 | End: 2021-02-10 | Stop reason: HOSPADM

## 2021-01-29 RX ORDER — GABAPENTIN 300 MG/1
300 CAPSULE ORAL 3 TIMES DAILY
Status: DISCONTINUED | OUTPATIENT
Start: 2021-01-29 | End: 2021-01-29

## 2021-01-29 RX ORDER — LATANOPROST 50 UG/ML
1 SOLUTION/ DROPS OPHTHALMIC NIGHTLY
Status: DISCONTINUED | OUTPATIENT
Start: 2021-01-29 | End: 2021-02-10 | Stop reason: HOSPADM

## 2021-01-29 RX ORDER — ASCORBIC ACID 500 MG
500 TABLET ORAL DAILY
Status: DISCONTINUED | OUTPATIENT
Start: 2021-01-30 | End: 2021-02-10 | Stop reason: HOSPADM

## 2021-01-29 RX ORDER — AMLODIPINE BESYLATE 10 MG/1
10 TABLET ORAL DAILY
Status: DISCONTINUED | OUTPATIENT
Start: 2021-01-30 | End: 2021-02-10 | Stop reason: HOSPADM

## 2021-01-29 RX ORDER — ONDANSETRON 2 MG/ML
4 INJECTION INTRAMUSCULAR; INTRAVENOUS
Status: COMPLETED | OUTPATIENT
Start: 2021-01-29 | End: 2021-01-29

## 2021-01-29 RX ORDER — ATORVASTATIN CALCIUM 20 MG/1
20 TABLET, FILM COATED ORAL DAILY
Status: DISCONTINUED | OUTPATIENT
Start: 2021-01-30 | End: 2021-02-10 | Stop reason: HOSPADM

## 2021-01-29 RX ORDER — BISACODYL 10 MG
10 SUPPOSITORY, RECTAL RECTAL DAILY PRN
Status: DISCONTINUED | OUTPATIENT
Start: 2021-01-29 | End: 2021-02-09

## 2021-01-29 RX ORDER — ONDANSETRON 8 MG/1
8 TABLET, ORALLY DISINTEGRATING ORAL EVERY 8 HOURS PRN
Status: DISCONTINUED | OUTPATIENT
Start: 2021-01-29 | End: 2021-02-10 | Stop reason: HOSPADM

## 2021-01-29 RX ORDER — GLUCAGON 1 MG
1 KIT INJECTION
Status: DISCONTINUED | OUTPATIENT
Start: 2021-01-29 | End: 2021-02-10 | Stop reason: HOSPADM

## 2021-01-29 RX ORDER — IPRATROPIUM BROMIDE AND ALBUTEROL SULFATE 2.5; .5 MG/3ML; MG/3ML
3 SOLUTION RESPIRATORY (INHALATION) EVERY 4 HOURS PRN
Status: DISCONTINUED | OUTPATIENT
Start: 2021-01-29 | End: 2021-01-30

## 2021-01-29 RX ORDER — FLUTICASONE PROPIONATE 50 MCG
2 SPRAY, SUSPENSION (ML) NASAL DAILY
Status: DISCONTINUED | OUTPATIENT
Start: 2021-01-30 | End: 2021-02-10 | Stop reason: HOSPADM

## 2021-01-29 RX ORDER — LIDOCAINE 50 MG/G
1 PATCH TOPICAL
Status: DISCONTINUED | OUTPATIENT
Start: 2021-01-29 | End: 2021-02-10 | Stop reason: HOSPADM

## 2021-01-29 RX ORDER — POLYETHYLENE GLYCOL 3350 17 G/17G
17 POWDER, FOR SOLUTION ORAL DAILY
Status: DISCONTINUED | OUTPATIENT
Start: 2021-01-30 | End: 2021-02-04

## 2021-01-29 RX ORDER — OXYCODONE HYDROCHLORIDE 5 MG/1
5 TABLET ORAL EVERY 4 HOURS PRN
Status: DISCONTINUED | OUTPATIENT
Start: 2021-01-29 | End: 2021-02-10 | Stop reason: HOSPADM

## 2021-01-29 RX ORDER — SODIUM CHLORIDE 0.9 % (FLUSH) 0.9 %
5 SYRINGE (ML) INJECTION
Status: DISCONTINUED | OUTPATIENT
Start: 2021-01-29 | End: 2021-02-10 | Stop reason: HOSPADM

## 2021-01-29 RX ORDER — LORAZEPAM 0.5 MG/1
0.5 TABLET ORAL
Status: DISCONTINUED | OUTPATIENT
Start: 2021-01-29 | End: 2021-02-10 | Stop reason: HOSPADM

## 2021-01-29 RX ADMIN — LEVETIRACETAM 500 MG: 500 TABLET ORAL at 10:01

## 2021-01-29 RX ADMIN — LIDOCAINE 1 PATCH: 50 PATCH TOPICAL at 05:01

## 2021-01-29 RX ADMIN — GADOBUTROL 9 ML: 604.72 INJECTION INTRAVENOUS at 07:01

## 2021-01-29 RX ADMIN — MELATONIN TAB 3 MG 6 MG: 3 TAB at 10:01

## 2021-01-29 RX ADMIN — ONDANSETRON 4 MG: 2 INJECTION INTRAMUSCULAR; INTRAVENOUS at 02:01

## 2021-01-29 RX ADMIN — LORAZEPAM 0.5 MG: 0.5 TABLET ORAL at 05:01

## 2021-01-30 LAB
25(OH)D3+25(OH)D2 SERPL-MCNC: 41 NG/ML (ref 30–96)
ANION GAP SERPL CALC-SCNC: 11 MMOL/L (ref 8–16)
BASOPHILS # BLD AUTO: 0.06 K/UL (ref 0–0.2)
BASOPHILS NFR BLD: 0.8 % (ref 0–1.9)
BUN SERPL-MCNC: 11 MG/DL (ref 8–23)
CALCIUM SERPL-MCNC: 9.5 MG/DL (ref 8.7–10.5)
CHLORIDE SERPL-SCNC: 99 MMOL/L (ref 95–110)
CO2 SERPL-SCNC: 28 MMOL/L (ref 23–29)
CREAT SERPL-MCNC: 0.7 MG/DL (ref 0.5–1.4)
DIFFERENTIAL METHOD: ABNORMAL
EOSINOPHIL # BLD AUTO: 0 K/UL (ref 0–0.5)
EOSINOPHIL NFR BLD: 0.4 % (ref 0–8)
ERYTHROCYTE [DISTWIDTH] IN BLOOD BY AUTOMATED COUNT: 14.6 % (ref 11.5–14.5)
EST. GFR  (AFRICAN AMERICAN): >60 ML/MIN/1.73 M^2
EST. GFR  (NON AFRICAN AMERICAN): >60 ML/MIN/1.73 M^2
ESTIMATED AVG GLUCOSE: 120 MG/DL (ref 68–131)
GLUCOSE SERPL-MCNC: 128 MG/DL (ref 70–110)
HBA1C MFR BLD: 5.8 % (ref 4–5.6)
HCT VFR BLD AUTO: 40.3 % (ref 37–48.5)
HGB BLD-MCNC: 13 G/DL (ref 12–16)
IMM GRANULOCYTES # BLD AUTO: 0.02 K/UL (ref 0–0.04)
IMM GRANULOCYTES NFR BLD AUTO: 0.3 % (ref 0–0.5)
LYMPHOCYTES # BLD AUTO: 1.8 K/UL (ref 1–4.8)
LYMPHOCYTES NFR BLD: 24.3 % (ref 18–48)
MAGNESIUM SERPL-MCNC: 1.8 MG/DL (ref 1.6–2.6)
MCH RBC QN AUTO: 26.7 PG (ref 27–31)
MCHC RBC AUTO-ENTMCNC: 32.3 G/DL (ref 32–36)
MCV RBC AUTO: 83 FL (ref 82–98)
MONOCYTES # BLD AUTO: 0.6 K/UL (ref 0.3–1)
MONOCYTES NFR BLD: 7.7 % (ref 4–15)
NEUTROPHILS # BLD AUTO: 4.8 K/UL (ref 1.8–7.7)
NEUTROPHILS NFR BLD: 66.5 % (ref 38–73)
NRBC BLD-RTO: 0 /100 WBC
PHOSPHATE SERPL-MCNC: 4.4 MG/DL (ref 2.7–4.5)
PLATELET # BLD AUTO: 480 K/UL (ref 150–350)
PMV BLD AUTO: 9.6 FL (ref 9.2–12.9)
POTASSIUM SERPL-SCNC: 3.8 MMOL/L (ref 3.5–5.1)
RBC # BLD AUTO: 4.86 M/UL (ref 4–5.4)
SODIUM SERPL-SCNC: 138 MMOL/L (ref 136–145)
WBC # BLD AUTO: 7.24 K/UL (ref 3.9–12.7)

## 2021-01-30 PROCEDURE — 97530 THERAPEUTIC ACTIVITIES: CPT

## 2021-01-30 PROCEDURE — 99204 OFFICE O/P NEW MOD 45 MIN: CPT | Mod: GC,,, | Performed by: PSYCHIATRY & NEUROLOGY

## 2021-01-30 PROCEDURE — 63600175 PHARM REV CODE 636 W HCPCS: Performed by: PHYSICIAN ASSISTANT

## 2021-01-30 PROCEDURE — G0378 HOSPITAL OBSERVATION PER HR: HCPCS

## 2021-01-30 PROCEDURE — 83036 HEMOGLOBIN GLYCOSYLATED A1C: CPT

## 2021-01-30 PROCEDURE — 97162 PT EVAL MOD COMPLEX 30 MIN: CPT

## 2021-01-30 PROCEDURE — 99233 PR SUBSEQUENT HOSPITAL CARE,LEVL III: ICD-10-PCS | Mod: GC,,, | Performed by: NEUROLOGICAL SURGERY

## 2021-01-30 PROCEDURE — 99204 PR OFFICE/OUTPT VISIT, NEW, LEVL IV, 45-59 MIN: ICD-10-PCS | Mod: GC,,, | Performed by: PSYCHIATRY & NEUROLOGY

## 2021-01-30 PROCEDURE — 96375 TX/PRO/DX INJ NEW DRUG ADDON: CPT | Performed by: EMERGENCY MEDICINE

## 2021-01-30 PROCEDURE — 84100 ASSAY OF PHOSPHORUS: CPT

## 2021-01-30 PROCEDURE — 85025 COMPLETE CBC W/AUTO DIFF WBC: CPT

## 2021-01-30 PROCEDURE — 96361 HYDRATE IV INFUSION ADD-ON: CPT | Performed by: EMERGENCY MEDICINE

## 2021-01-30 PROCEDURE — 25000003 PHARM REV CODE 250: Performed by: PHYSICIAN ASSISTANT

## 2021-01-30 PROCEDURE — 97165 OT EVAL LOW COMPLEX 30 MIN: CPT

## 2021-01-30 PROCEDURE — 97535 SELF CARE MNGMENT TRAINING: CPT

## 2021-01-30 PROCEDURE — 99233 SBSQ HOSP IP/OBS HIGH 50: CPT | Mod: GC,,, | Performed by: NEUROLOGICAL SURGERY

## 2021-01-30 PROCEDURE — 99226 PR SUBSEQUENT OBSERVATION CARE,LEVEL III: CPT | Mod: ,,, | Performed by: PHYSICIAN ASSISTANT

## 2021-01-30 PROCEDURE — 82306 VITAMIN D 25 HYDROXY: CPT

## 2021-01-30 PROCEDURE — 83735 ASSAY OF MAGNESIUM: CPT

## 2021-01-30 PROCEDURE — 36415 COLL VENOUS BLD VENIPUNCTURE: CPT

## 2021-01-30 PROCEDURE — 99226 PR SUBSEQUENT OBSERVATION CARE,LEVEL III: ICD-10-PCS | Mod: ,,, | Performed by: PHYSICIAN ASSISTANT

## 2021-01-30 PROCEDURE — 80048 BASIC METABOLIC PNL TOTAL CA: CPT

## 2021-01-30 RX ORDER — SODIUM CHLORIDE 9 MG/ML
INJECTION, SOLUTION INTRAVENOUS CONTINUOUS
Status: ACTIVE | OUTPATIENT
Start: 2021-01-30 | End: 2021-01-31

## 2021-01-30 RX ADMIN — PROMETHAZINE HYDROCHLORIDE 6.25 MG: 25 INJECTION INTRAMUSCULAR; INTRAVENOUS at 06:01

## 2021-01-30 RX ADMIN — THERA TABS 1 TABLET: TAB at 09:01

## 2021-01-30 RX ADMIN — LEVETIRACETAM 500 MG: 500 TABLET ORAL at 09:01

## 2021-01-30 RX ADMIN — GABAPENTIN 300 MG: 300 CAPSULE ORAL at 09:01

## 2021-01-30 RX ADMIN — ASPIRIN 81 MG: 81 TABLET, COATED ORAL at 09:01

## 2021-01-30 RX ADMIN — Medication 1000 UNITS: at 09:01

## 2021-01-30 RX ADMIN — LORAZEPAM 1 MG: 1 TABLET ORAL at 09:01

## 2021-01-30 RX ADMIN — ONDANSETRON 8 MG: 8 TABLET, ORALLY DISINTEGRATING ORAL at 03:01

## 2021-01-30 RX ADMIN — ONDANSETRON 8 MG: 8 TABLET, ORALLY DISINTEGRATING ORAL at 01:01

## 2021-01-30 RX ADMIN — LATANOPROST 1 DROP: 50 SOLUTION OPHTHALMIC at 09:01

## 2021-01-30 RX ADMIN — BISACODYL 10 MG: 10 SUPPOSITORY RECTAL at 01:01

## 2021-01-30 RX ADMIN — POLYETHYLENE GLYCOL 3350 17 G: 17 POWDER, FOR SOLUTION ORAL at 09:01

## 2021-01-30 RX ADMIN — ATORVASTATIN CALCIUM 20 MG: 20 TABLET, FILM COATED ORAL at 09:01

## 2021-01-30 RX ADMIN — SODIUM CHLORIDE: 0.9 INJECTION, SOLUTION INTRAVENOUS at 09:01

## 2021-01-30 RX ADMIN — OXYCODONE HYDROCHLORIDE AND ACETAMINOPHEN 500 MG: 500 TABLET ORAL at 09:01

## 2021-01-30 RX ADMIN — AMLODIPINE BESYLATE 10 MG: 10 TABLET ORAL at 09:01

## 2021-01-31 ENCOUNTER — ANESTHESIA EVENT (OUTPATIENT)
Dept: OBSERVATION | Facility: HOSPITAL | Age: 71
End: 2021-01-31

## 2021-01-31 ENCOUNTER — ANESTHESIA (OUTPATIENT)
Dept: OBSERVATION | Facility: HOSPITAL | Age: 71
End: 2021-01-31

## 2021-01-31 PROBLEM — E86.0 DEHYDRATION: Status: ACTIVE | Noted: 2021-01-31

## 2021-01-31 LAB
ANION GAP SERPL CALC-SCNC: 13 MMOL/L (ref 8–16)
BASOPHILS # BLD AUTO: 0.04 K/UL (ref 0–0.2)
BASOPHILS NFR BLD: 0.6 % (ref 0–1.9)
BLOOD COLLECTION FOR MS PROFILE: NORMAL
BUN SERPL-MCNC: 13 MG/DL (ref 8–23)
CALCIUM SERPL-MCNC: 9.3 MG/DL (ref 8.7–10.5)
CHLORIDE SERPL-SCNC: 100 MMOL/L (ref 95–110)
CLARITY CSF: ABNORMAL
CLARITY CSF: ABNORMAL
CO2 SERPL-SCNC: 26 MMOL/L (ref 23–29)
COLOR CSF: ABNORMAL
COLOR CSF: ABNORMAL
CREAT SERPL-MCNC: 0.7 MG/DL (ref 0.5–1.4)
DIFFERENTIAL METHOD: ABNORMAL
EOSINOPHIL # BLD AUTO: 0.1 K/UL (ref 0–0.5)
EOSINOPHIL NFR BLD: 1.8 % (ref 0–8)
EOSINOPHIL NFR CSF MANUAL: 1 %
EOSINOPHIL NFR CSF MANUAL: 2 %
ERYTHROCYTE [DISTWIDTH] IN BLOOD BY AUTOMATED COUNT: 14.6 % (ref 11.5–14.5)
EST. GFR  (AFRICAN AMERICAN): >60 ML/MIN/1.73 M^2
EST. GFR  (NON AFRICAN AMERICAN): >60 ML/MIN/1.73 M^2
GLUCOSE CSF-MCNC: 81 MG/DL (ref 40–70)
GLUCOSE SERPL-MCNC: 107 MG/DL (ref 70–110)
HCT VFR BLD AUTO: 43 % (ref 37–48.5)
HGB BLD-MCNC: 14.1 G/DL (ref 12–16)
IGA SERPL-MCNC: 121 MG/DL (ref 40–350)
IMM GRANULOCYTES # BLD AUTO: 0.01 K/UL (ref 0–0.04)
IMM GRANULOCYTES NFR BLD AUTO: 0.1 % (ref 0–0.5)
LYMPHOCYTES # BLD AUTO: 2.1 K/UL (ref 1–4.8)
LYMPHOCYTES NFR BLD: 29.4 % (ref 18–48)
LYMPHOCYTES NFR CSF MANUAL: 34 % (ref 40–80)
LYMPHOCYTES NFR CSF MANUAL: 46 % (ref 40–80)
MAGNESIUM SERPL-MCNC: 1.9 MG/DL (ref 1.6–2.6)
MCH RBC QN AUTO: 26.9 PG (ref 27–31)
MCHC RBC AUTO-ENTMCNC: 32.8 G/DL (ref 32–36)
MCV RBC AUTO: 82 FL (ref 82–98)
MONOCYTES # BLD AUTO: 0.7 K/UL (ref 0.3–1)
MONOCYTES NFR BLD: 9.9 % (ref 4–15)
MONOS+MACROS NFR CSF MANUAL: 6 % (ref 15–45)
MONOS+MACROS NFR CSF MANUAL: 8 % (ref 15–45)
NEUTROPHILS # BLD AUTO: 4.1 K/UL (ref 1.8–7.7)
NEUTROPHILS NFR BLD: 58.2 % (ref 38–73)
NEUTROPHILS NFR CSF MANUAL: 44 % (ref 0–6)
NEUTROPHILS NFR CSF MANUAL: 59 % (ref 0–6)
NRBC BLD-RTO: 0 /100 WBC
PHOSPHATE SERPL-MCNC: 3.9 MG/DL (ref 2.7–4.5)
PLATELET # BLD AUTO: 416 K/UL (ref 150–350)
PMV BLD AUTO: 10.6 FL (ref 9.2–12.9)
POTASSIUM SERPL-SCNC: 4.1 MMOL/L (ref 3.5–5.1)
PROT CSF-MCNC: 91 MG/DL (ref 15–40)
RBC # BLD AUTO: 5.24 M/UL (ref 4–5.4)
RBC # CSF: 2310 /CU MM
RBC # CSF: 8000 /CU MM
SODIUM SERPL-SCNC: 139 MMOL/L (ref 136–145)
SPECIMEN VOL CSF: 2 ML
SPECIMEN VOL CSF: 4 ML
WBC # BLD AUTO: 7.1 K/UL (ref 3.9–12.7)
WBC # CSF: 11 /CU MM (ref 0–5)
WBC # CSF: 5 /CU MM (ref 0–5)

## 2021-01-31 PROCEDURE — 94761 N-INVAS EAR/PLS OXIMETRY MLT: CPT

## 2021-01-31 PROCEDURE — 99232 PR SUBSEQUENT HOSPITAL CARE,LEVL II: ICD-10-PCS | Mod: GC,,, | Performed by: PSYCHIATRY & NEUROLOGY

## 2021-01-31 PROCEDURE — 82164 ANGIOTENSIN I ENZYME TEST: CPT

## 2021-01-31 PROCEDURE — 94010 BREATHING CAPACITY TEST: CPT

## 2021-01-31 PROCEDURE — 86618 LYME DISEASE ANTIBODY: CPT

## 2021-01-31 PROCEDURE — 84100 ASSAY OF PHOSPHORUS: CPT

## 2021-01-31 PROCEDURE — 99226 PR SUBSEQUENT OBSERVATION CARE,LEVEL III: CPT | Mod: ,,, | Performed by: PHYSICIAN ASSISTANT

## 2021-01-31 PROCEDURE — G0378 HOSPITAL OBSERVATION PER HR: HCPCS

## 2021-01-31 PROCEDURE — 25000003 PHARM REV CODE 250: Performed by: PHYSICIAN ASSISTANT

## 2021-01-31 PROCEDURE — 86592 SYPHILIS TEST NON-TREP QUAL: CPT

## 2021-01-31 PROCEDURE — 99226 PR SUBSEQUENT OBSERVATION CARE,LEVEL III: ICD-10-PCS | Mod: ,,, | Performed by: PHYSICIAN ASSISTANT

## 2021-01-31 PROCEDURE — 82945 GLUCOSE OTHER FLUID: CPT

## 2021-01-31 PROCEDURE — 89051 BODY FLUID CELL COUNT: CPT

## 2021-01-31 PROCEDURE — 82784 ASSAY IGA/IGD/IGG/IGM EACH: CPT

## 2021-01-31 PROCEDURE — 99232 SBSQ HOSP IP/OBS MODERATE 35: CPT | Mod: GC,,, | Performed by: PSYCHIATRY & NEUROLOGY

## 2021-01-31 PROCEDURE — 83735 ASSAY OF MAGNESIUM: CPT

## 2021-01-31 PROCEDURE — 86255 FLUORESCENT ANTIBODY SCREEN: CPT | Mod: 91

## 2021-01-31 PROCEDURE — 83883 ASSAY NEPHELOMETRY NOT SPEC: CPT

## 2021-01-31 PROCEDURE — 94150 VITAL CAPACITY TEST: CPT

## 2021-01-31 PROCEDURE — 96361 HYDRATE IV INFUSION ADD-ON: CPT | Performed by: EMERGENCY MEDICINE

## 2021-01-31 PROCEDURE — 84157 ASSAY OF PROTEIN OTHER: CPT

## 2021-01-31 PROCEDURE — 80048 BASIC METABOLIC PNL TOTAL CA: CPT

## 2021-01-31 PROCEDURE — 85025 COMPLETE CBC W/AUTO DIFF WBC: CPT

## 2021-01-31 PROCEDURE — 36415 COLL VENOUS BLD VENIPUNCTURE: CPT

## 2021-01-31 PROCEDURE — 99900035 HC TECH TIME PER 15 MIN (STAT)

## 2021-01-31 PROCEDURE — 87529 HSV DNA AMP PROBE: CPT

## 2021-01-31 RX ORDER — SODIUM CHLORIDE 9 MG/ML
INJECTION, SOLUTION INTRAVENOUS CONTINUOUS
Status: ACTIVE | OUTPATIENT
Start: 2021-01-31 | End: 2021-02-01

## 2021-01-31 RX ADMIN — AMLODIPINE BESYLATE 10 MG: 10 TABLET ORAL at 09:01

## 2021-01-31 RX ADMIN — GABAPENTIN 300 MG: 300 CAPSULE ORAL at 09:01

## 2021-01-31 RX ADMIN — SODIUM CHLORIDE: 0.9 INJECTION, SOLUTION INTRAVENOUS at 04:01

## 2021-01-31 RX ADMIN — OXYCODONE HYDROCHLORIDE AND ACETAMINOPHEN 500 MG: 500 TABLET ORAL at 09:01

## 2021-01-31 RX ADMIN — LATANOPROST 1 DROP: 50 SOLUTION OPHTHALMIC at 08:01

## 2021-01-31 RX ADMIN — Medication 1000 UNITS: at 09:01

## 2021-01-31 RX ADMIN — ASPIRIN 81 MG: 81 TABLET, COATED ORAL at 09:01

## 2021-01-31 RX ADMIN — LORAZEPAM 1 MG: 1 TABLET ORAL at 08:01

## 2021-01-31 RX ADMIN — THERA TABS 1 TABLET: TAB at 09:01

## 2021-01-31 RX ADMIN — POLYETHYLENE GLYCOL 3350 17 G: 17 POWDER, FOR SOLUTION ORAL at 09:01

## 2021-01-31 RX ADMIN — LEVETIRACETAM 500 MG: 500 TABLET ORAL at 09:01

## 2021-01-31 RX ADMIN — OXYCODONE 5 MG: 5 TABLET ORAL at 06:01

## 2021-01-31 RX ADMIN — GABAPENTIN 300 MG: 300 CAPSULE ORAL at 08:01

## 2021-01-31 RX ADMIN — LEVETIRACETAM 500 MG: 500 TABLET ORAL at 08:01

## 2021-01-31 RX ADMIN — ATORVASTATIN CALCIUM 20 MG: 20 TABLET, FILM COATED ORAL at 09:01

## 2021-02-01 LAB
ANION GAP SERPL CALC-SCNC: 10 MMOL/L (ref 8–16)
BASOPHILS # BLD AUTO: 0.04 K/UL (ref 0–0.2)
BASOPHILS NFR BLD: 0.5 % (ref 0–1.9)
BUN SERPL-MCNC: 9 MG/DL (ref 8–23)
CALCIUM SERPL-MCNC: 8.9 MG/DL (ref 8.7–10.5)
CHLORIDE SERPL-SCNC: 99 MMOL/L (ref 95–110)
CO2 SERPL-SCNC: 26 MMOL/L (ref 23–29)
CREAT SERPL-MCNC: 0.6 MG/DL (ref 0.5–1.4)
DIFFERENTIAL METHOD: ABNORMAL
EOSINOPHIL # BLD AUTO: 0.1 K/UL (ref 0–0.5)
EOSINOPHIL NFR BLD: 1.9 % (ref 0–8)
ERYTHROCYTE [DISTWIDTH] IN BLOOD BY AUTOMATED COUNT: 14.6 % (ref 11.5–14.5)
EST. GFR  (AFRICAN AMERICAN): >60 ML/MIN/1.73 M^2
EST. GFR  (NON AFRICAN AMERICAN): >60 ML/MIN/1.73 M^2
FOLATE SERPL-MCNC: 11.5 NG/ML (ref 4–24)
GLUCOSE SERPL-MCNC: 124 MG/DL (ref 70–110)
HCT VFR BLD AUTO: 37.8 % (ref 37–48.5)
HGB BLD-MCNC: 12.2 G/DL (ref 12–16)
IMM GRANULOCYTES # BLD AUTO: 0.02 K/UL (ref 0–0.04)
IMM GRANULOCYTES NFR BLD AUTO: 0.3 % (ref 0–0.5)
LYMPHOCYTES # BLD AUTO: 1.8 K/UL (ref 1–4.8)
LYMPHOCYTES NFR BLD: 24.6 % (ref 18–48)
MAGNESIUM SERPL-MCNC: 1.7 MG/DL (ref 1.6–2.6)
MCH RBC QN AUTO: 26.8 PG (ref 27–31)
MCHC RBC AUTO-ENTMCNC: 32.3 G/DL (ref 32–36)
MCV RBC AUTO: 83 FL (ref 82–98)
MONOCYTES # BLD AUTO: 0.7 K/UL (ref 0.3–1)
MONOCYTES NFR BLD: 9.2 % (ref 4–15)
NEUTROPHILS # BLD AUTO: 4.7 K/UL (ref 1.8–7.7)
NEUTROPHILS NFR BLD: 63.5 % (ref 38–73)
NRBC BLD-RTO: 0 /100 WBC
PHOSPHATE SERPL-MCNC: 3.5 MG/DL (ref 2.7–4.5)
PLATELET # BLD AUTO: 423 K/UL (ref 150–350)
PMV BLD AUTO: 10.4 FL (ref 9.2–12.9)
POTASSIUM SERPL-SCNC: 3.6 MMOL/L (ref 3.5–5.1)
RBC # BLD AUTO: 4.56 M/UL (ref 4–5.4)
RHEUMATOID FACT SERPL-ACNC: 12 IU/ML (ref 0–15)
SODIUM SERPL-SCNC: 135 MMOL/L (ref 136–145)
VDRL CSF QL: NORMAL
WBC # BLD AUTO: 7.47 K/UL (ref 3.9–12.7)

## 2021-02-01 PROCEDURE — 99226 PR SUBSEQUENT OBSERVATION CARE,LEVEL III: CPT | Mod: ,,, | Performed by: PHYSICIAN ASSISTANT

## 2021-02-01 PROCEDURE — 97530 THERAPEUTIC ACTIVITIES: CPT

## 2021-02-01 PROCEDURE — 97110 THERAPEUTIC EXERCISES: CPT

## 2021-02-01 PROCEDURE — 84100 ASSAY OF PHOSPHORUS: CPT

## 2021-02-01 PROCEDURE — 84181 WESTERN BLOT TEST: CPT

## 2021-02-01 PROCEDURE — 86038 ANTINUCLEAR ANTIBODIES: CPT

## 2021-02-01 PROCEDURE — 20600001 HC STEP DOWN PRIVATE ROOM

## 2021-02-01 PROCEDURE — 86235 NUCLEAR ANTIGEN ANTIBODY: CPT

## 2021-02-01 PROCEDURE — 86618 LYME DISEASE ANTIBODY: CPT

## 2021-02-01 PROCEDURE — 86235 NUCLEAR ANTIGEN ANTIBODY: CPT | Mod: 59

## 2021-02-01 PROCEDURE — 86147 CARDIOLIPIN ANTIBODY EA IG: CPT

## 2021-02-01 PROCEDURE — 63600175 PHARM REV CODE 636 W HCPCS: Performed by: STUDENT IN AN ORGANIZED HEALTH CARE EDUCATION/TRAINING PROGRAM

## 2021-02-01 PROCEDURE — 76937 US GUIDE VASCULAR ACCESS: CPT

## 2021-02-01 PROCEDURE — 84165 PATHOLOGIST INTERPRETATION SPE: ICD-10-PCS | Mod: 26,,, | Performed by: PATHOLOGY

## 2021-02-01 PROCEDURE — C1751 CATH, INF, PER/CENT/MIDLINE: HCPCS

## 2021-02-01 PROCEDURE — 86334 PATHOLOGIST INTERPRETATION IFE: ICD-10-PCS | Mod: 26,,, | Performed by: PATHOLOGY

## 2021-02-01 PROCEDURE — 85613 RUSSELL VIPER VENOM DILUTED: CPT

## 2021-02-01 PROCEDURE — 83516 IMMUNOASSAY NONANTIBODY: CPT

## 2021-02-01 PROCEDURE — 83735 ASSAY OF MAGNESIUM: CPT

## 2021-02-01 PROCEDURE — 94010 BREATHING CAPACITY TEST: CPT

## 2021-02-01 PROCEDURE — 99233 PR SUBSEQUENT HOSPITAL CARE,LEVL III: ICD-10-PCS | Mod: ,,, | Performed by: PHYSICIAN ASSISTANT

## 2021-02-01 PROCEDURE — 80048 BASIC METABOLIC PNL TOTAL CA: CPT

## 2021-02-01 PROCEDURE — 82962 GLUCOSE BLOOD TEST: CPT

## 2021-02-01 PROCEDURE — 25000003 PHARM REV CODE 250: Performed by: PHYSICIAN ASSISTANT

## 2021-02-01 PROCEDURE — 86225 DNA ANTIBODY NATIVE: CPT

## 2021-02-01 PROCEDURE — 86334 IMMUNOFIX E-PHORESIS SERUM: CPT

## 2021-02-01 PROCEDURE — 85025 COMPLETE CBC W/AUTO DIFF WBC: CPT

## 2021-02-01 PROCEDURE — 82746 ASSAY OF FOLIC ACID SERUM: CPT

## 2021-02-01 PROCEDURE — 36415 COLL VENOUS BLD VENIPUNCTURE: CPT

## 2021-02-01 PROCEDURE — 99233 PR SUBSEQUENT HOSPITAL CARE,LEVL III: ICD-10-PCS | Mod: ,,, | Performed by: PSYCHIATRY & NEUROLOGY

## 2021-02-01 PROCEDURE — 99226 PR SUBSEQUENT OBSERVATION CARE,LEVEL III: ICD-10-PCS | Mod: ,,, | Performed by: PHYSICIAN ASSISTANT

## 2021-02-01 PROCEDURE — 84165 PROTEIN E-PHORESIS SERUM: CPT

## 2021-02-01 PROCEDURE — 84425 ASSAY OF VITAMIN B-1: CPT

## 2021-02-01 PROCEDURE — 82607 VITAMIN B-12: CPT

## 2021-02-01 PROCEDURE — 86039 ANTINUCLEAR ANTIBODIES (ANA): CPT

## 2021-02-01 PROCEDURE — 97535 SELF CARE MNGMENT TRAINING: CPT

## 2021-02-01 PROCEDURE — 63600175 PHARM REV CODE 636 W HCPCS: Performed by: PHYSICIAN ASSISTANT

## 2021-02-01 PROCEDURE — 86255 FLUORESCENT ANTIBODY SCREEN: CPT | Mod: 59

## 2021-02-01 PROCEDURE — 84207 ASSAY OF VITAMIN B-6: CPT

## 2021-02-01 PROCEDURE — 99233 SBSQ HOSP IP/OBS HIGH 50: CPT | Mod: ,,, | Performed by: PHYSICIAN ASSISTANT

## 2021-02-01 PROCEDURE — 99233 SBSQ HOSP IP/OBS HIGH 50: CPT | Mod: ,,, | Performed by: PSYCHIATRY & NEUROLOGY

## 2021-02-01 PROCEDURE — 86431 RHEUMATOID FACTOR QUANT: CPT

## 2021-02-01 PROCEDURE — 94761 N-INVAS EAR/PLS OXIMETRY MLT: CPT

## 2021-02-01 PROCEDURE — 84165 PROTEIN E-PHORESIS SERUM: CPT | Mod: 26,,, | Performed by: PATHOLOGY

## 2021-02-01 PROCEDURE — 36410 VNPNXR 3YR/> PHY/QHP DX/THER: CPT

## 2021-02-01 PROCEDURE — 94150 VITAL CAPACITY TEST: CPT

## 2021-02-01 PROCEDURE — 86334 IMMUNOFIX E-PHORESIS SERUM: CPT | Mod: 26,,, | Performed by: PATHOLOGY

## 2021-02-01 RX ORDER — ENOXAPARIN SODIUM 100 MG/ML
40 INJECTION SUBCUTANEOUS EVERY 24 HOURS
Status: DISCONTINUED | OUTPATIENT
Start: 2021-02-01 | End: 2021-02-10 | Stop reason: HOSPADM

## 2021-02-01 RX ORDER — DIPHENHYDRAMINE HYDROCHLORIDE 50 MG/ML
25 INJECTION INTRAMUSCULAR; INTRAVENOUS
Status: COMPLETED | OUTPATIENT
Start: 2021-02-01 | End: 2021-02-05

## 2021-02-01 RX ADMIN — DIPHENHYDRAMINE HYDROCHLORIDE 25 MG: 50 INJECTION, SOLUTION INTRAMUSCULAR; INTRAVENOUS at 04:02

## 2021-02-01 RX ADMIN — Medication 1000 UNITS: at 09:02

## 2021-02-01 RX ADMIN — GABAPENTIN 300 MG: 300 CAPSULE ORAL at 09:02

## 2021-02-01 RX ADMIN — ATORVASTATIN CALCIUM 20 MG: 20 TABLET, FILM COATED ORAL at 09:02

## 2021-02-01 RX ADMIN — LORAZEPAM 1 MG: 1 TABLET ORAL at 10:02

## 2021-02-01 RX ADMIN — ASPIRIN 81 MG: 81 TABLET, COATED ORAL at 09:02

## 2021-02-01 RX ADMIN — THERA TABS 1 TABLET: TAB at 09:02

## 2021-02-01 RX ADMIN — LEVETIRACETAM 500 MG: 500 TABLET ORAL at 09:02

## 2021-02-01 RX ADMIN — LATANOPROST 1 DROP: 50 SOLUTION OPHTHALMIC at 11:02

## 2021-02-01 RX ADMIN — HUMAN IMMUNOGLOBULIN G 35 G: 20 LIQUID INTRAVENOUS at 04:02

## 2021-02-01 RX ADMIN — ENOXAPARIN SODIUM 40 MG: 40 INJECTION SUBCUTANEOUS at 06:02

## 2021-02-01 RX ADMIN — AMLODIPINE BESYLATE 10 MG: 10 TABLET ORAL at 09:02

## 2021-02-01 RX ADMIN — OXYCODONE 5 MG: 5 TABLET ORAL at 02:02

## 2021-02-01 RX ADMIN — OXYCODONE HYDROCHLORIDE AND ACETAMINOPHEN 500 MG: 500 TABLET ORAL at 09:02

## 2021-02-01 RX ADMIN — LIDOCAINE 1 PATCH: 50 PATCH TOPICAL at 06:02

## 2021-02-01 RX ADMIN — OXYCODONE 5 MG: 5 TABLET ORAL at 09:02

## 2021-02-01 RX ADMIN — POLYETHYLENE GLYCOL 3350 17 G: 17 POWDER, FOR SOLUTION ORAL at 09:02

## 2021-02-01 RX ADMIN — MELATONIN TAB 3 MG 6 MG: 3 TAB at 09:02

## 2021-02-02 LAB
ALBUMIN SERPL ELPH-MCNC: 3.79 G/DL (ref 3.35–5.55)
ALPHA1 GLOB SERPL ELPH-MCNC: 0.39 G/DL (ref 0.17–0.41)
ALPHA2 GLOB SERPL ELPH-MCNC: 0.85 G/DL (ref 0.43–0.99)
ANA PATTERN 1: NORMAL
ANA SER QL IF: POSITIVE
ANA TITR SER IF: NORMAL {TITER}
ANION GAP SERPL CALC-SCNC: 9 MMOL/L (ref 8–16)
ANTI-SSA ANTIBODY: 0.09 RATIO (ref 0–0.99)
ANTI-SSA INTERPRETATION: NEGATIVE
ANTI-SSB ANTIBODY: 0.07 RATIO (ref 0–0.99)
ANTI-SSB INTERPRETATION: NEGATIVE
B BURGDOR AB CSF IA-ACNC: 0.27 LIV
B-GLOBULIN SERPL ELPH-MCNC: 0.84 G/DL (ref 0.5–1.1)
BASOPHILS # BLD AUTO: 0.04 K/UL (ref 0–0.2)
BASOPHILS NFR BLD: 0.8 % (ref 0–1.9)
BUN SERPL-MCNC: 7 MG/DL (ref 8–23)
CALCIUM SERPL-MCNC: 8.8 MG/DL (ref 8.7–10.5)
CARDIOLIPIN IGG SER IA-ACNC: <9.4 GPL (ref 0–14.99)
CARDIOLIPIN IGM SER IA-ACNC: <9.4 MPL (ref 0–12.49)
CHLORIDE SERPL-SCNC: 96 MMOL/L (ref 95–110)
CO2 SERPL-SCNC: 29 MMOL/L (ref 23–29)
CREAT SERPL-MCNC: 0.6 MG/DL (ref 0.5–1.4)
DIFFERENTIAL METHOD: ABNORMAL
DSDNA AB SER-ACNC: NORMAL [IU]/ML
EOSINOPHIL # BLD AUTO: 0.1 K/UL (ref 0–0.5)
EOSINOPHIL NFR BLD: 1.2 % (ref 0–8)
ERYTHROCYTE [DISTWIDTH] IN BLOOD BY AUTOMATED COUNT: 14.1 % (ref 11.5–14.5)
EST. GFR  (AFRICAN AMERICAN): >60 ML/MIN/1.73 M^2
EST. GFR  (NON AFRICAN AMERICAN): >60 ML/MIN/1.73 M^2
GAMMA GLOB SERPL ELPH-MCNC: 1.73 G/DL (ref 0.67–1.58)
GLUCOSE SERPL-MCNC: 138 MG/DL (ref 70–110)
HCT VFR BLD AUTO: 36.4 % (ref 37–48.5)
HGB BLD-MCNC: 11.7 G/DL (ref 12–16)
IMM GRANULOCYTES # BLD AUTO: 0.02 K/UL (ref 0–0.04)
IMM GRANULOCYTES NFR BLD AUTO: 0.4 % (ref 0–0.5)
INTERPRETATION SERPL IFE-IMP: NORMAL
LA PPP-IMP: NEGATIVE
LYMPHOCYTES # BLD AUTO: 0.9 K/UL (ref 1–4.8)
LYMPHOCYTES NFR BLD: 16.9 % (ref 18–48)
MAGNESIUM SERPL-MCNC: 1.7 MG/DL (ref 1.6–2.6)
MCH RBC QN AUTO: 26.5 PG (ref 27–31)
MCHC RBC AUTO-ENTMCNC: 32.1 G/DL (ref 32–36)
MCV RBC AUTO: 83 FL (ref 82–98)
MONOCYTES # BLD AUTO: 0.5 K/UL (ref 0.3–1)
MONOCYTES NFR BLD: 10.4 % (ref 4–15)
NEUTROPHILS # BLD AUTO: 3.7 K/UL (ref 1.8–7.7)
NEUTROPHILS NFR BLD: 70.3 % (ref 38–73)
NRBC BLD-RTO: 0 /100 WBC
PATHOLOGIST INTERPRETATION IFE: NORMAL
PATHOLOGIST INTERPRETATION SPE: NORMAL
PHOSPHATE SERPL-MCNC: 3.7 MG/DL (ref 2.7–4.5)
PLATELET # BLD AUTO: 353 K/UL (ref 150–350)
PMV BLD AUTO: 9.8 FL (ref 9.2–12.9)
POTASSIUM SERPL-SCNC: 3.4 MMOL/L (ref 3.5–5.1)
PROT SERPL-MCNC: 7.6 G/DL (ref 6–8.4)
RBC # BLD AUTO: 4.41 M/UL (ref 4–5.4)
SODIUM SERPL-SCNC: 134 MMOL/L (ref 136–145)
VIT B12 SERPL-MCNC: 636 NG/L (ref 180–914)
WBC # BLD AUTO: 5.21 K/UL (ref 3.9–12.7)

## 2021-02-02 PROCEDURE — 63600175 PHARM REV CODE 636 W HCPCS: Performed by: PHYSICIAN ASSISTANT

## 2021-02-02 PROCEDURE — 63600175 PHARM REV CODE 636 W HCPCS: Performed by: STUDENT IN AN ORGANIZED HEALTH CARE EDUCATION/TRAINING PROGRAM

## 2021-02-02 PROCEDURE — 99232 SBSQ HOSP IP/OBS MODERATE 35: CPT | Mod: GC,,, | Performed by: PSYCHIATRY & NEUROLOGY

## 2021-02-02 PROCEDURE — 85025 COMPLETE CBC W/AUTO DIFF WBC: CPT

## 2021-02-02 PROCEDURE — 83735 ASSAY OF MAGNESIUM: CPT

## 2021-02-02 PROCEDURE — 25000003 PHARM REV CODE 250: Performed by: PHYSICIAN ASSISTANT

## 2021-02-02 PROCEDURE — 99233 SBSQ HOSP IP/OBS HIGH 50: CPT | Mod: ,,, | Performed by: PHYSICIAN ASSISTANT

## 2021-02-02 PROCEDURE — 20600001 HC STEP DOWN PRIVATE ROOM

## 2021-02-02 PROCEDURE — 97530 THERAPEUTIC ACTIVITIES: CPT | Mod: CQ

## 2021-02-02 PROCEDURE — 99233 PR SUBSEQUENT HOSPITAL CARE,LEVL III: ICD-10-PCS | Mod: ,,, | Performed by: PHYSICIAN ASSISTANT

## 2021-02-02 PROCEDURE — 80048 BASIC METABOLIC PNL TOTAL CA: CPT

## 2021-02-02 PROCEDURE — 84100 ASSAY OF PHOSPHORUS: CPT

## 2021-02-02 PROCEDURE — 25000003 PHARM REV CODE 250: Performed by: STUDENT IN AN ORGANIZED HEALTH CARE EDUCATION/TRAINING PROGRAM

## 2021-02-02 PROCEDURE — 94010 BREATHING CAPACITY TEST: CPT

## 2021-02-02 PROCEDURE — 99232 PR SUBSEQUENT HOSPITAL CARE,LEVL II: ICD-10-PCS | Mod: GC,,, | Performed by: PSYCHIATRY & NEUROLOGY

## 2021-02-02 RX ORDER — SODIUM CHLORIDE 9 MG/ML
INJECTION, SOLUTION INTRAVENOUS CONTINUOUS
Status: DISCONTINUED | OUTPATIENT
Start: 2021-02-02 | End: 2021-02-04

## 2021-02-02 RX ADMIN — OXYCODONE 5 MG: 5 TABLET ORAL at 09:02

## 2021-02-02 RX ADMIN — ATORVASTATIN CALCIUM 20 MG: 20 TABLET, FILM COATED ORAL at 09:02

## 2021-02-02 RX ADMIN — Medication 1000 UNITS: at 09:02

## 2021-02-02 RX ADMIN — ASPIRIN 81 MG: 81 TABLET, COATED ORAL at 09:02

## 2021-02-02 RX ADMIN — HUMAN IMMUNOGLOBULIN G 35 G: 20 LIQUID INTRAVENOUS at 01:02

## 2021-02-02 RX ADMIN — OXYCODONE 5 MG: 5 TABLET ORAL at 05:02

## 2021-02-02 RX ADMIN — PROMETHAZINE HYDROCHLORIDE 6.25 MG: 25 INJECTION INTRAMUSCULAR; INTRAVENOUS at 09:02

## 2021-02-02 RX ADMIN — LORAZEPAM 1 MG: 1 TABLET ORAL at 10:02

## 2021-02-02 RX ADMIN — GABAPENTIN 300 MG: 300 CAPSULE ORAL at 09:02

## 2021-02-02 RX ADMIN — THERA TABS 1 TABLET: TAB at 09:02

## 2021-02-02 RX ADMIN — ENOXAPARIN SODIUM 40 MG: 40 INJECTION SUBCUTANEOUS at 05:02

## 2021-02-02 RX ADMIN — AMLODIPINE BESYLATE 10 MG: 10 TABLET ORAL at 09:02

## 2021-02-02 RX ADMIN — LEVETIRACETAM 500 MG: 500 TABLET ORAL at 09:02

## 2021-02-02 RX ADMIN — LATANOPROST 1 DROP: 50 SOLUTION OPHTHALMIC at 09:02

## 2021-02-02 RX ADMIN — PROMETHAZINE HYDROCHLORIDE 6.25 MG: 25 INJECTION INTRAMUSCULAR; INTRAVENOUS at 10:02

## 2021-02-02 RX ADMIN — OXYCODONE HYDROCHLORIDE AND ACETAMINOPHEN 500 MG: 500 TABLET ORAL at 09:02

## 2021-02-02 RX ADMIN — ONDANSETRON 8 MG: 8 TABLET, ORALLY DISINTEGRATING ORAL at 06:02

## 2021-02-02 RX ADMIN — POLYETHYLENE GLYCOL 3350 17 G: 17 POWDER, FOR SOLUTION ORAL at 09:02

## 2021-02-02 RX ADMIN — SODIUM CHLORIDE: 0.9 INJECTION, SOLUTION INTRAVENOUS at 10:02

## 2021-02-02 RX ADMIN — ONDANSETRON 8 MG: 8 TABLET, ORALLY DISINTEGRATING ORAL at 08:02

## 2021-02-02 RX ADMIN — DIPHENHYDRAMINE HYDROCHLORIDE 25 MG: 50 INJECTION, SOLUTION INTRAMUSCULAR; INTRAVENOUS at 01:02

## 2021-02-03 LAB
ANION GAP SERPL CALC-SCNC: 6 MMOL/L (ref 8–16)
ANTI SM ANTIBODY: 0.11 RATIO (ref 0–0.99)
ANTI SM/RNP ANTIBODY: 0.15 RATIO (ref 0–0.99)
ANTI-SM INTERPRETATION: NEGATIVE
ANTI-SM/RNP INTERPRETATION: NEGATIVE
ANTI-SSA ANTIBODY: 0.09 RATIO (ref 0–0.99)
ANTI-SSA INTERPRETATION: NEGATIVE
ANTI-SSB ANTIBODY: 0.07 RATIO (ref 0–0.99)
ANTI-SSB INTERPRETATION: NEGATIVE
B BURGDOR AB SER IA-ACNC: 0.23 INDEX VALUE
BASOPHILS # BLD AUTO: 0.02 K/UL (ref 0–0.2)
BASOPHILS NFR BLD: 0.5 % (ref 0–1.9)
BUN SERPL-MCNC: 7 MG/DL (ref 8–23)
CALCIUM SERPL-MCNC: 8.7 MG/DL (ref 8.7–10.5)
CHLORIDE SERPL-SCNC: 96 MMOL/L (ref 95–110)
CO2 SERPL-SCNC: 30 MMOL/L (ref 23–29)
CREAT SERPL-MCNC: 0.6 MG/DL (ref 0.5–1.4)
DIFFERENTIAL METHOD: ABNORMAL
DSDNA AB SER-ACNC: NORMAL [IU]/ML
EOSINOPHIL # BLD AUTO: 0.1 K/UL (ref 0–0.5)
EOSINOPHIL NFR BLD: 2.3 % (ref 0–8)
ERYTHROCYTE [DISTWIDTH] IN BLOOD BY AUTOMATED COUNT: 14.2 % (ref 11.5–14.5)
EST. GFR  (AFRICAN AMERICAN): >60 ML/MIN/1.73 M^2
EST. GFR  (NON AFRICAN AMERICAN): >60 ML/MIN/1.73 M^2
GLUCOSE SERPL-MCNC: 118 MG/DL (ref 70–110)
HCT VFR BLD AUTO: 35.3 % (ref 37–48.5)
HGB BLD-MCNC: 11.3 G/DL (ref 12–16)
HSV1, PCR, CSF: NEGATIVE
HSV2, PCR, CSF: NEGATIVE
IMM GRANULOCYTES # BLD AUTO: 0.01 K/UL (ref 0–0.04)
IMM GRANULOCYTES NFR BLD AUTO: 0.3 % (ref 0–0.5)
LYMPHOCYTES # BLD AUTO: 1 K/UL (ref 1–4.8)
LYMPHOCYTES NFR BLD: 25.3 % (ref 18–48)
MAGNESIUM SERPL-MCNC: 1.7 MG/DL (ref 1.6–2.6)
MCH RBC QN AUTO: 26.6 PG (ref 27–31)
MCHC RBC AUTO-ENTMCNC: 32 G/DL (ref 32–36)
MCV RBC AUTO: 83 FL (ref 82–98)
MONOCYTES # BLD AUTO: 0.6 K/UL (ref 0.3–1)
MONOCYTES NFR BLD: 14.3 % (ref 4–15)
NEUTROPHILS # BLD AUTO: 2.3 K/UL (ref 1.8–7.7)
NEUTROPHILS NFR BLD: 57.3 % (ref 38–73)
NRBC BLD-RTO: 0 /100 WBC
PHOSPHATE SERPL-MCNC: 3.2 MG/DL (ref 2.7–4.5)
PLATELET # BLD AUTO: 325 K/UL (ref 150–350)
PMV BLD AUTO: 10 FL (ref 9.2–12.9)
POTASSIUM SERPL-SCNC: 3.4 MMOL/L (ref 3.5–5.1)
PYRIDOXAL SERPL-MCNC: 31 UG/L (ref 5–50)
RBC # BLD AUTO: 4.25 M/UL (ref 4–5.4)
SODIUM SERPL-SCNC: 132 MMOL/L (ref 136–145)
WBC # BLD AUTO: 4 K/UL (ref 3.9–12.7)

## 2021-02-03 PROCEDURE — 25000242 PHARM REV CODE 250 ALT 637 W/ HCPCS: Performed by: PHYSICIAN ASSISTANT

## 2021-02-03 PROCEDURE — 83735 ASSAY OF MAGNESIUM: CPT

## 2021-02-03 PROCEDURE — 97535 SELF CARE MNGMENT TRAINING: CPT

## 2021-02-03 PROCEDURE — 97530 THERAPEUTIC ACTIVITIES: CPT | Mod: CQ

## 2021-02-03 PROCEDURE — 63600175 PHARM REV CODE 636 W HCPCS: Performed by: STUDENT IN AN ORGANIZED HEALTH CARE EDUCATION/TRAINING PROGRAM

## 2021-02-03 PROCEDURE — 80048 BASIC METABOLIC PNL TOTAL CA: CPT

## 2021-02-03 PROCEDURE — 84100 ASSAY OF PHOSPHORUS: CPT

## 2021-02-03 PROCEDURE — 99232 PR SUBSEQUENT HOSPITAL CARE,LEVL II: ICD-10-PCS | Mod: ,,, | Performed by: PSYCHIATRY & NEUROLOGY

## 2021-02-03 PROCEDURE — 99233 PR SUBSEQUENT HOSPITAL CARE,LEVL III: ICD-10-PCS | Mod: ,,, | Performed by: PHYSICIAN ASSISTANT

## 2021-02-03 PROCEDURE — 99233 SBSQ HOSP IP/OBS HIGH 50: CPT | Mod: ,,, | Performed by: PHYSICIAN ASSISTANT

## 2021-02-03 PROCEDURE — 20600001 HC STEP DOWN PRIVATE ROOM

## 2021-02-03 PROCEDURE — 97110 THERAPEUTIC EXERCISES: CPT | Mod: CQ

## 2021-02-03 PROCEDURE — 99222 1ST HOSP IP/OBS MODERATE 55: CPT | Mod: ,,, | Performed by: NURSE PRACTITIONER

## 2021-02-03 PROCEDURE — 99232 SBSQ HOSP IP/OBS MODERATE 35: CPT | Mod: ,,, | Performed by: PSYCHIATRY & NEUROLOGY

## 2021-02-03 PROCEDURE — 97530 THERAPEUTIC ACTIVITIES: CPT

## 2021-02-03 PROCEDURE — 85025 COMPLETE CBC W/AUTO DIFF WBC: CPT

## 2021-02-03 PROCEDURE — 25000003 PHARM REV CODE 250: Performed by: STUDENT IN AN ORGANIZED HEALTH CARE EDUCATION/TRAINING PROGRAM

## 2021-02-03 PROCEDURE — 25000003 PHARM REV CODE 250: Performed by: PHYSICIAN ASSISTANT

## 2021-02-03 PROCEDURE — 99900035 HC TECH TIME PER 15 MIN (STAT)

## 2021-02-03 PROCEDURE — 99222 PR INITIAL HOSPITAL CARE,LEVL II: ICD-10-PCS | Mod: ,,, | Performed by: NURSE PRACTITIONER

## 2021-02-03 PROCEDURE — 63600175 PHARM REV CODE 636 W HCPCS: Performed by: PHYSICIAN ASSISTANT

## 2021-02-03 RX ORDER — POTASSIUM CHLORIDE 20 MEQ/1
40 TABLET, EXTENDED RELEASE ORAL ONCE
Status: COMPLETED | OUTPATIENT
Start: 2021-02-03 | End: 2021-02-03

## 2021-02-03 RX ADMIN — AMLODIPINE BESYLATE 10 MG: 10 TABLET ORAL at 09:02

## 2021-02-03 RX ADMIN — Medication 1000 UNITS: at 09:02

## 2021-02-03 RX ADMIN — GABAPENTIN 300 MG: 300 CAPSULE ORAL at 08:02

## 2021-02-03 RX ADMIN — LATANOPROST 1 DROP: 50 SOLUTION OPHTHALMIC at 08:02

## 2021-02-03 RX ADMIN — GABAPENTIN 300 MG: 300 CAPSULE ORAL at 09:02

## 2021-02-03 RX ADMIN — HUMAN IMMUNOGLOBULIN G 35 G: 20 LIQUID INTRAVENOUS at 11:02

## 2021-02-03 RX ADMIN — LEVETIRACETAM 500 MG: 500 TABLET ORAL at 08:02

## 2021-02-03 RX ADMIN — ONDANSETRON 8 MG: 8 TABLET, ORALLY DISINTEGRATING ORAL at 06:02

## 2021-02-03 RX ADMIN — THERA TABS 1 TABLET: TAB at 09:02

## 2021-02-03 RX ADMIN — DIPHENHYDRAMINE HYDROCHLORIDE 25 MG: 50 INJECTION, SOLUTION INTRAMUSCULAR; INTRAVENOUS at 10:02

## 2021-02-03 RX ADMIN — POTASSIUM CHLORIDE 40 MEQ: 1500 TABLET, EXTENDED RELEASE ORAL at 10:02

## 2021-02-03 RX ADMIN — ASPIRIN 81 MG: 81 TABLET, COATED ORAL at 09:02

## 2021-02-03 RX ADMIN — FLUTICASONE PROPIONATE 100 MCG: 50 SPRAY, METERED NASAL at 09:02

## 2021-02-03 RX ADMIN — SODIUM CHLORIDE: 0.9 INJECTION, SOLUTION INTRAVENOUS at 02:02

## 2021-02-03 RX ADMIN — LORAZEPAM 1 MG: 1 TABLET ORAL at 09:02

## 2021-02-03 RX ADMIN — ENOXAPARIN SODIUM 40 MG: 40 INJECTION SUBCUTANEOUS at 04:02

## 2021-02-03 RX ADMIN — OXYCODONE HYDROCHLORIDE AND ACETAMINOPHEN 500 MG: 500 TABLET ORAL at 09:02

## 2021-02-03 RX ADMIN — POLYETHYLENE GLYCOL 3350 17 G: 17 POWDER, FOR SOLUTION ORAL at 09:02

## 2021-02-03 RX ADMIN — PROMETHAZINE HYDROCHLORIDE 6.25 MG: 25 INJECTION INTRAMUSCULAR; INTRAVENOUS at 10:02

## 2021-02-03 RX ADMIN — PROMETHAZINE HYDROCHLORIDE 6.25 MG: 25 INJECTION INTRAMUSCULAR; INTRAVENOUS at 08:02

## 2021-02-03 RX ADMIN — LEVETIRACETAM 500 MG: 500 TABLET ORAL at 09:02

## 2021-02-03 RX ADMIN — ATORVASTATIN CALCIUM 20 MG: 20 TABLET, FILM COATED ORAL at 09:02

## 2021-02-04 PROBLEM — E87.1 HYPONATREMIA: Status: ACTIVE | Noted: 2021-02-04

## 2021-02-04 LAB
ACE CSF-CCNC: 1 U/L (ref 0–2.5)
ANION GAP SERPL CALC-SCNC: 4 MMOL/L (ref 8–16)
ANION GAP SERPL CALC-SCNC: 8 MMOL/L (ref 8–16)
BASOPHILS # BLD AUTO: 0.02 K/UL (ref 0–0.2)
BASOPHILS NFR BLD: 0.8 % (ref 0–1.9)
BUN SERPL-MCNC: 7 MG/DL (ref 8–23)
BUN SERPL-MCNC: 8 MG/DL (ref 8–23)
CALCIUM SERPL-MCNC: 8.5 MG/DL (ref 8.7–10.5)
CALCIUM SERPL-MCNC: 8.6 MG/DL (ref 8.7–10.5)
CHLORIDE SERPL-SCNC: 92 MMOL/L (ref 95–110)
CHLORIDE SERPL-SCNC: 94 MMOL/L (ref 95–110)
CO2 SERPL-SCNC: 26 MMOL/L (ref 23–29)
CO2 SERPL-SCNC: 29 MMOL/L (ref 23–29)
CREAT SERPL-MCNC: 0.6 MG/DL (ref 0.5–1.4)
CREAT SERPL-MCNC: 0.7 MG/DL (ref 0.5–1.4)
DEPRECATED GD1B DISIALYL IGG SER QL: NEGATIVE
DEPRECATED GD1B DISIALYL IGM SER QL: NEGATIVE
DIFFERENTIAL METHOD: ABNORMAL
EOSINOPHIL # BLD AUTO: 0 K/UL (ref 0–0.5)
EOSINOPHIL NFR BLD: 0.8 % (ref 0–8)
ERYTHROCYTE [DISTWIDTH] IN BLOOD BY AUTOMATED COUNT: 14 % (ref 11.5–14.5)
EST. GFR  (AFRICAN AMERICAN): >60 ML/MIN/1.73 M^2
EST. GFR  (AFRICAN AMERICAN): >60 ML/MIN/1.73 M^2
EST. GFR  (NON AFRICAN AMERICAN): >60 ML/MIN/1.73 M^2
EST. GFR  (NON AFRICAN AMERICAN): >60 ML/MIN/1.73 M^2
GLUCOSE SERPL-MCNC: 112 MG/DL (ref 70–110)
GLUCOSE SERPL-MCNC: 117 MG/DL (ref 70–110)
GM1 ASIALO IGG SER QL: NEGATIVE
GM1 ASIALO IGM SER QL: NEGATIVE
GM1 GANGL IGG SER QL: NEGATIVE
GM1 GANGL IGM SER QL: NEGATIVE
HCT VFR BLD AUTO: 34.9 % (ref 37–48.5)
HGB BLD-MCNC: 11.4 G/DL (ref 12–16)
IMM GRANULOCYTES # BLD AUTO: 0.01 K/UL (ref 0–0.04)
IMM GRANULOCYTES NFR BLD AUTO: 0.4 % (ref 0–0.5)
KAPPA LC FREE CSF-MCNC: 0.06 MG/DL
LYMPHOCYTES # BLD AUTO: 0.5 K/UL (ref 1–4.8)
LYMPHOCYTES NFR BLD: 18.2 % (ref 18–48)
MAGNESIUM SERPL-MCNC: 1.7 MG/DL (ref 1.6–2.6)
MCH RBC QN AUTO: 26.7 PG (ref 27–31)
MCHC RBC AUTO-ENTMCNC: 32.7 G/DL (ref 32–36)
MCV RBC AUTO: 82 FL (ref 82–98)
MONOCYTES # BLD AUTO: 0.5 K/UL (ref 0.3–1)
MONOCYTES NFR BLD: 17.4 % (ref 4–15)
NEUTROPHILS # BLD AUTO: 1.7 K/UL (ref 1.8–7.7)
NEUTROPHILS NFR BLD: 62.4 % (ref 38–73)
NRBC BLD-RTO: 0 /100 WBC
PHOSPHATE SERPL-MCNC: 3.3 MG/DL (ref 2.7–4.5)
PLATELET # BLD AUTO: 259 K/UL (ref 150–350)
PMV BLD AUTO: 10.3 FL (ref 9.2–12.9)
POTASSIUM SERPL-SCNC: 3.6 MMOL/L (ref 3.5–5.1)
POTASSIUM SERPL-SCNC: 4.1 MMOL/L (ref 3.5–5.1)
RBC # BLD AUTO: 4.27 M/UL (ref 4–5.4)
SODIUM SERPL-SCNC: 125 MMOL/L (ref 136–145)
SODIUM SERPL-SCNC: 128 MMOL/L (ref 136–145)
WBC # BLD AUTO: 2.64 K/UL (ref 3.9–12.7)

## 2021-02-04 PROCEDURE — 83735 ASSAY OF MAGNESIUM: CPT

## 2021-02-04 PROCEDURE — 99233 SBSQ HOSP IP/OBS HIGH 50: CPT | Mod: ,,, | Performed by: INTERNAL MEDICINE

## 2021-02-04 PROCEDURE — 99232 SBSQ HOSP IP/OBS MODERATE 35: CPT | Mod: ,,, | Performed by: PSYCHIATRY & NEUROLOGY

## 2021-02-04 PROCEDURE — 80048 BASIC METABOLIC PNL TOTAL CA: CPT

## 2021-02-04 PROCEDURE — 63600175 PHARM REV CODE 636 W HCPCS: Performed by: STUDENT IN AN ORGANIZED HEALTH CARE EDUCATION/TRAINING PROGRAM

## 2021-02-04 PROCEDURE — 99232 SBSQ HOSP IP/OBS MODERATE 35: CPT | Mod: ,,, | Performed by: NURSE PRACTITIONER

## 2021-02-04 PROCEDURE — 97530 THERAPEUTIC ACTIVITIES: CPT | Mod: CQ

## 2021-02-04 PROCEDURE — 63600175 PHARM REV CODE 636 W HCPCS: Performed by: PHYSICIAN ASSISTANT

## 2021-02-04 PROCEDURE — 94761 N-INVAS EAR/PLS OXIMETRY MLT: CPT

## 2021-02-04 PROCEDURE — 99232 PR SUBSEQUENT HOSPITAL CARE,LEVL II: ICD-10-PCS | Mod: ,,, | Performed by: NURSE PRACTITIONER

## 2021-02-04 PROCEDURE — 99232 PR SUBSEQUENT HOSPITAL CARE,LEVL II: ICD-10-PCS | Mod: ,,, | Performed by: PSYCHIATRY & NEUROLOGY

## 2021-02-04 PROCEDURE — 80048 BASIC METABOLIC PNL TOTAL CA: CPT | Mod: 91

## 2021-02-04 PROCEDURE — 25000003 PHARM REV CODE 250: Performed by: INTERNAL MEDICINE

## 2021-02-04 PROCEDURE — 94150 VITAL CAPACITY TEST: CPT

## 2021-02-04 PROCEDURE — 99233 PR SUBSEQUENT HOSPITAL CARE,LEVL III: ICD-10-PCS | Mod: ,,, | Performed by: INTERNAL MEDICINE

## 2021-02-04 PROCEDURE — 94010 BREATHING CAPACITY TEST: CPT

## 2021-02-04 PROCEDURE — 84100 ASSAY OF PHOSPHORUS: CPT

## 2021-02-04 PROCEDURE — 85025 COMPLETE CBC W/AUTO DIFF WBC: CPT

## 2021-02-04 PROCEDURE — 25000003 PHARM REV CODE 250: Performed by: PHYSICIAN ASSISTANT

## 2021-02-04 PROCEDURE — 20600001 HC STEP DOWN PRIVATE ROOM

## 2021-02-04 RX ORDER — POLYETHYLENE GLYCOL 3350 17 G/17G
0.4 POWDER, FOR SOLUTION ORAL DAILY
Status: DISCONTINUED | OUTPATIENT
Start: 2021-02-05 | End: 2021-02-09

## 2021-02-04 RX ORDER — POLYETHYLENE GLYCOL 3350 17 G/17G
17 POWDER, FOR SOLUTION ORAL ONCE
Status: COMPLETED | OUTPATIENT
Start: 2021-02-04 | End: 2021-02-04

## 2021-02-04 RX ORDER — CARVEDILOL 3.12 MG/1
3.12 TABLET ORAL 2 TIMES DAILY
Status: DISCONTINUED | OUTPATIENT
Start: 2021-02-04 | End: 2021-02-07

## 2021-02-04 RX ORDER — SENNOSIDES 8.6 MG/1
8.6 TABLET ORAL DAILY PRN
Status: DISCONTINUED | OUTPATIENT
Start: 2021-02-04 | End: 2021-02-10 | Stop reason: HOSPADM

## 2021-02-04 RX ADMIN — THERA TABS 1 TABLET: TAB at 09:02

## 2021-02-04 RX ADMIN — LEVETIRACETAM 500 MG: 500 TABLET ORAL at 09:02

## 2021-02-04 RX ADMIN — Medication 1000 UNITS: at 09:02

## 2021-02-04 RX ADMIN — ASPIRIN 81 MG: 81 TABLET, COATED ORAL at 09:02

## 2021-02-04 RX ADMIN — ENOXAPARIN SODIUM 40 MG: 40 INJECTION SUBCUTANEOUS at 04:02

## 2021-02-04 RX ADMIN — HUMAN IMMUNOGLOBULIN G 5 G: 20 LIQUID INTRAVENOUS at 11:02

## 2021-02-04 RX ADMIN — BISACODYL 10 MG: 10 SUPPOSITORY RECTAL at 08:02

## 2021-02-04 RX ADMIN — PROMETHAZINE HYDROCHLORIDE 6.25 MG: 25 INJECTION INTRAMUSCULAR; INTRAVENOUS at 09:02

## 2021-02-04 RX ADMIN — LATANOPROST 1 DROP: 50 SOLUTION OPHTHALMIC at 09:02

## 2021-02-04 RX ADMIN — AMLODIPINE BESYLATE 10 MG: 10 TABLET ORAL at 09:02

## 2021-02-04 RX ADMIN — DIPHENHYDRAMINE HYDROCHLORIDE 25 MG: 50 INJECTION, SOLUTION INTRAMUSCULAR; INTRAVENOUS at 11:02

## 2021-02-04 RX ADMIN — GABAPENTIN 300 MG: 300 CAPSULE ORAL at 09:02

## 2021-02-04 RX ADMIN — POLYETHYLENE GLYCOL 3350 17 G: 17 POWDER, FOR SOLUTION ORAL at 04:02

## 2021-02-04 RX ADMIN — POLYETHYLENE GLYCOL 3350 17 G: 17 POWDER, FOR SOLUTION ORAL at 09:02

## 2021-02-04 RX ADMIN — ATORVASTATIN CALCIUM 20 MG: 20 TABLET, FILM COATED ORAL at 09:02

## 2021-02-04 RX ADMIN — OXYCODONE HYDROCHLORIDE AND ACETAMINOPHEN 500 MG: 500 TABLET ORAL at 09:02

## 2021-02-04 RX ADMIN — TRAZODONE HYDROCHLORIDE 25 MG: 50 TABLET ORAL at 09:02

## 2021-02-04 RX ADMIN — DOCUSATE SODIUM 50 MG: 50 CAPSULE, LIQUID FILLED ORAL at 04:02

## 2021-02-04 RX ADMIN — CARVEDILOL 3.12 MG: 3.12 TABLET, FILM COATED ORAL at 09:02

## 2021-02-05 ENCOUNTER — TELEPHONE (OUTPATIENT)
Dept: NEUROSURGERY | Facility: CLINIC | Age: 71
End: 2021-02-05

## 2021-02-05 DIAGNOSIS — M48.061 SPINAL STENOSIS OF LUMBAR REGION WITHOUT NEUROGENIC CLAUDICATION: Primary | ICD-10-CM

## 2021-02-05 PROBLEM — G61.0 GBS (GUILLAIN BARRE SYNDROME): Status: ACTIVE | Noted: 2021-01-29

## 2021-02-05 LAB
ALBUMIN SERPL BCP-MCNC: 2.7 G/DL (ref 3.5–5.2)
ALP SERPL-CCNC: 40 U/L (ref 55–135)
ALT SERPL W/O P-5'-P-CCNC: 12 U/L (ref 10–44)
ANION GAP SERPL CALC-SCNC: 7 MMOL/L (ref 8–16)
AST SERPL-CCNC: 23 U/L (ref 10–40)
BASOPHILS # BLD AUTO: 0.03 K/UL (ref 0–0.2)
BASOPHILS NFR BLD: 0.4 % (ref 0–1.9)
BILIRUB SERPL-MCNC: 0.4 MG/DL (ref 0.1–1)
BUN SERPL-MCNC: 12 MG/DL (ref 8–23)
CALCIUM SERPL-MCNC: 8.8 MG/DL (ref 8.7–10.5)
CHLORIDE SERPL-SCNC: 92 MMOL/L (ref 95–110)
CO2 SERPL-SCNC: 29 MMOL/L (ref 23–29)
CREAT SERPL-MCNC: 0.7 MG/DL (ref 0.5–1.4)
DIFFERENTIAL METHOD: ABNORMAL
EOSINOPHIL # BLD AUTO: 0 K/UL (ref 0–0.5)
EOSINOPHIL NFR BLD: 0.1 % (ref 0–8)
ERYTHROCYTE [DISTWIDTH] IN BLOOD BY AUTOMATED COUNT: 14.5 % (ref 11.5–14.5)
EST. GFR  (AFRICAN AMERICAN): >60 ML/MIN/1.73 M^2
EST. GFR  (NON AFRICAN AMERICAN): >60 ML/MIN/1.73 M^2
GLUCOSE SERPL-MCNC: 107 MG/DL (ref 70–110)
HCT VFR BLD AUTO: 33.3 % (ref 37–48.5)
HGB BLD-MCNC: 11.1 G/DL (ref 12–16)
IMM GRANULOCYTES # BLD AUTO: 0.01 K/UL (ref 0–0.04)
IMM GRANULOCYTES NFR BLD AUTO: 0.1 % (ref 0–0.5)
LYMPHOCYTES # BLD AUTO: 1 K/UL (ref 1–4.8)
LYMPHOCYTES NFR BLD: 13.5 % (ref 18–48)
MAGNESIUM SERPL-MCNC: 1.7 MG/DL (ref 1.6–2.6)
MCH RBC QN AUTO: 26.9 PG (ref 27–31)
MCHC RBC AUTO-ENTMCNC: 33.3 G/DL (ref 32–36)
MCV RBC AUTO: 81 FL (ref 82–98)
MONOCYTES # BLD AUTO: 0.9 K/UL (ref 0.3–1)
MONOCYTES NFR BLD: 12.8 % (ref 4–15)
NEUTROPHILS # BLD AUTO: 5.2 K/UL (ref 1.8–7.7)
NEUTROPHILS NFR BLD: 73.1 % (ref 38–73)
NRBC BLD-RTO: 0 /100 WBC
OSMOLALITY SERPL: 285 MOSM/KG (ref 275–295)
PHOSPHATE SERPL-MCNC: 4 MG/DL (ref 2.7–4.5)
PLATELET # BLD AUTO: 352 K/UL (ref 150–350)
PMV BLD AUTO: 10.9 FL (ref 9.2–12.9)
POTASSIUM SERPL-SCNC: 3.7 MMOL/L (ref 3.5–5.1)
PROT SERPL-MCNC: 9.3 G/DL (ref 6–8.4)
RBC # BLD AUTO: 4.13 M/UL (ref 4–5.4)
SODIUM SERPL-SCNC: 128 MMOL/L (ref 136–145)
VIT B1 BLD-MCNC: 39 UG/L (ref 38–122)
WBC # BLD AUTO: 7.12 K/UL (ref 3.9–12.7)

## 2021-02-05 PROCEDURE — 97112 NEUROMUSCULAR REEDUCATION: CPT

## 2021-02-05 PROCEDURE — 25000003 PHARM REV CODE 250: Performed by: PHYSICIAN ASSISTANT

## 2021-02-05 PROCEDURE — 63600175 PHARM REV CODE 636 W HCPCS: Mod: JG | Performed by: STUDENT IN AN ORGANIZED HEALTH CARE EDUCATION/TRAINING PROGRAM

## 2021-02-05 PROCEDURE — 94150 VITAL CAPACITY TEST: CPT

## 2021-02-05 PROCEDURE — 99233 SBSQ HOSP IP/OBS HIGH 50: CPT | Mod: ,,, | Performed by: INTERNAL MEDICINE

## 2021-02-05 PROCEDURE — 99233 SBSQ HOSP IP/OBS HIGH 50: CPT | Mod: GC,,, | Performed by: PSYCHIATRY & NEUROLOGY

## 2021-02-05 PROCEDURE — 63600175 PHARM REV CODE 636 W HCPCS: Performed by: PHYSICIAN ASSISTANT

## 2021-02-05 PROCEDURE — 85025 COMPLETE CBC W/AUTO DIFF WBC: CPT

## 2021-02-05 PROCEDURE — 83735 ASSAY OF MAGNESIUM: CPT

## 2021-02-05 PROCEDURE — 20600001 HC STEP DOWN PRIVATE ROOM

## 2021-02-05 PROCEDURE — 80053 COMPREHEN METABOLIC PANEL: CPT

## 2021-02-05 PROCEDURE — 25000003 PHARM REV CODE 250: Performed by: INTERNAL MEDICINE

## 2021-02-05 PROCEDURE — 99900035 HC TECH TIME PER 15 MIN (STAT)

## 2021-02-05 PROCEDURE — 99233 PR SUBSEQUENT HOSPITAL CARE,LEVL III: ICD-10-PCS | Mod: ,,, | Performed by: PHYSICIAN ASSISTANT

## 2021-02-05 PROCEDURE — 99233 PR SUBSEQUENT HOSPITAL CARE,LEVL III: ICD-10-PCS | Mod: GC,,, | Performed by: PSYCHIATRY & NEUROLOGY

## 2021-02-05 PROCEDURE — 25000242 PHARM REV CODE 250 ALT 637 W/ HCPCS: Performed by: PHYSICIAN ASSISTANT

## 2021-02-05 PROCEDURE — 84100 ASSAY OF PHOSPHORUS: CPT

## 2021-02-05 PROCEDURE — 99233 SBSQ HOSP IP/OBS HIGH 50: CPT | Mod: ,,, | Performed by: PHYSICIAN ASSISTANT

## 2021-02-05 PROCEDURE — 97535 SELF CARE MNGMENT TRAINING: CPT

## 2021-02-05 PROCEDURE — 97530 THERAPEUTIC ACTIVITIES: CPT | Mod: CQ

## 2021-02-05 PROCEDURE — 97110 THERAPEUTIC EXERCISES: CPT | Mod: CQ

## 2021-02-05 PROCEDURE — 94010 BREATHING CAPACITY TEST: CPT

## 2021-02-05 PROCEDURE — 36415 COLL VENOUS BLD VENIPUNCTURE: CPT

## 2021-02-05 PROCEDURE — 99233 PR SUBSEQUENT HOSPITAL CARE,LEVL III: ICD-10-PCS | Mod: ,,, | Performed by: INTERNAL MEDICINE

## 2021-02-05 PROCEDURE — 83930 ASSAY OF BLOOD OSMOLALITY: CPT

## 2021-02-05 RX ORDER — TRAZODONE HYDROCHLORIDE 50 MG/1
50 TABLET ORAL NIGHTLY
Status: DISCONTINUED | OUTPATIENT
Start: 2021-02-05 | End: 2021-02-10 | Stop reason: HOSPADM

## 2021-02-05 RX ADMIN — DOCUSATE SODIUM 50 MG: 50 CAPSULE, LIQUID FILLED ORAL at 09:02

## 2021-02-05 RX ADMIN — OXYCODONE 5 MG: 5 TABLET ORAL at 10:02

## 2021-02-05 RX ADMIN — LEVETIRACETAM 500 MG: 500 TABLET ORAL at 09:02

## 2021-02-05 RX ADMIN — GABAPENTIN 300 MG: 300 CAPSULE ORAL at 09:02

## 2021-02-05 RX ADMIN — LIDOCAINE 1 PATCH: 50 PATCH TOPICAL at 04:02

## 2021-02-05 RX ADMIN — OXYCODONE HYDROCHLORIDE AND ACETAMINOPHEN 500 MG: 500 TABLET ORAL at 09:02

## 2021-02-05 RX ADMIN — Medication 1000 UNITS: at 09:02

## 2021-02-05 RX ADMIN — ASPIRIN 81 MG: 81 TABLET, COATED ORAL at 09:02

## 2021-02-05 RX ADMIN — OXYCODONE 5 MG: 5 TABLET ORAL at 03:02

## 2021-02-05 RX ADMIN — OXYCODONE 5 MG: 5 TABLET ORAL at 04:02

## 2021-02-05 RX ADMIN — ENOXAPARIN SODIUM 40 MG: 40 INJECTION SUBCUTANEOUS at 05:02

## 2021-02-05 RX ADMIN — HUMAN IMMUNOGLOBULIN G 35 G: 20 LIQUID INTRAVENOUS at 11:02

## 2021-02-05 RX ADMIN — DIPHENHYDRAMINE HYDROCHLORIDE 25 MG: 50 INJECTION, SOLUTION INTRAMUSCULAR; INTRAVENOUS at 10:02

## 2021-02-05 RX ADMIN — CARVEDILOL 3.12 MG: 3.12 TABLET, FILM COATED ORAL at 09:02

## 2021-02-05 RX ADMIN — FLUTICASONE PROPIONATE 100 MCG: 50 SPRAY, METERED NASAL at 09:02

## 2021-02-05 RX ADMIN — THERA TABS 1 TABLET: TAB at 09:02

## 2021-02-05 RX ADMIN — ATORVASTATIN CALCIUM 20 MG: 20 TABLET, FILM COATED ORAL at 09:02

## 2021-02-05 RX ADMIN — PROMETHAZINE HYDROCHLORIDE 6.25 MG: 25 INJECTION INTRAMUSCULAR; INTRAVENOUS at 09:02

## 2021-02-05 RX ADMIN — LATANOPROST 1 DROP: 50 SOLUTION OPHTHALMIC at 08:02

## 2021-02-05 RX ADMIN — TRAZODONE HYDROCHLORIDE 50 MG: 50 TABLET ORAL at 08:02

## 2021-02-05 RX ADMIN — CARVEDILOL 3.12 MG: 3.12 TABLET, FILM COATED ORAL at 08:02

## 2021-02-05 RX ADMIN — LEVETIRACETAM 500 MG: 500 TABLET ORAL at 08:02

## 2021-02-05 RX ADMIN — AMLODIPINE BESYLATE 10 MG: 10 TABLET ORAL at 09:02

## 2021-02-05 RX ADMIN — POLYETHYLENE GLYCOL 3350 33.5 G: 17 POWDER, FOR SOLUTION ORAL at 09:02

## 2021-02-06 LAB
ALBUMIN SERPL BCP-MCNC: 2.7 G/DL (ref 3.5–5.2)
ALP SERPL-CCNC: 44 U/L (ref 55–135)
ALT SERPL W/O P-5'-P-CCNC: 11 U/L (ref 10–44)
ANION GAP SERPL CALC-SCNC: 6 MMOL/L (ref 8–16)
AST SERPL-CCNC: 22 U/L (ref 10–40)
BASOPHILS # BLD AUTO: 0.04 K/UL (ref 0–0.2)
BASOPHILS NFR BLD: 1 % (ref 0–1.9)
BILIRUB SERPL-MCNC: 0.4 MG/DL (ref 0.1–1)
BUN SERPL-MCNC: 16 MG/DL (ref 8–23)
CALCIUM SERPL-MCNC: 8.9 MG/DL (ref 8.7–10.5)
CHLORIDE SERPL-SCNC: 92 MMOL/L (ref 95–110)
CO2 SERPL-SCNC: 32 MMOL/L (ref 23–29)
CREAT SERPL-MCNC: 0.7 MG/DL (ref 0.5–1.4)
DIFFERENTIAL METHOD: ABNORMAL
EOSINOPHIL # BLD AUTO: 0.1 K/UL (ref 0–0.5)
EOSINOPHIL NFR BLD: 1.2 % (ref 0–8)
ERYTHROCYTE [DISTWIDTH] IN BLOOD BY AUTOMATED COUNT: 18.8 % (ref 11.5–14.5)
EST. GFR  (AFRICAN AMERICAN): >60 ML/MIN/1.73 M^2
EST. GFR  (NON AFRICAN AMERICAN): >60 ML/MIN/1.73 M^2
GLUCOSE SERPL-MCNC: 112 MG/DL (ref 70–110)
HCT VFR BLD AUTO: 35.3 % (ref 37–48.5)
HGB BLD-MCNC: 11.3 G/DL (ref 12–16)
IMM GRANULOCYTES # BLD AUTO: 0.01 K/UL (ref 0–0.04)
IMM GRANULOCYTES NFR BLD AUTO: 0.2 % (ref 0–0.5)
LYMPHOCYTES # BLD AUTO: 1 K/UL (ref 1–4.8)
LYMPHOCYTES NFR BLD: 24.1 % (ref 18–48)
MAGNESIUM SERPL-MCNC: 1.8 MG/DL (ref 1.6–2.6)
MCH RBC QN AUTO: 27 PG (ref 27–31)
MCHC RBC AUTO-ENTMCNC: 32 G/DL (ref 32–36)
MCV RBC AUTO: 84 FL (ref 82–98)
MONOCYTES # BLD AUTO: 0.6 K/UL (ref 0.3–1)
MONOCYTES NFR BLD: 15.7 % (ref 4–15)
NEUTROPHILS # BLD AUTO: 2.3 K/UL (ref 1.8–7.7)
NEUTROPHILS NFR BLD: 57.8 % (ref 38–73)
NRBC BLD-RTO: 0 /100 WBC
PHOSPHATE SERPL-MCNC: 4.4 MG/DL (ref 2.7–4.5)
PLATELET # BLD AUTO: 455 K/UL (ref 150–350)
PMV BLD AUTO: 12.7 FL (ref 9.2–12.9)
POTASSIUM SERPL-SCNC: 3.6 MMOL/L (ref 3.5–5.1)
PROT SERPL-MCNC: 9.7 G/DL (ref 6–8.4)
RBC # BLD AUTO: 4.19 M/UL (ref 4–5.4)
SODIUM SERPL-SCNC: 130 MMOL/L (ref 136–145)
WBC # BLD AUTO: 4.02 K/UL (ref 3.9–12.7)

## 2021-02-06 PROCEDURE — 94150 VITAL CAPACITY TEST: CPT

## 2021-02-06 PROCEDURE — 99233 PR SUBSEQUENT HOSPITAL CARE,LEVL III: ICD-10-PCS | Mod: ,,, | Performed by: INTERNAL MEDICINE

## 2021-02-06 PROCEDURE — 25000003 PHARM REV CODE 250: Performed by: INTERNAL MEDICINE

## 2021-02-06 PROCEDURE — 36415 COLL VENOUS BLD VENIPUNCTURE: CPT

## 2021-02-06 PROCEDURE — U0003 INFECTIOUS AGENT DETECTION BY NUCLEIC ACID (DNA OR RNA); SEVERE ACUTE RESPIRATORY SYNDROME CORONAVIRUS 2 (SARS-COV-2) (CORONAVIRUS DISEASE [COVID-19]), AMPLIFIED PROBE TECHNIQUE, MAKING USE OF HIGH THROUGHPUT TECHNOLOGIES AS DESCRIBED BY CMS-2020-01-R: HCPCS

## 2021-02-06 PROCEDURE — 99233 SBSQ HOSP IP/OBS HIGH 50: CPT | Mod: ,,, | Performed by: INTERNAL MEDICINE

## 2021-02-06 PROCEDURE — 85025 COMPLETE CBC W/AUTO DIFF WBC: CPT

## 2021-02-06 PROCEDURE — 83735 ASSAY OF MAGNESIUM: CPT

## 2021-02-06 PROCEDURE — 80053 COMPREHEN METABOLIC PANEL: CPT

## 2021-02-06 PROCEDURE — 99900035 HC TECH TIME PER 15 MIN (STAT)

## 2021-02-06 PROCEDURE — 94761 N-INVAS EAR/PLS OXIMETRY MLT: CPT

## 2021-02-06 PROCEDURE — 20600001 HC STEP DOWN PRIVATE ROOM

## 2021-02-06 PROCEDURE — 63600175 PHARM REV CODE 636 W HCPCS: Performed by: PHYSICIAN ASSISTANT

## 2021-02-06 PROCEDURE — 94010 BREATHING CAPACITY TEST: CPT

## 2021-02-06 PROCEDURE — 84100 ASSAY OF PHOSPHORUS: CPT

## 2021-02-06 PROCEDURE — 25000003 PHARM REV CODE 250: Performed by: PHYSICIAN ASSISTANT

## 2021-02-06 RX ORDER — HYDROXYZINE PAMOATE 25 MG/1
25 CAPSULE ORAL EVERY 8 HOURS PRN
Status: DISCONTINUED | OUTPATIENT
Start: 2021-02-06 | End: 2021-02-10 | Stop reason: HOSPADM

## 2021-02-06 RX ORDER — LORAZEPAM 1 MG/1
1 TABLET ORAL NIGHTLY
Status: DISCONTINUED | OUTPATIENT
Start: 2021-02-06 | End: 2021-02-10 | Stop reason: HOSPADM

## 2021-02-06 RX ADMIN — TRAZODONE HYDROCHLORIDE 50 MG: 50 TABLET ORAL at 09:02

## 2021-02-06 RX ADMIN — LEVETIRACETAM 500 MG: 500 TABLET ORAL at 09:02

## 2021-02-06 RX ADMIN — Medication 1000 UNITS: at 09:02

## 2021-02-06 RX ADMIN — OXYCODONE 5 MG: 5 TABLET ORAL at 06:02

## 2021-02-06 RX ADMIN — ATORVASTATIN CALCIUM 20 MG: 20 TABLET, FILM COATED ORAL at 09:02

## 2021-02-06 RX ADMIN — ENOXAPARIN SODIUM 40 MG: 40 INJECTION SUBCUTANEOUS at 04:02

## 2021-02-06 RX ADMIN — OXYCODONE 5 MG: 5 TABLET ORAL at 04:02

## 2021-02-06 RX ADMIN — POLYETHYLENE GLYCOL 3350 33.5 G: 17 POWDER, FOR SOLUTION ORAL at 09:02

## 2021-02-06 RX ADMIN — OXYCODONE 5 MG: 5 TABLET ORAL at 11:02

## 2021-02-06 RX ADMIN — LORAZEPAM 1 MG: 1 TABLET ORAL at 12:02

## 2021-02-06 RX ADMIN — DOCUSATE SODIUM 50 MG: 50 CAPSULE, LIQUID FILLED ORAL at 09:02

## 2021-02-06 RX ADMIN — THERA TABS 1 TABLET: TAB at 09:02

## 2021-02-06 RX ADMIN — CARVEDILOL 3.12 MG: 3.12 TABLET, FILM COATED ORAL at 09:02

## 2021-02-06 RX ADMIN — LORAZEPAM 1 MG: 1 TABLET ORAL at 09:02

## 2021-02-06 RX ADMIN — GABAPENTIN 300 MG: 300 CAPSULE ORAL at 09:02

## 2021-02-06 RX ADMIN — AMLODIPINE BESYLATE 10 MG: 10 TABLET ORAL at 09:02

## 2021-02-06 RX ADMIN — HYDROXYZINE PAMOATE 25 MG: 25 CAPSULE ORAL at 02:02

## 2021-02-06 RX ADMIN — LATANOPROST 1 DROP: 50 SOLUTION OPHTHALMIC at 09:02

## 2021-02-06 RX ADMIN — FLUTICASONE PROPIONATE 100 MCG: 50 SPRAY, METERED NASAL at 09:02

## 2021-02-06 RX ADMIN — ASPIRIN 81 MG: 81 TABLET, COATED ORAL at 09:02

## 2021-02-06 RX ADMIN — OXYCODONE HYDROCHLORIDE AND ACETAMINOPHEN 500 MG: 500 TABLET ORAL at 09:02

## 2021-02-07 LAB
ALBUMIN SERPL BCP-MCNC: 2.8 G/DL (ref 3.5–5.2)
ALP SERPL-CCNC: 42 U/L (ref 55–135)
ALT SERPL W/O P-5'-P-CCNC: 12 U/L (ref 10–44)
ANION GAP SERPL CALC-SCNC: 7 MMOL/L (ref 8–16)
AST SERPL-CCNC: 27 U/L (ref 10–40)
BASOPHILS # BLD AUTO: 0.06 K/UL (ref 0–0.2)
BASOPHILS NFR BLD: 1.6 % (ref 0–1.9)
BILIRUB SERPL-MCNC: 0.4 MG/DL (ref 0.1–1)
BUN SERPL-MCNC: 19 MG/DL (ref 8–23)
CALCIUM SERPL-MCNC: 9.1 MG/DL (ref 8.7–10.5)
CHLORIDE SERPL-SCNC: 93 MMOL/L (ref 95–110)
CO2 SERPL-SCNC: 30 MMOL/L (ref 23–29)
CREAT SERPL-MCNC: 0.7 MG/DL (ref 0.5–1.4)
DIFFERENTIAL METHOD: ABNORMAL
EOSINOPHIL # BLD AUTO: 0.1 K/UL (ref 0–0.5)
EOSINOPHIL NFR BLD: 2.3 % (ref 0–8)
ERYTHROCYTE [DISTWIDTH] IN BLOOD BY AUTOMATED COUNT: 14.1 % (ref 11.5–14.5)
EST. GFR  (AFRICAN AMERICAN): >60 ML/MIN/1.73 M^2
EST. GFR  (NON AFRICAN AMERICAN): >60 ML/MIN/1.73 M^2
GLUCOSE SERPL-MCNC: 126 MG/DL (ref 70–110)
HCT VFR BLD AUTO: 33.8 % (ref 37–48.5)
HGB BLD-MCNC: 11.2 G/DL (ref 12–16)
IMM GRANULOCYTES # BLD AUTO: 0.02 K/UL (ref 0–0.04)
IMM GRANULOCYTES NFR BLD AUTO: 0.5 % (ref 0–0.5)
LYMPHOCYTES # BLD AUTO: 1.2 K/UL (ref 1–4.8)
LYMPHOCYTES NFR BLD: 31.3 % (ref 18–48)
MAG IGM SER QL IB: NEGATIVE
MAGNESIUM SERPL-MCNC: 1.8 MG/DL (ref 1.6–2.6)
MCH RBC QN AUTO: 26.4 PG (ref 27–31)
MCHC RBC AUTO-ENTMCNC: 33.1 G/DL (ref 32–36)
MCV RBC AUTO: 80 FL (ref 82–98)
MONOCYTES # BLD AUTO: 0.7 K/UL (ref 0.3–1)
MONOCYTES NFR BLD: 17.6 % (ref 4–15)
NEUTROPHILS # BLD AUTO: 1.8 K/UL (ref 1.8–7.7)
NEUTROPHILS NFR BLD: 46.7 % (ref 38–73)
NRBC BLD-RTO: 0 /100 WBC
PHOSPHATE SERPL-MCNC: 4.4 MG/DL (ref 2.7–4.5)
PLATELET # BLD AUTO: 331 K/UL (ref 150–350)
PLATELET BLD QL SMEAR: ABNORMAL
PMV BLD AUTO: 9.8 FL (ref 9.2–12.9)
POTASSIUM SERPL-SCNC: 4 MMOL/L (ref 3.5–5.1)
PROT SERPL-MCNC: 9.7 G/DL (ref 6–8.4)
RBC # BLD AUTO: 4.24 M/UL (ref 4–5.4)
SARS-COV-2 RNA RESP QL NAA+PROBE: NOT DETECTED
SODIUM SERPL-SCNC: 130 MMOL/L (ref 136–145)
WBC # BLD AUTO: 3.86 K/UL (ref 3.9–12.7)

## 2021-02-07 PROCEDURE — 51798 US URINE CAPACITY MEASURE: CPT

## 2021-02-07 PROCEDURE — 85025 COMPLETE CBC W/AUTO DIFF WBC: CPT

## 2021-02-07 PROCEDURE — 99232 PR SUBSEQUENT HOSPITAL CARE,LEVL II: ICD-10-PCS | Mod: ,,, | Performed by: INTERNAL MEDICINE

## 2021-02-07 PROCEDURE — 84100 ASSAY OF PHOSPHORUS: CPT

## 2021-02-07 PROCEDURE — 63600175 PHARM REV CODE 636 W HCPCS: Performed by: PHYSICIAN ASSISTANT

## 2021-02-07 PROCEDURE — 80053 COMPREHEN METABOLIC PANEL: CPT

## 2021-02-07 PROCEDURE — 99232 SBSQ HOSP IP/OBS MODERATE 35: CPT | Mod: GC,,, | Performed by: PSYCHIATRY & NEUROLOGY

## 2021-02-07 PROCEDURE — 99900035 HC TECH TIME PER 15 MIN (STAT)

## 2021-02-07 PROCEDURE — 25000003 PHARM REV CODE 250: Performed by: INTERNAL MEDICINE

## 2021-02-07 PROCEDURE — 99232 SBSQ HOSP IP/OBS MODERATE 35: CPT | Mod: ,,, | Performed by: INTERNAL MEDICINE

## 2021-02-07 PROCEDURE — 20600001 HC STEP DOWN PRIVATE ROOM

## 2021-02-07 PROCEDURE — 25000003 PHARM REV CODE 250: Performed by: PHYSICIAN ASSISTANT

## 2021-02-07 PROCEDURE — 99232 PR SUBSEQUENT HOSPITAL CARE,LEVL II: ICD-10-PCS | Mod: GC,,, | Performed by: PSYCHIATRY & NEUROLOGY

## 2021-02-07 PROCEDURE — 83735 ASSAY OF MAGNESIUM: CPT

## 2021-02-07 RX ADMIN — CARVEDILOL 3.12 MG: 3.12 TABLET, FILM COATED ORAL at 08:02

## 2021-02-07 RX ADMIN — THERA TABS 1 TABLET: TAB at 08:02

## 2021-02-07 RX ADMIN — ATORVASTATIN CALCIUM 20 MG: 20 TABLET, FILM COATED ORAL at 08:02

## 2021-02-07 RX ADMIN — OXYCODONE HYDROCHLORIDE AND ACETAMINOPHEN 500 MG: 500 TABLET ORAL at 08:02

## 2021-02-07 RX ADMIN — LATANOPROST 1 DROP: 50 SOLUTION OPHTHALMIC at 09:02

## 2021-02-07 RX ADMIN — ASPIRIN 81 MG: 81 TABLET, COATED ORAL at 08:02

## 2021-02-07 RX ADMIN — OXYCODONE 5 MG: 5 TABLET ORAL at 05:02

## 2021-02-07 RX ADMIN — Medication 1000 UNITS: at 08:02

## 2021-02-07 RX ADMIN — LEVETIRACETAM 500 MG: 500 TABLET ORAL at 08:02

## 2021-02-07 RX ADMIN — LEVETIRACETAM 500 MG: 500 TABLET ORAL at 09:02

## 2021-02-07 RX ADMIN — OXYCODONE 5 MG: 5 TABLET ORAL at 08:02

## 2021-02-07 RX ADMIN — GABAPENTIN 300 MG: 300 CAPSULE ORAL at 08:02

## 2021-02-07 RX ADMIN — GABAPENTIN 300 MG: 300 CAPSULE ORAL at 09:02

## 2021-02-07 RX ADMIN — DOCUSATE SODIUM 50 MG: 50 CAPSULE, LIQUID FILLED ORAL at 09:02

## 2021-02-07 RX ADMIN — POLYETHYLENE GLYCOL 3350 33.5 G: 17 POWDER, FOR SOLUTION ORAL at 08:02

## 2021-02-07 RX ADMIN — FLUTICASONE PROPIONATE 100 MCG: 50 SPRAY, METERED NASAL at 08:02

## 2021-02-07 RX ADMIN — TRAZODONE HYDROCHLORIDE 50 MG: 50 TABLET ORAL at 09:02

## 2021-02-07 RX ADMIN — AMLODIPINE BESYLATE 10 MG: 10 TABLET ORAL at 08:02

## 2021-02-07 RX ADMIN — LORAZEPAM 1 MG: 1 TABLET ORAL at 09:02

## 2021-02-07 RX ADMIN — ENOXAPARIN SODIUM 40 MG: 40 INJECTION SUBCUTANEOUS at 04:02

## 2021-02-08 ENCOUNTER — PATIENT MESSAGE (OUTPATIENT)
Dept: NEUROLOGY | Facility: CLINIC | Age: 71
End: 2021-02-08

## 2021-02-08 LAB
ALBUMIN SERPL BCP-MCNC: 2.7 G/DL (ref 3.5–5.2)
ALP SERPL-CCNC: 40 U/L (ref 55–135)
ALT SERPL W/O P-5'-P-CCNC: 10 U/L (ref 10–44)
AMPHIPHYSIN AB TITR CSF: NEGATIVE TITER
ANION GAP SERPL CALC-SCNC: 10 MMOL/L (ref 8–16)
AST SERPL-CCNC: 22 U/L (ref 10–40)
BASOPHILS # BLD AUTO: 0.04 K/UL (ref 0–0.2)
BASOPHILS NFR BLD: 1.1 % (ref 0–1.9)
BILIRUB SERPL-MCNC: 0.4 MG/DL (ref 0.1–1)
BUN SERPL-MCNC: 16 MG/DL (ref 8–23)
CALCIUM SERPL-MCNC: 8.9 MG/DL (ref 8.7–10.5)
CHLORIDE SERPL-SCNC: 92 MMOL/L (ref 95–110)
CO2 SERPL-SCNC: 30 MMOL/L (ref 23–29)
CREAT SERPL-MCNC: 0.7 MG/DL (ref 0.5–1.4)
CV2 IGG TITR CSF: NEGATIVE TITER
DIFFERENTIAL METHOD: ABNORMAL
EOSINOPHIL # BLD AUTO: 0.1 K/UL (ref 0–0.5)
EOSINOPHIL NFR BLD: 2.2 % (ref 0–8)
ERYTHROCYTE [DISTWIDTH] IN BLOOD BY AUTOMATED COUNT: 13.9 % (ref 11.5–14.5)
EST. GFR  (AFRICAN AMERICAN): >60 ML/MIN/1.73 M^2
EST. GFR  (NON AFRICAN AMERICAN): >60 ML/MIN/1.73 M^2
GLIAL NUC TYPE 1 AB TITR CSF: NEGATIVE TITER
GLUCOSE SERPL-MCNC: 120 MG/DL (ref 70–110)
HCT VFR BLD AUTO: 32.9 % (ref 37–48.5)
HGB BLD-MCNC: 10.7 G/DL (ref 12–16)
HU1 AB TITR CSF IF: NEGATIVE TITER
HU2 AB TITR CSF IF: NEGATIVE TITER
HU3 AB TITR CSF: NEGATIVE TITER
IMM GRANULOCYTES # BLD AUTO: 0.01 K/UL (ref 0–0.04)
IMM GRANULOCYTES NFR BLD AUTO: 0.3 % (ref 0–0.5)
LYMPHOCYTES # BLD AUTO: 1.6 K/UL (ref 1–4.8)
LYMPHOCYTES NFR BLD: 43.6 % (ref 18–48)
MAGNESIUM SERPL-MCNC: 1.7 MG/DL (ref 1.6–2.6)
MCH RBC QN AUTO: 26.6 PG (ref 27–31)
MCHC RBC AUTO-ENTMCNC: 32.5 G/DL (ref 32–36)
MCV RBC AUTO: 82 FL (ref 82–98)
MONOCYTES # BLD AUTO: 0.8 K/UL (ref 0.3–1)
MONOCYTES NFR BLD: 20.5 % (ref 4–15)
NEUTROPHILS # BLD AUTO: 1.2 K/UL (ref 1.8–7.7)
NEUTROPHILS NFR BLD: 32.3 % (ref 38–73)
NRBC BLD-RTO: 0 /100 WBC
PARANEOPLASTIC INTERPRETATION,CSF: NORMAL
PCA-2 AB TITR CSF: NEGATIVE TITER
PCA-TR AB TITR CSF: NEGATIVE TITER
PHOSPHATE SERPL-MCNC: 4.5 MG/DL (ref 2.7–4.5)
PLATELET # BLD AUTO: 331 K/UL (ref 150–350)
PLATELET BLD QL SMEAR: ABNORMAL
PMV BLD AUTO: 10.1 FL (ref 9.2–12.9)
PNEOE REFLEX TEST ADDED: NORMAL
POTASSIUM SERPL-SCNC: 3.7 MMOL/L (ref 3.5–5.1)
PROT SERPL-MCNC: 9 G/DL (ref 6–8.4)
PURKINJE CELLS AB TITR CSF IF: NEGATIVE TITER
RBC # BLD AUTO: 4.03 M/UL (ref 4–5.4)
SODIUM SERPL-SCNC: 132 MMOL/L (ref 136–145)
WBC # BLD AUTO: 3.65 K/UL (ref 3.9–12.7)

## 2021-02-08 PROCEDURE — 99233 SBSQ HOSP IP/OBS HIGH 50: CPT | Mod: ,,, | Performed by: INTERNAL MEDICINE

## 2021-02-08 PROCEDURE — 63600175 PHARM REV CODE 636 W HCPCS: Performed by: PHYSICIAN ASSISTANT

## 2021-02-08 PROCEDURE — 25000003 PHARM REV CODE 250: Performed by: PHYSICIAN ASSISTANT

## 2021-02-08 PROCEDURE — 99232 PR SUBSEQUENT HOSPITAL CARE,LEVL II: ICD-10-PCS | Mod: ,,, | Performed by: NURSE PRACTITIONER

## 2021-02-08 PROCEDURE — 85025 COMPLETE CBC W/AUTO DIFF WBC: CPT

## 2021-02-08 PROCEDURE — 25000003 PHARM REV CODE 250: Performed by: INTERNAL MEDICINE

## 2021-02-08 PROCEDURE — 99900035 HC TECH TIME PER 15 MIN (STAT)

## 2021-02-08 PROCEDURE — 99232 SBSQ HOSP IP/OBS MODERATE 35: CPT | Mod: ,,, | Performed by: NURSE PRACTITIONER

## 2021-02-08 PROCEDURE — 25000003 PHARM REV CODE 250: Performed by: FAMILY MEDICINE

## 2021-02-08 PROCEDURE — 99233 PR SUBSEQUENT HOSPITAL CARE,LEVL III: ICD-10-PCS | Mod: ,,, | Performed by: INTERNAL MEDICINE

## 2021-02-08 PROCEDURE — 84100 ASSAY OF PHOSPHORUS: CPT

## 2021-02-08 PROCEDURE — 94010 BREATHING CAPACITY TEST: CPT

## 2021-02-08 PROCEDURE — 83735 ASSAY OF MAGNESIUM: CPT

## 2021-02-08 PROCEDURE — 80053 COMPREHEN METABOLIC PANEL: CPT

## 2021-02-08 PROCEDURE — 94150 VITAL CAPACITY TEST: CPT

## 2021-02-08 PROCEDURE — 20600001 HC STEP DOWN PRIVATE ROOM

## 2021-02-08 RX ORDER — GABAPENTIN 300 MG/1
300 CAPSULE ORAL 2 TIMES DAILY
Qty: 60 CAPSULE | Refills: 11 | Status: SHIPPED | OUTPATIENT
Start: 2021-02-08 | End: 2021-07-01

## 2021-02-08 RX ORDER — ACETAMINOPHEN 325 MG/1
650 TABLET ORAL EVERY 4 HOURS PRN
Refills: 0
Start: 2021-02-08 | End: 2022-10-04

## 2021-02-08 RX ORDER — BISACODYL 10 MG
10 SUPPOSITORY, RECTAL RECTAL DAILY PRN
Refills: 0 | COMMUNITY
Start: 2021-02-08 | End: 2021-02-10 | Stop reason: HOSPADM

## 2021-02-08 RX ORDER — LORAZEPAM 1 MG/1
1 TABLET ORAL NIGHTLY
Qty: 30 TABLET | Refills: 0
Start: 2021-02-08 | End: 2021-04-14 | Stop reason: SDUPTHER

## 2021-02-08 RX ORDER — LIDOCAINE 50 MG/G
1 PATCH TOPICAL DAILY
Refills: 0
Start: 2021-02-08 | End: 2021-07-01

## 2021-02-08 RX ORDER — TAMSULOSIN HYDROCHLORIDE 0.4 MG/1
0.4 CAPSULE ORAL DAILY
Status: DISCONTINUED | OUTPATIENT
Start: 2021-02-08 | End: 2021-02-10 | Stop reason: HOSPADM

## 2021-02-08 RX ORDER — TAMSULOSIN HYDROCHLORIDE 0.4 MG/1
0.4 CAPSULE ORAL DAILY
Qty: 30 CAPSULE | Refills: 11 | Status: SHIPPED | OUTPATIENT
Start: 2021-02-08 | End: 2021-08-16

## 2021-02-08 RX ORDER — TRAZODONE HYDROCHLORIDE 50 MG/1
50 TABLET ORAL NIGHTLY
Qty: 30 TABLET | Refills: 11 | Status: SHIPPED | OUTPATIENT
Start: 2021-02-08 | End: 2021-07-01

## 2021-02-08 RX ORDER — CHOLECALCIFEROL (VITAMIN D3) 25 MCG
1000 TABLET ORAL DAILY
Start: 2021-02-09 | End: 2021-07-01

## 2021-02-08 RX ORDER — LEVETIRACETAM 500 MG/1
500 TABLET ORAL 2 TIMES DAILY
Qty: 60 TABLET | Refills: 11 | Status: SHIPPED | OUTPATIENT
Start: 2021-02-08 | End: 2021-07-01

## 2021-02-08 RX ORDER — ONDANSETRON 8 MG/1
8 TABLET, ORALLY DISINTEGRATING ORAL EVERY 8 HOURS PRN
Start: 2021-02-08 | End: 2021-07-01

## 2021-02-08 RX ORDER — SENNOSIDES 8.6 MG/1
1 TABLET ORAL DAILY PRN
COMMUNITY
Start: 2021-02-08 | End: 2021-02-10 | Stop reason: HOSPADM

## 2021-02-08 RX ORDER — POLYETHYLENE GLYCOL 3350 17 G/17G
0.4 POWDER, FOR SOLUTION ORAL DAILY
Refills: 0
Start: 2021-02-09 | End: 2021-02-10 | Stop reason: HOSPADM

## 2021-02-08 RX ORDER — HYDROXYZINE PAMOATE 25 MG/1
25 CAPSULE ORAL EVERY 8 HOURS PRN
Start: 2021-02-08 | End: 2021-08-16

## 2021-02-08 RX ADMIN — LEVETIRACETAM 500 MG: 500 TABLET ORAL at 08:02

## 2021-02-08 RX ADMIN — ONDANSETRON 8 MG: 8 TABLET, ORALLY DISINTEGRATING ORAL at 11:02

## 2021-02-08 RX ADMIN — THERA TABS 1 TABLET: TAB at 08:02

## 2021-02-08 RX ADMIN — DOCUSATE SODIUM 50 MG: 50 CAPSULE, LIQUID FILLED ORAL at 08:02

## 2021-02-08 RX ADMIN — ENOXAPARIN SODIUM 40 MG: 40 INJECTION SUBCUTANEOUS at 05:02

## 2021-02-08 RX ADMIN — ASPIRIN 81 MG: 81 TABLET, COATED ORAL at 08:02

## 2021-02-08 RX ADMIN — ATORVASTATIN CALCIUM 20 MG: 20 TABLET, FILM COATED ORAL at 08:02

## 2021-02-08 RX ADMIN — OXYCODONE 5 MG: 5 TABLET ORAL at 06:02

## 2021-02-08 RX ADMIN — OXYCODONE HYDROCHLORIDE AND ACETAMINOPHEN 500 MG: 500 TABLET ORAL at 08:02

## 2021-02-08 RX ADMIN — OXYCODONE 5 MG: 5 TABLET ORAL at 12:02

## 2021-02-08 RX ADMIN — ONDANSETRON 8 MG: 8 TABLET, ORALLY DISINTEGRATING ORAL at 12:02

## 2021-02-08 RX ADMIN — Medication 1000 UNITS: at 08:02

## 2021-02-08 RX ADMIN — ACETAMINOPHEN 650 MG: 325 TABLET ORAL at 11:02

## 2021-02-08 RX ADMIN — LATANOPROST 1 DROP: 50 SOLUTION OPHTHALMIC at 08:02

## 2021-02-08 RX ADMIN — GABAPENTIN 300 MG: 300 CAPSULE ORAL at 08:02

## 2021-02-08 RX ADMIN — AMLODIPINE BESYLATE 10 MG: 10 TABLET ORAL at 08:02

## 2021-02-08 RX ADMIN — SODIUM CHLORIDE 1000 ML: 0.9 INJECTION, SOLUTION INTRAVENOUS at 08:02

## 2021-02-08 RX ADMIN — TAMSULOSIN HYDROCHLORIDE 0.4 MG: 0.4 CAPSULE ORAL at 12:02

## 2021-02-08 RX ADMIN — FLUTICASONE PROPIONATE 100 MCG: 50 SPRAY, METERED NASAL at 08:02

## 2021-02-09 LAB
ALBUMIN SERPL BCP-MCNC: 2.6 G/DL (ref 3.5–5.2)
ALP SERPL-CCNC: 37 U/L (ref 55–135)
ALT SERPL W/O P-5'-P-CCNC: 11 U/L (ref 10–44)
AMORPH CRY UR QL COMP ASSIST: NORMAL
ANION GAP SERPL CALC-SCNC: 5 MMOL/L (ref 8–16)
ANISOCYTOSIS BLD QL SMEAR: SLIGHT
AST SERPL-CCNC: 21 U/L (ref 10–40)
BACTERIA #/AREA URNS AUTO: NORMAL /HPF
BASOPHILS # BLD AUTO: 0.03 K/UL (ref 0–0.2)
BASOPHILS NFR BLD: 0.8 % (ref 0–1.9)
BILIRUB SERPL-MCNC: 0.4 MG/DL (ref 0.1–1)
BILIRUB UR QL STRIP: NEGATIVE
BUN SERPL-MCNC: 10 MG/DL (ref 8–23)
CALCIUM SERPL-MCNC: 8.6 MG/DL (ref 8.7–10.5)
CHLORIDE SERPL-SCNC: 96 MMOL/L (ref 95–110)
CLARITY UR REFRACT.AUTO: ABNORMAL
CO2 SERPL-SCNC: 30 MMOL/L (ref 23–29)
COLOR UR AUTO: YELLOW
CREAT SERPL-MCNC: 0.6 MG/DL (ref 0.5–1.4)
DIFFERENTIAL METHOD: ABNORMAL
EOSINOPHIL # BLD AUTO: 0.1 K/UL (ref 0–0.5)
EOSINOPHIL NFR BLD: 2.4 % (ref 0–8)
ERYTHROCYTE [DISTWIDTH] IN BLOOD BY AUTOMATED COUNT: 14.4 % (ref 11.5–14.5)
EST. GFR  (AFRICAN AMERICAN): >60 ML/MIN/1.73 M^2
EST. GFR  (NON AFRICAN AMERICAN): >60 ML/MIN/1.73 M^2
GLUCOSE SERPL-MCNC: 116 MG/DL (ref 70–110)
GLUCOSE UR QL STRIP: NEGATIVE
HCT VFR BLD AUTO: 29.8 % (ref 37–48.5)
HGB BLD-MCNC: 9.9 G/DL (ref 12–16)
HGB UR QL STRIP: NEGATIVE
IMM GRANULOCYTES # BLD AUTO: 0.05 K/UL (ref 0–0.04)
IMM GRANULOCYTES NFR BLD AUTO: 1.3 % (ref 0–0.5)
KETONES UR QL STRIP: NEGATIVE
LACTATE SERPL-SCNC: 0.8 MMOL/L (ref 0.5–2.2)
LEUKOCYTE ESTERASE UR QL STRIP: ABNORMAL
LYMPHOCYTES # BLD AUTO: 1.6 K/UL (ref 1–4.8)
LYMPHOCYTES NFR BLD: 43 % (ref 18–48)
MAGNESIUM SERPL-MCNC: 1.6 MG/DL (ref 1.6–2.6)
MCH RBC QN AUTO: 26.7 PG (ref 27–31)
MCHC RBC AUTO-ENTMCNC: 33.2 G/DL (ref 32–36)
MCV RBC AUTO: 80 FL (ref 82–98)
MICROSCOPIC COMMENT: NORMAL
MONOCYTES # BLD AUTO: 0.7 K/UL (ref 0.3–1)
MONOCYTES NFR BLD: 17.7 % (ref 4–15)
NEUTROPHILS # BLD AUTO: 1.3 K/UL (ref 1.8–7.7)
NEUTROPHILS NFR BLD: 34.8 % (ref 38–73)
NITRITE UR QL STRIP: NEGATIVE
NRBC BLD-RTO: 0 /100 WBC
PH UR STRIP: 8 [PH] (ref 5–8)
PHOSPHATE SERPL-MCNC: 3.6 MG/DL (ref 2.7–4.5)
PLATELET # BLD AUTO: 331 K/UL (ref 150–350)
PLATELET BLD QL SMEAR: ABNORMAL
PMV BLD AUTO: 10.2 FL (ref 9.2–12.9)
POTASSIUM SERPL-SCNC: 3.6 MMOL/L (ref 3.5–5.1)
PROT SERPL-MCNC: 8.1 G/DL (ref 6–8.4)
PROT UR QL STRIP: NEGATIVE
RBC # BLD AUTO: 3.71 M/UL (ref 4–5.4)
SODIUM SERPL-SCNC: 131 MMOL/L (ref 136–145)
SP GR UR STRIP: 1.01 (ref 1–1.03)
SQUAMOUS #/AREA URNS AUTO: 1 /HPF
URN SPEC COLLECT METH UR: ABNORMAL
WBC # BLD AUTO: 3.72 K/UL (ref 3.9–12.7)
WBC #/AREA URNS AUTO: 3 /HPF (ref 0–5)

## 2021-02-09 PROCEDURE — 81001 URINALYSIS AUTO W/SCOPE: CPT

## 2021-02-09 PROCEDURE — 97530 THERAPEUTIC ACTIVITIES: CPT

## 2021-02-09 PROCEDURE — 25000003 PHARM REV CODE 250: Performed by: PHYSICIAN ASSISTANT

## 2021-02-09 PROCEDURE — 99233 SBSQ HOSP IP/OBS HIGH 50: CPT | Mod: ,,, | Performed by: INTERNAL MEDICINE

## 2021-02-09 PROCEDURE — 99233 PR SUBSEQUENT HOSPITAL CARE,LEVL III: ICD-10-PCS | Mod: ,,, | Performed by: INTERNAL MEDICINE

## 2021-02-09 PROCEDURE — 20600001 HC STEP DOWN PRIVATE ROOM

## 2021-02-09 PROCEDURE — 63600175 PHARM REV CODE 636 W HCPCS: Performed by: PHYSICIAN ASSISTANT

## 2021-02-09 PROCEDURE — 80053 COMPREHEN METABOLIC PANEL: CPT

## 2021-02-09 PROCEDURE — 51798 US URINE CAPACITY MEASURE: CPT

## 2021-02-09 PROCEDURE — 83605 ASSAY OF LACTIC ACID: CPT

## 2021-02-09 PROCEDURE — 84100 ASSAY OF PHOSPHORUS: CPT

## 2021-02-09 PROCEDURE — 99900035 HC TECH TIME PER 15 MIN (STAT)

## 2021-02-09 PROCEDURE — 25000003 PHARM REV CODE 250: Performed by: INTERNAL MEDICINE

## 2021-02-09 PROCEDURE — 97535 SELF CARE MNGMENT TRAINING: CPT

## 2021-02-09 PROCEDURE — 85025 COMPLETE CBC W/AUTO DIFF WBC: CPT

## 2021-02-09 PROCEDURE — 97110 THERAPEUTIC EXERCISES: CPT

## 2021-02-09 PROCEDURE — 83735 ASSAY OF MAGNESIUM: CPT

## 2021-02-09 RX ADMIN — AMLODIPINE BESYLATE 10 MG: 10 TABLET ORAL at 09:02

## 2021-02-09 RX ADMIN — LATANOPROST 1 DROP: 50 SOLUTION OPHTHALMIC at 08:02

## 2021-02-09 RX ADMIN — OXYCODONE HYDROCHLORIDE AND ACETAMINOPHEN 500 MG: 500 TABLET ORAL at 09:02

## 2021-02-09 RX ADMIN — GABAPENTIN 300 MG: 300 CAPSULE ORAL at 09:02

## 2021-02-09 RX ADMIN — LEVETIRACETAM 500 MG: 500 TABLET ORAL at 09:02

## 2021-02-09 RX ADMIN — LORAZEPAM 1 MG: 1 TABLET ORAL at 08:02

## 2021-02-09 RX ADMIN — LEVETIRACETAM 500 MG: 500 TABLET ORAL at 08:02

## 2021-02-09 RX ADMIN — GABAPENTIN 300 MG: 300 CAPSULE ORAL at 08:02

## 2021-02-09 RX ADMIN — TRAZODONE HYDROCHLORIDE 50 MG: 50 TABLET ORAL at 08:02

## 2021-02-09 RX ADMIN — LIDOCAINE 1 PATCH: 50 PATCH TOPICAL at 05:02

## 2021-02-09 RX ADMIN — OXYCODONE 5 MG: 5 TABLET ORAL at 03:02

## 2021-02-09 RX ADMIN — Medication 1000 UNITS: at 09:02

## 2021-02-09 RX ADMIN — PROMETHAZINE HYDROCHLORIDE 6.25 MG: 25 INJECTION INTRAMUSCULAR; INTRAVENOUS at 12:02

## 2021-02-09 RX ADMIN — ASPIRIN 81 MG: 81 TABLET, COATED ORAL at 09:02

## 2021-02-09 RX ADMIN — THERA TABS 1 TABLET: TAB at 09:02

## 2021-02-09 RX ADMIN — ATORVASTATIN CALCIUM 20 MG: 20 TABLET, FILM COATED ORAL at 09:02

## 2021-02-09 RX ADMIN — TAMSULOSIN HYDROCHLORIDE 0.4 MG: 0.4 CAPSULE ORAL at 09:02

## 2021-02-09 RX ADMIN — ENOXAPARIN SODIUM 40 MG: 40 INJECTION SUBCUTANEOUS at 05:02

## 2021-02-10 VITALS
SYSTOLIC BLOOD PRESSURE: 107 MMHG | DIASTOLIC BLOOD PRESSURE: 78 MMHG | TEMPERATURE: 99 F | BODY MASS INDEX: 30.16 KG/M2 | RESPIRATION RATE: 20 BRPM | WEIGHT: 181 LBS | HEIGHT: 65 IN | OXYGEN SATURATION: 98 % | HEART RATE: 104 BPM

## 2021-02-10 LAB
ALBUMIN SERPL BCP-MCNC: 2.7 G/DL (ref 3.5–5.2)
ALP SERPL-CCNC: 40 U/L (ref 55–135)
ALT SERPL W/O P-5'-P-CCNC: 13 U/L (ref 10–44)
AMPHIPHYSIN AB TITR SER: NEGATIVE TITER
ANION GAP SERPL CALC-SCNC: 8 MMOL/L (ref 8–16)
ANISOCYTOSIS BLD QL SMEAR: SLIGHT
AST SERPL-CCNC: 32 U/L (ref 10–40)
BASOPHILS # BLD AUTO: 0.04 K/UL (ref 0–0.2)
BASOPHILS NFR BLD: 1.1 % (ref 0–1.9)
BILIRUB SERPL-MCNC: 0.3 MG/DL (ref 0.1–1)
BUN SERPL-MCNC: 7 MG/DL (ref 8–23)
CALCIUM SERPL-MCNC: 8.8 MG/DL (ref 8.7–10.5)
CHLORIDE SERPL-SCNC: 97 MMOL/L (ref 95–110)
CO2 SERPL-SCNC: 27 MMOL/L (ref 23–29)
CREAT SERPL-MCNC: 0.6 MG/DL (ref 0.5–1.4)
CV2 IGG TITR SER: NEGATIVE TITER
DIFFERENTIAL METHOD: ABNORMAL
EOSINOPHIL # BLD AUTO: 0.1 K/UL (ref 0–0.5)
EOSINOPHIL NFR BLD: 2.5 % (ref 0–8)
ERYTHROCYTE [DISTWIDTH] IN BLOOD BY AUTOMATED COUNT: 14.2 % (ref 11.5–14.5)
EST. GFR  (AFRICAN AMERICAN): >60 ML/MIN/1.73 M^2
EST. GFR  (NON AFRICAN AMERICAN): >60 ML/MIN/1.73 M^2
GLIAL NUC TYPE 1 AB TITR SER: NEGATIVE TITER
GLUCOSE SERPL-MCNC: 112 MG/DL (ref 70–110)
HCT VFR BLD AUTO: 31.3 % (ref 37–48.5)
HGB BLD-MCNC: 10.5 G/DL (ref 12–16)
HU1 AB TITR SER: NEGATIVE TITER
HU2 AB TITR SER IF: NEGATIVE TITER
HU3 AB TITR SER: NEGATIVE TITER
IMM GRANULOCYTES # BLD AUTO: 0.04 K/UL (ref 0–0.04)
IMM GRANULOCYTES NFR BLD AUTO: 1.1 % (ref 0–0.5)
IMMUNOLOGIST REVIEW: NORMAL
LYMPHOCYTES # BLD AUTO: 1.7 K/UL (ref 1–4.8)
LYMPHOCYTES NFR BLD: 46.3 % (ref 18–48)
MAGNESIUM SERPL-MCNC: 1.7 MG/DL (ref 1.6–2.6)
MCH RBC QN AUTO: 26.8 PG (ref 27–31)
MCHC RBC AUTO-ENTMCNC: 33.5 G/DL (ref 32–36)
MCV RBC AUTO: 80 FL (ref 82–98)
MONOCYTES # BLD AUTO: 0.6 K/UL (ref 0.3–1)
MONOCYTES NFR BLD: 15.4 % (ref 4–15)
NACHR AB SER-SCNC: 0.01 NMOL/L
NEUTROPHILS # BLD AUTO: 1.2 K/UL (ref 1.8–7.7)
NEUTROPHILS NFR BLD: 33.6 % (ref 38–73)
NRBC BLD-RTO: 1 /100 WBC
PAVAL REFLEX TEST ADDED: NORMAL
PCA-1 AB TITR SER: NEGATIVE TITER
PCA-2 AB TITR SER: NEGATIVE TITER
PCA-TR AB TITR SER: NEGATIVE TITER
PHOSPHATE SERPL-MCNC: 3.4 MG/DL (ref 2.7–4.5)
PLATELET # BLD AUTO: 227 K/UL (ref 150–350)
PLATELET BLD QL SMEAR: ABNORMAL
PMV BLD AUTO: 10.6 FL (ref 9.2–12.9)
POIKILOCYTOSIS BLD QL SMEAR: SLIGHT
POTASSIUM SERPL-SCNC: 4 MMOL/L (ref 3.5–5.1)
PROT SERPL-MCNC: 8.4 G/DL (ref 6–8.4)
RBC # BLD AUTO: 3.92 M/UL (ref 4–5.4)
SODIUM SERPL-SCNC: 132 MMOL/L (ref 136–145)
STRIA MUS AB TITR SER: NEGATIVE TITER
VGCC-N BIND AB SER-SCNC: 0 NMOL/L
VGCC-P/Q BIND AB SER-SCNC: 0 NMOL/L
VGKC AB SER-SCNC: 0 NMOL/L
WBC # BLD AUTO: 3.56 K/UL (ref 3.9–12.7)

## 2021-02-10 PROCEDURE — 85025 COMPLETE CBC W/AUTO DIFF WBC: CPT

## 2021-02-10 PROCEDURE — 99222 1ST HOSP IP/OBS MODERATE 55: CPT | Mod: ,,, | Performed by: PHYSICAL MEDICINE & REHABILITATION

## 2021-02-10 PROCEDURE — 99239 HOSP IP/OBS DSCHRG MGMT >30: CPT | Mod: ,,, | Performed by: INTERNAL MEDICINE

## 2021-02-10 PROCEDURE — 84100 ASSAY OF PHOSPHORUS: CPT

## 2021-02-10 PROCEDURE — 25000003 PHARM REV CODE 250: Performed by: PHYSICIAN ASSISTANT

## 2021-02-10 PROCEDURE — 25000003 PHARM REV CODE 250: Performed by: INTERNAL MEDICINE

## 2021-02-10 PROCEDURE — 99222 PR INITIAL HOSPITAL CARE,LEVL II: ICD-10-PCS | Mod: ,,, | Performed by: PHYSICAL MEDICINE & REHABILITATION

## 2021-02-10 PROCEDURE — 80053 COMPREHEN METABOLIC PANEL: CPT

## 2021-02-10 PROCEDURE — 83735 ASSAY OF MAGNESIUM: CPT

## 2021-02-10 PROCEDURE — 99239 PR HOSPITAL DISCHARGE DAY,>30 MIN: ICD-10-PCS | Mod: ,,, | Performed by: INTERNAL MEDICINE

## 2021-02-10 PROCEDURE — 36415 COLL VENOUS BLD VENIPUNCTURE: CPT

## 2021-02-10 RX ADMIN — TAMSULOSIN HYDROCHLORIDE 0.4 MG: 0.4 CAPSULE ORAL at 09:02

## 2021-02-10 RX ADMIN — Medication 1000 UNITS: at 09:02

## 2021-02-10 RX ADMIN — FLUTICASONE PROPIONATE 100 MCG: 50 SPRAY, METERED NASAL at 09:02

## 2021-02-10 RX ADMIN — LEVETIRACETAM 500 MG: 500 TABLET ORAL at 09:02

## 2021-02-10 RX ADMIN — THERA TABS 1 TABLET: TAB at 09:02

## 2021-02-10 RX ADMIN — GABAPENTIN 300 MG: 300 CAPSULE ORAL at 09:02

## 2021-02-10 RX ADMIN — OXYCODONE HYDROCHLORIDE AND ACETAMINOPHEN 500 MG: 500 TABLET ORAL at 09:02

## 2021-02-10 RX ADMIN — ATORVASTATIN CALCIUM 20 MG: 20 TABLET, FILM COATED ORAL at 09:02

## 2021-02-10 RX ADMIN — ASPIRIN 81 MG: 81 TABLET, COATED ORAL at 09:02

## 2021-02-10 RX ADMIN — AMLODIPINE BESYLATE 10 MG: 10 TABLET ORAL at 09:02

## 2021-02-15 ENCOUNTER — TELEPHONE (OUTPATIENT)
Dept: NEUROLOGY | Facility: CLINIC | Age: 71
End: 2021-02-15

## 2021-02-17 ENCOUNTER — HOSPITAL ENCOUNTER (OUTPATIENT)
Dept: RADIOLOGY | Facility: HOSPITAL | Age: 71
Discharge: HOME | End: 2021-02-17
Attending: PHYSICAL MEDICINE & REHABILITATION
Payer: MEDICARE

## 2021-02-17 DIAGNOSIS — G61.0 GBS (GUILLAIN-BARRE SYNDROME): Primary | ICD-10-CM

## 2021-02-17 DIAGNOSIS — G61.0 GBS (GUILLAIN-BARRE SYNDROME): ICD-10-CM

## 2021-02-17 DIAGNOSIS — R29.898 WEAKNESS OF BOTH LOWER EXTREMITIES: ICD-10-CM

## 2021-02-17 DIAGNOSIS — I80.03 PHLEBITIS AND THROMBOPHLEBITIS OF SUPERFICIAL VESSELS OF LOWER EXTREMITIES, BILATERAL: ICD-10-CM

## 2021-02-17 PROCEDURE — 93970 EXTREMITY STUDY: CPT | Mod: 26,,, | Performed by: RADIOLOGY

## 2021-02-17 PROCEDURE — 93970 US LOWER EXTREMITY VEINS BILATERAL: ICD-10-PCS | Mod: 26,,, | Performed by: RADIOLOGY

## 2021-02-17 PROCEDURE — 93970 EXTREMITY STUDY: CPT | Mod: TC

## 2021-02-18 DIAGNOSIS — G61.0 GBS (GUILLAIN BARRE SYNDROME): Primary | ICD-10-CM

## 2021-03-01 ENCOUNTER — TELEPHONE (OUTPATIENT)
Dept: NEUROLOGY | Facility: CLINIC | Age: 71
End: 2021-03-01

## 2021-03-05 DIAGNOSIS — G61.0 GUILLAIN BARRÉ SYNDROME: Primary | ICD-10-CM

## 2021-03-10 ENCOUNTER — OFFICE VISIT (OUTPATIENT)
Dept: OPTOMETRY | Facility: CLINIC | Age: 71
End: 2021-03-10
Payer: MEDICARE

## 2021-03-10 DIAGNOSIS — H43.393 VISUAL FLOATERS, BILATERAL: Primary | ICD-10-CM

## 2021-03-10 DIAGNOSIS — H25.13 SENILE NUCLEAR SCLEROSIS, BILATERAL: ICD-10-CM

## 2021-03-10 DIAGNOSIS — H40.053 BILATERAL OCULAR HYPERTENSION: ICD-10-CM

## 2021-03-10 DIAGNOSIS — H43.812 SYMPTOMATIC POSTERIOR VITREOUS DETACHMENT OF LEFT EYE: ICD-10-CM

## 2021-03-10 DIAGNOSIS — Z98.890 S/P LASER TRABECULOPLASTY OF EYE: ICD-10-CM

## 2021-03-10 PROCEDURE — 99999 PR PBB SHADOW E&M-EST. PATIENT-LVL III: CPT | Mod: PBBFAC,,, | Performed by: OPTOMETRIST

## 2021-03-10 PROCEDURE — 99213 OFFICE O/P EST LOW 20 MIN: CPT | Mod: PBBFAC,PO | Performed by: OPTOMETRIST

## 2021-03-10 PROCEDURE — 92014 PR EYE EXAM, EST PATIENT,COMPREHESV: ICD-10-PCS | Mod: S$PBB,,, | Performed by: OPTOMETRIST

## 2021-03-10 PROCEDURE — 99999 PR PBB SHADOW E&M-EST. PATIENT-LVL III: ICD-10-PCS | Mod: PBBFAC,,, | Performed by: OPTOMETRIST

## 2021-03-10 PROCEDURE — 92014 COMPRE OPH EXAM EST PT 1/>: CPT | Mod: S$PBB,,, | Performed by: OPTOMETRIST

## 2021-03-10 RX ORDER — DORZOLAMIDE HYDROCHLORIDE AND TIMOLOL MALEATE 20; 5 MG/ML; MG/ML
1 SOLUTION/ DROPS OPHTHALMIC 2 TIMES DAILY
Qty: 3 BOTTLE | Refills: 4 | Status: SHIPPED | OUTPATIENT
Start: 2021-03-10 | End: 2022-04-28

## 2021-03-10 RX ORDER — LATANOPROST 50 UG/ML
1 SOLUTION/ DROPS OPHTHALMIC NIGHTLY
Qty: 7.5 ML | Refills: 4 | Status: SHIPPED | OUTPATIENT
Start: 2021-03-10 | End: 2022-04-28

## 2021-03-19 ENCOUNTER — PES CALL (OUTPATIENT)
Dept: ADMINISTRATIVE | Facility: CLINIC | Age: 71
End: 2021-03-19

## 2021-04-08 ENCOUNTER — TELEPHONE (OUTPATIENT)
Dept: INTERNAL MEDICINE | Facility: CLINIC | Age: 71
End: 2021-04-08

## 2021-04-12 ENCOUNTER — TELEPHONE (OUTPATIENT)
Dept: INTERNAL MEDICINE | Facility: CLINIC | Age: 71
End: 2021-04-12

## 2021-04-12 ENCOUNTER — TELEPHONE (OUTPATIENT)
Dept: OPHTHALMOLOGY | Facility: CLINIC | Age: 71
End: 2021-04-12

## 2021-04-13 ENCOUNTER — TELEPHONE (OUTPATIENT)
Dept: NEUROLOGY | Facility: CLINIC | Age: 71
End: 2021-04-13

## 2021-04-14 ENCOUNTER — OFFICE VISIT (OUTPATIENT)
Dept: OPTOMETRY | Facility: CLINIC | Age: 71
End: 2021-04-14
Payer: MEDICARE

## 2021-04-14 DIAGNOSIS — Z98.890 S/P LASER TRABECULOPLASTY OF EYE: ICD-10-CM

## 2021-04-14 DIAGNOSIS — H25.13 SENILE NUCLEAR SCLEROSIS, BILATERAL: ICD-10-CM

## 2021-04-14 DIAGNOSIS — H43.393 VISUAL FLOATERS, BILATERAL: Primary | ICD-10-CM

## 2021-04-14 DIAGNOSIS — H40.053 BILATERAL OCULAR HYPERTENSION: ICD-10-CM

## 2021-04-14 DIAGNOSIS — H43.812 SYMPTOMATIC POSTERIOR VITREOUS DETACHMENT OF LEFT EYE: ICD-10-CM

## 2021-04-14 PROCEDURE — 92012 INTRM OPH EXAM EST PATIENT: CPT | Mod: S$PBB,,, | Performed by: OPTOMETRIST

## 2021-04-14 PROCEDURE — 99999 PR PBB SHADOW E&M-EST. PATIENT-LVL III: ICD-10-PCS | Mod: PBBFAC,,, | Performed by: OPTOMETRIST

## 2021-04-14 PROCEDURE — 99213 OFFICE O/P EST LOW 20 MIN: CPT | Mod: PBBFAC,PO | Performed by: OPTOMETRIST

## 2021-04-14 PROCEDURE — 92012 PR EYE EXAM, EST PATIENT,INTERMED: ICD-10-PCS | Mod: S$PBB,,, | Performed by: OPTOMETRIST

## 2021-04-14 PROCEDURE — 99999 PR PBB SHADOW E&M-EST. PATIENT-LVL III: CPT | Mod: PBBFAC,,, | Performed by: OPTOMETRIST

## 2021-04-14 RX ORDER — GABAPENTIN 100 MG/1
CAPSULE ORAL
COMMUNITY
Start: 2021-03-08 | End: 2021-04-14

## 2021-04-14 RX ORDER — LORAZEPAM 1 MG/1
TABLET ORAL
Qty: 30 TABLET | Refills: 0 | Status: SHIPPED | OUTPATIENT
Start: 2021-04-14 | End: 2021-04-20 | Stop reason: SDUPTHER

## 2021-04-14 RX ORDER — AMLODIPINE BESYLATE 10 MG/1
10 TABLET ORAL DAILY
Qty: 90 TABLET | Refills: 3 | Status: SHIPPED | OUTPATIENT
Start: 2021-04-14 | End: 2022-04-11

## 2021-04-15 ENCOUNTER — TELEPHONE (OUTPATIENT)
Dept: INTERNAL MEDICINE | Facility: CLINIC | Age: 71
End: 2021-04-15

## 2021-04-15 DIAGNOSIS — R53.81 DEBILITY: ICD-10-CM

## 2021-04-15 DIAGNOSIS — D3A.090 CARCINOID TUMOR DETERMINED BY BIOPSY OF LUNG: Primary | ICD-10-CM

## 2021-04-20 ENCOUNTER — TELEPHONE (OUTPATIENT)
Dept: NEUROLOGY | Facility: CLINIC | Age: 71
End: 2021-04-20

## 2021-04-20 ENCOUNTER — OFFICE VISIT (OUTPATIENT)
Dept: INTERNAL MEDICINE | Facility: CLINIC | Age: 71
End: 2021-04-20
Payer: MEDICARE

## 2021-04-20 ENCOUNTER — PATIENT MESSAGE (OUTPATIENT)
Dept: INTERNAL MEDICINE | Facility: CLINIC | Age: 71
End: 2021-04-20

## 2021-04-20 DIAGNOSIS — G61.0 GBS (GUILLAIN-BARRE SYNDROME): Primary | ICD-10-CM

## 2021-04-20 DIAGNOSIS — C7A.090 MALIGNANT CARCINOID TUMOR OF BRONCHUS AND LUNG: ICD-10-CM

## 2021-04-20 DIAGNOSIS — I10 ESSENTIAL HYPERTENSION: ICD-10-CM

## 2021-04-20 DIAGNOSIS — F41.9 ANXIETY: ICD-10-CM

## 2021-04-20 DIAGNOSIS — R53.81 DEBILITY: ICD-10-CM

## 2021-04-20 PROCEDURE — 99443 PR PHYSICIAN TELEPHONE EVALUATION 21-30 MIN: ICD-10-PCS | Mod: 95,,, | Performed by: INTERNAL MEDICINE

## 2021-04-20 PROCEDURE — 99443 PR PHYSICIAN TELEPHONE EVALUATION 21-30 MIN: CPT | Mod: 95,,, | Performed by: INTERNAL MEDICINE

## 2021-04-20 RX ORDER — LORAZEPAM 1 MG/1
TABLET ORAL
Qty: 30 TABLET | Refills: 0 | Status: SHIPPED | OUTPATIENT
Start: 2021-04-20 | End: 2021-06-18

## 2021-04-23 ENCOUNTER — TELEPHONE (OUTPATIENT)
Dept: NEUROLOGY | Facility: CLINIC | Age: 71
End: 2021-04-23

## 2021-04-30 ENCOUNTER — TELEPHONE (OUTPATIENT)
Dept: INTERNAL MEDICINE | Facility: CLINIC | Age: 71
End: 2021-04-30

## 2021-05-02 PROBLEM — F41.9 ANXIETY: Status: ACTIVE | Noted: 2021-05-02

## 2021-05-02 PROBLEM — R53.81 DEBILITY: Status: ACTIVE | Noted: 2021-05-02

## 2021-05-10 ENCOUNTER — TELEPHONE (OUTPATIENT)
Dept: NEUROLOGY | Facility: CLINIC | Age: 71
End: 2021-05-10

## 2021-05-14 DIAGNOSIS — C7A.090 CARCINOID BRONCHIAL ADENOMA OF RIGHT LUNG: Primary | ICD-10-CM

## 2021-05-17 ENCOUNTER — PES CALL (OUTPATIENT)
Dept: ADMINISTRATIVE | Facility: CLINIC | Age: 71
End: 2021-05-17

## 2021-06-14 ENCOUNTER — DOCUMENT SCAN (OUTPATIENT)
Dept: HOME HEALTH SERVICES | Facility: HOSPITAL | Age: 71
End: 2021-06-14
Payer: MEDICARE

## 2021-06-23 ENCOUNTER — TELEPHONE (OUTPATIENT)
Dept: OPHTHALMOLOGY | Facility: CLINIC | Age: 71
End: 2021-06-23

## 2021-07-01 ENCOUNTER — OFFICE VISIT (OUTPATIENT)
Dept: NEUROLOGY | Facility: CLINIC | Age: 71
End: 2021-07-01
Payer: MEDICARE

## 2021-07-01 VITALS
WEIGHT: 167 LBS | HEIGHT: 65 IN | HEART RATE: 77 BPM | BODY MASS INDEX: 27.82 KG/M2 | DIASTOLIC BLOOD PRESSURE: 59 MMHG | SYSTOLIC BLOOD PRESSURE: 118 MMHG

## 2021-07-01 DIAGNOSIS — G61.0 GBS (GUILLAIN-BARRE SYNDROME): ICD-10-CM

## 2021-07-01 DIAGNOSIS — R56.9 SEIZURE: Primary | ICD-10-CM

## 2021-07-01 DIAGNOSIS — M89.9 LESION OF BONE OF CERVICAL SPINE: ICD-10-CM

## 2021-07-01 PROCEDURE — 99214 OFFICE O/P EST MOD 30 MIN: CPT | Mod: PBBFAC | Performed by: PSYCHIATRY & NEUROLOGY

## 2021-07-01 PROCEDURE — 99215 PR OFFICE/OUTPT VISIT, EST, LEVL V, 40-54 MIN: ICD-10-PCS | Mod: S$PBB,,, | Performed by: PSYCHIATRY & NEUROLOGY

## 2021-07-01 PROCEDURE — 99999 PR PBB SHADOW E&M-EST. PATIENT-LVL IV: ICD-10-PCS | Mod: PBBFAC,,, | Performed by: PSYCHIATRY & NEUROLOGY

## 2021-07-01 PROCEDURE — 99215 OFFICE O/P EST HI 40 MIN: CPT | Mod: S$PBB,,, | Performed by: PSYCHIATRY & NEUROLOGY

## 2021-07-01 PROCEDURE — 99999 PR PBB SHADOW E&M-EST. PATIENT-LVL IV: CPT | Mod: PBBFAC,,, | Performed by: PSYCHIATRY & NEUROLOGY

## 2021-07-01 RX ORDER — LEVETIRACETAM 500 MG/1
500 TABLET ORAL 2 TIMES DAILY
Qty: 60 TABLET | Refills: 11 | Status: SHIPPED | OUTPATIENT
Start: 2021-07-01 | End: 2021-08-16

## 2021-07-02 ENCOUNTER — HOSPITAL ENCOUNTER (OUTPATIENT)
Dept: NEUROLOGY | Facility: CLINIC | Age: 71
Discharge: HOME | End: 2021-07-02
Payer: MEDICARE

## 2021-07-02 DIAGNOSIS — R56.9 SEIZURE: ICD-10-CM

## 2021-07-02 PROCEDURE — 95816 PR EEG,W/AWAKE & DROWSY RECORD: ICD-10-PCS | Mod: 26,S$PBB,, | Performed by: PSYCHIATRY & NEUROLOGY

## 2021-07-02 PROCEDURE — 95816 EEG AWAKE AND DROWSY: CPT | Mod: 26,S$PBB,, | Performed by: PSYCHIATRY & NEUROLOGY

## 2021-07-02 PROCEDURE — 95816 EEG AWAKE AND DROWSY: CPT | Mod: PBBFAC | Performed by: PSYCHIATRY & NEUROLOGY

## 2021-07-16 ENCOUNTER — HOSPITAL ENCOUNTER (OUTPATIENT)
Dept: RADIOLOGY | Facility: HOSPITAL | Age: 71
Discharge: HOME | End: 2021-07-16
Attending: THORACIC SURGERY (CARDIOTHORACIC VASCULAR SURGERY)
Payer: MEDICARE

## 2021-07-16 ENCOUNTER — OFFICE VISIT (OUTPATIENT)
Dept: CARDIOTHORACIC SURGERY | Facility: CLINIC | Age: 71
End: 2021-07-16
Payer: MEDICARE

## 2021-07-16 VITALS
TEMPERATURE: 98 F | SYSTOLIC BLOOD PRESSURE: 143 MMHG | HEART RATE: 64 BPM | RESPIRATION RATE: 18 BRPM | HEIGHT: 65 IN | DIASTOLIC BLOOD PRESSURE: 80 MMHG | BODY MASS INDEX: 28.06 KG/M2 | WEIGHT: 168.44 LBS

## 2021-07-16 DIAGNOSIS — C7A.090 CARCINOID BRONCHIAL ADENOMA OF RIGHT LUNG: Primary | ICD-10-CM

## 2021-07-16 DIAGNOSIS — C7A.090 CARCINOID BRONCHIAL ADENOMA OF RIGHT LUNG: ICD-10-CM

## 2021-07-16 PROCEDURE — 99999 PR PBB SHADOW E&M-EST. PATIENT-LVL IV: CPT | Mod: PBBFAC,,, | Performed by: THORACIC SURGERY (CARDIOTHORACIC VASCULAR SURGERY)

## 2021-07-16 PROCEDURE — 71250 CT THORAX DX C-: CPT | Mod: 26,,, | Performed by: RADIOLOGY

## 2021-07-16 PROCEDURE — 99213 PR OFFICE/OUTPT VISIT, EST, LEVL III, 20-29 MIN: ICD-10-PCS | Mod: S$PBB,,, | Performed by: THORACIC SURGERY (CARDIOTHORACIC VASCULAR SURGERY)

## 2021-07-16 PROCEDURE — 99999 PR PBB SHADOW E&M-EST. PATIENT-LVL IV: ICD-10-PCS | Mod: PBBFAC,,, | Performed by: THORACIC SURGERY (CARDIOTHORACIC VASCULAR SURGERY)

## 2021-07-16 PROCEDURE — 99214 OFFICE O/P EST MOD 30 MIN: CPT | Mod: PBBFAC,25 | Performed by: THORACIC SURGERY (CARDIOTHORACIC VASCULAR SURGERY)

## 2021-07-16 PROCEDURE — 71250 CT CHEST WITHOUT CONTRAST: ICD-10-PCS | Mod: 26,,, | Performed by: RADIOLOGY

## 2021-07-16 PROCEDURE — 99213 OFFICE O/P EST LOW 20 MIN: CPT | Mod: S$PBB,,, | Performed by: THORACIC SURGERY (CARDIOTHORACIC VASCULAR SURGERY)

## 2021-07-16 PROCEDURE — 71250 CT THORAX DX C-: CPT | Mod: TC

## 2021-07-27 ENCOUNTER — OFFICE VISIT (OUTPATIENT)
Dept: OPHTHALMOLOGY | Facility: CLINIC | Age: 71
End: 2021-07-27
Payer: MEDICARE

## 2021-07-27 DIAGNOSIS — H40.1132 PRIMARY OPEN ANGLE GLAUCOMA (POAG) OF BOTH EYES, MODERATE STAGE: Primary | ICD-10-CM

## 2021-07-27 DIAGNOSIS — H40.053 BILATERAL OCULAR HYPERTENSION: ICD-10-CM

## 2021-07-27 DIAGNOSIS — H25.13 NUCLEAR SCLEROSIS OF BOTH EYES: ICD-10-CM

## 2021-07-27 PROCEDURE — 99214 PR OFFICE/OUTPT VISIT, EST, LEVL IV, 30-39 MIN: ICD-10-PCS | Mod: S$PBB,,, | Performed by: OPHTHALMOLOGY

## 2021-07-27 PROCEDURE — 99999 PR PBB SHADOW E&M-EST. PATIENT-LVL I: ICD-10-PCS | Mod: PBBFAC,,, | Performed by: OPHTHALMOLOGY

## 2021-07-27 PROCEDURE — 99214 OFFICE O/P EST MOD 30 MIN: CPT | Mod: S$PBB,,, | Performed by: OPHTHALMOLOGY

## 2021-07-27 PROCEDURE — 99999 PR PBB SHADOW E&M-EST. PATIENT-LVL I: CPT | Mod: PBBFAC,,, | Performed by: OPHTHALMOLOGY

## 2021-07-27 PROCEDURE — 99211 OFF/OP EST MAY X REQ PHY/QHP: CPT | Mod: PBBFAC | Performed by: OPHTHALMOLOGY

## 2021-08-03 DIAGNOSIS — Z51.81 THERAPEUTIC DRUG MONITORING: Primary | ICD-10-CM

## 2021-08-06 ENCOUNTER — TELEPHONE (OUTPATIENT)
Dept: INTERNAL MEDICINE | Facility: CLINIC | Age: 71
End: 2021-08-06

## 2021-08-06 DIAGNOSIS — R05.9 COUGH: ICD-10-CM

## 2021-08-06 DIAGNOSIS — R68.83 CHILLS: ICD-10-CM

## 2021-08-07 ENCOUNTER — LAB VISIT (OUTPATIENT)
Dept: INTERNAL MEDICINE | Facility: CLINIC | Age: 71
End: 2021-08-07
Payer: MEDICARE

## 2021-08-07 DIAGNOSIS — R05.9 COUGH: ICD-10-CM

## 2021-08-07 DIAGNOSIS — R68.83 CHILLS: ICD-10-CM

## 2021-08-07 PROCEDURE — U0003 INFECTIOUS AGENT DETECTION BY NUCLEIC ACID (DNA OR RNA); SEVERE ACUTE RESPIRATORY SYNDROME CORONAVIRUS 2 (SARS-COV-2) (CORONAVIRUS DISEASE [COVID-19]), AMPLIFIED PROBE TECHNIQUE, MAKING USE OF HIGH THROUGHPUT TECHNOLOGIES AS DESCRIBED BY CMS-2020-01-R: HCPCS | Performed by: INTERNAL MEDICINE

## 2021-08-07 PROCEDURE — U0005 INFEC AGEN DETEC AMPLI PROBE: HCPCS | Performed by: INTERNAL MEDICINE

## 2021-08-09 ENCOUNTER — TELEPHONE (OUTPATIENT)
Dept: INTERNAL MEDICINE | Facility: CLINIC | Age: 71
End: 2021-08-09

## 2021-08-09 LAB
SARS-COV-2 RNA RESP QL NAA+PROBE: NOT DETECTED
SARS-COV-2- CYCLE NUMBER: -1

## 2021-08-10 ENCOUNTER — PES CALL (OUTPATIENT)
Dept: ADMINISTRATIVE | Facility: CLINIC | Age: 71
End: 2021-08-10

## 2021-08-13 ENCOUNTER — TELEPHONE (OUTPATIENT)
Dept: INTERNAL MEDICINE | Facility: CLINIC | Age: 71
End: 2021-08-13

## 2021-08-13 ENCOUNTER — DOCUMENT SCAN (OUTPATIENT)
Dept: HOME HEALTH SERVICES | Facility: HOSPITAL | Age: 71
End: 2021-08-13
Payer: MEDICARE

## 2021-08-16 ENCOUNTER — OFFICE VISIT (OUTPATIENT)
Dept: INTERNAL MEDICINE | Facility: CLINIC | Age: 71
End: 2021-08-16
Payer: MEDICARE

## 2021-08-16 VITALS
HEART RATE: 78 BPM | BODY MASS INDEX: 26.47 KG/M2 | SYSTOLIC BLOOD PRESSURE: 132 MMHG | RESPIRATION RATE: 16 BRPM | HEIGHT: 66 IN | OXYGEN SATURATION: 97 % | WEIGHT: 164.69 LBS | TEMPERATURE: 98 F | DIASTOLIC BLOOD PRESSURE: 72 MMHG

## 2021-08-16 DIAGNOSIS — G61.0 GBS (GUILLAIN-BARRE SYNDROME): Primary | ICD-10-CM

## 2021-08-16 DIAGNOSIS — R29.898 WEAKNESS OF BOTH LOWER EXTREMITIES: ICD-10-CM

## 2021-08-16 DIAGNOSIS — I10 ESSENTIAL HYPERTENSION: ICD-10-CM

## 2021-08-16 DIAGNOSIS — C7A.090 MALIGNANT CARCINOID TUMOR OF BRONCHUS AND LUNG: ICD-10-CM

## 2021-08-16 DIAGNOSIS — R79.9 ABNORMAL FINDING OF BLOOD CHEMISTRY, UNSPECIFIED: ICD-10-CM

## 2021-08-16 DIAGNOSIS — R53.81 DEBILITY: ICD-10-CM

## 2021-08-16 DIAGNOSIS — Z12.31 ENCOUNTER FOR SCREENING MAMMOGRAM FOR BREAST CANCER: ICD-10-CM

## 2021-08-16 PROCEDURE — 99214 PR OFFICE/OUTPT VISIT, EST, LEVL IV, 30-39 MIN: ICD-10-PCS | Mod: S$PBB,,, | Performed by: INTERNAL MEDICINE

## 2021-08-16 PROCEDURE — 99214 OFFICE O/P EST MOD 30 MIN: CPT | Mod: S$PBB,,, | Performed by: INTERNAL MEDICINE

## 2021-08-16 PROCEDURE — 99999 PR PBB SHADOW E&M-EST. PATIENT-LVL IV: CPT | Mod: PBBFAC,,, | Performed by: INTERNAL MEDICINE

## 2021-08-16 PROCEDURE — 99999 PR PBB SHADOW E&M-EST. PATIENT-LVL IV: ICD-10-PCS | Mod: PBBFAC,,, | Performed by: INTERNAL MEDICINE

## 2021-08-16 PROCEDURE — 99214 OFFICE O/P EST MOD 30 MIN: CPT | Mod: PBBFAC,PO | Performed by: INTERNAL MEDICINE

## 2021-08-16 RX ORDER — LORAZEPAM 1 MG/1
TABLET ORAL
Qty: 60 TABLET | Refills: 2 | Status: SHIPPED | OUTPATIENT
Start: 2021-08-16 | End: 2021-12-06 | Stop reason: SDUPTHER

## 2021-08-21 ENCOUNTER — LAB VISIT (OUTPATIENT)
Dept: LAB | Facility: HOSPITAL | Age: 71
End: 2021-08-21
Attending: INTERNAL MEDICINE
Payer: MEDICARE

## 2021-08-21 DIAGNOSIS — R53.81 DEBILITY: ICD-10-CM

## 2021-08-21 DIAGNOSIS — R29.898 WEAKNESS OF BOTH LOWER EXTREMITIES: ICD-10-CM

## 2021-08-21 DIAGNOSIS — I10 ESSENTIAL HYPERTENSION: ICD-10-CM

## 2021-08-21 DIAGNOSIS — G61.0 GBS (GUILLAIN-BARRE SYNDROME): ICD-10-CM

## 2021-08-21 DIAGNOSIS — R79.9 ABNORMAL FINDING OF BLOOD CHEMISTRY, UNSPECIFIED: ICD-10-CM

## 2021-08-21 LAB
ALBUMIN SERPL BCP-MCNC: 3.6 G/DL (ref 3.5–5.2)
ALP SERPL-CCNC: 47 U/L (ref 55–135)
ALT SERPL W/O P-5'-P-CCNC: 12 U/L (ref 10–44)
ANION GAP SERPL CALC-SCNC: 9 MMOL/L (ref 8–16)
AST SERPL-CCNC: 22 U/L (ref 10–40)
BASOPHILS # BLD AUTO: 0.04 K/UL (ref 0–0.2)
BASOPHILS NFR BLD: 0.8 % (ref 0–1.9)
BILIRUB SERPL-MCNC: 0.5 MG/DL (ref 0.1–1)
BUN SERPL-MCNC: 8 MG/DL (ref 8–23)
CALCIUM SERPL-MCNC: 9.5 MG/DL (ref 8.7–10.5)
CHLORIDE SERPL-SCNC: 102 MMOL/L (ref 95–110)
CHOLEST SERPL-MCNC: 178 MG/DL (ref 120–199)
CHOLEST/HDLC SERPL: 2.7 {RATIO} (ref 2–5)
CO2 SERPL-SCNC: 27 MMOL/L (ref 23–29)
CREAT SERPL-MCNC: 0.7 MG/DL (ref 0.5–1.4)
DIFFERENTIAL METHOD: ABNORMAL
EOSINOPHIL # BLD AUTO: 0.2 K/UL (ref 0–0.5)
EOSINOPHIL NFR BLD: 3.2 % (ref 0–8)
ERYTHROCYTE [DISTWIDTH] IN BLOOD BY AUTOMATED COUNT: 16.4 % (ref 11.5–14.5)
ERYTHROCYTE [SEDIMENTATION RATE] IN BLOOD BY WESTERGREN METHOD: 34 MM/HR (ref 0–36)
EST. GFR  (AFRICAN AMERICAN): >60 ML/MIN/1.73 M^2
EST. GFR  (NON AFRICAN AMERICAN): >60 ML/MIN/1.73 M^2
ESTIMATED AVG GLUCOSE: 114 MG/DL (ref 68–131)
GLUCOSE SERPL-MCNC: 83 MG/DL (ref 70–110)
HBA1C MFR BLD: 5.6 % (ref 4–5.6)
HCT VFR BLD AUTO: 35.1 % (ref 37–48.5)
HDLC SERPL-MCNC: 66 MG/DL (ref 40–75)
HDLC SERPL: 37.1 % (ref 20–50)
HGB BLD-MCNC: 11.4 G/DL (ref 12–16)
IMM GRANULOCYTES # BLD AUTO: 0.02 K/UL (ref 0–0.04)
IMM GRANULOCYTES NFR BLD AUTO: 0.4 % (ref 0–0.5)
LDLC SERPL CALC-MCNC: 103.6 MG/DL (ref 63–159)
LYMPHOCYTES # BLD AUTO: 1.8 K/UL (ref 1–4.8)
LYMPHOCYTES NFR BLD: 35.1 % (ref 18–48)
MCH RBC QN AUTO: 26.8 PG (ref 27–31)
MCHC RBC AUTO-ENTMCNC: 32.5 G/DL (ref 32–36)
MCV RBC AUTO: 83 FL (ref 82–98)
MONOCYTES # BLD AUTO: 0.5 K/UL (ref 0.3–1)
MONOCYTES NFR BLD: 9.5 % (ref 4–15)
NEUTROPHILS # BLD AUTO: 2.6 K/UL (ref 1.8–7.7)
NEUTROPHILS NFR BLD: 51 % (ref 38–73)
NONHDLC SERPL-MCNC: 112 MG/DL
NRBC BLD-RTO: 0 /100 WBC
PLATELET # BLD AUTO: 368 K/UL (ref 150–450)
PMV BLD AUTO: 10.1 FL (ref 9.2–12.9)
POTASSIUM SERPL-SCNC: 4 MMOL/L (ref 3.5–5.1)
PROT SERPL-MCNC: 7.2 G/DL (ref 6–8.4)
RBC # BLD AUTO: 4.25 M/UL (ref 4–5.4)
SODIUM SERPL-SCNC: 138 MMOL/L (ref 136–145)
TRIGL SERPL-MCNC: 42 MG/DL (ref 30–150)
TSH SERPL DL<=0.005 MIU/L-ACNC: 0.88 UIU/ML (ref 0.4–4)
WBC # BLD AUTO: 5.07 K/UL (ref 3.9–12.7)

## 2021-08-21 PROCEDURE — 36415 COLL VENOUS BLD VENIPUNCTURE: CPT | Mod: PO | Performed by: INTERNAL MEDICINE

## 2021-08-21 PROCEDURE — 80061 LIPID PANEL: CPT | Performed by: INTERNAL MEDICINE

## 2021-08-21 PROCEDURE — 84443 ASSAY THYROID STIM HORMONE: CPT | Performed by: INTERNAL MEDICINE

## 2021-08-21 PROCEDURE — 83036 HEMOGLOBIN GLYCOSYLATED A1C: CPT | Performed by: INTERNAL MEDICINE

## 2021-08-21 PROCEDURE — 85025 COMPLETE CBC W/AUTO DIFF WBC: CPT | Performed by: INTERNAL MEDICINE

## 2021-08-21 PROCEDURE — 80053 COMPREHEN METABOLIC PANEL: CPT | Performed by: INTERNAL MEDICINE

## 2021-08-21 PROCEDURE — 85652 RBC SED RATE AUTOMATED: CPT | Performed by: INTERNAL MEDICINE

## 2021-09-08 ENCOUNTER — TELEPHONE (OUTPATIENT)
Dept: NEUROLOGY | Facility: CLINIC | Age: 71
End: 2021-09-08

## 2021-10-01 ENCOUNTER — HOSPITAL ENCOUNTER (OUTPATIENT)
Dept: RADIOLOGY | Facility: HOSPITAL | Age: 71
Discharge: HOME | End: 2021-10-01
Attending: INTERNAL MEDICINE
Payer: MEDICARE

## 2021-10-01 VITALS — WEIGHT: 164 LBS | BODY MASS INDEX: 26.88 KG/M2

## 2021-10-01 DIAGNOSIS — Z12.31 ENCOUNTER FOR SCREENING MAMMOGRAM FOR BREAST CANCER: ICD-10-CM

## 2021-10-01 PROCEDURE — 77063 BREAST TOMOSYNTHESIS BI: CPT | Mod: 26,,, | Performed by: RADIOLOGY

## 2021-10-01 PROCEDURE — 77067 SCR MAMMO BI INCL CAD: CPT | Mod: 26,,, | Performed by: RADIOLOGY

## 2021-10-01 PROCEDURE — 77067 MAMMO DIGITAL SCREENING BILAT WITH TOMO: ICD-10-PCS | Mod: 26,,, | Performed by: RADIOLOGY

## 2021-10-01 PROCEDURE — 77067 SCR MAMMO BI INCL CAD: CPT | Mod: TC,PO

## 2021-10-01 PROCEDURE — 77063 MAMMO DIGITAL SCREENING BILAT WITH TOMO: ICD-10-PCS | Mod: 26,,, | Performed by: RADIOLOGY

## 2021-10-04 ENCOUNTER — TELEPHONE (OUTPATIENT)
Dept: INTERNAL MEDICINE | Facility: CLINIC | Age: 71
End: 2021-10-04

## 2021-10-04 DIAGNOSIS — E04.2 MULTIPLE THYROID NODULES: Primary | ICD-10-CM

## 2021-10-06 ENCOUNTER — OFFICE VISIT (OUTPATIENT)
Dept: OTOLARYNGOLOGY | Facility: CLINIC | Age: 71
End: 2021-10-06
Payer: MEDICARE

## 2021-10-06 VITALS — WEIGHT: 163 LBS | BODY MASS INDEX: 26.71 KG/M2

## 2021-10-06 DIAGNOSIS — E04.1 THYROID NODULE: Primary | ICD-10-CM

## 2021-10-06 PROCEDURE — 99999 PR PBB SHADOW E&M-EST. PATIENT-LVL III: ICD-10-PCS | Mod: PBBFAC,,, | Performed by: OTOLARYNGOLOGY

## 2021-10-06 PROCEDURE — 99214 PR OFFICE/OUTPT VISIT, EST, LEVL IV, 30-39 MIN: ICD-10-PCS | Mod: S$PBB,,, | Performed by: OTOLARYNGOLOGY

## 2021-10-06 PROCEDURE — 99213 OFFICE O/P EST LOW 20 MIN: CPT | Mod: PBBFAC | Performed by: OTOLARYNGOLOGY

## 2021-10-06 PROCEDURE — 99214 OFFICE O/P EST MOD 30 MIN: CPT | Mod: S$PBB,,, | Performed by: OTOLARYNGOLOGY

## 2021-10-06 PROCEDURE — 99999 PR PBB SHADOW E&M-EST. PATIENT-LVL III: CPT | Mod: PBBFAC,,, | Performed by: OTOLARYNGOLOGY

## 2021-10-07 ENCOUNTER — LAB VISIT (OUTPATIENT)
Dept: LAB | Facility: OTHER | Age: 71
End: 2021-10-07
Attending: PSYCHIATRY & NEUROLOGY
Payer: MEDICARE

## 2021-10-07 ENCOUNTER — TELEPHONE (OUTPATIENT)
Dept: INTERVENTIONAL RADIOLOGY/VASCULAR | Facility: OTHER | Age: 71
End: 2021-10-07

## 2021-10-07 ENCOUNTER — PATIENT MESSAGE (OUTPATIENT)
Dept: INTERVENTIONAL RADIOLOGY/VASCULAR | Facility: OTHER | Age: 71
End: 2021-10-07

## 2021-10-07 ENCOUNTER — OFFICE VISIT (OUTPATIENT)
Dept: NEUROLOGY | Facility: CLINIC | Age: 71
End: 2021-10-07
Payer: MEDICARE

## 2021-10-07 VITALS
HEIGHT: 66 IN | DIASTOLIC BLOOD PRESSURE: 68 MMHG | WEIGHT: 160.94 LBS | SYSTOLIC BLOOD PRESSURE: 144 MMHG | HEART RATE: 67 BPM | BODY MASS INDEX: 25.86 KG/M2

## 2021-10-07 DIAGNOSIS — G60.3 IDIOPATHIC PROGRESSIVE POLYNEUROPATHY: ICD-10-CM

## 2021-10-07 DIAGNOSIS — Z87.898 H/O IDIOPATHIC SEIZURE: ICD-10-CM

## 2021-10-07 DIAGNOSIS — G60.3 IDIOPATHIC PROGRESSIVE POLYNEUROPATHY: Primary | ICD-10-CM

## 2021-10-07 LAB
ANION GAP SERPL CALC-SCNC: 11 MMOL/L (ref 8–16)
BUN SERPL-MCNC: 10 MG/DL (ref 8–23)
CALCIUM SERPL-MCNC: 9.9 MG/DL (ref 8.7–10.5)
CHLORIDE SERPL-SCNC: 99 MMOL/L (ref 95–110)
CO2 SERPL-SCNC: 29 MMOL/L (ref 23–29)
CREAT SERPL-MCNC: 0.7 MG/DL (ref 0.5–1.4)
EST. GFR  (AFRICAN AMERICAN): >60 ML/MIN/1.73 M^2
EST. GFR  (NON AFRICAN AMERICAN): >60 ML/MIN/1.73 M^2
GLUCOSE SERPL-MCNC: 142 MG/DL (ref 70–110)
POTASSIUM SERPL-SCNC: 3.8 MMOL/L (ref 3.5–5.1)
SODIUM SERPL-SCNC: 139 MMOL/L (ref 136–145)

## 2021-10-07 PROCEDURE — 99999 PR PBB SHADOW E&M-EST. PATIENT-LVL IV: ICD-10-PCS | Mod: PBBFAC,,, | Performed by: PSYCHIATRY & NEUROLOGY

## 2021-10-07 PROCEDURE — 99214 OFFICE O/P EST MOD 30 MIN: CPT | Mod: PBBFAC | Performed by: PSYCHIATRY & NEUROLOGY

## 2021-10-07 PROCEDURE — 36415 COLL VENOUS BLD VENIPUNCTURE: CPT | Performed by: PSYCHIATRY & NEUROLOGY

## 2021-10-07 PROCEDURE — 99214 PR OFFICE/OUTPT VISIT, EST, LEVL IV, 30-39 MIN: ICD-10-PCS | Mod: S$PBB,,, | Performed by: PSYCHIATRY & NEUROLOGY

## 2021-10-07 PROCEDURE — 99214 OFFICE O/P EST MOD 30 MIN: CPT | Mod: S$PBB,,, | Performed by: PSYCHIATRY & NEUROLOGY

## 2021-10-07 PROCEDURE — 80048 BASIC METABOLIC PNL TOTAL CA: CPT | Performed by: PSYCHIATRY & NEUROLOGY

## 2021-10-07 PROCEDURE — 99999 PR PBB SHADOW E&M-EST. PATIENT-LVL IV: CPT | Mod: PBBFAC,,, | Performed by: PSYCHIATRY & NEUROLOGY

## 2021-10-12 ENCOUNTER — HOSPITAL ENCOUNTER (OUTPATIENT)
Dept: RADIOLOGY | Facility: OTHER | Age: 71
Discharge: HOME | End: 2021-10-12
Attending: OTOLARYNGOLOGY
Payer: MEDICARE

## 2021-10-12 DIAGNOSIS — E04.1 THYROID NODULE: ICD-10-CM

## 2021-10-12 PROCEDURE — 88173 CYTOPATH EVAL FNA REPORT: CPT | Mod: 26,,, | Performed by: PATHOLOGY

## 2021-10-12 PROCEDURE — 88173 CYTOPATH EVAL FNA REPORT: CPT | Performed by: PATHOLOGY

## 2021-10-12 PROCEDURE — 88172 CYTP DX EVAL FNA 1ST EA SITE: CPT | Performed by: PATHOLOGY

## 2021-10-12 PROCEDURE — 10005 FNA BX W/US GDN 1ST LES: CPT | Mod: ,,, | Performed by: RADIOLOGY

## 2021-10-12 PROCEDURE — 88177 CYTP FNA EVAL EA ADDL: CPT | Performed by: PATHOLOGY

## 2021-10-12 PROCEDURE — 88177 PR  EVALUATION OF FNA SMEAR TO DETERMINE ADEQUACY, EA ADD EVAL: ICD-10-PCS | Mod: 26,,, | Performed by: PATHOLOGY

## 2021-10-12 PROCEDURE — 88177 CYTP FNA EVAL EA ADDL: CPT | Mod: 26,,, | Performed by: PATHOLOGY

## 2021-10-12 PROCEDURE — 10005 US FINE NEEDLE ASPIRATION BIOPSY, FIRST LESION: ICD-10-PCS | Mod: ,,, | Performed by: RADIOLOGY

## 2021-10-12 PROCEDURE — 25000003 PHARM REV CODE 250: Performed by: OTOLARYNGOLOGY

## 2021-10-12 PROCEDURE — 88172 PR  EVALUATION OF FNA SMEAR TO DETERMINE ADEQUACY, FIRST EVAL: ICD-10-PCS | Mod: 26,,, | Performed by: PATHOLOGY

## 2021-10-12 PROCEDURE — 88172 CYTP DX EVAL FNA 1ST EA SITE: CPT | Mod: 26,,, | Performed by: PATHOLOGY

## 2021-10-12 PROCEDURE — 88173 PR  INTERPRETATION OF FNA SMEAR: ICD-10-PCS | Mod: 26,,, | Performed by: PATHOLOGY

## 2021-10-12 PROCEDURE — 10005 FNA BX W/US GDN 1ST LES: CPT

## 2021-10-12 RX ORDER — LIDOCAINE HYDROCHLORIDE 10 MG/ML
5 INJECTION INFILTRATION; PERINEURAL ONCE
Status: COMPLETED | OUTPATIENT
Start: 2021-10-12 | End: 2021-10-12

## 2021-10-12 RX ADMIN — LIDOCAINE HYDROCHLORIDE 5 ML: 10 INJECTION, SOLUTION INFILTRATION; PERINEURAL at 01:10

## 2021-10-15 ENCOUNTER — TELEPHONE (OUTPATIENT)
Dept: OTOLARYNGOLOGY | Facility: CLINIC | Age: 71
End: 2021-10-15

## 2021-10-15 ENCOUNTER — PATIENT MESSAGE (OUTPATIENT)
Dept: NEUROLOGY | Facility: CLINIC | Age: 71
End: 2021-10-15
Payer: MEDICARE

## 2021-10-15 LAB
ADEQUACY: NORMAL
FINAL PATHOLOGIC DIAGNOSIS: NORMAL
Lab: NORMAL

## 2021-10-19 ENCOUNTER — TELEPHONE (OUTPATIENT)
Dept: NEUROLOGY | Facility: CLINIC | Age: 71
End: 2021-10-19

## 2021-10-22 ENCOUNTER — HOSPITAL ENCOUNTER (OUTPATIENT)
Dept: RADIOLOGY | Facility: HOSPITAL | Age: 71
Discharge: HOME | End: 2021-10-22
Attending: PSYCHIATRY & NEUROLOGY
Payer: MEDICARE

## 2021-10-22 ENCOUNTER — TELEPHONE (OUTPATIENT)
Dept: NEUROLOGY | Facility: CLINIC | Age: 71
End: 2021-10-22

## 2021-10-22 ENCOUNTER — PATIENT MESSAGE (OUTPATIENT)
Dept: NEUROLOGY | Facility: CLINIC | Age: 71
End: 2021-10-22

## 2021-10-22 ENCOUNTER — TELEPHONE (OUTPATIENT)
Dept: INTERNAL MEDICINE | Facility: CLINIC | Age: 71
End: 2021-10-22

## 2021-10-22 PROCEDURE — A9585 GADOBUTROL INJECTION: HCPCS | Performed by: PSYCHIATRY & NEUROLOGY

## 2021-10-22 PROCEDURE — 72156 MRI CERVICAL SPINE W WO CONTRAST: ICD-10-PCS | Mod: 26,,, | Performed by: INTERNAL MEDICINE

## 2021-10-22 PROCEDURE — 72156 MRI NECK SPINE W/O & W/DYE: CPT | Mod: TC

## 2021-10-22 PROCEDURE — 25500020 PHARM REV CODE 255: Performed by: PSYCHIATRY & NEUROLOGY

## 2021-10-22 PROCEDURE — 72156 MRI NECK SPINE W/O & W/DYE: CPT | Mod: 26,,, | Performed by: INTERNAL MEDICINE

## 2021-10-22 RX ORDER — GADOBUTROL 604.72 MG/ML
8 INJECTION INTRAVENOUS
Status: COMPLETED | OUTPATIENT
Start: 2021-10-22 | End: 2021-10-22

## 2021-10-22 RX ADMIN — GADOBUTROL 8 ML: 604.72 INJECTION INTRAVENOUS at 08:10

## 2021-11-10 ENCOUNTER — PES CALL (OUTPATIENT)
Dept: ADMINISTRATIVE | Facility: CLINIC | Age: 71
End: 2021-11-10
Payer: MEDICARE

## 2021-11-19 ENCOUNTER — PATIENT OUTREACH (OUTPATIENT)
Dept: ADMINISTRATIVE | Facility: OTHER | Age: 71
End: 2021-11-19
Payer: MEDICARE

## 2021-11-22 ENCOUNTER — OFFICE VISIT (OUTPATIENT)
Dept: OPHTHALMOLOGY | Facility: CLINIC | Age: 71
End: 2021-11-22
Payer: MEDICARE

## 2021-11-22 ENCOUNTER — CLINICAL SUPPORT (OUTPATIENT)
Dept: OPHTHALMOLOGY | Facility: CLINIC | Age: 71
End: 2021-11-22
Payer: MEDICARE

## 2021-11-22 DIAGNOSIS — H40.1132 PRIMARY OPEN ANGLE GLAUCOMA (POAG) OF BOTH EYES, MODERATE STAGE: Primary | ICD-10-CM

## 2021-11-22 DIAGNOSIS — H25.13 NUCLEAR SCLEROSIS OF BOTH EYES: ICD-10-CM

## 2021-11-22 DIAGNOSIS — Z91.199 CURRENT NON-ADHERENCE TO MEDICAL TREATMENT: ICD-10-CM

## 2021-11-22 DIAGNOSIS — H40.1132 PRIMARY OPEN ANGLE GLAUCOMA (POAG) OF BOTH EYES, MODERATE STAGE: ICD-10-CM

## 2021-11-22 PROCEDURE — 99214 PR OFFICE/OUTPT VISIT, EST, LEVL IV, 30-39 MIN: ICD-10-PCS | Mod: S$PBB,,, | Performed by: OPHTHALMOLOGY

## 2021-11-22 PROCEDURE — 99213 OFFICE O/P EST LOW 20 MIN: CPT | Mod: PBBFAC | Performed by: OPHTHALMOLOGY

## 2021-11-22 PROCEDURE — 99999 PR PBB SHADOW E&M-EST. PATIENT-LVL III: ICD-10-PCS | Mod: PBBFAC,,, | Performed by: OPHTHALMOLOGY

## 2021-11-22 PROCEDURE — 99999 PR PBB SHADOW E&M-EST. PATIENT-LVL III: CPT | Mod: PBBFAC,,, | Performed by: OPHTHALMOLOGY

## 2021-11-22 PROCEDURE — 99214 OFFICE O/P EST MOD 30 MIN: CPT | Mod: S$PBB,,, | Performed by: OPHTHALMOLOGY

## 2021-11-30 DIAGNOSIS — C7A.090 CARCINOID BRONCHIAL ADENOMA OF RIGHT LUNG: Primary | ICD-10-CM

## 2021-12-06 ENCOUNTER — PATIENT MESSAGE (OUTPATIENT)
Dept: INTERNAL MEDICINE | Facility: CLINIC | Age: 71
End: 2021-12-06

## 2021-12-06 ENCOUNTER — OFFICE VISIT (OUTPATIENT)
Dept: INTERNAL MEDICINE | Facility: CLINIC | Age: 71
End: 2021-12-06
Payer: MEDICARE

## 2021-12-06 VITALS
SYSTOLIC BLOOD PRESSURE: 114 MMHG | WEIGHT: 162.69 LBS | HEIGHT: 66 IN | OXYGEN SATURATION: 99 % | DIASTOLIC BLOOD PRESSURE: 78 MMHG | RESPIRATION RATE: 16 BRPM | HEART RATE: 66 BPM | TEMPERATURE: 98 F | BODY MASS INDEX: 26.14 KG/M2

## 2021-12-06 DIAGNOSIS — F41.9 ANXIETY: ICD-10-CM

## 2021-12-06 DIAGNOSIS — I70.0 ATHEROSCLEROSIS OF AORTA: ICD-10-CM

## 2021-12-06 DIAGNOSIS — R79.9 ABNORMAL FINDING OF BLOOD CHEMISTRY, UNSPECIFIED: ICD-10-CM

## 2021-12-06 DIAGNOSIS — I10 ESSENTIAL HYPERTENSION: Primary | ICD-10-CM

## 2021-12-06 DIAGNOSIS — E78.49 OTHER HYPERLIPIDEMIA: ICD-10-CM

## 2021-12-06 DIAGNOSIS — G61.0 GBS (GUILLAIN-BARRE SYNDROME): ICD-10-CM

## 2021-12-06 DIAGNOSIS — C7A.090 MALIGNANT CARCINOID TUMOR OF BRONCHUS AND LUNG: ICD-10-CM

## 2021-12-06 DIAGNOSIS — R29.898 WEAKNESS OF BOTH LOWER EXTREMITIES: ICD-10-CM

## 2021-12-06 PROCEDURE — 99214 OFFICE O/P EST MOD 30 MIN: CPT | Mod: S$PBB,,, | Performed by: INTERNAL MEDICINE

## 2021-12-06 PROCEDURE — 99999 PR PBB SHADOW E&M-EST. PATIENT-LVL IV: CPT | Mod: PBBFAC,,, | Performed by: INTERNAL MEDICINE

## 2021-12-06 PROCEDURE — 99214 OFFICE O/P EST MOD 30 MIN: CPT | Mod: PBBFAC,PO | Performed by: INTERNAL MEDICINE

## 2021-12-06 PROCEDURE — 99999 PR PBB SHADOW E&M-EST. PATIENT-LVL IV: ICD-10-PCS | Mod: PBBFAC,,, | Performed by: INTERNAL MEDICINE

## 2021-12-06 PROCEDURE — 99214 PR OFFICE/OUTPT VISIT, EST, LEVL IV, 30-39 MIN: ICD-10-PCS | Mod: S$PBB,,, | Performed by: INTERNAL MEDICINE

## 2021-12-06 RX ORDER — LORAZEPAM 1 MG/1
TABLET ORAL
Qty: 60 TABLET | Refills: 2 | Status: SHIPPED | OUTPATIENT
Start: 2021-12-06 | End: 2022-04-11 | Stop reason: SDUPTHER

## 2021-12-06 RX ORDER — CHOLECALCIFEROL (VITAMIN D3) 25 MCG
1000 TABLET ORAL DAILY
COMMUNITY

## 2021-12-06 RX ORDER — ROSUVASTATIN CALCIUM 20 MG/1
20 TABLET, COATED ORAL DAILY
COMMUNITY
End: 2021-12-06 | Stop reason: SDUPTHER

## 2021-12-06 RX ORDER — ROSUVASTATIN CALCIUM 20 MG/1
20 TABLET, COATED ORAL DAILY
Qty: 90 TABLET | Refills: 3 | Status: SHIPPED | OUTPATIENT
Start: 2021-12-06 | End: 2024-02-22 | Stop reason: DRUGHIGH

## 2021-12-23 ENCOUNTER — TELEPHONE (OUTPATIENT)
Dept: INTERNAL MEDICINE | Facility: CLINIC | Age: 71
End: 2021-12-23
Payer: MEDICARE

## 2021-12-23 DIAGNOSIS — R68.83 CHILLS: Primary | ICD-10-CM

## 2022-01-12 ENCOUNTER — TELEPHONE (OUTPATIENT)
Dept: INTERNAL MEDICINE | Facility: CLINIC | Age: 72
End: 2022-01-12
Payer: MEDICARE

## 2022-01-12 NOTE — TELEPHONE ENCOUNTER
----- Message from Shanae Zelaya sent at 1/12/2022  8:14 AM CST -----  Contact: [phone]  Patient would like to speak to the nurse in regards to a personal urgent matter. Please advise

## 2022-01-12 NOTE — TELEPHONE ENCOUNTER
Last seen 21.      from heart attack and  last week.    She tested positive covid yesterday, rapid test.   Monday vomited clear congestion.  Fatigue, depressed.  Had fever 99,  Today 98.5.   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    Doing soup and liquids and rest.  I told her what over counter meds can take and I would ask if you can see if she would qualify for the iv infusion.  She is not sure if wants to get that.  I told her if you order, dept will call and explain what it is and she can decide.    Please advise.  Thanks lorin

## 2022-01-27 ENCOUNTER — PES CALL (OUTPATIENT)
Dept: ADMINISTRATIVE | Facility: CLINIC | Age: 72
End: 2022-01-27
Payer: MEDICARE

## 2022-01-31 NOTE — PROGRESS NOTES
"Subjective:       Patient ID: Nan Palafox is a 72 y.o. female.    Chief Complaint: Follow-up    Diagnosis:  Typical carcinoid     Pre-operative therapy: none    Procedure(s) and date(s): 1/4/21: right robotic assisted middle lobectomy with MLND    Pathology: 2.0 cm well differentiated typical carcinoid, no VPI, no LVI, no margins, level 2,4,7,10,11 = negative, pT1bN0     Post-operative therapy: surveillance     HPI   72 y.o. female never smoker here today for 1 year follow-up from right robotic middle lobectomy. Uncomplicated post-operative course. 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 Tested positive for COVID 1 month ago. Did not require hospitalizations or oxygen.  recently passed away. Denies fever, chills, wheeze, cough, CP, palpitations, claudication,  N/V or changes in bowel and bladder functioning.       Review of Systems   Constitutional: Negative for activity change, fatigue and fever.   Eyes: Negative for visual disturbance.   Respiratory: Positive for cough. Negative for chest tightness and wheezing.    Cardiovascular: Negative for chest pain, palpitations and leg swelling.   Gastrointestinal: Negative.    Genitourinary: Negative for difficulty urinating.   Musculoskeletal: Negative for arthralgias and myalgias.   Neurological: Negative for dizziness, facial asymmetry and speech difficulty.   Psychiatric/Behavioral: Negative for confusion. The patient is not nervous/anxious.          Objective:       Vitals:    02/02/22 0851   BP: 134/82   Pulse: 67   SpO2: 96%   Weight: 73.2 kg (161 lb 6 oz)   Height: 5' 5" (1.651 m)   PainSc: 0-No pain       Physical Exam  Constitutional:       Appearance: Normal appearance.   Eyes:      General: No scleral icterus.  Cardiovascular:      Rate and Rhythm: Normal rate and regular rhythm.      Pulses: Normal pulses.   Pulmonary:      Effort: Pulmonary effort is normal.      Breath sounds: Normal breath sounds. No " wheezing or rhonchi.   Chest:      Chest wall: No tenderness.   Abdominal:      General: Abdomen is flat.      Palpations: Abdomen is soft.   Musculoskeletal:      Right lower leg: No edema.      Left lower leg: No edema.   Neurological:      General: No focal deficit present.      Mental Status: She is alert and oriented to person, place, and time.      Gait: Gait normal.   Psychiatric:         Mood and Affect: Mood normal.         Chest CT 7/16/21:  1.  Stable postsurgical changes status post right middle lobectomy and mediastinal lymph node dissection.  2.   Interval development of focal consolidation within the right posterior lung base, which may represent atelectasis or infectious process, though malignancy cannot be excluded in this patient with a history of right middle lobe carcinoid tumor.  3.  Bilateral thyroid nodules.  4.  Hepatic cysts.    Chest CT 2/2/22:       Assessment:       70 y.o. female here today for 1 year follow-up from right robotic middle lobectomy, pathology pT1bN0 typical carcinoid. Pathology reviewed with patient.   New likely inflammatory infiltrates. COVID + 1 month ago.   Requesting psychology.     Plan:       New scattered ground glass. Nonspecific. RTC in 6 months with chest CT.   Referral placed for heme/onc/psychology

## 2022-02-02 ENCOUNTER — HOSPITAL ENCOUNTER (OUTPATIENT)
Dept: RADIOLOGY | Facility: HOSPITAL | Age: 72
Discharge: HOME | End: 2022-02-02
Attending: THORACIC SURGERY (CARDIOTHORACIC VASCULAR SURGERY)
Payer: MEDICARE

## 2022-02-02 ENCOUNTER — OFFICE VISIT (OUTPATIENT)
Dept: CARDIOTHORACIC SURGERY | Facility: CLINIC | Age: 72
End: 2022-02-02
Payer: MEDICARE

## 2022-02-02 VITALS
OXYGEN SATURATION: 96 % | HEIGHT: 65 IN | SYSTOLIC BLOOD PRESSURE: 134 MMHG | WEIGHT: 161.38 LBS | BODY MASS INDEX: 26.89 KG/M2 | DIASTOLIC BLOOD PRESSURE: 82 MMHG | HEART RATE: 67 BPM

## 2022-02-02 DIAGNOSIS — F43.20 ACUTE ADJUSTMENT DISORDER: ICD-10-CM

## 2022-02-02 DIAGNOSIS — C7A.090 CARCINOID BRONCHIAL ADENOMA OF RIGHT LUNG: Primary | ICD-10-CM

## 2022-02-02 DIAGNOSIS — C7A.090 CARCINOID BRONCHIAL ADENOMA OF RIGHT LUNG: ICD-10-CM

## 2022-02-02 PROCEDURE — 99999 PR PBB SHADOW E&M-EST. PATIENT-LVL IV: ICD-10-PCS | Mod: PBBFAC,,, | Performed by: THORACIC SURGERY (CARDIOTHORACIC VASCULAR SURGERY)

## 2022-02-02 PROCEDURE — 71250 CT THORAX DX C-: CPT | Mod: 26,,, | Performed by: RADIOLOGY

## 2022-02-02 PROCEDURE — 99213 OFFICE O/P EST LOW 20 MIN: CPT | Mod: S$PBB,,, | Performed by: THORACIC SURGERY (CARDIOTHORACIC VASCULAR SURGERY)

## 2022-02-02 PROCEDURE — 99213 PR OFFICE/OUTPT VISIT, EST, LEVL III, 20-29 MIN: ICD-10-PCS | Mod: S$PBB,,, | Performed by: THORACIC SURGERY (CARDIOTHORACIC VASCULAR SURGERY)

## 2022-02-02 PROCEDURE — 99214 OFFICE O/P EST MOD 30 MIN: CPT | Mod: PBBFAC,25 | Performed by: THORACIC SURGERY (CARDIOTHORACIC VASCULAR SURGERY)

## 2022-02-02 PROCEDURE — 99999 PR PBB SHADOW E&M-EST. PATIENT-LVL IV: CPT | Mod: PBBFAC,,, | Performed by: THORACIC SURGERY (CARDIOTHORACIC VASCULAR SURGERY)

## 2022-02-02 PROCEDURE — 71250 CT CHEST WITHOUT CONTRAST: ICD-10-PCS | Mod: 26,,, | Performed by: RADIOLOGY

## 2022-02-02 PROCEDURE — 71250 CT THORAX DX C-: CPT | Mod: TC

## 2022-02-02 RX ORDER — ALBUTEROL SULFATE 90 UG/1
2 AEROSOL, METERED RESPIRATORY (INHALATION) EVERY 6 HOURS PRN
COMMUNITY
Start: 2022-01-16 | End: 2023-02-08

## 2022-02-03 ENCOUNTER — TELEPHONE (OUTPATIENT)
Dept: HEMATOLOGY/ONCOLOGY | Facility: CLINIC | Age: 72
End: 2022-02-03
Payer: MEDICARE

## 2022-02-03 NOTE — TELEPHONE ENCOUNTER
----- Message from Oneyda Martines RN sent at 2/3/2022  8:36 AM CST -----  Regarding: new patient appointment  Hi     This patient needs an appointment with Dr. Washburn.  Can you reach out to her please?    Oneyda

## 2022-02-06 NOTE — PROGRESS NOTES
Subjective:       Patient ID: Nan Palafox is a 72 y.o. female.    Chief Complaint: Follow-up (4 Mo)    HPI   The patient presents for follow-up of medical conditions which include hypertension chronic home, anxiety, Guillain-Cable syndrome, history of bronchial carcinoid tumor.    The patient has been attending physical therapy is ongoing treatment for Guillain-Cable syndrome which she reports followed vaccination for the COVID-19 virus.  The patient feels she continues to make improvement.  She has been seen her neurologist Dr. Prado.  She still notes lower extremity weakness.  She also knows transient weakness and loss of control while using her right hand.  She denies having any numbness in the hand.    She is scheduled to follow-up with chest surgery next month.  This will be a scheduled follow-up visit with Dr. Hernandez.    The patient is also emotionally upset over her 's diagnosis of early dementia.  He is 79 years old.  Knee    She is also scheduled to see her pulmonologist Dr. Javad Franks tomorrow at [facility].  The patient is still experiencing a cough.  She is using Flonase for treatment of rhinitis and postnasal drainage.    Review of Systems   Constitutional: Negative for activity change, appetite change, fatigue and unexpected weight change.   HENT: Positive for postnasal drip.    Eyes: Negative for visual disturbance.   Respiratory: Positive for cough. Negative for shortness of breath and wheezing.    Cardiovascular: Negative for chest pain, palpitations and leg swelling.   Gastrointestinal: Negative for abdominal pain, blood in stool, constipation and diarrhea.   Genitourinary: Negative for dysuria and hematuria.   Musculoskeletal: Positive for gait problem. Negative for arthralgias, neck pain and neck stiffness.   Integumentary:  Negative for rash.   Neurological: Positive for weakness. Negative for dizziness, syncope and headaches.   Psychiatric/Behavioral: Negative for sleep  disturbance.            Physical Exam  Vitals and nursing note reviewed.   Constitutional:       General: She is not in acute distress.     Appearance: She is well-developed and well-nourished.   HENT:      Head: Normocephalic and atraumatic.   Eyes:      General: No scleral icterus.     Extraocular Movements: EOM normal.      Conjunctiva/sclera: Conjunctivae normal.   Neck:      Thyroid: No thyromegaly.      Vascular: No JVD.   Cardiovascular:      Rate and Rhythm: Normal rate and regular rhythm.      Pulses: Intact distal pulses.      Heart sounds: Normal heart sounds. No murmur heard.  No friction rub. No gallop.    Pulmonary:      Effort: Pulmonary effort is normal. No respiratory distress.      Breath sounds: Normal breath sounds. No wheezing or rales.   Abdominal:      General: Bowel sounds are normal.      Palpations: Abdomen is soft. There is no mass.      Tenderness: There is no abdominal tenderness.   Musculoskeletal:         General: No tenderness. Normal range of motion.      Cervical back: Normal range of motion and neck supple.   Lymphadenopathy:      Cervical: No cervical adenopathy.   Skin:     General: Skin is warm and dry.      Findings: No rash.   Neurological:      Mental Status: She is alert and oriented to person, place, and time.   Psychiatric:         Behavior: Behavior normal.         Thought Content: Thought content normal.      Comments: The patient appears slightly anxious and sad at times particularly when discussing her 's diagnosis.           Hospital Outpatient Visit on 10/12/2021   Component Date Value Ref Range Status    Final Pathologic Diagnosis 10/12/2021    Final                    Value:[facility] Thyroid Cytology Category: Benign  Consistent with a benign  follicular nodule.  Other findings and comments:  Benign follicular epithelial cells.  Colloid present.  Macrophages.      Interp By ZEKE Jalloh MD, Signed on 10/15/2021 at 09:49    Adequacy 10/12/2021     Final                    Value:Pass 1-3: follicular cells; colloid and debris. 3 diffquick, 3 alcohol, 1  cytolyt.  Dr. Erwin reported results to Dr. Mace on 10/21/2021 @ 1355      Disclaimer 10/12/2021    Final                    Value:Screening was performed at [facility], [address], [place] [address].     Lab Visit on 10/07/2021   Component Date Value Ref Range Status    Sodium 10/07/2021 139  136 - 145 mmol/L Final    Potassium 10/07/2021 3.8  3.5 - 5.1 mmol/L Final    Chloride 10/07/2021 99  95 - 110 mmol/L Final    CO2 10/07/2021 29  23 - 29 mmol/L Final    Glucose 10/07/2021 142* 70 - 110 mg/dL Final    BUN 10/07/2021 10  8 - 23 mg/dL Final    Creatinine 10/07/2021 0.7  0.5 - 1.4 mg/dL Final    Calcium 10/07/2021 9.9  8.7 - 10.5 mg/dL Final    Anion Gap 10/07/2021 11  8 - 16 mmol/L Final    eGFR if African American 10/07/2021 >60  >60 mL/min/1.73 m^2 Final    eGFR if non African American 10/07/2021 >60  >60 mL/min/1.73 m^2 Final    Comment: Calculation used to obtain the estimated glomerular filtration  rate (eGFR) is the CKD-EPI equation.          Assessment & Plan:      Nan was seen today for follow-up.  The patient has been encouraged to continue her regular physical therapy sessions.  Current medications will be continued.  The patient will keep her scheduled visits with Pulmonology and Chest Surgery.    Lab studies will be repeated in 4 months.    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  -     LORazepam (ATIVAN) 1 MG tablet; TAKE 1 TABLET BY MOUTH EVERY 8 HOURS AS NEEDED         Follow up in about 4 months (around 4/6/2022).     Glenroy Jimenez MD

## 2022-02-09 ENCOUNTER — OFFICE VISIT (OUTPATIENT)
Dept: OPHTHALMOLOGY | Facility: CLINIC | Age: 72
End: 2022-02-09
Payer: MEDICARE

## 2022-02-09 DIAGNOSIS — H25.13 NUCLEAR SCLEROSIS OF BOTH EYES: ICD-10-CM

## 2022-02-09 DIAGNOSIS — H40.1132 PRIMARY OPEN ANGLE GLAUCOMA (POAG) OF BOTH EYES, MODERATE STAGE: Primary | ICD-10-CM

## 2022-02-09 PROCEDURE — 99999 PR PBB SHADOW E&M-EST. PATIENT-LVL II: ICD-10-PCS | Mod: PBBFAC,,, | Performed by: OPHTHALMOLOGY

## 2022-02-09 PROCEDURE — 99214 PR OFFICE/OUTPT VISIT, EST, LEVL IV, 30-39 MIN: ICD-10-PCS | Mod: S$PBB,,, | Performed by: OPHTHALMOLOGY

## 2022-02-09 PROCEDURE — 99212 OFFICE O/P EST SF 10 MIN: CPT | Mod: PBBFAC | Performed by: OPHTHALMOLOGY

## 2022-02-09 PROCEDURE — 99214 OFFICE O/P EST MOD 30 MIN: CPT | Mod: S$PBB,,, | Performed by: OPHTHALMOLOGY

## 2022-02-09 PROCEDURE — 99999 PR PBB SHADOW E&M-EST. PATIENT-LVL II: CPT | Mod: PBBFAC,,, | Performed by: OPHTHALMOLOGY

## 2022-02-09 NOTE — PROGRESS NOTES
Assessment /Plan     For exam results, see Encounter Report.    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 [employer]    ==>   2022 AD // MI  Grieving    Grieving best friends x 2 loss 2/2 COVID May 2020  Difficult    2021  ==> Carcinoid Tumor Lung  ==> COVID Vaccine  ==> Guillain-[place] Syndrome  Recovering    --> Reports No Glc drops during @ 7 week hospitalization    OHT  POAG OD > OS  Presented 2019  40 // 24    + FMHx Mother ( 94 yo) & Brother  Denies Blindness    CCT  568 // 567    < 21 --> Achieved --> better-adherence    Both eyes  --> CSM --> no drops 2/2 caretaker //  dementia etc --> discussed c MIREYA  Xal q day  Cosopt BID    SP SLT OS 2020  --> consider SLT OD as discussed      NSC OU  CE PRN  MRx +275      Hx Retinal Tear OD  RD precautions:  Discussed symptoms of RD with increased flashes, floaters, decreasing vision.  Patient/Family to call and return immediately to clinic should the symptoms of RD occur. Voiced good understanding with Q & A.       Plan  RTC 4 months IOP &  Adherence --> consider SLT OD --> HVF & OCT RNFL  RTC sooner prn with good understanding

## 2022-02-24 ENCOUNTER — TELEPHONE (OUTPATIENT)
Dept: INTERNAL MEDICINE | Facility: CLINIC | Age: 72
End: 2022-02-24
Payer: MEDICARE

## 2022-03-03 ENCOUNTER — OFFICE VISIT (OUTPATIENT)
Dept: PSYCHIATRY | Facility: CLINIC | Age: 72
End: 2022-03-03
Payer: MEDICARE

## 2022-03-03 DIAGNOSIS — C7A.090 CARCINOID BRONCHIAL ADENOMA OF RIGHT LUNG: ICD-10-CM

## 2022-03-03 DIAGNOSIS — F43.22 ADJUSTMENT DISORDER WITH ANXIETY: Primary | ICD-10-CM

## 2022-03-03 DIAGNOSIS — F43.21 GRIEF: ICD-10-CM

## 2022-03-03 PROCEDURE — 99999 PR PBB SHADOW E&M-EST. PATIENT-LVL II: CPT | Mod: PBBFAC,,, | Performed by: PSYCHOLOGIST

## 2022-03-03 PROCEDURE — 90791 PSYCH DIAGNOSTIC EVALUATION: CPT | Mod: ,,, | Performed by: PSYCHOLOGIST

## 2022-03-03 PROCEDURE — 99999 PR PBB SHADOW E&M-EST. PATIENT-LVL II: ICD-10-PCS | Mod: PBBFAC,,, | Performed by: PSYCHOLOGIST

## 2022-03-03 PROCEDURE — 99212 OFFICE O/P EST SF 10 MIN: CPT | Mod: PBBFAC | Performed by: PSYCHOLOGIST

## 2022-03-03 PROCEDURE — 90791 PR PSYCHIATRIC DIAGNOSTIC EVALUATION: ICD-10-PCS | Mod: ,,, | Performed by: PSYCHOLOGIST

## 2022-03-03 NOTE — PROGRESS NOTES
INFORMED CONSENT/ LIMITS of CONFIDENTIALITY: Prior to beginning the interview, the patient's identification was confirmed via name and date of birth. Nan Palafox  was informed of the possible risks and benefits of psychological interventions (e.g., counseling, psychotherapy, testing) and provided information regarding the handling of protected health records and   the limits of confidentiality, including the importance of reporting any suicidal or homicidal ideation to ensure safety of all parties. This provider explained the purpose of today's appointment and the patient was provided with time to ask questions regarding this information.  Acceptance and understanding of these conditions was expressed, and Nan Palafox freely consented to this evaluation.     PSYCHO-ONCOLOGY INTAKE    Diagnostic Interview - CPT 28089    Date: 3/3/2022  Site: [facility]     Evaluation Length (direct face-to-face time):  1 hour     Referral Source: Brigette Donohue P*   Oncologist:   PCP: Glenroy Jimenez MD    Clinical status of patient: Outpatient    Nan Palafox, a 72 y.o. female, seen for initial evaluation visit.  Met with patient.    Chief complaint/reason for encounter: adjustment to illness, Psychological Evaluation and treatment recommendations    Medical/Surgical History:    Patient Active Problem List   Diagnosis    Nuclear sclerosis - Both Eyes    History of colon polyps    GIST (gastrointestinal stromal tumor), non-malignant    Primary hypertension    Anemia of chronic disease    GERD without esophagitis    Cervical syndrome    Cervical spondylosis with radiculopathy    Bilateral ocular hypertension    Primary open angle glaucoma (POAG) of both eyes, moderate stage    Atherosclerosis of aorta    Tortuous aorta    Class 1 obesity due to excess calories with serious comorbidity in adult    Abnormality of lung    Malignant carcinoid tumor of bronchus and lung    History of  gastrointestinal stromal tumor (GIST)    Coronary artery disease    Carcinoid bronchial adenoma of right lung    Carcinoid bronchial adenoma, right    Unable to ambulate    Weakness of both lower extremities    Dehydration    Spinal stenosis of lumbar region    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   Firearms:  No   Advanced directives:No     Family History:   Psychiatric illness: No     Alcohol/Drug Abuse:  Younger brothers SUDS     Suicide: No      Past Psychiatric History:   Inpatient treatment: No     Outpatient treatment: Yes- tried for one visit- for hypnosis. In late 20s.      Prior substance abuse treatment: No     Suicide Attempts: No     Psychotropic Medications:  Current: Ativan       Past: Valium    Current medications as per below, allergies reviewed in chart.    Current Outpatient Medications   Medication    acetaminophen (TYLENOL) 325 MG tablet    albuterol (PROVENTIL/VENTOLIN HFA) 90 mcg/actuation inhaler    amLODIPine (NORVASC) 10 MG tablet    ASCORBATE CALCIUM (VITAMIN C ORAL)    aspirin (ECOTRIN) 81 MG EC tablet    dorzolamide-timolol 2-0.5% (COSOPT) 22.3-6.8 mg/mL ophthalmic solution    fluticasone (FLONASE) 50 mcg/actuation nasal spray    latanoprost 0.005 % ophthalmic solution    LORazepam (ATIVAN) 1 MG tablet    multivitamin (THERAGRAN) tablet    rosuvastatin (CRESTOR) 20 MG tablet    vitamin D (VITAMIN D3) 1000 units Tab     No current facility-administered medications for this visit.          Social situation/Stressors: Nan Palafox lives alone in [facility].  She is retired from [employer].   Nan Palafox has been  49 and has 1 children.  Her spouse is .   The patient  reports good social support. Nan Palafox is an active member of a [employer].  Nan Palafox's hobbies include  Art, music, ballet.    Additional stressors:  [place]  Recent Death of     Strengths:Able to vocalize needs, Values and traditions, Motivation, readiness for change, Setting and pursuing goals, hopes, dreams, aspirations and Resources - social, interpersonal, monetary  Liabilities: Other: Guarded    Current Evaluation:     Mental Status Exam: Nan Palafox arrived promptly for the assessment session. The patient was fully cooperative throughout the interview and was an adequate historian   Appearance: age appropriate, appropriately  dressed, adequately  groomed  Behavior/Cooperation: guarded  Speech: normal in rate, volume, and tone and appropriate quality, quantity and organization of sentences  Mood: dysthymic, irritable  Affect: mood congruent  Thought Process: goal-directed, logical  Thought Content: normal, no suicidality, no homicidality, delusions, or paranoia;did not appear to be responding to internal stimuli during the interview.   Orientation: grossly intact  Memory: Grossly intact  Attention Span/Concentration: Attends to interview without distraction; reports no difficulty  Fund of Knowledge: average  Estimate of Intelligence: average from verbal skills and history  Cognition: grossly intact  Insight: patient has awareness of illness; good insight into own behavior and behavior of others  Judgment: the patient's behavior is adequate to circumstances    Distress Score    Distress Score: 8        Practical Problems Physical Problems                                                   Family Problems                                         Emotional Problems                                                         Spiritual/Religions Concerns               Other Problems                Patient Health Questionnaire-9 (PHQ-9)     1.  Little interest or  pleasure in doing things: Not at all                       = 0   2.  Feeling down, depressed or hopeless: More than half of days  = 2   3.  Trouble falling or staying asleep, or sleeping too much: Nearly every day           = 3   4.  Feeling tired or having little energy: Not at all                       = 0   5.  Poor appetite or overeating: Not at all                       = 0   6.  Feeling bad about yourself - or that you are a failure or have let yourself or your family down: Not at all                       = 0   7.  Trouble concentrating on things, such as reading the newspaper or watching television: Not at all                       = 0   8.  Moving or speaking so slowly that other people could have noticed.  Or the opposite - being fidgety or restless that you have been moving around a lot more than usual: Not at all                       = 0   9.  Thoughts that you would be better off dead, or of hurting yourself: Not at all                       = 0    PHQ-9 Total:  5, mild     Generalized Anxiety Disorder Questionnaire-7 (JAYLIN-7)  The patient completed the General Anxiety Disorder, 7 Items (GAD7)and received a score of 15, indicating moderate anxiety symptoms presently impacting current functioning.       History of present illness:    Patient with history of Carcinoid tumor. Per Chart: 72 y.o. female never smoker here today for 1 year follow-up from right robotic middle lobectomy. Uncomplicated post-operative course. 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 Tested positive for COVID 1 month ago. Did not require hospitalizations or oxygen.  recently passed away. Denies fever, chills, wheeze, cough, CP, palpitations, claudication,  N/V or changes in bowel and bladder functioning.     Patient requested referral to Psych. Patient  of 50 years patient's passed away due to complications from dementia. 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  Patient declined to talk about certain things.     Nan Palafox has adjusted to illness with difficulty primarily through passive coping strategies. She has engaged in appropriate information gathering.  The patient has good family/friend support.  Her support system is coping well with the diagnosis/treatment/prognosis. Illness-related psychosocial stressors include changes in ability to engage in leisure activities.  The patient has a good partnership with her [facility] oncology treatment team. The patient reports the following barriers to cancer care:none.     Behavioral Health Symptoms:   · Mood: Depression: depressed mood and diminished interest;  no prior; no SI/HI  · Lotus: Denies  · Psychosis: Denies   · Anxiety: Feeling nervous, anxious, or on edge, Uncontrollable worry (about health, future, functioning), Excessive worry (interfering with social interaction, sleep), Difficulty relaxing, Irritability, Fear of unknown and denied; no prior  · Generalized anxiety: Denies    · Panic Disorder: Denies  · Social/specific phobia: Denies   · OCD: Denies  · Trauma: Declined to discuss  · Sexual Dysfunction:  Denies  · Substance abuse: denied  · Cognitive functioning: denied  · Health behaviors: noncontributory  · Sleep: Treated with Valium   · Pain: Ms. Palafox reports no pain.   · CAM Therapies: None         Assessment - Diagnosis - Goals:       ICD-10-CM ICD-9-CM   1. Adjustment disorder with anxiety  F43.22 309.24   2. Carcinoid bronchial adenoma of right lung  C7A.090 162.9   3. Grief  F43.21 309.0     Plan:individual psychotherapy    Summary and Recommendations  Nan Palafox is a 72 y.o. female referred by Brigette Donohue P* for psychological evaluation and treatment.  Ms. Palafox appears to be coping poorly with her diagnosis and proposed treatment course as well as psychosocial stressors and grief. Patient very guarded and apprehensive  about treatment but did agree to a follow up.      GOALS:   Pleasant events scheduling  Monitor stressors in writing and bring to next visit      Tran Nolan, PhD  Clinical Psychologist  [place] License #[id number]  [place] License #[id number]

## 2022-03-09 ENCOUNTER — PES CALL (OUTPATIENT)
Dept: ADMINISTRATIVE | Facility: CLINIC | Age: 72
End: 2022-03-09
Payer: MEDICARE

## 2022-03-18 ENCOUNTER — TELEPHONE (OUTPATIENT)
Dept: INTERNAL MEDICINE | Facility: CLINIC | Age: 72
End: 2022-03-18
Payer: MEDICARE

## 2022-03-18 DIAGNOSIS — Z91.89 AT RISK FOR INFECTION DUE TO IMMUNOSUPPRESSION: Primary | ICD-10-CM

## 2022-03-18 NOTE — TELEPHONE ENCOUNTER
----- Message from Tanya Austin sent at 3/18/2022  9:05 AM CDT -----  Contact: Pt [phone]  Patient would like to speak to the nurse in regards to a personal urgent matter. Please advise    Last seen 12/6/21.     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     Does she qualify for the iv infusion?     Please call and advise.    Thank You

## 2022-03-18 NOTE — TELEPHONE ENCOUNTER
Her Cancer dr suggested she see pcp dr eloise kendall to discuss what opens are for her to get something to protect her from covid  Since had reaction to vaccine..     Cancer dr said dr kendall is aware of what she would qualify for.    She wanted your opinion. Thanks lorin Forrest has appt 4/4 to see you next.

## 2022-03-18 NOTE — TELEPHONE ENCOUNTER
I left a message for the patient.  I will refer her to Infectious Disease for evaluation.  The patient has a history of bronchial carcinoid.  She developed Guillian-Erhard following immunization for COVID -19. She never completed the vaccine series.  I am unsure whether or not she qualifies for the IV infusion therapy for patients severely immunosuppressed. This will need to be determined.  Consultation with Infectious Disease is recommended.

## 2022-03-23 ENCOUNTER — TELEPHONE (OUTPATIENT)
Dept: INTERNAL MEDICINE | Facility: CLINIC | Age: 72
End: 2022-03-23
Payer: MEDICARE

## 2022-03-23 NOTE — TELEPHONE ENCOUNTER
Mary Mcginnis MA  Patient is requesting to speak to someone regarding questions about referral. Patient did schedule with Infectious disease.     I called her.  She had no questions.  Just wanted to know dr colten.  I told her dr martinez wanted her to see them for idea's on preventive covid since had problems with taking the vaccine. ([place])

## 2022-04-04 ENCOUNTER — LAB VISIT (OUTPATIENT)
Dept: LAB | Facility: HOSPITAL | Age: 72
End: 2022-04-04
Attending: INTERNAL MEDICINE
Payer: MEDICARE

## 2022-04-04 DIAGNOSIS — I10 ESSENTIAL HYPERTENSION: ICD-10-CM

## 2022-04-04 DIAGNOSIS — E78.49 OTHER HYPERLIPIDEMIA: ICD-10-CM

## 2022-04-04 DIAGNOSIS — I70.0 ATHEROSCLEROSIS OF AORTA: ICD-10-CM

## 2022-04-04 DIAGNOSIS — R79.9 ABNORMAL FINDING OF BLOOD CHEMISTRY, UNSPECIFIED: ICD-10-CM

## 2022-04-04 LAB
ALBUMIN SERPL BCP-MCNC: 3.9 G/DL (ref 3.5–5.2)
ALP SERPL-CCNC: 48 U/L (ref 55–135)
ALT SERPL W/O P-5'-P-CCNC: 14 U/L (ref 10–44)
ANION GAP SERPL CALC-SCNC: 10 MMOL/L (ref 8–16)
AST SERPL-CCNC: 20 U/L (ref 10–40)
BASOPHILS # BLD AUTO: 0.05 K/UL (ref 0–0.2)
BASOPHILS NFR BLD: 0.9 % (ref 0–1.9)
BILIRUB SERPL-MCNC: 0.5 MG/DL (ref 0.1–1)
BUN SERPL-MCNC: 9 MG/DL (ref 8–23)
CALCIUM SERPL-MCNC: 9.8 MG/DL (ref 8.7–10.5)
CHLORIDE SERPL-SCNC: 99 MMOL/L (ref 95–110)
CHOLEST SERPL-MCNC: 196 MG/DL (ref 120–199)
CHOLEST/HDLC SERPL: 2.5 {RATIO} (ref 2–5)
CO2 SERPL-SCNC: 30 MMOL/L (ref 23–29)
CREAT SERPL-MCNC: 0.7 MG/DL (ref 0.5–1.4)
DIFFERENTIAL METHOD: ABNORMAL
EOSINOPHIL # BLD AUTO: 0.3 K/UL (ref 0–0.5)
EOSINOPHIL NFR BLD: 5.1 % (ref 0–8)
ERYTHROCYTE [DISTWIDTH] IN BLOOD BY AUTOMATED COUNT: 15.9 % (ref 11.5–14.5)
EST. GFR  (AFRICAN AMERICAN): >60 ML/MIN/1.73 M^2
EST. GFR  (NON AFRICAN AMERICAN): >60 ML/MIN/1.73 M^2
ESTIMATED AVG GLUCOSE: 111 MG/DL (ref 68–131)
GLUCOSE SERPL-MCNC: 91 MG/DL (ref 70–110)
HBA1C MFR BLD: 5.5 % (ref 4–5.6)
HCT VFR BLD AUTO: 37.1 % (ref 37–48.5)
HDLC SERPL-MCNC: 78 MG/DL (ref 40–75)
HDLC SERPL: 39.8 % (ref 20–50)
HGB BLD-MCNC: 12 G/DL (ref 12–16)
IMM GRANULOCYTES # BLD AUTO: 0.01 K/UL (ref 0–0.04)
IMM GRANULOCYTES NFR BLD AUTO: 0.2 % (ref 0–0.5)
LDLC SERPL CALC-MCNC: 106.4 MG/DL (ref 63–159)
LYMPHOCYTES # BLD AUTO: 1.5 K/UL (ref 1–4.8)
LYMPHOCYTES NFR BLD: 25.6 % (ref 18–48)
MCH RBC QN AUTO: 27.6 PG (ref 27–31)
MCHC RBC AUTO-ENTMCNC: 32.3 G/DL (ref 32–36)
MCV RBC AUTO: 85 FL (ref 82–98)
MONOCYTES # BLD AUTO: 0.5 K/UL (ref 0.3–1)
MONOCYTES NFR BLD: 9.5 % (ref 4–15)
NEUTROPHILS # BLD AUTO: 3.3 K/UL (ref 1.8–7.7)
NEUTROPHILS NFR BLD: 58.7 % (ref 38–73)
NONHDLC SERPL-MCNC: 118 MG/DL
NRBC BLD-RTO: 0 /100 WBC
PLATELET # BLD AUTO: 422 K/UL (ref 150–450)
PMV BLD AUTO: 10.2 FL (ref 9.2–12.9)
POTASSIUM SERPL-SCNC: 4.2 MMOL/L (ref 3.5–5.1)
PROT SERPL-MCNC: 8 G/DL (ref 6–8.4)
RBC # BLD AUTO: 4.35 M/UL (ref 4–5.4)
SODIUM SERPL-SCNC: 139 MMOL/L (ref 136–145)
TRIGL SERPL-MCNC: 58 MG/DL (ref 30–150)
WBC # BLD AUTO: 5.66 K/UL (ref 3.9–12.7)

## 2022-04-04 PROCEDURE — 80061 LIPID PANEL: CPT | Performed by: INTERNAL MEDICINE

## 2022-04-04 PROCEDURE — 83036 HEMOGLOBIN GLYCOSYLATED A1C: CPT | Performed by: INTERNAL MEDICINE

## 2022-04-04 PROCEDURE — 36415 COLL VENOUS BLD VENIPUNCTURE: CPT | Mod: PO | Performed by: INTERNAL MEDICINE

## 2022-04-04 PROCEDURE — 80053 COMPREHEN METABOLIC PANEL: CPT | Performed by: INTERNAL MEDICINE

## 2022-04-04 PROCEDURE — 85025 COMPLETE CBC W/AUTO DIFF WBC: CPT | Performed by: INTERNAL MEDICINE

## 2022-04-07 ENCOUNTER — OFFICE VISIT (OUTPATIENT)
Dept: NEUROLOGY | Facility: CLINIC | Age: 72
End: 2022-04-07
Payer: MEDICARE

## 2022-04-07 VITALS
SYSTOLIC BLOOD PRESSURE: 159 MMHG | DIASTOLIC BLOOD PRESSURE: 81 MMHG | HEIGHT: 65 IN | BODY MASS INDEX: 26.81 KG/M2 | HEART RATE: 83 BPM | WEIGHT: 160.94 LBS

## 2022-04-07 DIAGNOSIS — R22.43 LOCALIZED SWELLING, MASS AND LUMP, LOWER LIMB, BILATERAL: ICD-10-CM

## 2022-04-07 DIAGNOSIS — M79.89 LEG SWELLING: ICD-10-CM

## 2022-04-07 DIAGNOSIS — R26.81 GAIT INSTABILITY: Primary | ICD-10-CM

## 2022-04-07 PROCEDURE — 99215 OFFICE O/P EST HI 40 MIN: CPT | Mod: S$PBB,,, | Performed by: PSYCHIATRY & NEUROLOGY

## 2022-04-07 PROCEDURE — 99214 OFFICE O/P EST MOD 30 MIN: CPT | Mod: PBBFAC | Performed by: PSYCHIATRY & NEUROLOGY

## 2022-04-07 PROCEDURE — 99999 PR PBB SHADOW E&M-EST. PATIENT-LVL IV: ICD-10-PCS | Mod: PBBFAC,,, | Performed by: PSYCHIATRY & NEUROLOGY

## 2022-04-07 PROCEDURE — 99999 PR PBB SHADOW E&M-EST. PATIENT-LVL IV: CPT | Mod: PBBFAC,,, | Performed by: PSYCHIATRY & NEUROLOGY

## 2022-04-07 PROCEDURE — 99215 PR OFFICE/OUTPT VISIT, EST, LEVL V, 40-54 MIN: ICD-10-PCS | Mod: S$PBB,,, | Performed by: PSYCHIATRY & NEUROLOGY

## 2022-04-07 NOTE — PROGRESS NOTES
NEUROLOGY  Outpatient  Clinic visit note    [address] [address]  [phone] (office) / [phone] ( (fax)    Patient Name:  Nan Palafox  :  1950  MR #:  [id number]  Acct #:  [id number]    Date of Neurology Visit: 2022  Name of Neurologist: Lucy Prado MD    Other Physicians:  Glenroy Jimenez MD (Primary Care Physician); No ref. 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 female presents for follow up. Patient was admitted for GBS evaluation at [facility] in 2021.    Today she reports she has intermittent tingling in her toes which improves when she walks.  She has noted intermittent heaviness in the right hand at times which goes away once she moves her hands. She has developed swelling in her left leg which has been going on for a few months.     She had a fall when she was walking on a side walk , did not pay attention and fell.     No seizures.     Has been off Keppra >1 year       Visit of 10/7/2021:  She never restarted the Keppra. She has been off Keppra for > 6 months.  She has tingling in her toes especially at night.  No falls.  No symptoms in hands.   Continues to do PT. She is under a lot of stress due to her 's health.    Patient was recommended routine EEG- wnl, given one lifetime seizure and no recurrence she prefers to stay off Keppra  Lesion at C7 endplate favored to be degenerative change        Initial HPI: 2021  She is now using cane. 2021 she underwent surgery thoracic surgery for removal of a mass. A few days later she got her first Pfizer vaccine.   She noted tingling and numbenss. She could not move her legs. 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 She was treated with IVIG     The tingling has resolved but every once in a while she  feels a heaviness in her toes. She had difficulty raisining her hands. She has noted that the right upper extremity continues to remain weak.     In addition -     In 2020 she underwent a lung biospy. She states post biopsy she had change in speech, right upper extremity weakness. She was admitted at [facility] and was told she did not have a stroke but she was started on Keppra given the possiblity of a ? Seizure. She has stopped taking the Keppra for 6 months now and not had another event. Stopped the Keppra herself because she was not sure as to why she was taking it.     Currently she reports right arm weakness. She is doing PT 2x/ week. She lives with her .     H/o carcinoid tumor s/p resection       Treatment to date:    IVIG for GBS   PT    Review of Systems:        Past Medical, Surgical, Family & Social History:   Past Medical History:   Diagnosis Date    Arthritis     Cataract     Cervical spondylosis with radiculopathy 4/11/2016    History of gastrointestinal stromal tumor (GIST) 12/2/2020    Hypertension     Malignant carcinoid tumor of bronchus and lung 12/2/2020    Nuclear sclerosis - Both Eyes 2/8/2013    PNA (pneumonia)     Primary open angle glaucoma (POAG) of both eyes, moderate stage 3/11/2019       Home Medications:     Current Outpatient Medications:     acetaminophen (TYLENOL) 325 MG tablet, Take 2 tablets (650 mg total) by mouth every 4 (four) hours as needed., Disp: , Rfl: 0    albuterol (PROVENTIL/VENTOLIN HFA) 90 mcg/actuation inhaler, Inhale 2 puffs into the lungs every 6 (six) hours as needed., Disp: , Rfl:     amLODIPine (NORVASC) 10 MG tablet, Take 1 tablet (10 mg total) by mouth once daily. For blood pressure control., Disp: 90 tablet, Rfl: 3    ASCORBATE CALCIUM (VITAMIN C ORAL), Take by mouth., Disp: , Rfl:     aspirin (ECOTRIN) 81 MG EC tablet, Take 81 mg by mouth once daily., Disp: , Rfl:     dorzolamide-timolol 2-0.5% (COSOPT) 22.3-6.8 mg/mL ophthalmic  "solution, Place 1 drop into both eyes 2 (two) times daily., Disp: 3 Bottle, Rfl: 4    fluticasone (FLONASE) 50 mcg/actuation nasal spray, 2 sprays by Each Nare route once daily., Disp: 1 Bottle, Rfl: 12    latanoprost 0.005 % ophthalmic solution, Place 1 drop into both eyes every evening., Disp: 7.5 mL, Rfl: 4    LORazepam (ATIVAN) 1 MG tablet, TAKE 1 TABLET BY MOUTH EVERY 8 HOURS AS NEEDED, Disp: 60 tablet, Rfl: 2    multivitamin (THERAGRAN) tablet, Take 1 tablet by mouth once daily., Disp: , Rfl:     rosuvastatin (CRESTOR) 20 MG tablet, Take 1 tablet (20 mg total) by mouth once daily., Disp: 90 tablet, Rfl: 3    vitamin D (VITAMIN D3) 1000 units Tab, Take 1,000 Units by mouth once daily., Disp: , Rfl:       14-point review of systems as follows:   No check ashvin indicates NEGATIVE response   Constitutional: [] weight loss, [] change to appetite   Eyes: [] change in vision, [] double vision   Ears, nose, mouth, throat: [] frequent nose bleeds, [] ringing in the ears   Respiratory: [] cough, [] wheezing   Cardiovascular: [] chest pain, [] palpitations   Gastrointestinal: [] jaundice, [] nausea/vomiting   Genitourinary: [] incontinence, [] burning with urination   Hematologic/lymphatic: [] easy bruising/bleeding, [] night sweats   Neurological: [x] numbness, [x] weakness   Endocrine: [x] fatigue, [] heat/cold intolerance   Allergy/Immunologic: [] fevers, [x] chills   Musculoskeletal: [] muscle pain, [] joint pain   Psychiatric: [] thoughts of harming self/others, [x] depression   Integumentary: [] rashes, [] sores that do not heal     Physical Examination:  BP (!) 159/81   Pulse 83   Ht 5' 5" (1.651 m)   Wt 73 kg (160 lb 15 oz)   BMI 26.78 kg/m²     GENERAL:  General appearance: Well, non-toxic appearing.  No apparent distress.  Fundi exam: normal.  Neck: supple.  Carotid auscultation: normal.  Heart auscultation: normal.  Peripheral pulses: normal.  Extremities: bilateral lower extremity swelling " noted    MENTAL STATUS:  Alertness, attention span & concentration: normal.  Language: normal.  Orientation to self, place & time:  normal.  Memory, recent & remote: normal.  Fund of knowledge: normal.      SPEECH:  Clear and fluent.  Follows complex commands.    CRANIAL NERVES:  Cranial Nerves II-XII were examined.  II - Visual fields: normal.  III, IV, VI: PERRL, EOMI, No ptosis, No nystagmus.  V - Facial sensation: normal.  VII - Face symmetry & mobility: normal.  VIII - Hearing: normal.  IX, X - Palate: mobile & midline.  XI - Shoulder shrug: normal.  XII - Tongue protrusion: normal.    GROSS MOTOR:  Gait & station: normal except wobbles when she walks on toes  Tone: normal.  Abnormal movements: none.  Finger-nose & Heel-knee-shin: normal.  Rapid alternating movements & drift: normal.  Romberg: absent    MUSCLE STRENGTH:     Fascics Atrophy RIGHT    LEFT Atrophy Fascics     5 Neck Ext. 5       5 Neck Flex 5       5 Deltoids 5       5 Sh.Ext.Rot. 5       5 Sh.Int.Rot. 5       5 Biceps 5       5 Triceps 5       5 Forearm.Pr. 5       5 Wrist Ext. 5       5 Wrist Flex 5       5 Finger Ext. 5       5 Finger Flex 5       5 FPL 5       5 Inteross. 5                         5 Iliopsoas 5       5 Hip Abduct 5       5 Hip Adduct 5       5 Quads 5       5 Hams 5       5 Dorsiflex 5       5 Plantar Flex 5       5 Ankle Winston 5       5 Ankle Invert 5       5 Toe Ext. 5       5 Toe Flex 5                         REFLEXES:    RIGHT Reflex   LEFT   2+ Biceps 2+   2+ Brachiorad. 2+   2+ Triceps 2+        0 Patellar 0   0 Ankle 0        Down PLANTAR Down     SENSORY:  Light touch: Normal throughout.  Sharp touch: Normal throughout.  Vibration: 12 seconds at the toes   Temperature: Normal throughout.  Joint Position: Normal throughout.    Diagnostic Data Reviewed:           Results for orders placed or performed during the hospital encounter of 01/29/21   MRI Brain W WO Contrast    Narrative    EXAMINATION:  MRI BRAIN W WO  CONTRAST; MRI THORACIC SPINE W WO CONTRAST; MRI CERVICAL SPINE W WO CONTRAST    CLINICAL HISTORY:  BLE weakness and RUE weakness;; bilateral LE weakness, RUE weakness;; bilateral LE weaknes, RUE weakness;.    TECHNIQUE:  Multiplanar multisequence MR imaging of the brain was performed before and after the administration of 9 mL Gadavist  intravenous contrast.    COMPARISON:  CT head without contrast 09/26/2016.    FINDINGS:  Intracranial compartment:    Prominence of the ventricles and sulci compatible with mild generalized cerebral volume loss. No hydrocephalus.  Cavum septum pellucidum et vergae.    No extra-axial blood or fluid collections.  Partial empty sella configuration, unchanged.    Patchy and confluent T2/FLAIR hyperintensities scattered throughout the centrum semiovale, callososeptal interface, and periventricular white matter mostly centered at the posterior aspect of the occipital horns, favoring age advanced chronic microvascular ischemic change, with sequela of demyelinating disease also a consideration.    No mass lesion, acute hemorrhage, edema or acute infarct. No abnormal enhancement.    Normal vascular flow voids are preserved.    Skull/extracranial contents (limited evaluation): Bone marrow signal intensity is normal.  Craniocervical junction is within normal limits.    Globes are symmetric.    Cervical spine:    Straightening of the normal cervical lordosis.    Vertebral body heights are maintained without evidence for fracture or diffuse marrow infiltrative process.    There is a small area of T1 hypointense signal at the inferior C7 endplate, which demonstrates increased T2 and STIR signal and also enhancement on post-contrast imaging (sagittal series 35, image 8).  While this may represent active inflammatory change, metastatic lesion also remains a consideration.  Overall, this area appears somewhat similar to MRI 03/18/2016, noting no contrast administration on prior imaging.    Mild  intervertebral disc space narrowing throughout the visualized cervical spine.    Cord maintains normal signal intensity and contour.    Pre dens space is maintained.    C2-C3: Posterior disc osteophyte complex with no spinal canal stenosis or neural foraminal narrowing.    C3-C4: Posterior disc osteophyte complex with no spinal canal stenosis or neural foraminal narrowing.    C4-C5: Posterior disc osteophyte complex with no spinal canal stenosis or neural foraminal narrowing.    C5-C6: Posterior disc osteophyte complex, uncovertebral joint spurring, and mild bilateral facet joint hypertrophy contributing to mild bilateral neural foraminal narrowing.    C6-C7: Posterior disc osteophyte complex, uncovertebral joint spurring, and bilateral facet joint hypertrophy contributing to mild spinal canal stenosis as well as mild bilateral neural foraminal narrowing.    C7-T1: Posterior disc osteophyte complex, uncovertebral joint spurring, and bilateral facet joint hypertrophy contributing to mild bilateral neural foraminal narrowing.    Paraspinal musculature is maintained.    Partially visualized soft tissues of the neck are unremarkable.    Thoracic spine:    Mild exaggerated kyphosis of the thoracic spine.  Vertebral body heights are maintained without evidence for fracture or marrow infiltrative process.    Mild multilevel intervertebral disc space narrowing throughout the visualized thoracic spine.    Cord maintains normal signal intensity and contour.    No focal disc defect, spinal canal stenosis, or neural foraminal narrowing throughout the visualized thoracic spine.    Paraspinal musculature is maintained.    Small right pleural effusion.    Partially visualized intra-abdominal organs are unremarkable.    Visualized aorta is normal in course and caliber without evidence for aneurysm.      Impression    No acute intracranial hemorrhage or major vascular distribution infarct.    Patchy and confluent, nonenhancing  T2/FLAIR hyperintensities scattered throughout the supratentorial white matter as detailed above.  Findings are nonspecific, however considerations include age-advanced microvascular ischemic changes well as demyelinating disease.  Correlation is advised.    Small enhancing lesion at the inferior C7 endplate, overall nonspecific, favoring active degenerative change given similar appearance to prior imaging dated 03/18/2016.  However, small metastatic lesion cannot be completely excluded.  Correlation is advised.    Cervical spondylosis as detailed above, worst at C6-C7 contributing to mild spinal canal stenosis as well as mild bilateral neural foraminal narrowing.    Visualized cervical and thoracic cord maintains normal signal intensity and contour without enhancing lesions.    Small right pleural effusion.    Other findings as above.    This report was flagged in Epic as abnormal.    Electronically signed by resident: Branden Cao  Date:    01/29/2021  Time:    20:14    Electronically signed by: Ru Houston MD  Date:    01/29/2021  Time:    20:53   Results for orders placed or performed during the hospital encounter of 09/26/16   CT Head Without Contrast    Narrative    COMPARISON: MRI brain 5/30/08    FINDINGS: No evidence of hemorrhage, mass, midline shift or acute major vascular territory infarct.  Gray white interface appears intact.  There is age appropriate  generalized cerebral atrophy.  There are patchy and confluent low attenuation changes throughout the subcortical and periventricular white matter, nonspecific but can be seen with chronic small vessel ischemic disease.  Cavum septum pellucidum. The ventricles are midline without distortion by mass effect.  No extra-axial hemorrhage or mass. Skull base  vascular calcifications are noted.     The extracranial structures are within normal limits.  Calvarium is intact.  Visualized paranasal sinuses are clear.  Mastoid air cells are clear.    Impression    1.   "No acute large vascular territory infarct or intracranial hemorrhage identified.  Further evaluation/followup as warranted.    2.  Senescent changes as above.  ______________________________________     Electronically signed by: BRYANT MELENDEZ MD, MD  Date:     09/26/16  Time:    23:16        Discharge summary from 2/10/2021:    HPI:   70 year old female with PMH of HTN, HLD, GIST, GERD, obesity, cervical spondylosis, bilateral TKR, and newly diagnosed right middle typical carcinoid tumor (from outside records, RML mass first noted on chest CT in July 2018) recent lung resection 1/5 who presents for bilateral leg weakness preventing her from walking. Pt believes on 1/26 she notes that it was more difficult to walk, "felt like she was moving cement blocks" and that she had "pins and needles in her feet". By the next morning, 1/27, she says she could not walk at all. She came to the ER, but was discharged and had a fall in the parking lot. Additionally she notes some RUE weakness in conjunction with these symptoms. As her family could not care for her at home and she had multiple falls (no head trauma or LOC) at home, she re-presented today. She denies saddle anesthesia, loss of B/B, facial asymmetry, confusion, back pain. Prior to 1/26 she states she was ambulatory without assistance.     MRI L spine 1/27 showed "Multilevel lumbar spondylosis as detailed above worst at L2-L3 contributing to moderate spinal canal stenosis as well as mild left and moderate right neural foraminal narrowing. Disc extrusion at L4-L5 likely abutting the exiting left L4 nerve root.  Correlation with symptoms is advised. No abnormal enhancement or evidence for metastatic disease in the lumbar spine."     In ED: VSS, mild tachycardia; cbc/cmp unremarkable; CXR with slight decrease in R pleural effusion since prior study        Procedure(s) (LRB):  MPU PROCEDURE (N/A)       Hospital Course:   Patient admitted for for BLE weakness preventing her " "from ambulation. MRI CTL spine completed without obvious etiology for severity of symptoms. NSGY and gen neuro consulted to weigh in. Appreciate assistance. No neurosurgical intervention per NSGY. Gen neuro recommending LP to evaluate for GBS vs paraneoplastic syndrome vs autoimmune causes for patients symptoms. Anesthesia performed LP 1/31, high in protein. Additional labs per neuro. 2/1 plan to start IVIG for suspected autoimmune process, completed after 5 days with positive effect in strength.   Mild lab disturbances including hyponatremia encountered in setting of IVIG use - improved after discontinuation.  Patient progressed with improvement in strength and will be discharged to [facility].  Myelogram, NMG will be needed to be done outpt.          Weakness of both lower extremities  Likely [place] Syndrome      Pt presenting with BLE heaviness and RUE heaviness that started 1/26 and progressed to the point where she is unable to ambulate and carry out her ADLs. Seen in ER here, had a fall in parking lot on dc and persistent sxs, thus representing. Pt AOx4.     - MRI previous visit: "Multilevel lumbar spondylosis as detailed above worst at L2-L3 contributing to moderate spinal canal stenosis as well as mild left and moderate right neural foraminal narrowing. Disc extrusion at L4-L5 likely abutting the exiting left L4 nerve root.  Correlation with symptoms is advised"  - discussed pt with NSGY who recommend MRI T/C spine  - MRI brain and T/C spine ordered     "No acute intracranial hemorrhage or major vascular distribution infarct.   Patchy and confluent, nonenhancing T2/FLAIR hyperintensities scattered throughout the supratentorial white matter as detailed above.  Findings are nonspecific, however considerations include age-advanced microvascular ischemic changes well as demyelinating disease.  Correlation is advised.   Small enhancing lesion at the inferior C7 endplate, overall nonspecific, favoring active " "degenerative change given similar appearance to prior imaging dated 03/18/2016.  However, small metastatic lesion cannot be completely excluded.  Correlation is advised.   Cervical spondylosis as detailed above, worst at C6-C7 contributing to mild spinal canal stenosis as well as mild bilateral neural foraminal narrowing.   Visualized cervical and thoracic cord maintains normal signal intensity and contour without enhancing lesions. "  - NSGY and gen neuro consulted for further assistance, appreciate weighing in     - no acute surgical intervention per NSGY     - labs per neuro pending     - LP done 1/31 to assess for GBS vs PNP syndrome vs autoimmune causes, high protein     - START IVIG 2/1 for suspected autoimmune process, plan for 5d of tx, completed 2/6/21     - Resp mechanics with neuro parameters  - outpt NMG  - PTOT consulted: rehab  - neuro checks, seizure precautions, fall precautions        Coronary artery disease  Cont ASA and statin        Malignant carcinoid tumor of bronchus and lung  Recent R lung lobectomy, 1/5/21  -per dc summary from lung resection" diagnosed right middle typical carcinoid tumor. From outside records, RML mass first noted on chest CT in July 2018. Patient reports she was first notified of its presence about a year ago. She followed with Pulmonology at [facility] for surveillance imaging. Repeat chest CT in November 2020 showed a slight increase in size. Underwent CT guided biopsy at [facility] on 11/23/20 with an eventful post-procedure course. Stroke code was called after she developed right sided weakness and aphasia in PACU. CTA head/neck, MRI brain transcranial dopplers, TTE and LP were all unrevealing. She did have an EEG that showed some left-sided epileptiform irregularities thus she was started on Keppra. Per patient's daughter her weakness, memory and speech all spontaneously improved 48 hours after procedure. She has been completely neurologically intact since discharge. " " Pathology returned as low-grade carcinoid tumor, Ki-67 < 2%. "  - cont keppra, seizure precautions  - albuterol PRN, flonase  -pt reports taking gabapentin for nerve pain after surgery... contributing to weakness? Hold gabapentin 300mg TID     Primary open angle glaucoma (POAG) of both eyes, moderate stage  Continue home eye drops        Hyponatremia  -c/f pseudohyponatremia d/t IVIG  -check serum osmolarity - 285  -confirms lab abnormality in setting of IVIG      EEG on 7/2/2021:    IMPRESSION:   This is a normal routine EEG done in awake and drowsy states.     A normal EEG does not rule out seizures/epilepsy.  CLINICAL CORRELATION IS RECOMMENDED.      Assessment and Plan:    Nan Palafox is a 71 y.o. female presents for follow up.    In 2020 she underwent a lung biospy. She states post biopsy she had change in speech, right upper extremity weakness. She was admitted at [facility] and was told she did not have a stroke but she was started on Keppra given the possiblity of a ? Seizure. Per review of [facility] [place] EEG revealed ""left sided irregularities"??   -Unable to review formal EEG report, records not available. EEG at [facility] off Keppra- within normal limit. She has been seizure free since the first event. She stopped Keppra > 6 months ago and is doing well. Will continue to monitor off Keppra     Currently she reports tingling in toes but denies weakness- she states she has had tingling for many years even prior to GBS diagnosis . She is doing PT. She lives with her .  Neurological examination is within normal limits except for absent lower extremity reflexes/ unsteady gait as mentioned above.  She would like to hold off on getting an EMG/ NCS study    Assessment:  1. Unsteady gait  2. ?  First lifetime seizure in 2020/? abnormal EEG at that time  3. ?  Lesion at C7 endplate- likely degenerative changes  4. Bilateral foot paresthesias  5. Bilateral leg swelling     Recommendations  1.  PT for " evaluation and management of unsteady gait   2. Monitor off Keppra given normal routine EEG and single liftetime seizure   3. Venous US of lower extremities  4. Patient would like to hold off on EMG/ NCS          The patient will return to clinic in 12 months/ as needed.           Lucy Prado MD  Medicine-Neurology, Clinical Neurophysiology    40 minutes spent during the visit with the patient, in addition this time includes time spent reviewing prior records, clinical notes, imaging if obtained and blood work on the day of the visit. The total time spent was all on the day of the visit.      This note was created with voice recognition software.  Grammatical, syntax and spelling errors may be inevitable.    Problem List Items Addressed This Visit    None     Visit Diagnoses     Gait instability    -  Primary    Relevant Orders    Ambulatory referral/consult to Physical/Occupational Therapy    Leg swelling        Relevant Orders    CV Ultrasound doppler venous legs bilat    Localized swelling, mass and lump, lower limb, bilateral         Relevant Orders    CV Ultrasound doppler venous legs bilat

## 2022-04-07 NOTE — PATIENT INSTRUCTIONS
Thank you for coming     I recommend the following  -Venous ultrasound of lower extremities   - PT referral     You would like to hold off on nerve conduction testing for now       Follow up with neurology in 1 year

## 2022-04-11 ENCOUNTER — OFFICE VISIT (OUTPATIENT)
Dept: INTERNAL MEDICINE | Facility: CLINIC | Age: 72
End: 2022-04-11
Payer: MEDICARE

## 2022-04-11 ENCOUNTER — PATIENT MESSAGE (OUTPATIENT)
Dept: INTERNAL MEDICINE | Facility: CLINIC | Age: 72
End: 2022-04-11

## 2022-04-11 VITALS
HEIGHT: 65 IN | HEART RATE: 80 BPM | OXYGEN SATURATION: 99 % | WEIGHT: 159.81 LBS | DIASTOLIC BLOOD PRESSURE: 60 MMHG | BODY MASS INDEX: 26.63 KG/M2 | TEMPERATURE: 97 F | SYSTOLIC BLOOD PRESSURE: 124 MMHG | RESPIRATION RATE: 16 BRPM

## 2022-04-11 DIAGNOSIS — R73.03 PREDIABETES: ICD-10-CM

## 2022-04-11 DIAGNOSIS — I10 ESSENTIAL HYPERTENSION: ICD-10-CM

## 2022-04-11 DIAGNOSIS — B34.9 VIRAL SYNDROME: Primary | ICD-10-CM

## 2022-04-11 DIAGNOSIS — M79.89 LEG SWELLING: ICD-10-CM

## 2022-04-11 DIAGNOSIS — R79.9 ABNORMAL FINDING OF BLOOD CHEMISTRY, UNSPECIFIED: ICD-10-CM

## 2022-04-11 LAB
CTP QC/QA: YES
CTP QC/QA: YES
POC MOLECULAR INFLUENZA A AGN: NEGATIVE
POC MOLECULAR INFLUENZA B AGN: NEGATIVE
SARS-COV-2 AG RESP QL IA.RAPID: NEGATIVE

## 2022-04-11 PROCEDURE — 99214 OFFICE O/P EST MOD 30 MIN: CPT | Mod: PBBFAC,PO | Performed by: INTERNAL MEDICINE

## 2022-04-11 PROCEDURE — 99999 PR PBB SHADOW E&M-EST. PATIENT-LVL IV: CPT | Mod: PBBFAC,,, | Performed by: INTERNAL MEDICINE

## 2022-04-11 PROCEDURE — 99214 OFFICE O/P EST MOD 30 MIN: CPT | Mod: S$PBB,,, | Performed by: INTERNAL MEDICINE

## 2022-04-11 PROCEDURE — 87811 SARS-COV-2 COVID19 W/OPTIC: CPT | Mod: PBBFAC,PO | Performed by: INTERNAL MEDICINE

## 2022-04-11 PROCEDURE — 99999 PR PBB SHADOW E&M-EST. PATIENT-LVL IV: ICD-10-PCS | Mod: PBBFAC,,, | Performed by: INTERNAL MEDICINE

## 2022-04-11 PROCEDURE — 87502 INFLUENZA DNA AMP PROBE: CPT | Mod: PBBFAC,PO | Performed by: INTERNAL MEDICINE

## 2022-04-11 PROCEDURE — 99214 PR OFFICE/OUTPT VISIT, EST, LEVL IV, 30-39 MIN: ICD-10-PCS | Mod: S$PBB,,, | Performed by: INTERNAL MEDICINE

## 2022-04-11 RX ORDER — VALSARTAN AND HYDROCHLOROTHIAZIDE 320; 25 MG/1; MG/1
1 TABLET, FILM COATED ORAL DAILY
COMMUNITY
Start: 2022-04-08

## 2022-04-11 RX ORDER — LORAZEPAM 1 MG/1
TABLET ORAL
Qty: 60 TABLET | Refills: 2 | Status: SHIPPED | OUTPATIENT
Start: 2022-04-11 | End: 2022-08-15

## 2022-04-12 ENCOUNTER — HOSPITAL ENCOUNTER (OUTPATIENT)
Dept: RADIOLOGY | Facility: OTHER | Age: 72
Discharge: HOME | End: 2022-04-12
Attending: PSYCHIATRY & NEUROLOGY
Payer: MEDICARE

## 2022-04-12 ENCOUNTER — OFFICE VISIT (OUTPATIENT)
Dept: INFECTIOUS DISEASES | Facility: CLINIC | Age: 72
End: 2022-04-12
Payer: MEDICARE

## 2022-04-12 VITALS
TEMPERATURE: 98 F | WEIGHT: 158.94 LBS | DIASTOLIC BLOOD PRESSURE: 90 MMHG | SYSTOLIC BLOOD PRESSURE: 146 MMHG | BODY MASS INDEX: 26.48 KG/M2 | HEART RATE: 64 BPM | HEIGHT: 65 IN

## 2022-04-12 DIAGNOSIS — R22.43 LOCALIZED SWELLING, MASS AND LUMP, LOWER LIMB, BILATERAL: ICD-10-CM

## 2022-04-12 DIAGNOSIS — G61.0 GUILLAIN BARRÉ SYNDROME: Primary | ICD-10-CM

## 2022-04-12 DIAGNOSIS — T50.B95A ADVERSE EFFECT OF COVID-19 VACCINE: ICD-10-CM

## 2022-04-12 DIAGNOSIS — Z91.89 AT RISK FOR INFECTION DUE TO IMMUNOSUPPRESSION: ICD-10-CM

## 2022-04-12 PROCEDURE — 99214 OFFICE O/P EST MOD 30 MIN: CPT | Mod: PBBFAC,25 | Performed by: INTERNAL MEDICINE

## 2022-04-12 PROCEDURE — 99999 PR PBB SHADOW E&M-EST. PATIENT-LVL IV: ICD-10-PCS | Mod: PBBFAC,,,

## 2022-04-12 PROCEDURE — 93970 EXTREMITY STUDY: CPT | Mod: TC

## 2022-04-12 PROCEDURE — 93970 US LOWER EXTREMITY VEINS BILATERAL: ICD-10-PCS | Mod: 26,,, | Performed by: RADIOLOGY

## 2022-04-12 PROCEDURE — 93970 EXTREMITY STUDY: CPT | Mod: 26,,, | Performed by: RADIOLOGY

## 2022-04-12 PROCEDURE — 99999 PR PBB SHADOW E&M-EST. PATIENT-LVL IV: CPT | Mod: PBBFAC,,,

## 2022-04-13 NOTE — PROGRESS NOTES
Infectious Disease Clinic Note  04/13/2022     Subjective:       Patient ID: Nan Palafox is a 72 y.o. female being seen for an new visit.    Chief Complaint: Follow-up    HPI   Ms. 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    Pt reports she received the first dose of pfizer covid vaccin on 1/21/21 and soon after began to experience b/l LE weakness prompting her to go to the ED on 1/27, as she was still mobile she was sent home. She was eventually admitted on 1/29/21- 2/10/21 and underwent extensive workup. MRI revealed C7-T1 moderate b/l foraminal stenosis, but did not explain severity of her sx. No surgical intervention deemed necessary per [facility]. Underwent LP on 1/31/21, which yielded CSF fluid high in protein. Received IVIG per neuro for GBS vs autoimmune etiology with improvement in strength.     She reports her strength has since continued to improve and she is able to ambulate independently. However, she is now scared of receiving another COVID vaccine given the possibility of having had GBS from the pfizer vaccine. Denies recent signs or symptoms of infection. Did have COVID in 2/2022 with mild symptoms.     Family History   Problem Relation Age of Onset    Glaucoma Mother     Cataracts Mother     Blindness Mother     Cancer Mother 68        colon, myeloma    Glaucoma Brother     Cataracts Brother     Cancer Brother         esophageal    No Known Problems Father     No Known Problems Sister     No Known Problems Maternal Aunt     No Known Problems Maternal Uncle     No Known Problems Paternal Aunt     No Known Problems Paternal Uncle     No Known Problems Maternal Grandmother     No Known Problems Maternal Grandfather     No Known Problems Paternal Grandmother     No Known Problems Paternal Grandfather     Amblyopia Neg Hx     Macular degeneration Neg Hx     Retinal  detachment Neg Hx     Strabismus Neg Hx     Diabetes Neg Hx     Hypertension Neg Hx     Stroke Neg Hx     Thyroid disease Neg Hx     Breast cancer Neg Hx     Ovarian cancer Neg Hx      Social History     Socioeconomic History    Marital status:    Tobacco Use    Smoking status: Never Smoker    Smokeless tobacco: Never Used   Substance and Sexual Activity    Alcohol use: Yes     Alcohol/week: 3.0 standard drinks     Types: 3 Glasses of wine per week     Comment: 2-3 glasses per night    Drug use: Never     Past Surgical History:   Procedure Laterality Date    BREAST BIOPSY Right     excisional bx    BREAST SURGERY Right     Excisional bx    INJECTION OF ANESTHETIC AGENT AROUND MULTIPLE INTERCOSTAL NERVES Right 1/4/2021    Procedure: BLOCK, NERVE, INTERCOSTAL, 2 OR MORE;  Surgeon: Ru Hernandez MD;  Location: [facility] OR [address] [place] [address];  Service: Thoracic;  Laterality: Right;    RETINAL DETACHMENT SURGERY      patient states that she had a retinal tear that was repaired in the past at [facility] but she is uncertain of which eye    STOMACH SURGERY      SURGICAL REMOVAL OF LYMPH NODE N/A 1/4/2021    Procedure: EXCISION, LYMPH NODE;  Surgeon: Ru Hernandez MD;  Location: [facility] OR [address] [place] [address];  Service: Thoracic;  Laterality: N/A;    TOTAL KNEE ARTHROPLASTY Right     XI ROBOTIC RATS,WITH LOBECTOMY,LUNG Right 1/4/2021    Procedure: XI ROBOTIC RATS,WITH LOBECTOMY,LUNG;  Surgeon: Ru Hernandez MD;  Location: [facility] OR [address] [place] [address];  Service: Thoracic;  Laterality: Right;  Right middle lobectomy.       Patient's Medications   New Prescriptions    No medications on file   Previous Medications    ACETAMINOPHEN (TYLENOL) 325 MG TABLET    Take 2 tablets (650 mg total) by mouth every 4 (four) hours as needed.    ALBUTEROL (PROVENTIL/VENTOLIN HFA) 90 MCG/ACTUATION INHALER    Inhale 2 puffs into the lungs every 6 (six) hours as needed.    ASCORBATE CALCIUM (VITAMIN C ORAL)    Take by mouth.    ASPIRIN (ECOTRIN)  "81 MG EC TABLET    Take 81 mg by mouth once daily.    DORZOLAMIDE-TIMOLOL 2-0.5% (COSOPT) 22.3-6.8 MG/ML OPHTHALMIC SOLUTION    Place 1 drop into both eyes 2 (two) times daily.    FLUTICASONE (FLONASE) 50 MCG/ACTUATION NASAL SPRAY    2 sprays by Each Nare route once daily.    LATANOPROST 0.005 % OPHTHALMIC SOLUTION    Place 1 drop into both eyes every evening.    LORAZEPAM (ATIVAN) 1 MG TABLET    TAKE 1 TABLET BY MOUTH EVERY 8 HOURS AS NEEDED    MULTIVITAMIN (THERAGRAN) TABLET    Take 1 tablet by mouth once daily.    ROSUVASTATIN (CRESTOR) 20 MG TABLET    Take 1 tablet (20 mg total) by mouth once daily.    VALSARTAN-HYDROCHLOROTHIAZIDE (DIOVAN-HCT) 320-25 MG PER TABLET    Take 1 tablet by mouth once daily.    VITAMIN D (VITAMIN D3) 1000 UNITS TAB    Take 1,000 Units by mouth once daily.   Modified Medications    No medications on file   Discontinued Medications    No medications on file       Patient Active Problem List    Diagnosis Date Noted    Current non-adherence to medical treatment 11/22/2021    Debility 05/02/2021    Anxiety 05/02/2021    Hyponatremia 02/04/2021    Spinal stenosis of lumbar region     Dehydration 01/31/2021    Unable to ambulate 01/29/2021    Weakness of both lower extremities 01/29/2021    Carcinoid bronchial adenoma, right 01/04/2021    Coronary artery disease     Carcinoid bronchial adenoma of right lung     Malignant carcinoid tumor of bronchus and lung 12/02/2020    History of gastrointestinal stromal tumor (GIST) 12/02/2020    Class 1 obesity due to excess calories with serious comorbidity in adult 09/16/2020    Abnormality of lung 09/16/2020     Pt followed by external pulm MD - states lung mass- requested her to get copy of ct chest sent to Dr Jimenez- she states a "mass"      Atherosclerosis of aorta 09/14/2020    Tortuous aorta 09/14/2020    Bilateral ocular hypertension 03/11/2019    Primary open angle glaucoma (POAG) of both eyes, moderate stage 03/11/2019    " "Cervical syndrome 04/11/2016    Cervical spondylosis with radiculopathy 04/11/2016    GERD without esophagitis 11/18/2015    Primary hypertension 06/26/2015    Anemia of chronic disease 06/26/2015    History of colon polyps 10/27/2013    GIST (gastrointestinal stromal tumor), non-malignant 10/27/2013    Nuclear sclerosis - Both Eyes 02/08/2013       Review of Systems   Review of Systems   Constitutional: Negative for chills, diaphoresis, fever and malaise/fatigue.   HENT: Negative for sinus pain and sore throat.    Respiratory: Negative for cough, sputum production and shortness of breath.    Cardiovascular: Negative for chest pain, palpitations and orthopnea.   Gastrointestinal: Negative for abdominal pain, diarrhea and nausea.   Genitourinary: Negative for dysuria and frequency.   Musculoskeletal: Negative for back pain and myalgias.   Neurological: Negative for dizziness and headaches.   Psychiatric/Behavioral: The patient is not nervous/anxious.            Objective:      BP (!) 146/90 (BP Location: Left arm, Patient Position: Sitting)   Pulse 64   Temp 98.2 °F (36.8 °C) (Oral)   Ht 5' 5" (1.651 m)   Wt 72.1 kg (158 lb 15.2 oz)   BMI 26.45 kg/m²   Estimated body mass index is 26.45 kg/m² as calculated from the following:    Height as of this encounter: 5' 5" (1.651 m).    Weight as of this encounter: 72.1 kg (158 lb 15.2 oz).    Physical Exam  Vitals reviewed.   Constitutional:       General: She is not in acute distress.     Appearance: She is well-developed.   HENT:      Head: Normocephalic and atraumatic.   Eyes:      Conjunctiva/sclera: Conjunctivae normal.      Pupils: Pupils are equal, round, and reactive to light.   Cardiovascular:      Rate and Rhythm: Normal rate and regular rhythm.      Heart sounds: Normal heart sounds.   Pulmonary:      Effort: Pulmonary effort is normal.      Breath sounds: Normal breath sounds.   Abdominal:      General: Bowel sounds are normal. There is no distension. "      Palpations: Abdomen is soft.   Musculoskeletal:         General: Normal range of motion.      Cervical back: Normal range of motion and neck supple.   Skin:     General: Skin is warm and dry.   Neurological:      Mental Status: She is alert and oriented to person, place, and time.      Cranial Nerves: No cranial nerve deficit.   Psychiatric:         Behavior: Behavior normal.         Assessment:         1. Guillain Barré syndrome  Ambulatory referral/consult Order for Evusheld   2. At risk for infection due to immunosuppression  Ambulatory referral/consult to Infectious Disease         Plan:       Nan was seen today for follow-up.    Diagnoses and all orders for this visit:    Guillain Barré syndrome  Adverse effect of COVID-19 vaccine  --Hx of progressive b/l LE weakness days after receiving pfizer covid vaccine (1/21/22) possibly due to GBS.  --Counseled on continued masking in public places, social distancing and handwashing.  --Pt is a candidate for administration of EVUSHELD for pre exposure ppx (tier 6). Discussed possible adverse rx including hypersensitivity rx, bleeding at injection site, etc.  --will place referral:  -     Ambulatory referral/consult Order for Evusheld; Future      Case discussed with Dr. Vickers.  Monica Hall PGY-5  Infectious Disease

## 2022-04-18 ENCOUNTER — PATIENT MESSAGE (OUTPATIENT)
Dept: NEUROLOGY | Facility: CLINIC | Age: 72
End: 2022-04-18
Payer: MEDICARE

## 2022-04-21 ENCOUNTER — INFUSION (OUTPATIENT)
Dept: INFUSION THERAPY | Facility: HOSPITAL | Age: 72
End: 2022-04-21
Payer: MEDICARE

## 2022-04-21 VITALS
DIASTOLIC BLOOD PRESSURE: 73 MMHG | RESPIRATION RATE: 16 BRPM | OXYGEN SATURATION: 100 % | HEART RATE: 70 BPM | TEMPERATURE: 99 F | BODY MASS INDEX: 26.66 KG/M2 | HEIGHT: 65 IN | WEIGHT: 160 LBS | SYSTOLIC BLOOD PRESSURE: 142 MMHG

## 2022-04-21 DIAGNOSIS — C7A.090 MALIGNANT CARCINOID TUMOR OF BRONCHUS AND LUNG: ICD-10-CM

## 2022-04-21 DIAGNOSIS — G61.0 GUILLAIN BARRÉ SYNDROME: Primary | ICD-10-CM

## 2022-04-21 PROCEDURE — 63600175 PHARM REV CODE 636 W HCPCS: Performed by: INTERNAL MEDICINE

## 2022-04-21 PROCEDURE — M0220 HC INJECTION ADMIN, TIXAGEVIMAB-CILGAVIMAB, INCL POST ADMIN MONIT: HCPCS | Performed by: INTERNAL MEDICINE

## 2022-04-21 RX ORDER — ACETAMINOPHEN 325 MG/1
650 TABLET ORAL ONCE AS NEEDED
Status: CANCELLED | OUTPATIENT
Start: 2022-04-21

## 2022-04-21 RX ORDER — ONDANSETRON 4 MG/1
4 TABLET, ORALLY DISINTEGRATING ORAL ONCE AS NEEDED
Status: DISCONTINUED | OUTPATIENT
Start: 2022-04-21 | End: 2022-04-21 | Stop reason: HOSPADM

## 2022-04-21 RX ORDER — ALBUTEROL SULFATE 90 UG/1
2 AEROSOL, METERED RESPIRATORY (INHALATION) ONCE AS NEEDED
Status: CANCELLED | OUTPATIENT
Start: 2022-04-21

## 2022-04-21 RX ORDER — ALBUTEROL SULFATE 90 UG/1
2 AEROSOL, METERED RESPIRATORY (INHALATION) ONCE AS NEEDED
Status: DISCONTINUED | OUTPATIENT
Start: 2022-04-21 | End: 2022-04-21 | Stop reason: HOSPADM

## 2022-04-21 RX ORDER — EPINEPHRINE 0.3 MG/.3ML
0.3 INJECTION SUBCUTANEOUS
Status: CANCELLED | OUTPATIENT
Start: 2022-04-21

## 2022-04-21 RX ORDER — ACETAMINOPHEN 325 MG/1
650 TABLET ORAL ONCE AS NEEDED
Status: DISCONTINUED | OUTPATIENT
Start: 2022-04-21 | End: 2022-04-21 | Stop reason: HOSPADM

## 2022-04-21 RX ORDER — DIPHENHYDRAMINE HCL 25 MG
25 CAPSULE ORAL ONCE AS NEEDED
Status: DISCONTINUED | OUTPATIENT
Start: 2022-04-21 | End: 2022-04-21 | Stop reason: HOSPADM

## 2022-04-21 RX ORDER — PREDNISONE 20 MG/1
40 TABLET ORAL ONCE AS NEEDED
Status: DISCONTINUED | OUTPATIENT
Start: 2022-04-21 | End: 2022-04-21 | Stop reason: HOSPADM

## 2022-04-21 RX ORDER — ONDANSETRON 4 MG/1
4 TABLET, ORALLY DISINTEGRATING ORAL ONCE AS NEEDED
Status: CANCELLED | OUTPATIENT
Start: 2022-04-21

## 2022-04-21 RX ORDER — DIPHENHYDRAMINE HCL 25 MG
25 CAPSULE ORAL ONCE AS NEEDED
Status: CANCELLED | OUTPATIENT
Start: 2022-04-21

## 2022-04-21 RX ORDER — PREDNISONE 20 MG/1
40 TABLET ORAL ONCE AS NEEDED
Status: CANCELLED | OUTPATIENT
Start: 2022-04-21

## 2022-04-21 RX ORDER — EPINEPHRINE 0.3 MG/.3ML
0.3 INJECTION SUBCUTANEOUS
Status: DISCONTINUED | OUTPATIENT
Start: 2022-04-21 | End: 2022-04-21 | Stop reason: HOSPADM

## 2022-04-21 RX ADMIN — Medication 300 MG: at 01:04

## 2022-04-27 ENCOUNTER — PES CALL (OUTPATIENT)
Dept: ADMINISTRATIVE | Facility: CLINIC | Age: 72
End: 2022-04-27
Payer: MEDICARE

## 2022-05-03 NOTE — PROGRESS NOTES
Subjective:       Patient ID: Nan Palafox is a 72 y.o. female.    Chief Complaint: Follow-up (4 motnh f/u; labs done 4/4/22), Cough, Nasal Congestion (Pt says this has been going on since 4/4/22), and Hoarse    HPI   The patient presents for follow-up evaluation of medical conditions which include hypertension, recent respiratory infection, left lower extremity swelling.  The patient recently saw her cardiologist Dr. Yinka Rivas on 03/24/2022.  Amlodipine was discontinued due to lower extremity swelling.  Valsartan-HCT was substituted.  Knee the patient reports she is still noting left ankle swelling.  A venous ultrasound was recently ordered by her neurologist.  She reports that has not yet been scheduled.    She last saw her pulmonologist from [facility] Dr. Javad Franks  in February of this year.  No active pulmonary disease was felt to be present.    For the past week however the patient states she has experienced hoarseness, chills, and malaise.  A mild cough is also reported.  She states that her pulse oximeter readings have been good.  Since her Guillain-Inman syndrome was 1st diagnosed in January the patient states she has had recurrent chills.  She has not documented any fever.    Review of Systems   Constitutional: Positive for chills and fatigue. Negative for activity change, appetite change, fever and unexpected weight change.   HENT: Positive for voice change.    Respiratory: Positive for cough. Negative for chest tightness, shortness of breath and wheezing.    Cardiovascular: Positive for leg swelling. Negative for chest pain and palpitations.   Gastrointestinal: Negative for diarrhea and vomiting.   Genitourinary: Negative for dysuria, flank pain and frequency.   Musculoskeletal: Negative for myalgias.   Neurological: Negative for dizziness and headaches.   Hematological:        The patient is still sad following the recent loss of her .   Psychiatric/Behavioral: Positive for  dysphoric mood. Negative for confusion and hallucinations.            Physical Exam  Vitals and nursing note reviewed.   Constitutional:       General: She is not in acute distress.     Appearance: She is well-developed.      Comments: The patient has lost 2 lb since 12/06/2021.   HENT:      Head: Normocephalic and atraumatic.   Eyes:      General: No scleral icterus.     Conjunctiva/sclera: Conjunctivae normal.   Neck:      Thyroid: No thyromegaly.      Vascular: No JVD.   Cardiovascular:      Rate and Rhythm: Normal rate and regular rhythm.      Heart sounds: Normal heart sounds. No murmur heard.    No friction rub. No gallop.   Pulmonary:      Effort: Pulmonary effort is normal. No respiratory distress.      Breath sounds: Normal breath sounds. No wheezing or rales.   Abdominal:      General: Bowel sounds are normal.      Palpations: Abdomen is soft. There is no mass.      Tenderness: There is no abdominal tenderness.   Musculoskeletal:         General: No tenderness. Normal range of motion.      Cervical back: Normal range of motion and neck supple.      Right lower leg: No edema.      Left lower leg: No edema.   Lymphadenopathy:      Cervical: No cervical adenopathy.   Skin:     General: Skin is warm and dry.      Findings: No rash.   Neurological:      General: No focal deficit present.      Mental Status: She is alert and oriented to person, place, and time.      Coordination: Coordination normal.      Gait: Gait normal.   Psychiatric:         Behavior: Behavior normal.         Thought Content: Thought content normal.           Lab Visit on 04/04/2022   Component Date Value Ref Range Status    Sodium 04/04/2022 139  136 - 145 mmol/L Final    Potassium 04/04/2022 4.2  3.5 - 5.1 mmol/L Final    Chloride 04/04/2022 99  95 - 110 mmol/L Final    CO2 04/04/2022 30 (A) 23 - 29 mmol/L Final    Glucose 04/04/2022 91  70 - 110 mg/dL Final    BUN 04/04/2022 9  8 - 23 mg/dL Final    Creatinine 04/04/2022 0.7  0.5  - 1.4 mg/dL Final    Calcium 04/04/2022 9.8  8.7 - 10.5 mg/dL Final    Total Protein 04/04/2022 8.0  6.0 - 8.4 g/dL Final    Albumin 04/04/2022 3.9  3.5 - 5.2 g/dL Final    Total Bilirubin 04/04/2022 0.5  0.1 - 1.0 mg/dL Final    Comment: For infants and newborns, interpretation of results should be based  on gestational age, weight and in agreement with clinical  observations.    Premature Infant recommended reference ranges:  Up to 24 hours.............<8.0 mg/dL  Up to 48 hours............<12.0 mg/dL  3-5 days..................<15.0 mg/dL  6-29 days.................<15.0 mg/dL      Alkaline Phosphatase 04/04/2022 48 (A) 55 - 135 U/L Final    AST 04/04/2022 20  10 - 40 U/L Final    ALT 04/04/2022 14  10 - 44 U/L Final    Anion Gap 04/04/2022 10  8 - 16 mmol/L Final    eGFR if African American 04/04/2022 >60.0  >60 mL/min/1.73 m^2 Final    eGFR if non African American 04/04/2022 >60.0  >60 mL/min/1.73 m^2 Final    Comment: Calculation used to obtain the estimated glomerular filtration  rate (eGFR) is the CKD-EPI equation.       Cholesterol 04/04/2022 196  120 - 199 mg/dL Final    Comment: The National Cholesterol Education Program (NCEP) has set the  following guidelines (reference ranges) for Cholesterol:  Optimal.....................<200 mg/dL  Borderline High.............200-239 mg/dL  High........................> or = 240 mg/dL      Triglycerides 04/04/2022 58  30 - 150 mg/dL Final    Comment: The National Cholesterol Education Program (NCEP) has set the  following guidelines (reference values) for triglycerides:  Normal......................<150 mg/dL  Borderline High.............150-199 mg/dL  High........................200-499 mg/dL      HDL 04/04/2022 78 (A) 40 - 75 mg/dL Final    Comment: The National Cholesterol Education Program (NCEP) has set the  following guidelines (reference values) for HDL Cholesterol:  Low...............<40 mg/dL  Optimal...........>60 mg/dL      LDL Cholesterol  04/04/2022 106.4  63.0 - 159.0 mg/dL Final    Comment: The National Cholesterol Education Program (NCEP) has set the  following guidelines (reference values) for LDL Cholesterol:  Optimal.......................<130 mg/dL  Borderline High...............130-159 mg/dL  High..........................160-189 mg/dL  Very High.....................>190 mg/dL      HDL/Cholesterol Ratio 04/04/2022 39.8  20.0 - 50.0 % Final    Total Cholesterol/HDL Ratio 04/04/2022 2.5  2.0 - 5.0 Final    Non-HDL Cholesterol 04/04/2022 118  mg/dL Final    Comment: Risk category and Non-HDL cholesterol goals:  Coronary heart disease (CHD)or equivalent (10-year risk of CHD >20%):  Non-HDL cholesterol goal     <130 mg/dL  Two or more CHD risk factors and 10-year risk of CHD <= 20%:  Non-HDL cholesterol goal     <160 mg/dL  0 to 1 CHD risk factor:  Non-HDL cholesterol goal     <190 mg/dL      WBC 04/04/2022 5.66  3.90 - 12.70 K/uL Final    RBC 04/04/2022 4.35  4.00 - 5.40 M/uL Final    Hemoglobin 04/04/2022 12.0  12.0 - 16.0 g/dL Final    Hematocrit 04/04/2022 37.1  37.0 - 48.5 % Final    MCV 04/04/2022 85  82 - 98 fL Final    MCH 04/04/2022 27.6  27.0 - 31.0 pg Final    MCHC 04/04/2022 32.3  32.0 - 36.0 g/dL Final    RDW 04/04/2022 15.9 (A) 11.5 - 14.5 % Final    Platelets 04/04/2022 422  150 - 450 K/uL Final    MPV 04/04/2022 10.2  9.2 - 12.9 fL Final    Immature Granulocytes 04/04/2022 0.2  0.0 - 0.5 % Final    Gran # (ANC) 04/04/2022 3.3  1.8 - 7.7 K/uL Final    Immature Grans (Abs) 04/04/2022 0.01  0.00 - 0.04 K/uL Final    Comment: Mild elevation in immature granulocytes is non specific and   can be seen in a variety of conditions including stress response,   acute inflammation, trauma and pregnancy. Correlation with other   laboratory and clinical findings is essential.      Lymph # 04/04/2022 1.5  1.0 - 4.8 K/uL Final    Mono # 04/04/2022 0.5  0.3 - 1.0 K/uL Final    Eos # 04/04/2022 0.3  0.0 - 0.5 K/uL Final    Baso #  04/04/2022 0.05  0.00 - 0.20 K/uL Final    nRBC 04/04/2022 0  0 /100 WBC Final    Gran % 04/04/2022 58.7  38.0 - 73.0 % Final    Lymph % 04/04/2022 25.6  18.0 - 48.0 % Final    Mono % 04/04/2022 9.5  4.0 - 15.0 % Final    Eosinophil % 04/04/2022 5.1  0.0 - 8.0 % Final    Basophil % 04/04/2022 0.9  0.0 - 1.9 % Final    Differential Method 04/04/2022 Automated   Final    Hemoglobin A1C 04/04/2022 5.5  4.0 - 5.6 % Final    Comment: ADA Screening Guidelines:  5.7-6.4%  Consistent with prediabetes  >or=6.5%  Consistent with diabetes    High levels of fetal hemoglobin interfere with the HbA1C  assay. Heterozygous hemoglobin variants (HbS, HgC, etc)do  not significantly interfere with this assay.   However, presence of multiple variants may affect accuracy.      Estimated Avg Glucose 04/04/2022 111  68 - 131 mg/dL Final       Assessment & Plan:      Nan was seen today for follow-up, cough, nasal congestion and hoarse.  Swab will be sent for COVID -19 and for influenza.    Await venous ultrasound of both lower extremities.    Diagnoses and all orders for this visit:    Viral syndrome  -     SARS Coronavirus 2 Antigen, POCT Manual Read  -     POCT Influenza A/B Molecular    Essential hypertension    Leg swelling    Abnormal finding of blood chemistry, unspecified  -     Comprehensive Metabolic Panel; Future  -     TSH; Future  -     Hemoglobin A1C; Future    Prediabetes   -     TSH; Future    Other orders  -     LORazepam (ATIVAN) 1 MG tablet; TAKE 1 TABLET BY MOUTH EVERY 8 HOURS AS NEEDED         Follow up in about 2 months (around 6/11/2022).     Glenroy Jimenez MD

## 2022-05-06 NOTE — PROGRESS NOTES
I have reviewed the notes, assessments, and/or procedures performed by Dr. Guillaume, I concur with her/his documentation of Nan Palafox.

## 2022-05-16 ENCOUNTER — PES CALL (OUTPATIENT)
Dept: ADMINISTRATIVE | Facility: CLINIC | Age: 72
End: 2022-05-16
Payer: MEDICARE

## 2022-06-06 ENCOUNTER — PES CALL (OUTPATIENT)
Dept: ADMINISTRATIVE | Facility: CLINIC | Age: 72
End: 2022-06-06
Payer: MEDICARE

## 2022-06-23 ENCOUNTER — CLINICAL SUPPORT (OUTPATIENT)
Dept: OPHTHALMOLOGY | Facility: CLINIC | Age: 72
End: 2022-06-23
Payer: MEDICARE

## 2022-06-23 ENCOUNTER — OFFICE VISIT (OUTPATIENT)
Dept: OPHTHALMOLOGY | Facility: CLINIC | Age: 72
End: 2022-06-23
Payer: MEDICARE

## 2022-06-23 DIAGNOSIS — H40.1132 PRIMARY OPEN ANGLE GLAUCOMA (POAG) OF BOTH EYES, MODERATE STAGE: Primary | ICD-10-CM

## 2022-06-23 DIAGNOSIS — H40.1132 PRIMARY OPEN ANGLE GLAUCOMA (POAG) OF BOTH EYES, MODERATE STAGE: ICD-10-CM

## 2022-06-23 DIAGNOSIS — H25.13 NUCLEAR SCLEROSIS OF BOTH EYES: ICD-10-CM

## 2022-06-23 PROCEDURE — 99999 PR PBB SHADOW E&M-EST. PATIENT-LVL III: ICD-10-PCS | Mod: PBBFAC,,, | Performed by: OPHTHALMOLOGY

## 2022-06-23 PROCEDURE — 99214 PR OFFICE/OUTPT VISIT, EST, LEVL IV, 30-39 MIN: ICD-10-PCS | Mod: S$PBB,,, | Performed by: OPHTHALMOLOGY

## 2022-06-23 PROCEDURE — 99999 PR PBB SHADOW E&M-EST. PATIENT-LVL III: CPT | Mod: PBBFAC,,, | Performed by: OPHTHALMOLOGY

## 2022-06-23 PROCEDURE — 99214 OFFICE O/P EST MOD 30 MIN: CPT | Mod: S$PBB,,, | Performed by: OPHTHALMOLOGY

## 2022-06-23 PROCEDURE — 99213 OFFICE O/P EST LOW 20 MIN: CPT | Mod: PBBFAC | Performed by: OPHTHALMOLOGY

## 2022-06-23 NOTE — PROGRESS NOTES
Assessment /Plan     For exam results, see Encounter Report.    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 [employer]    ==>   2022 AD // [place]  Grieving    Grieving best friends x 2 loss /2 COVID May 2020  Difficult    2021  ==> Carcinoid Tumor Lung  ==> COVID Vaccine  ==> Guillain-[place] Syndrome  --> rehab --> high stress      --> Reports No Glc drops during @ 7 week hospitalization    OHT  POAG OD > OS  Presented 2019  40 // 24    + FMHx Mother ( 94 yo) & Brother  Denies Blindness    CCT  568 // 567    < 21 --> Achieved    Both eyes  --> better adherence --> CSM  Xal q day  Cosopt BID    SP SLT OS 2020  --> consider SLT OD as discussed      NSC OU  CE PRN  MRx +275      Hx Retinal Tear OD  RD precautions:  Discussed symptoms of RD with increased flashes, floaters, decreasing vision.  Patient/Family to call and return immediately to clinic should the symptoms of RD occur. Voiced good understanding with Q & A.      Plan  RTC 4 months IOP & DFE &  Adherence  RTC sooner prn with good understanding

## 2022-06-23 NOTE — PROGRESS NOTES
Visual field test done.  Patient stated no latex allergies used coverlet          Glasses Prescription     Sphere Cylinder Axis Dist VA Add   Right +0.25 +1.75 170 20/30 +2.75   Left +0.25 +0.75 180 20/30 +2.75        Ordered, Authorized, and Signed By: Loc

## 2022-07-05 ENCOUNTER — TELEPHONE (OUTPATIENT)
Dept: INTERNAL MEDICINE | Facility: CLINIC | Age: 72
End: 2022-07-05
Payer: MEDICARE

## 2022-07-05 ENCOUNTER — TELEPHONE (OUTPATIENT)
Dept: NEUROLOGY | Facility: CLINIC | Age: 72
End: 2022-07-05
Payer: MEDICARE

## 2022-07-05 NOTE — TELEPHONE ENCOUNTER
----- Message from Mary Peralta sent at 7/5/2022  9:42 AM CDT -----  Type: Patient Call Back    Who called:Self    What is the request in detail: Pt requesting a call back from nurse      Can the clinic reply by [facility]? no    Would the patient rather a call back or a response via [facility]? Call back    Best call back number:[phone] (home)

## 2022-07-05 NOTE — TELEPHONE ENCOUNTER
----- Message from Xochilt Winston MA sent at 7/5/2022 11:08 AM CDT -----  Good Morning     This patient has seen Dr. BOBBY Prado in Neurology. She's requesting continued PT but Dr Prado is out on maternity leave and possibly won't be returning. Staff informed patient we'll reach out to her PCP regarding referral for continuous PT treatment along with the facility in which she attends. Can someone assist with orders for patient to have continued PT      Thanks  Xochilt GUILLAUME

## 2022-07-05 NOTE — TELEPHONE ENCOUNTER
Staff spoke with patient informing her we'll reach out to her PCP regarding new orders for PT being that Dr. Prado is out on maternity leave

## 2022-07-12 ENCOUNTER — TELEPHONE (OUTPATIENT)
Dept: INTERNAL MEDICINE | Facility: CLINIC | Age: 72
End: 2022-07-12
Payer: MEDICARE

## 2022-07-12 DIAGNOSIS — G61.0 GUILLAIN-BARRE DISEASE: ICD-10-CM

## 2022-07-12 DIAGNOSIS — R26.9 ABNORMAL GAIT DUE TO MUSCLE WEAKNESS: Primary | ICD-10-CM

## 2022-07-12 DIAGNOSIS — M62.81 ABNORMAL GAIT DUE TO MUSCLE WEAKNESS: Primary | ICD-10-CM

## 2022-07-12 NOTE — TELEPHONE ENCOUNTER
I sent a message to dr martinez on 7/5 but he hasn't replied.  He probably needs more information to order pt.    I left [facility] for mrs. [place] to call me back with more information.  What does she want therapy for?   Ankle swelling? Leg weakness?     Please call with more information.

## 2022-07-12 NOTE — TELEPHONE ENCOUNTER
----- Message from Soraya Lora sent at 7/12/2022  1:01 PM CDT -----  Regarding: Patient advice  Contact: Pt  Pt called in regards to getting orders sent for Physical Therapy     Please advise , Pt can be reached at [phone]

## 2022-07-12 NOTE — TELEPHONE ENCOUNTER
I sent you a message 7/5 but you didn't have enough to go on with ordering pt.      Order pended for you to sign.  Neurology ordered phys therapy and it seems to be helping and they need more visits to continue therapy and  Dr nieto is on maternity leave.  .  [facility].  [id number]  [place]  [phone]   Fax [phone].    I need to fax them once signed.

## 2022-07-13 NOTE — TELEPHONE ENCOUNTER
[facility].     patient advised .   (  recently and trouble sleepng)

## 2022-08-08 NOTE — PROGRESS NOTES
Subjective:       Patient ID: Nan Palafox is a 72 y.o. female.    Chief Complaint: No chief complaint on file.    Diagnosis:  Typical carcinoid     Pre-operative therapy: none    Procedure(s) and date(s): 1/4/21: right robotic assisted middle lobectomy with MLND    Pathology: 2.0 cm well differentiated typical carcinoid, no VPI, no LVI, no margins, level 2,4,7,10,11 = negative, pT1bN0     Post-operative therapy: surveillance     HPI   72 y.o. female never smoker here today for 1.5 year follow-up from right robotic middle lobectomy. Uncomplicated post-operative course. 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 Covid + Jan 2022. CT shortly after with scattered inflammatory opacities. Denies fever, chills, wheeze, cough, CP, palpitations, claudication,  N/V or changes in bowel and bladder functioning.       Review of Systems   Constitutional: Negative for activity change, fatigue and fever.   Eyes: Negative for visual disturbance.   Respiratory: Positive for cough. Negative for chest tightness and wheezing.    Cardiovascular: Negative for chest pain, palpitations and leg swelling.   Gastrointestinal: Negative.    Genitourinary: Negative for difficulty urinating.   Musculoskeletal: Positive for gait problem. Negative for arthralgias and myalgias.   Neurological: Positive for disturbances in coordination and coordination difficulties. Negative for dizziness, facial asymmetry and speech difficulty.        Residual lower extremity weakness resulting in some difficulty with coordination and ambulation   Psychiatric/Behavioral: Negative for confusion. The patient is not nervous/anxious.          Objective:       There were no vitals filed for this visit.    Physical Exam  Constitutional:       Appearance: Normal appearance.   Eyes:      General: No scleral icterus.  Cardiovascular:      Rate and Rhythm: Normal rate and regular rhythm.      Pulses: Normal pulses.   Pulmonary:       Effort: Pulmonary effort is normal.      Breath sounds: Normal breath sounds. No wheezing or rhonchi.   Chest:      Chest wall: No tenderness.   Abdominal:      General: Abdomen is flat.      Palpations: Abdomen is soft.   Musculoskeletal:      Right lower leg: No edema.      Left lower leg: No edema.   Neurological:      General: No focal deficit present.      Mental Status: She is alert and oriented to person, place, and time.      Gait: Gait normal.   Psychiatric:         Mood and Affect: Mood normal.         Chest CT 7/16/21:  1.  Stable postsurgical changes status post right middle lobectomy and mediastinal lymph node dissection.  2.   Interval development of focal consolidation within the right posterior lung base, which may represent atelectasis or infectious process, though malignancy cannot be excluded in this patient with a history of right middle lobe carcinoid tumor.  3.  Bilateral thyroid nodules.  4.  Hepatic cysts.    Chest CT 2/2/22:   1.  New left lower lobe ground-glass nodular opacities, favored to represent infectious/inflammatory etiology.  However, short-term follow-up evaluation is highly recommended.  2.  Persistent, stable appearance of focal opacity of the right posterior base.  It may merely represent focal atelectasis.  Consider follow-up evaluation to include intravenous contrast.  3.  Other findings as above without change.     Chest CT 8/10/22:  I reviewed the images and compared to the previous chest CT  1.  Interval resolution of ground-glass opacities within the left lower lobe.  Favored to represent resolved infectious or inflammatory etiology.  2.  Stable appearance of focal opacity in the right posterior base.  Imaging appearance may favor round atelectasis.  Assessment:       70 y.o. female here today for 1.5 year follow-up from right robotic middle lobectomy, pathology pT1bN0 typical carcinoid. Pathology reviewed with patient.   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   If no evidence of disease at time of next visit, we will extend follow-up interval to every 9 months

## 2022-08-10 ENCOUNTER — OFFICE VISIT (OUTPATIENT)
Dept: CARDIOTHORACIC SURGERY | Facility: CLINIC | Age: 72
End: 2022-08-10
Payer: MEDICARE

## 2022-08-10 ENCOUNTER — HOSPITAL ENCOUNTER (OUTPATIENT)
Dept: RADIOLOGY | Facility: HOSPITAL | Age: 72
Discharge: HOME | End: 2022-08-10
Attending: THORACIC SURGERY (CARDIOTHORACIC VASCULAR SURGERY)
Payer: MEDICARE

## 2022-08-10 VITALS
DIASTOLIC BLOOD PRESSURE: 96 MMHG | SYSTOLIC BLOOD PRESSURE: 153 MMHG | HEIGHT: 65 IN | WEIGHT: 165.13 LBS | HEART RATE: 66 BPM | BODY MASS INDEX: 27.51 KG/M2 | OXYGEN SATURATION: 98 %

## 2022-08-10 DIAGNOSIS — C7A.090 CARCINOID BRONCHIAL ADENOMA OF RIGHT LUNG: Primary | ICD-10-CM

## 2022-08-10 DIAGNOSIS — C7A.090 CARCINOID BRONCHIAL ADENOMA OF RIGHT LUNG: ICD-10-CM

## 2022-08-10 PROCEDURE — 71250 CT THORAX DX C-: CPT | Mod: 26,,, | Performed by: RADIOLOGY

## 2022-08-10 PROCEDURE — 71250 CT CHEST WITHOUT CONTRAST: ICD-10-PCS | Mod: 26,,, | Performed by: RADIOLOGY

## 2022-08-10 PROCEDURE — 99999 PR PBB SHADOW E&M-EST. PATIENT-LVL IV: CPT | Mod: PBBFAC,,, | Performed by: THORACIC SURGERY (CARDIOTHORACIC VASCULAR SURGERY)

## 2022-08-10 PROCEDURE — 99999 PR PBB SHADOW E&M-EST. PATIENT-LVL IV: ICD-10-PCS | Mod: PBBFAC,,, | Performed by: THORACIC SURGERY (CARDIOTHORACIC VASCULAR SURGERY)

## 2022-08-10 PROCEDURE — 71250 CT THORAX DX C-: CPT | Mod: TC

## 2022-08-10 PROCEDURE — 99213 OFFICE O/P EST LOW 20 MIN: CPT | Mod: S$PBB,,, | Performed by: THORACIC SURGERY (CARDIOTHORACIC VASCULAR SURGERY)

## 2022-08-10 PROCEDURE — 99213 PR OFFICE/OUTPT VISIT, EST, LEVL III, 20-29 MIN: ICD-10-PCS | Mod: S$PBB,,, | Performed by: THORACIC SURGERY (CARDIOTHORACIC VASCULAR SURGERY)

## 2022-08-10 PROCEDURE — 99214 OFFICE O/P EST MOD 30 MIN: CPT | Mod: PBBFAC,25 | Performed by: THORACIC SURGERY (CARDIOTHORACIC VASCULAR SURGERY)

## 2022-08-10 RX ORDER — HYDROXYZINE PAMOATE 25 MG/1
50 CAPSULE ORAL EVERY 8 HOURS PRN
COMMUNITY
Start: 2022-07-01

## 2022-09-06 ENCOUNTER — LAB VISIT (OUTPATIENT)
Dept: LAB | Facility: HOSPITAL | Age: 72
End: 2022-09-06
Payer: MEDICARE

## 2022-09-06 DIAGNOSIS — R68.83 CHILLS: ICD-10-CM

## 2022-09-06 DIAGNOSIS — R73.03 PREDIABETES: ICD-10-CM

## 2022-09-06 DIAGNOSIS — R79.9 ABNORMAL FINDING OF BLOOD CHEMISTRY, UNSPECIFIED: ICD-10-CM

## 2022-09-06 LAB
ALBUMIN SERPL BCP-MCNC: 3.8 G/DL (ref 3.5–5.2)
ALP SERPL-CCNC: 45 U/L (ref 55–135)
ALT SERPL W/O P-5'-P-CCNC: 12 U/L (ref 10–44)
ANION GAP SERPL CALC-SCNC: 10 MMOL/L (ref 8–16)
AST SERPL-CCNC: 19 U/L (ref 10–40)
BILIRUB SERPL-MCNC: 0.5 MG/DL (ref 0.1–1)
BUN SERPL-MCNC: 15 MG/DL (ref 8–23)
CALCIUM SERPL-MCNC: 9.6 MG/DL (ref 8.7–10.5)
CHLORIDE SERPL-SCNC: 98 MMOL/L (ref 95–110)
CO2 SERPL-SCNC: 28 MMOL/L (ref 23–29)
CREAT SERPL-MCNC: 0.7 MG/DL (ref 0.5–1.4)
EST. GFR  (NO RACE VARIABLE): >60 ML/MIN/1.73 M^2
ESTIMATED AVG GLUCOSE: 111 MG/DL (ref 68–131)
GLUCOSE SERPL-MCNC: 88 MG/DL (ref 70–110)
HBA1C MFR BLD: 5.5 % (ref 4–5.6)
POTASSIUM SERPL-SCNC: 3.9 MMOL/L (ref 3.5–5.1)
PROT SERPL-MCNC: 7.3 G/DL (ref 6–8.4)
SODIUM SERPL-SCNC: 136 MMOL/L (ref 136–145)
TSH SERPL DL<=0.005 MIU/L-ACNC: 0.96 UIU/ML (ref 0.4–4)

## 2022-09-06 PROCEDURE — 84443 ASSAY THYROID STIM HORMONE: CPT | Performed by: INTERNAL MEDICINE

## 2022-09-06 PROCEDURE — 80053 COMPREHEN METABOLIC PANEL: CPT | Performed by: INTERNAL MEDICINE

## 2022-09-06 PROCEDURE — 36415 COLL VENOUS BLD VENIPUNCTURE: CPT | Mod: PO | Performed by: INTERNAL MEDICINE

## 2022-09-06 PROCEDURE — 83036 HEMOGLOBIN GLYCOSYLATED A1C: CPT | Performed by: INTERNAL MEDICINE

## 2022-09-09 ENCOUNTER — PATIENT MESSAGE (OUTPATIENT)
Dept: INFECTIOUS DISEASES | Facility: CLINIC | Age: 72
End: 2022-09-09
Payer: MEDICARE

## 2022-09-09 DIAGNOSIS — T50.B95A ADVERSE EFFECT OF COVID-19 VACCINE: Primary | ICD-10-CM

## 2022-09-09 DIAGNOSIS — G61.0 GUILLAIN BARRÉ SYNDROME: ICD-10-CM

## 2022-09-12 ENCOUNTER — OFFICE VISIT (OUTPATIENT)
Dept: INTERNAL MEDICINE | Facility: CLINIC | Age: 72
End: 2022-09-12
Payer: MEDICARE

## 2022-09-12 VITALS
OXYGEN SATURATION: 99 % | WEIGHT: 167.13 LBS | BODY MASS INDEX: 27.85 KG/M2 | HEART RATE: 77 BPM | DIASTOLIC BLOOD PRESSURE: 80 MMHG | RESPIRATION RATE: 16 BRPM | SYSTOLIC BLOOD PRESSURE: 152 MMHG | TEMPERATURE: 97 F | HEIGHT: 65 IN

## 2022-09-12 DIAGNOSIS — D63.8 ANEMIA OF CHRONIC DISEASE: ICD-10-CM

## 2022-09-12 DIAGNOSIS — I70.0 ATHEROSCLEROSIS OF AORTA: ICD-10-CM

## 2022-09-12 DIAGNOSIS — F41.9 ANXIETY: ICD-10-CM

## 2022-09-12 DIAGNOSIS — R35.1 NOCTURIA: ICD-10-CM

## 2022-09-12 DIAGNOSIS — I10 PRIMARY HYPERTENSION: Primary | ICD-10-CM

## 2022-09-12 DIAGNOSIS — Z86.010 HISTORY OF COLON POLYPS: ICD-10-CM

## 2022-09-12 DIAGNOSIS — Z86.69 HISTORY OF GUILLAIN-BARRE SYNDROME: ICD-10-CM

## 2022-09-12 DIAGNOSIS — Z78.0 POSTMENOPAUSAL: ICD-10-CM

## 2022-09-12 DIAGNOSIS — Z12.11 COLON CANCER SCREENING: ICD-10-CM

## 2022-09-12 DIAGNOSIS — R29.898 WEAKNESS OF BOTH LOWER EXTREMITIES: ICD-10-CM

## 2022-09-12 DIAGNOSIS — Z12.31 ENCOUNTER FOR SCREENING MAMMOGRAM FOR BREAST CANCER: ICD-10-CM

## 2022-09-12 PROCEDURE — 99214 PR OFFICE/OUTPT VISIT, EST, LEVL IV, 30-39 MIN: ICD-10-PCS | Mod: S$PBB,,, | Performed by: INTERNAL MEDICINE

## 2022-09-12 PROCEDURE — 99214 OFFICE O/P EST MOD 30 MIN: CPT | Mod: S$PBB,,, | Performed by: INTERNAL MEDICINE

## 2022-09-12 PROCEDURE — 99999 PR PBB SHADOW E&M-EST. PATIENT-LVL V: ICD-10-PCS | Mod: PBBFAC,,, | Performed by: INTERNAL MEDICINE

## 2022-09-12 PROCEDURE — 99999 PR PBB SHADOW E&M-EST. PATIENT-LVL V: CPT | Mod: PBBFAC,,, | Performed by: INTERNAL MEDICINE

## 2022-09-12 PROCEDURE — 99215 OFFICE O/P EST HI 40 MIN: CPT | Mod: PBBFAC,PO | Performed by: INTERNAL MEDICINE

## 2022-09-12 RX ORDER — CARVEDILOL 6.25 MG/1
6.25 TABLET ORAL 2 TIMES DAILY
Qty: 60 TABLET | Refills: 6 | Status: SHIPPED | OUTPATIENT
Start: 2022-09-12 | End: 2023-05-12

## 2022-09-14 ENCOUNTER — TELEPHONE (OUTPATIENT)
Dept: NEUROLOGY | Facility: CLINIC | Age: 72
End: 2022-09-14
Payer: MEDICARE

## 2022-09-14 PROBLEM — Z86.69 HISTORY OF GUILLAIN-BARRE SYNDROME: Status: ACTIVE | Noted: 2022-09-14

## 2022-09-14 NOTE — PROGRESS NOTES
Subjective:       Patient ID: Nan Palafox is a 72 y.o. female.    Chief Complaint: Follow-up and Depression    HPI  The patient presents for follow-up of medical conditions which include hypertension, lower extremity weakness following Guillain-[place] syndrome, anxiety and depression following the recent loss of her .  The patient also has been experiencing 3-4 episodes of nocturia daily for the past 2 months.  She denies experiencing any burning with urination or gross hematuria.    The patient has noted improvement in her lower extremity weakness following physical therapy.  She has been attending therapy at [facility] facility.  She is no longer using a cane for assisted ambulation.  She denies having any upper extremity weakness.    The patient was recently treated for an upper respiratory tract infection.  She was seen at an urgent care clinic.  She had noted transient chest tightness , cough, and shortness of breath with activity.  Her oxygen saturation was excellent.  No pneumonia was discovered.  Respiratory symptoms have resolved and she is back to her baseline.    Review of Systems   Constitutional:  Negative for activity change, appetite change, fatigue and unexpected weight change.   Eyes:  Negative for visual disturbance.   Respiratory:  Negative for cough and shortness of breath.    Cardiovascular:  Negative for chest pain, palpitations and leg swelling.   Gastrointestinal:  Negative for abdominal pain, blood in stool, constipation and diarrhea.   Genitourinary:  Negative for dysuria and hematuria.        3-4 episodes of nocturia daily are reported.   Musculoskeletal:  Positive for arthralgias. Negative for neck pain and neck stiffness.   Integumentary:  Negative for rash.   Neurological:  Positive for weakness. Negative for dizziness, syncope and headaches.   Psychiatric/Behavioral:  Positive for dysphoric mood and sleep disturbance. Negative for suicidal ideas. The patient  is nervous/anxious.           Physical Exam  Vitals and nursing note reviewed.   Constitutional:       General: She is not in acute distress.     Appearance: Normal appearance. She is well-developed.      Comments: The the patient has gained 8 lb since 04/11/2022.   HENT:      Head: Normocephalic and atraumatic.   Eyes:      General: No scleral icterus.     Extraocular Movements: Extraocular movements intact.      Conjunctiva/sclera: Conjunctivae normal.   Neck:      Thyroid: No thyromegaly.      Vascular: No JVD.   Cardiovascular:      Rate and Rhythm: Normal rate and regular rhythm.      Heart sounds: Normal heart sounds. No murmur heard.    No friction rub. No gallop.   Pulmonary:      Effort: Pulmonary effort is normal. No respiratory distress.      Breath sounds: Normal breath sounds. No wheezing or rales.   Abdominal:      General: Bowel sounds are normal.      Palpations: Abdomen is soft. There is no mass.      Tenderness: There is no abdominal tenderness.   Musculoskeletal:         General: No tenderness. Normal range of motion.      Cervical back: Normal range of motion and neck supple.      Right lower leg: No edema.      Left lower leg: No edema.   Lymphadenopathy:      Cervical: No cervical adenopathy.   Skin:     General: Skin is warm and dry.      Findings: No rash.   Neurological:      Mental Status: She is alert and oriented to person, place, and time.   Psychiatric:         Mood and Affect: Mood normal.         Behavior: Behavior normal.         Lab Visit on 09/06/2022   Component Date Value Ref Range Status    Sodium 09/06/2022 136  136 - 145 mmol/L Final    Potassium 09/06/2022 3.9  3.5 - 5.1 mmol/L Final    Chloride 09/06/2022 98  95 - 110 mmol/L Final    CO2 09/06/2022 28  23 - 29 mmol/L Final    Glucose 09/06/2022 88  70 - 110 mg/dL Final    BUN 09/06/2022 15  8 - 23 mg/dL Final    Creatinine 09/06/2022 0.7  0.5 - 1.4 mg/dL Final    Calcium 09/06/2022 9.6  8.7 - 10.5 mg/dL Final    Total  Protein 09/06/2022 7.3  6.0 - 8.4 g/dL Final    Albumin 09/06/2022 3.8  3.5 - 5.2 g/dL Final    Total Bilirubin 09/06/2022 0.5  0.1 - 1.0 mg/dL Final    Comment: For infants and newborns, interpretation of results should be based  on gestational age, weight and in agreement with clinical  observations.    Premature Infant recommended reference ranges:  Up to 24 hours.............<8.0 mg/dL  Up to 48 hours............<12.0 mg/dL  3-5 days..................<15.0 mg/dL  6-29 days.................<15.0 mg/dL      Alkaline Phosphatase 09/06/2022 45 (L)  55 - 135 U/L Final    AST 09/06/2022 19  10 - 40 U/L Final    ALT 09/06/2022 12  10 - 44 U/L Final    Anion Gap 09/06/2022 10  8 - 16 mmol/L Final    eGFR 09/06/2022 >60.0  >60 mL/min/1.73 m^2 Final    TSH 09/06/2022 0.956  0.400 - 4.000 uIU/mL Final    Hemoglobin A1C 09/06/2022 5.5  4.0 - 5.6 % Final    Comment: ADA Screening Guidelines:  5.7-6.4%  Consistent with prediabetes  >or=6.5%  Consistent with diabetes    High levels of fetal hemoglobin interfere with the HbA1C  assay. Heterozygous hemoglobin variants (HbS, HgC, etc)do  not significantly interfere with this assay.   However, presence of multiple variants may affect accuracy.      Estimated Avg Glucose 09/06/2022 111  68 - 131 mg/dL Final       Assessment & Plan:      Nan was seen today for follow-up and depression.  Carvedilol will be added for better blood pressure control.  The patient will continue valsartan/HCTZ.  Blood pressure will be rechecked in 1 month.    Urine studies will be obtained today.    Screening mammogram and screening colonoscopy will be ordered.  Bone density study will also be ordered.    The patient will follow-up with Infectious Disease regarding Evusheld booster injections.    Diagnoses and all orders for this visit:    Primary hypertension    Nocturia  -     Urinalysis; Future  -     Urine culture; Future    Weakness of both lower extremities    Anemia of chronic  disease    Anxiety    Atherosclerosis of aorta    History of colon polyps  -     Ambulatory referral/consult to Endo Procedure ; Future    Postmenopausal  -     DXA Bone Density Spine And Hip; Future    Encounter for screening mammogram for breast cancer  -     Mammo Digital Screening Bilat; Future    Colon cancer screening  -     Ambulatory referral/consult to Endo Procedure ; Future    History of Guillain-[place] syndrome    Other orders  -     carvediloL (COREG) 6.25 MG tablet; Take 1 tablet (6.25 mg total) by mouth 2 (two) times daily. For blood pressure control         No follow-ups on file.     Glenroy Jimenez MD

## 2022-09-14 NOTE — TELEPHONE ENCOUNTER
Left VM advising pt she has been scheduled incorrectly with Maggy Valenzuela. Advised that Dr. Prado is completing VVs right now as well before she fully departs [facility]. Will offer when patient returns call.

## 2022-09-14 NOTE — TELEPHONE ENCOUNTER
----- Message from Maggy Valenzuela PA-C sent at 9/14/2022 10:05 AM CDT -----  Regarding: FW: appt - 12/19/22: ref'd by Dr. Prado..GBS (Guillain-[place] syndrome)  Contact: PT @ [phone]  This patient is incorrectly scheduled with me and needs to be scheduled with neuromuscular.   ----- Message -----  From: Bev Elias  Sent: 9/13/2022  11:54 AM CDT  To: Bianca MATHEW Staff  Subject: appt - 12/19/22: ref'd by Dr. Prado..GBS (Guil#    Pt is calling to speak to someone in Dr. Valenzuela's office; asking to be seen sooner than 12/19/22 when she is scheduled. Pt is being ref'd by Dr. 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 I did add pt to the wait list, but she is asking for a return call to be seen sooner. Thanks.

## 2022-09-29 ENCOUNTER — OFFICE VISIT (OUTPATIENT)
Dept: NEUROLOGY | Facility: CLINIC | Age: 72
End: 2022-09-29
Payer: MEDICARE

## 2022-09-29 DIAGNOSIS — E53.1 PYRIDOXINE DEFICIENCY: ICD-10-CM

## 2022-09-29 DIAGNOSIS — R20.2 NUMBNESS AND TINGLING IN RIGHT HAND: ICD-10-CM

## 2022-09-29 DIAGNOSIS — R26.81 GAIT INSTABILITY: Primary | ICD-10-CM

## 2022-09-29 DIAGNOSIS — R20.0 NUMBNESS AND TINGLING IN RIGHT HAND: ICD-10-CM

## 2022-09-29 DIAGNOSIS — G60.3 IDIOPATHIC PROGRESSIVE POLYNEUROPATHY: ICD-10-CM

## 2022-09-29 PROCEDURE — 99214 PR OFFICE/OUTPT VISIT, EST, LEVL IV, 30-39 MIN: ICD-10-PCS | Mod: 95,,, | Performed by: PSYCHIATRY & NEUROLOGY

## 2022-09-29 PROCEDURE — 99214 OFFICE O/P EST MOD 30 MIN: CPT | Mod: 95,,, | Performed by: PSYCHIATRY & NEUROLOGY

## 2022-09-29 NOTE — PROGRESS NOTES
NEUROLOGY  Outpatient  Clinic visit note    [address] [address]  [phone] (office) / [phone] ( (fax)    Patient Name:  Nan Palafox  :  1950  MR #:  [id number]  Acct #:  [id number]    Date of Neurology Visit: 2022  Name of Neurologist: Lucy Prado MD    Other Physicians:  Glenroy Jimenez MD (Primary Care Physician); No ref. provider found (Referring)    The patient location is: Home   The chief complaint leading to consultation is: Tingling and numbness  Visit type: Virtual visit with synchronous audio and video  Total time spent with patient: 33 minutes   Each patient to whom he or she provides medical services by telemedicine is:  (1) informed of the relationship between the physician and patient and the respective role of any other health care provider with respect to management of the patient; and (2) notified that he or she may decline to receive medical services by telemedicine and may withdraw from such care at any time.    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 female presents for follow up. Patient was admitted for GBS evaluation at [facility] in 2021.    Today the patient reports tightness in her toes, she has noted tingling in the right hand which is bothersome when she drives her bike. She does PT twice a week and this has been very helpful. She is not falling but had an episode where she felt off balance after which this resolved. No numbness but night has tingling in her toes. She moves her legs and rubs her legs and this resolves.      Visit of 2022  Today she reports she has intermittent tingling in her toes which improves when she walks.  She has noted intermittent heaviness in the right hand at times which goes away once she moves her hands. She has developed swelling in her left leg which has been  going on for a few months.     She had a fall when she was walking on a side walk , did not pay attention and fell.     No seizures.     Has been off Keppra >1 year    She was recommended PT, normal EEG ? Single lifetime seizure- no further events off Keppra > 1 year, venous US of lower extremities and EMG/ NCS was recommended. Patient preferred not to undergo EMG/ NCS      Visit of 10/7/2021:  She never restarted the Keppra. She has been off Keppra for > 6 months.  She has tingling in her toes especially at night.  No falls.  No symptoms in hands.   Continues to do PT. She is under a lot of stress due to her 's health.    Patient was recommended routine EEG- wnl, given one lifetime seizure and no recurrence she prefers to stay off Keppra  Lesion at C7 endplate favored to be degenerative change        Initial HPI: 7/1/2021  She is now using cane. January 2021 she underwent surgery thoracic surgery for removal of a mass. A few days later she got her first Pfizer vaccine.   She noted tingling and numbenss. She could not move her legs. 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 She was treated with IVIG     The tingling has resolved but every once in a while she feels a heaviness in her toes. She had difficulty raisining her hands. She has noted that the right upper extremity continues to remain weak.     In addition -     In 2020 she underwent a lung biospy. She states post biopsy she had change in speech, right upper extremity weakness. She was admitted at [facility] and was told she did not have a stroke but she was started on Keppra given the possiblity of a ? Seizure. She has stopped taking the Keppra for 6 months now and not had another event. Stopped the Keppra herself because she was not sure as to why she was taking it.     Currently she reports right arm weakness. She is doing PT 2x/ week. She lives with her .     H/o carcinoid tumor s/p resection        Treatment to date:    IVIG for GBS   PT    Review of Systems:        Past Medical, Surgical, Family & Social History:   Past Medical History:   Diagnosis Date    Arthritis     Cataract     Cervical spondylosis with radiculopathy 4/11/2016    GBS (Guillain-Chester syndrome)     History of gastrointestinal stromal tumor (GIST) 12/2/2020    Hypertension     Malignant carcinoid tumor of bronchus and lung 12/2/2020    Nuclear sclerosis - Both Eyes 2/8/2013    PNA (pneumonia)     Primary open angle glaucoma (POAG) of both eyes, moderate stage 3/11/2019       Home Medications:     Current Outpatient Medications:     acetaminophen (TYLENOL) 325 MG tablet, Take 2 tablets (650 mg total) by mouth every 4 (four) hours as needed., Disp: , Rfl: 0    albuterol (PROVENTIL/VENTOLIN HFA) 90 mcg/actuation inhaler, Inhale 2 puffs into the lungs every 6 (six) hours as needed., Disp: , Rfl:     ASCORBATE CALCIUM (VITAMIN C ORAL), Take 1 tablet by mouth once daily at 6am., Disp: , Rfl:     aspirin (ECOTRIN) 81 MG EC tablet, Take 81 mg by mouth once daily., Disp: , Rfl:     carvediloL (COREG) 6.25 MG tablet, Take 1 tablet (6.25 mg total) by mouth 2 (two) times daily. For blood pressure control, Disp: 60 tablet, Rfl: 6    dorzolamide-timolol 2-0.5% (COSOPT) 22.3-6.8 mg/mL ophthalmic solution, INSTILL 1 DROP INTO BOTH EYES TWICE DAILY, Disp: 30 mL, Rfl: 3    fluticasone (FLONASE) 50 mcg/actuation nasal spray, 2 sprays by Each Nare route once daily., Disp: 1 Bottle, Rfl: 12    hydrOXYzine pamoate (VISTARIL) 25 MG Cap, Take 50 mg by mouth every 8 (eight) hours as needed., Disp: , Rfl:     latanoprost 0.005 % ophthalmic solution, INSTILL 1 DROP INTO BOTH EYES EVERY EVENING, Disp: 7.5 mL, Rfl: 4    LORazepam (ATIVAN) 1 MG tablet, TAKE 1 TABLET BY MOUTH EVERY 8 HOURS AS NEEDED, Disp: 60 tablet, Rfl: 2    multivitamin (THERAGRAN) tablet, Take 1 tablet by mouth once daily., Disp: , Rfl:     rosuvastatin (CRESTOR) 20 MG tablet, Take 1 tablet (20  mg total) by mouth once daily., Disp: 90 tablet, Rfl: 3    valsartan-hydrochlorothiazide (DIOVAN-HCT) 320-25 mg per tablet, Take 1 tablet by mouth once daily., Disp: , Rfl:     vitamin D (VITAMIN D3) 1000 units Tab, Take 1,000 Units by mouth once daily., Disp: , Rfl:       14-point review of systems as follows:   No check ashvin indicates NEGATIVE response   Constitutional: [] weight loss, [] change to appetite   Eyes: [] change in vision, [] double vision   Ears, nose, mouth, throat: [] frequent nose bleeds, [] ringing in the ears   Respiratory: [] cough, [] wheezing   Cardiovascular: [] chest pain, [] palpitations   Gastrointestinal: [] jaundice, [] nausea/vomiting   Genitourinary: [] incontinence, [] burning with urination   Hematologic/lymphatic: [] easy bruising/bleeding, [] night sweats   Neurological: [x] numbness, [x] weakness   Endocrine: [x] fatigue, [] heat/cold intolerance   Allergy/Immunologic: [] fevers, [x] chills   Musculoskeletal: [] muscle pain, [] joint pain   Psychiatric: [] thoughts of harming self/others, [x] depression   Integumentary: [] rashes, [] sores that do not heal     Physical Examination:  There were no vitals taken for this visit.    GENERAL:  General appearance: Well, non-toxic appearing.  No apparent distress.  Fundi exam: Unable to examine during virtual visit  Neck: No gross abnormalities noted on visual inspection  Carotid auscultation: Unable to examine during virtual visit  Heart auscultation: Unable to examine during virtual visit  Peripheral pulses: Unable to examine during virtual visit  Extremities: No gross abnormalities noted on visual inspection    MENTAL STATUS:  Alertness, attention span & concentration: normal.  Language: normal.  Orientation to self, place & time:  normal.  Memory, recent & remote: normal.  Fund of knowledge: normal.      SPEECH:  Clear and fluent.  Follows complex commands.    CRANIAL NERVES:  Cranial Nerves II-XII were examined.  II - Visual fields:  normal.  III, IV, VI: PERRL, EOMI, No ptosis, No nystagmus.  V - Facial sensation: normal.  VII - Face symmetry & mobility: normal.  VIII - Hearing: normal.  IX, X - Palate: mobile & midline.  XI - Shoulder shrug: normal.  XII - Tongue protrusion: normal.    GROSS MOTOR:  Gait & station: normal except wobbles when she walks on toes  Tone: Unable to examine during virtual visit  Abnormal movements: none.  Finger-nose & Heel-knee-shin: normal.  Rapid alternating movements & drift: normal.      MUSCLE STRENGTH:   Patient moves bilateral upper and lower extremities strongly and antigravity  Unable to perform graded exam during video visit    REFLEXES:    Unable to perform reflex testing during video visit     SENSORY:  Light touch: Normal throughout.     Diagnostic Data Reviewed:           Results for orders placed or performed during the hospital encounter of 01/29/21   MRI Brain W WO Contrast    Narrative    EXAMINATION:  MRI BRAIN W WO CONTRAST; MRI THORACIC SPINE W WO CONTRAST; MRI CERVICAL SPINE W WO CONTRAST    CLINICAL HISTORY:  BLE weakness and RUE weakness;; bilateral LE weakness, RUE weakness;; bilateral LE weaknes, RUE weakness;.    TECHNIQUE:  Multiplanar multisequence MR imaging of the brain was performed before and after the administration of 9 mL Gadavist  intravenous contrast.    COMPARISON:  CT head without contrast 09/26/2016.    FINDINGS:  Intracranial compartment:    Prominence of the ventricles and sulci compatible with mild generalized cerebral volume loss. No hydrocephalus.  Cavum septum pellucidum et vergae.    No extra-axial blood or fluid collections.  Partial empty sella configuration, unchanged.    Patchy and confluent T2/FLAIR hyperintensities scattered throughout the centrum semiovale, callososeptal interface, and periventricular white matter mostly centered at the posterior aspect of the occipital horns, favoring age advanced chronic microvascular ischemic change, with sequela of  demyelinating disease also a consideration.    No mass lesion, acute hemorrhage, edema or acute infarct. No abnormal enhancement.    Normal vascular flow voids are preserved.    Skull/extracranial contents (limited evaluation): Bone marrow signal intensity is normal.  Craniocervical junction is within normal limits.    Globes are symmetric.    Cervical spine:    Straightening of the normal cervical lordosis.    Vertebral body heights are maintained without evidence for fracture or diffuse marrow infiltrative process.    There is a small area of T1 hypointense signal at the inferior C7 endplate, which demonstrates increased T2 and STIR signal and also enhancement on post-contrast imaging (sagittal series 35, image 8).  While this may represent active inflammatory change, metastatic lesion also remains a consideration.  Overall, this area appears somewhat similar to MRI 03/18/2016, noting no contrast administration on prior imaging.    Mild intervertebral disc space narrowing throughout the visualized cervical spine.    Cord maintains normal signal intensity and contour.    Pre dens space is maintained.    C2-C3: Posterior disc osteophyte complex with no spinal canal stenosis or neural foraminal narrowing.    C3-C4: Posterior disc osteophyte complex with no spinal canal stenosis or neural foraminal narrowing.    C4-C5: Posterior disc osteophyte complex with no spinal canal stenosis or neural foraminal narrowing.    C5-C6: Posterior disc osteophyte complex, uncovertebral joint spurring, and mild bilateral facet joint hypertrophy contributing to mild bilateral neural foraminal narrowing.    C6-C7: Posterior disc osteophyte complex, uncovertebral joint spurring, and bilateral facet joint hypertrophy contributing to mild spinal canal stenosis as well as mild bilateral neural foraminal narrowing.    C7-T1: Posterior disc osteophyte complex, uncovertebral joint spurring, and bilateral facet joint hypertrophy contributing to  mild bilateral neural foraminal narrowing.    Paraspinal musculature is maintained.    Partially visualized soft tissues of the neck are unremarkable.    Thoracic spine:    Mild exaggerated kyphosis of the thoracic spine.  Vertebral body heights are maintained without evidence for fracture or marrow infiltrative process.    Mild multilevel intervertebral disc space narrowing throughout the visualized thoracic spine.    Cord maintains normal signal intensity and contour.    No focal disc defect, spinal canal stenosis, or neural foraminal narrowing throughout the visualized thoracic spine.    Paraspinal musculature is maintained.    Small right pleural effusion.    Partially visualized intra-abdominal organs are unremarkable.    Visualized aorta is normal in course and caliber without evidence for aneurysm.      Impression    No acute intracranial hemorrhage or major vascular distribution infarct.    Patchy and confluent, nonenhancing T2/FLAIR hyperintensities scattered throughout the supratentorial white matter as detailed above.  Findings are nonspecific, however considerations include age-advanced microvascular ischemic changes well as demyelinating disease.  Correlation is advised.    Small enhancing lesion at the inferior C7 endplate, overall nonspecific, favoring active degenerative change given similar appearance to prior imaging dated 03/18/2016.  However, small metastatic lesion cannot be completely excluded.  Correlation is advised.    Cervical spondylosis as detailed above, worst at C6-C7 contributing to mild spinal canal stenosis as well as mild bilateral neural foraminal narrowing.    Visualized cervical and thoracic cord maintains normal signal intensity and contour without enhancing lesions.    Small right pleural effusion.    Other findings as above.    This report was flagged in Epic as abnormal.    Electronically signed by resident: Branden Cao  Date:    01/29/2021  Time:    20:14    Electronically  "signed by: Bryant Houston MD  Date:    01/29/2021  Time:    20:53   Results for orders placed or performed during the hospital encounter of 09/26/16   CT Head Without Contrast    Narrative    COMPARISON: MRI brain 5/30/08    FINDINGS: No evidence of hemorrhage, mass, midline shift or acute major vascular territory infarct.  Gray white interface appears intact.  There is age appropriate  generalized cerebral atrophy.  There are patchy and confluent low attenuation changes throughout the subcortical and periventricular white matter, nonspecific but can be seen with chronic small vessel ischemic disease.  Cavum septum pellucidum. The ventricles are midline without distortion by mass effect.  No extra-axial hemorrhage or mass. Skull base  vascular calcifications are noted.     The extracranial structures are within normal limits.  Calvarium is intact.  Visualized paranasal sinuses are clear.  Mastoid air cells are clear.    Impression    1.  No acute large vascular territory infarct or intracranial hemorrhage identified.  Further evaluation/followup as warranted.    2.  Senescent changes as above.  ______________________________________     Electronically signed by: BRYANT HOUSTON MD, MD  Date:     09/26/16  Time:    23:16        Discharge summary from 2/10/2021:    HPI:   70 year old female with PMH of HTN, HLD, GIST, GERD, obesity, cervical spondylosis, bilateral TKR, and newly diagnosed right middle typical carcinoid tumor (from outside records, RML mass first noted on chest CT in July 2018) recent lung resection 1/5 who presents for bilateral leg weakness preventing her from walking. Pt believes on 1/26 she notes that it was more difficult to walk, "felt like she was moving cement blocks" and that she had "pins and needles in her feet". By the next morning, 1/27, she says she could not walk at all. She came to the ER, but was discharged and had a fall in the parking lot. Additionally she notes some RUE weakness in " "conjunction with these symptoms. As her family could not care for her at home and she had multiple falls (no head trauma or LOC) at home, she re-presented today. She denies saddle anesthesia, loss of B/B, facial asymmetry, confusion, back pain. Prior to 1/26 she states she was ambulatory without assistance.     MRI L spine 1/27 showed "Multilevel lumbar spondylosis as detailed above worst at L2-L3 contributing to moderate spinal canal stenosis as well as mild left and moderate right neural foraminal narrowing. Disc extrusion at L4-L5 likely abutting the exiting left L4 nerve root.  Correlation with symptoms is advised. No abnormal enhancement or evidence for metastatic disease in the lumbar spine."     In ED: VSS, mild tachycardia; cbc/cmp unremarkable; CXR with slight decrease in R pleural effusion since prior study        Procedure(s) (LRB):  MPU PROCEDURE (N/A)       Hospital Course:   Patient admitted for for BLE weakness preventing her from ambulation. MRI CTL spine completed without obvious etiology for severity of symptoms. NSGY and gen neuro consulted to weigh in. Appreciate assistance. No neurosurgical intervention per NSGY. Gen neuro recommending LP to evaluate for GBS vs paraneoplastic syndrome vs autoimmune causes for patients symptoms. Anesthesia performed LP 1/31, high in protein. Additional labs per neuro. 2/1 plan to start IVIG for suspected autoimmune process, completed after 5 days with positive effect in strength.   Mild lab disturbances including hyponatremia encountered in setting of IVIG use - improved after discontinuation.  Patient progressed with improvement in strength and will be discharged to [facility].  Myelogram, NMG will be needed to be done outpt.          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 Seen in ER " "here, had a fall in parking lot on dc and persistent sxs, thus representing. Pt AOx4.     - MRI previous visit: "Multilevel lumbar spondylosis as detailed above worst at L2-L3 contributing to moderate spinal canal stenosis as well as mild left and moderate right neural foraminal narrowing. Disc extrusion at L4-L5 likely abutting the exiting left L4 nerve root.  Correlation with symptoms is advised"  - discussed pt with NSGY who recommend MRI T/C spine  - MRI brain and T/C spine ordered     "No acute intracranial hemorrhage or major vascular distribution infarct.   Patchy and confluent, nonenhancing T2/FLAIR hyperintensities scattered throughout the supratentorial white matter as detailed above.  Findings are nonspecific, however considerations include age-advanced microvascular ischemic changes well as demyelinating disease.  Correlation is advised.   Small enhancing lesion at the inferior C7 endplate, overall nonspecific, favoring active degenerative change given similar appearance to prior imaging dated 03/18/2016.  However, small metastatic lesion cannot be completely excluded.  Correlation is advised.   Cervical spondylosis as detailed above, worst at C6-C7 contributing to mild spinal canal stenosis as well as mild bilateral neural foraminal narrowing.   Visualized cervical and thoracic cord maintains normal signal intensity and contour without enhancing lesions. "  - NSGY and gen neuro consulted for further assistance, appreciate weighing in     - no acute surgical intervention per NSGY     - labs per neuro pending     - LP done 1/31 to assess for GBS vs PNP syndrome vs autoimmune causes, high protein     - START IVIG 2/1 for suspected autoimmune process, plan for 5d of tx, completed 2/6/21     - Resp mechanics with neuro parameters  - outpt NMG  - PTOT consulted: rehab  - neuro checks, seizure precautions, fall precautions        Coronary artery disease  Cont ASA and statin        Malignant carcinoid tumor of " "bronchus and lung  Recent R lung lobectomy, 1/5/21  -per dc summary from lung resection" diagnosed right middle typical carcinoid tumor. From outside records, RML mass first noted on chest CT in July 2018. Patient reports she was first notified of its presence about a year ago. She followed with Pulmonology at [facility] for surveillance imaging. Repeat chest CT in November 2020 showed a slight increase in size. Underwent CT guided biopsy at [facility] on 11/23/20 with an eventful post-procedure course. Stroke code was called after she developed right sided weakness and aphasia in PACU. CTA head/neck, MRI brain transcranial dopplers, TTE and LP were all unrevealing. She did have an EEG that showed some left-sided epileptiform irregularities thus she was started on Keppra. Per patient's daughter her weakness, memory and speech all spontaneously improved 48 hours after procedure. She has been completely neurologically intact since discharge.  Pathology returned as low-grade carcinoid tumor, Ki-67 < 2%. "  - cont keppra, seizure precautions  - albuterol PRN, flonase  -pt reports taking gabapentin for nerve pain after surgery... contributing to weakness? Hold gabapentin 300mg TID     Primary open angle glaucoma (POAG) of both eyes, moderate stage  Continue home eye drops        Hyponatremia  -c/f pseudohyponatremia d/t IVIG  -check serum osmolarity - 285  -confirms lab abnormality in setting of IVIG      EEG on 7/2/2021:    IMPRESSION:   This is a normal routine EEG done in awake and drowsy states.     A normal EEG does not rule out seizures/epilepsy.  CLINICAL CORRELATION IS RECOMMENDED.      Assessment and Plan:    Nan Palafox is a 71 y.o. female presents for follow up.    In 2020 she underwent a lung biospy. She states post biopsy she had change in speech, right upper extremity weakness. She was admitted at [facility] and was told she did not have a stroke but she was started on Keppra given the possiblity of a ? " "Seizure. Per review of [facility] records EEG revealed "left sided irregularities"??   -Unable to review formal EEG report, records not available. EEG at [facility] off Keppra- within normal limit. She has been seizure free since the first event. She stopped Keppra > 6 months ago and is doing well. Will continue to monitor off Keppra     Currently she reports tingling in the right hand and  tingling in toes but denies weakness- she states she has had tingling for many years even prior to GBS diagnosis . She is doing PT.Neurological examination is within normal limits except for absent lower extremity reflexes ( on previous exam- cannot be tested today since it is a virtual visit)/ unsteady gait as mentioned above.      Assessment:  1. Unsteady gait  2. ?  First lifetime seizure in 2020- off Keppra > 1 year- no seizures   3. Right hand tingling  4. Bilateral foot paresthesias      Recommendations  1.  PT for evaluation and management of unsteady gait   2. Monitor off Keppra given normal routine EEG and single liftetime seizure   3. EMG/ NCS of right upper and bilateral lower extremities to evaluate for carpal tunnel syndrome and neuropathy. Of note, tingling in notes preceded GBS diagnosis.   4. Blood work ordered to evaluate underlying cause of paresthesias, previously SPEP- hypergammaglobulinemia, Normal B12 noted.           The patient will return to clinic in 12 months/ as needed.           Lucy Prado MD  Medicine-Neurology, Clinical Neurophysiology    33 minutes spent during the visit with the patient, in addition this time includes time spent reviewing prior records, clinical notes, imaging if obtained and blood work on the day of the visit. The total time spent was all on the day of the visit.      This note was created with voice recognition software.  Grammatical, syntax and spelling errors may be inevitable.    Problem List Items Addressed This Visit    None  Visit Diagnoses       Gait instability    -  " Primary    Relevant Orders    Ambulatory referral/consult to Physical/Occupational Therapy    Idiopathic progressive polyneuropathy        Relevant Orders    EMG W/ ULTRASOUND AND NERVE CONDUCTION TEST 3 Extremities    Vitamin B1    RPR    HIV 1/2 Ag/Ab (4th Gen)    Vitamin B6    Numbness and tingling in right hand        Pyridoxine deficiency        Relevant Orders    Vitamin B6

## 2022-10-11 ENCOUNTER — OFFICE VISIT (OUTPATIENT)
Dept: INTERNAL MEDICINE | Facility: CLINIC | Age: 72
End: 2022-10-11
Payer: MEDICARE

## 2022-10-11 VITALS
TEMPERATURE: 98 F | WEIGHT: 167.13 LBS | RESPIRATION RATE: 18 BRPM | OXYGEN SATURATION: 97 % | SYSTOLIC BLOOD PRESSURE: 110 MMHG | HEIGHT: 66 IN | HEART RATE: 70 BPM | BODY MASS INDEX: 26.86 KG/M2 | DIASTOLIC BLOOD PRESSURE: 64 MMHG

## 2022-10-11 DIAGNOSIS — G61.0 GUILLAIN-BARRE DISEASE: ICD-10-CM

## 2022-10-11 DIAGNOSIS — I10 PRIMARY HYPERTENSION: Primary | ICD-10-CM

## 2022-10-11 DIAGNOSIS — F32.A DEPRESSIVE DISORDER: ICD-10-CM

## 2022-10-11 PROCEDURE — 99213 PR OFFICE/OUTPT VISIT, EST, LEVL III, 20-29 MIN: ICD-10-PCS | Mod: S$PBB,,, | Performed by: INTERNAL MEDICINE

## 2022-10-11 PROCEDURE — 99999 PR PBB SHADOW E&M-EST. PATIENT-LVL IV: CPT | Mod: PBBFAC,,, | Performed by: INTERNAL MEDICINE

## 2022-10-11 PROCEDURE — 99214 OFFICE O/P EST MOD 30 MIN: CPT | Mod: PBBFAC,PO | Performed by: INTERNAL MEDICINE

## 2022-10-11 PROCEDURE — 99999 PR PBB SHADOW E&M-EST. PATIENT-LVL IV: ICD-10-PCS | Mod: PBBFAC,,, | Performed by: INTERNAL MEDICINE

## 2022-10-11 PROCEDURE — 99213 OFFICE O/P EST LOW 20 MIN: CPT | Mod: S$PBB,,, | Performed by: INTERNAL MEDICINE

## 2022-10-15 NOTE — PROGRESS NOTES
Subjective:       Patient ID: Nan Palafox is a 72 y.o. female.    Chief Complaint: Hypertension (1mo - f/u)    HPI  The patient presents for follow-up of hypertension therapy.  She is tolerating her medications well without side effects.    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  The patient is still attending physical therapy for treatment of upper and lower extremity weakness.  She states she experiences intermittent episodes of sudden loss of strength in the right hand.  She denies having any numbness or tingling in the hand.  She is using a cane for assisted ambulation which she has to walk for prolonged periods.  She has not experienced any falls recently.  Occasional spasms are noted in the toes.  She denies experiencing any tremors.    She has decreased her intake of coffee and alcoholic beverages; this has reduced urinary frequency.    The patient reports she had a follow-up visit with her [facility] pulmonologist Dr. Javad Franks on 09/14/2022.  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  She is also followed by her cardiothoracic surgeon Dr. Ru Hernandez since her lung surgery.    Review of Systems   Constitutional:  Negative for activity change, appetite change, fatigue and unexpected weight change.   Eyes:  Negative for visual disturbance.   Respiratory:  Negative for cough and shortness of breath.    Cardiovascular:  Negative for chest pain, palpitations and leg swelling.   Gastrointestinal:  Negative for abdominal pain, blood in stool, constipation and diarrhea.   Genitourinary:  Positive for frequency. Negative for bladder incontinence, dysuria and hematuria.        Nocturia x 2 reported.   Musculoskeletal:  Positive for gait problem. Negative for arthralgias, neck pain and neck stiffness.    Integumentary:  Negative for rash.   Neurological:  Positive for weakness. Negative for dizziness, tremors, seizures, syncope, numbness, headaches and memory loss.   Psychiatric/Behavioral:  Positive for dysphoric mood. Negative for confusion, hallucinations, sleep disturbance and suicidal ideas. The patient is not nervous/anxious.           Physical Exam  Vitals and nursing note reviewed.   Constitutional:       General: She is not in acute distress.     Appearance: Normal appearance. She is well-developed.   HENT:      Head: Normocephalic and atraumatic.   Eyes:      General: No scleral icterus.     Extraocular Movements: Extraocular movements intact.      Conjunctiva/sclera: Conjunctivae normal.   Neck:      Thyroid: No thyromegaly.      Vascular: No JVD.   Cardiovascular:      Rate and Rhythm: Normal rate and regular rhythm.      Heart sounds: Normal heart sounds. No murmur heard.    No friction rub. No gallop.   Pulmonary:      Effort: Pulmonary effort is normal. No respiratory distress.      Breath sounds: Normal breath sounds. No wheezing or rales.   Abdominal:      General: Bowel sounds are normal.      Palpations: Abdomen is soft. There is no mass.      Tenderness: There is no abdominal tenderness.   Musculoskeletal:         General: No tenderness. Normal range of motion.      Cervical back: Normal range of motion and neck supple.      Right lower leg: No edema.      Left lower leg: No edema.   Lymphadenopathy:      Cervical: No cervical adenopathy.   Skin:     General: Skin is warm and dry.      Findings: No rash.   Neurological:      General: No focal deficit present.      Mental Status: She is alert and oriented to person, place, and time.      Motor: No weakness.      Coordination: Coordination normal.   Psychiatric:         Behavior: Behavior normal.         Thought Content: Thought content normal.         Judgment: Judgment normal.      Comments: The patient is sad at times when discussing the  passing of her .  However, she is optimistic about her own physical progress obtained from participating in physical therapy.  There is no suicidal ideation.  No hallucinosis or delusions are evident.           Assessment & Plan:      Nan was seen today for hypertension.  Current antihypertensive therapy will be continued.    The patient has an appointment to see a psychiatrist - Dr. Schultz at [facility] - for initial consultation regarding her depressive symptoms.  She has been encouraged to follow through on this appointment.    Diagnoses and all orders for this visit:    Primary hypertension    Guillain-[place] disease    Depressive disorder       Follow up in about 4 months (around 2/11/2023).     Glenroy Jimenez MD

## 2022-10-19 ENCOUNTER — HOSPITAL ENCOUNTER (OUTPATIENT)
Dept: RADIOLOGY | Facility: HOSPITAL | Age: 72
Discharge: HOME | End: 2022-10-19
Attending: INTERNAL MEDICINE
Payer: MEDICARE

## 2022-10-19 DIAGNOSIS — Z12.31 ENCOUNTER FOR SCREENING MAMMOGRAM FOR BREAST CANCER: ICD-10-CM

## 2022-10-19 PROCEDURE — 77067 MAMMO DIGITAL SCREENING BILAT WITH TOMO: ICD-10-PCS | Mod: 26,,, | Performed by: RADIOLOGY

## 2022-10-19 PROCEDURE — 77063 BREAST TOMOSYNTHESIS BI: CPT | Mod: 26,,, | Performed by: RADIOLOGY

## 2022-10-19 PROCEDURE — 77063 MAMMO DIGITAL SCREENING BILAT WITH TOMO: ICD-10-PCS | Mod: 26,,, | Performed by: RADIOLOGY

## 2022-10-19 PROCEDURE — 77063 BREAST TOMOSYNTHESIS BI: CPT | Mod: TC,PO

## 2022-10-19 PROCEDURE — 77067 SCR MAMMO BI INCL CAD: CPT | Mod: TC,PO

## 2022-10-19 PROCEDURE — 77067 SCR MAMMO BI INCL CAD: CPT | Mod: 26,,, | Performed by: RADIOLOGY

## 2022-10-21 ENCOUNTER — PES CALL (OUTPATIENT)
Dept: ADMINISTRATIVE | Facility: CLINIC | Age: 72
End: 2022-10-21
Payer: MEDICARE

## 2022-10-27 ENCOUNTER — INFUSION (OUTPATIENT)
Dept: INFUSION THERAPY | Facility: HOSPITAL | Age: 72
End: 2022-10-27
Attending: STUDENT IN AN ORGANIZED HEALTH CARE EDUCATION/TRAINING PROGRAM
Payer: MEDICARE

## 2022-10-27 VITALS
DIASTOLIC BLOOD PRESSURE: 88 MMHG | HEART RATE: 58 BPM | SYSTOLIC BLOOD PRESSURE: 182 MMHG | OXYGEN SATURATION: 99 % | RESPIRATION RATE: 18 BRPM | TEMPERATURE: 98 F

## 2022-10-27 DIAGNOSIS — T50.B95A ADVERSE EFFECT OF COVID-19 VACCINE: Primary | ICD-10-CM

## 2022-10-27 DIAGNOSIS — G61.0 GUILLAIN BARRÉ SYNDROME: ICD-10-CM

## 2022-10-27 DIAGNOSIS — Z86.69 HISTORY OF GUILLAIN-BARRE SYNDROME: ICD-10-CM

## 2022-10-27 PROCEDURE — 63600175 PHARM REV CODE 636 W HCPCS: Performed by: INTERNAL MEDICINE

## 2022-10-27 PROCEDURE — M0220 HC INJECTION ADMIN, TIXAGEVIMAB-CILGAVIMAB, INCL POST ADMIN MONIT: HCPCS | Performed by: INTERNAL MEDICINE

## 2022-10-27 RX ORDER — ONDANSETRON 4 MG/1
4 TABLET, ORALLY DISINTEGRATING ORAL ONCE AS NEEDED
OUTPATIENT
Start: 2022-10-27

## 2022-10-27 RX ORDER — ALBUTEROL SULFATE 90 UG/1
2 AEROSOL, METERED RESPIRATORY (INHALATION) ONCE AS NEEDED
OUTPATIENT
Start: 2022-10-27

## 2022-10-27 RX ORDER — ACETAMINOPHEN 325 MG/1
650 TABLET ORAL ONCE AS NEEDED
OUTPATIENT
Start: 2022-10-27

## 2022-10-27 RX ORDER — EPINEPHRINE 0.3 MG/.3ML
0.3 INJECTION SUBCUTANEOUS ONCE AS NEEDED
Status: DISCONTINUED | OUTPATIENT
Start: 2022-10-27 | End: 2022-10-27 | Stop reason: HOSPADM

## 2022-10-27 RX ORDER — ALBUTEROL SULFATE 90 UG/1
2 AEROSOL, METERED RESPIRATORY (INHALATION) ONCE AS NEEDED
Status: DISCONTINUED | OUTPATIENT
Start: 2022-10-27 | End: 2022-10-27 | Stop reason: HOSPADM

## 2022-10-27 RX ORDER — EPINEPHRINE 0.3 MG/.3ML
0.3 INJECTION SUBCUTANEOUS ONCE AS NEEDED
OUTPATIENT
Start: 2022-10-27

## 2022-10-27 RX ORDER — PREDNISONE 20 MG/1
40 TABLET ORAL ONCE AS NEEDED
Status: DISCONTINUED | OUTPATIENT
Start: 2022-10-27 | End: 2022-10-27 | Stop reason: HOSPADM

## 2022-10-27 RX ORDER — ONDANSETRON 4 MG/1
4 TABLET, ORALLY DISINTEGRATING ORAL ONCE AS NEEDED
Status: DISCONTINUED | OUTPATIENT
Start: 2022-10-27 | End: 2022-10-27 | Stop reason: HOSPADM

## 2022-10-27 RX ORDER — DIPHENHYDRAMINE HCL 25 MG
25 CAPSULE ORAL ONCE AS NEEDED
Status: DISCONTINUED | OUTPATIENT
Start: 2022-10-27 | End: 2022-10-27 | Stop reason: HOSPADM

## 2022-10-27 RX ORDER — PREDNISONE 20 MG/1
40 TABLET ORAL ONCE AS NEEDED
OUTPATIENT
Start: 2022-10-27

## 2022-10-27 RX ORDER — DIPHENHYDRAMINE HCL 25 MG
25 CAPSULE ORAL ONCE AS NEEDED
OUTPATIENT
Start: 2022-10-27

## 2022-10-27 RX ORDER — ACETAMINOPHEN 325 MG/1
650 TABLET ORAL ONCE AS NEEDED
Status: DISCONTINUED | OUTPATIENT
Start: 2022-10-27 | End: 2022-10-27 | Stop reason: HOSPADM

## 2022-10-27 RX ADMIN — Medication 300 MG: at 09:10

## 2022-11-09 ENCOUNTER — TELEPHONE (OUTPATIENT)
Dept: OPHTHALMOLOGY | Facility: CLINIC | Age: 72
End: 2022-11-09
Payer: COMMERCIAL

## 2022-11-09 NOTE — TELEPHONE ENCOUNTER
----- Message from Dianna Romero sent at 11/9/2022 12:31 PM CST -----  Regarding: Appt Inquiry  Pt called about being seen pt states she is having trouble with sight.     Pts call back :[phone]

## 2022-11-16 ENCOUNTER — OFFICE VISIT (OUTPATIENT)
Dept: OBSTETRICS AND GYNECOLOGY | Facility: CLINIC | Age: 72
End: 2022-11-16
Payer: MEDICARE

## 2022-11-16 VITALS
WEIGHT: 168.88 LBS | BODY MASS INDEX: 27.67 KG/M2 | DIASTOLIC BLOOD PRESSURE: 80 MMHG | SYSTOLIC BLOOD PRESSURE: 140 MMHG

## 2022-11-16 DIAGNOSIS — C7A.090 MALIGNANT CARCINOID TUMOR OF BRONCHUS AND LUNG: ICD-10-CM

## 2022-11-16 DIAGNOSIS — Z86.69 HISTORY OF GUILLAIN-BARRE SYNDROME: ICD-10-CM

## 2022-11-16 DIAGNOSIS — I10 PRIMARY HYPERTENSION: ICD-10-CM

## 2022-11-16 DIAGNOSIS — Z01.419 ENCOUNTER FOR GYNECOLOGICAL EXAMINATION WITHOUT ABNORMAL FINDING: Primary | ICD-10-CM

## 2022-11-16 DIAGNOSIS — D21.4 GIST (GASTROINTESTINAL STROMAL TUMOR), NON-MALIGNANT: ICD-10-CM

## 2022-11-16 PROCEDURE — G0101 PR CA SCREEN;PELVIC/BREAST EXAM: ICD-10-PCS | Mod: S$PBB,,, | Performed by: OBSTETRICS & GYNECOLOGY

## 2022-11-16 PROCEDURE — 99999 PR PBB SHADOW E&M-EST. PATIENT-LVL II: ICD-10-PCS | Mod: PBBFAC,,, | Performed by: OBSTETRICS & GYNECOLOGY

## 2022-11-16 PROCEDURE — 99999 PR PBB SHADOW E&M-EST. PATIENT-LVL II: CPT | Mod: PBBFAC,,, | Performed by: OBSTETRICS & GYNECOLOGY

## 2022-11-16 PROCEDURE — 99212 OFFICE O/P EST SF 10 MIN: CPT | Mod: PBBFAC | Performed by: OBSTETRICS & GYNECOLOGY

## 2022-11-16 PROCEDURE — G0101 CA SCREEN;PELVIC/BREAST EXAM: HCPCS | Mod: S$PBB,,, | Performed by: OBSTETRICS & GYNECOLOGY

## 2022-11-16 RX ORDER — CEFDINIR 300 MG/1
300 CAPSULE ORAL EVERY 12 HOURS
COMMUNITY
Start: 2022-09-10 | End: 2023-03-16

## 2022-11-16 NOTE — PROGRESS NOTES
HISTORY OF PRESENT ILLNESS:    Nan Palafox is a 72 y.o. female, , No LMP recorded. Patient has had a hysterectomy.,  presents for a routine exam and has no complaints.  Patient's history reviewed and updated.  She is in good spirits today.    Past Medical History:   Diagnosis Date    Arthritis     Cataract     Cervical spondylosis with radiculopathy 2016    GBS (Guillain-[place] syndrome)     History of gastrointestinal stromal tumor (GIST) 2020    Hypertension     Malignant carcinoid tumor of bronchus and lung 2020    Nuclear sclerosis - Both Eyes 2013    PNA (pneumonia)     Primary open angle glaucoma (POAG) of both eyes, moderate stage 2019       Past Surgical History:   Procedure Laterality Date    BREAST BIOPSY Right     excisional bx    BREAST SURGERY Right     Excisional bx    INJECTION OF ANESTHETIC AGENT AROUND MULTIPLE INTERCOSTAL NERVES Right 2021    Procedure: BLOCK, NERVE, INTERCOSTAL, 2 OR MORE;  Surgeon: Ru Hernandez MD;  Location: [facility] OR [address] [place] [address];  Service: Thoracic;  Laterality: Right;    RETINAL DETACHMENT SURGERY      patient states that she had a retinal tear that was repaired in the past at [facility] but she is uncertain of which eye    STOMACH SURGERY      SURGICAL REMOVAL OF LYMPH NODE N/A 2021    Procedure: EXCISION, LYMPH NODE;  Surgeon: Ru Hernandez MD;  Location: [facility] OR [address] [place] [address];  Service: Thoracic;  Laterality: N/A;    TOTAL KNEE ARTHROPLASTY Right     XI ROBOTIC RATS,WITH LOBECTOMY,LUNG Right 2021    Procedure: XI ROBOTIC RATS,WITH LOBECTOMY,LUNG;  Surgeon: Ru Hernandez MD;  Location: [facility] OR [address] [place] [address];  Service: Thoracic;  Laterality: Right;  Right middle lobectomy.       MEDICATIONS AND ALLERGIES:      Current Outpatient Medications:     albuterol (PROVENTIL/VENTOLIN HFA) 90 mcg/actuation inhaler, Inhale 2 puffs into the lungs every 6 (six) hours as needed., Disp: , Rfl:     ASCORBATE CALCIUM (VITAMIN C ORAL),  Take 1 tablet by mouth once daily at 6am., Disp: , Rfl:     aspirin (ECOTRIN) 81 MG EC tablet, Take 81 mg by mouth once daily., Disp: , Rfl:     carvediloL (COREG) 6.25 MG tablet, Take 1 tablet (6.25 mg total) by mouth 2 (two) times daily. For blood pressure control, Disp: 60 tablet, Rfl: 6    dorzolamide-timolol 2-0.5% (COSOPT) 22.3-6.8 mg/mL ophthalmic solution, INSTILL 1 DROP INTO BOTH EYES TWICE DAILY, Disp: 30 mL, Rfl: 3    fluticasone (FLONASE) 50 mcg/actuation nasal spray, 2 sprays by Each Nare route once daily., Disp: 1 Bottle, Rfl: 12    hydrOXYzine pamoate (VISTARIL) 25 MG Cap, Take 50 mg by mouth every 8 (eight) hours as needed., Disp: , Rfl:     LORazepam (ATIVAN) 1 MG tablet, TAKE 1 TABLET BY MOUTH EVERY 8 HOURS AS NEEDED, Disp: 60 tablet, Rfl: 2    multivitamin (THERAGRAN) tablet, Take 1 tablet by mouth once daily., Disp: , Rfl:     rosuvastatin (CRESTOR) 20 MG tablet, Take 1 tablet (20 mg total) by mouth once daily., Disp: 90 tablet, Rfl: 3    valsartan-hydrochlorothiazide (DIOVAN-HCT) 320-25 mg per tablet, Take 1 tablet by mouth once daily., Disp: , Rfl:     vitamin D (VITAMIN D3) 1000 units Tab, Take 1,000 Units by mouth once daily., Disp: , Rfl:     ZINC ORAL, Take by mouth 3 (three) times daily., Disp: , Rfl:     cefdinir (OMNICEF) 300 MG capsule, Take 300 mg by mouth every 12 (twelve) hours., Disp: , Rfl:     latanoprost 0.005 % ophthalmic solution, INSTILL 1 DROP INTO BOTH EYES EVERY EVENING, Disp: 7.5 mL, Rfl: 4    Review of patient's allergies indicates:   Allergen Reactions    Iodine Hives       Family History   Problem Relation Age of Onset    Glaucoma Mother     Cataracts Mother     Blindness Mother     Cancer Mother 68        colon, myeloma    Glaucoma Brother     Cataracts Brother     Cancer Brother         esophageal    No Known Problems Father     No Known Problems Sister     No Known Problems Maternal Aunt     No Known Problems Maternal Uncle     No Known Problems Paternal Aunt     No  Known Problems Paternal Uncle     No Known Problems Maternal Grandmother     No Known Problems Maternal Grandfather     No Known Problems Paternal Grandmother     No Known Problems Paternal Grandfather     Amblyopia Neg Hx     Macular degeneration Neg Hx     Retinal detachment Neg Hx     Strabismus Neg Hx     Diabetes Neg Hx     Hypertension Neg Hx     Stroke Neg Hx     Thyroid disease Neg Hx     Breast cancer Neg Hx     Ovarian cancer Neg Hx        Social History     Socioeconomic History    Marital status:    Tobacco Use    Smoking status: Never    Smokeless tobacco: Never   Substance and Sexual Activity    Alcohol use: Yes     Alcohol/week: 3.0 standard drinks     Types: 3 Glasses of wine per week     Comment: 2-3 glasses per night    Drug use: Never    Sexual activity: Not Currently     Partners: Male       COMPREHENSIVE GYN HISTORY:  PAP History: Denies abnormal Paps.  Infection History: Denies STDs. Denies PID.  Benign History: Denies uterine fibroids. Denies ovarian cysts. Denies endometriosis. Denies other conditions.  Cancer History: Denies cervical cancer. Denies uterine cancer or hyperplasia. Denies ovarian cancer. Denies vulvar cancer or pre-cancer. Denies vaginal cancer or pre-cancer. Denies breast cancer. Denies colon cancer.  Sexual Activity History: Reports currently being sexually active  Menstrual History: Monthly. Mod then light flow.   Dysmenorrhea History: Reports mild dysmenorrhea.       ROS:  GENERAL: No weight changes. No swelling. No fatigue. No fever.  CARDIOVASCULAR: No chest pain. No shortness of breath. No leg cramps.   NEUROLOGICAL: No headaches. No vision changes.  BREASTS: No pain. No lumps. No discharge.  ABDOMEN: No pain. No nausea. No vomiting. No diarrhea. No constipation.  REPRODUCTIVE: No abnormal bleeding.   VULVA: No pain. No lesions. No itching.  VAGINA: No relaxation. No itching. No odor. No discharge. No lesions.  URINARY: No incontinence. No nocturia. No frequency.  No dysuria.    BP (!) 140/80   Wt 76.6 kg (168 lb 14 oz)   BMI 27.67 kg/m²     PE:  APPEARANCE: Well nourished, well developed, in no acute distress.  AFFECT: WNL, alert and oriented x 3.  SKIN: No acne or hirsutism.  NECK: Neck symmetric, without masses or thyromegaly.  NODES: No inguinal, cervical, axillary or femoral lymph node enlargement.  CHEST: Good respiratory effort.   ABDOMEN: Soft. No tenderness or masses. No hepatosplenomegaly. No hernias.  BREASTS: Symmetrical, no skin changes, visible lesions, palpable masses or nipple discharge bilaterally.  PELVIC: External female genitalia without lesions.  Female hair distribution. Adequate perineal body, Normal urethral meatus. Vagina moist and well rugated without lesions or discharge.  No significant cystocele or rectocele present. Cervix pink without lesions, discharge or tenderness. Uterus is 4-6 week size, regular, mobile and nontender. Adnexa without masses or tenderness.  EXTREMITIES: No edema    DIAGNOSIS:  1. Encounter for gynecological examination without abnormal finding    2. History of Guillain-[place] syndrome    3. Primary hypertension    4. GIST (gastrointestinal stromal tumor), non-malignant    5. Malignant carcinoid tumor of bronchus and lung      PLAN:      COUNSELING:  The patient was counseled today on:  -A.C.S. Pap and pelvic exam guidelines (pap every 3 years), recomendations for yearly mammogram;  -to follow up with her PCP for other health maintenance.    FOLLOW-UP with me annually.

## 2022-11-18 LAB — CRC RECOMMENDATION EXT: NORMAL

## 2022-11-23 ENCOUNTER — PATIENT OUTREACH (OUTPATIENT)
Dept: ADMINISTRATIVE | Facility: HOSPITAL | Age: 72
End: 2022-11-23
Payer: COMMERCIAL

## 2022-11-29 ENCOUNTER — OFFICE VISIT (OUTPATIENT)
Dept: OPHTHALMOLOGY | Facility: CLINIC | Age: 72
End: 2022-11-29
Payer: MEDICARE

## 2022-11-29 DIAGNOSIS — H25.13 NUCLEAR SCLEROSIS OF BOTH EYES: ICD-10-CM

## 2022-11-29 DIAGNOSIS — H40.1132 PRIMARY OPEN ANGLE GLAUCOMA (POAG) OF BOTH EYES, MODERATE STAGE: Primary | ICD-10-CM

## 2022-11-29 PROCEDURE — 99999 PR PBB SHADOW E&M-EST. PATIENT-LVL III: ICD-10-PCS | Mod: PBBFAC,,, | Performed by: OPHTHALMOLOGY

## 2022-11-29 PROCEDURE — 99213 OFFICE O/P EST LOW 20 MIN: CPT | Mod: PBBFAC | Performed by: OPHTHALMOLOGY

## 2022-11-29 PROCEDURE — 99214 PR OFFICE/OUTPT VISIT, EST, LEVL IV, 30-39 MIN: ICD-10-PCS | Mod: S$PBB,,, | Performed by: OPHTHALMOLOGY

## 2022-11-29 PROCEDURE — 99999 PR PBB SHADOW E&M-EST. PATIENT-LVL III: CPT | Mod: PBBFAC,,, | Performed by: OPHTHALMOLOGY

## 2022-11-29 PROCEDURE — 99214 OFFICE O/P EST MOD 30 MIN: CPT | Mod: S$PBB,,, | Performed by: OPHTHALMOLOGY

## 2022-11-30 NOTE — PROGRESS NOTES
Assessment /Plan     For exam results, see Encounter Report.    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 [employer]    ==>   2022 AD // [place] --> BD 2022  Grieving    Grieving best friends x 2 loss / COVID May 2020  Difficult    2021 --> discussed  ==> Carcinoid Tumor Lung  ==> COVID Vaccine  ==> Guillain-[place] Syndrome  --> rehab      --> Reports No Glc drops during @ 7 week hospitalization    OHT  POAG OD > OS  Presented 2019  40 // 24    + FMHx Mother ( 94 yo) & Brother  Denies Blindness    CCT  568 // 567    < 21 --> Achieved & discussed    Both eyes  --> better adherence --> CSM  Xal q day  Cosopt BID    SP SLT OS 2020  --> consider SLT OD as discussed      NSC OU  CE PRN  MRx +275      Hx Retinal Tear OD  RD precautions:  Discussed symptoms of RD with increased flashes, floaters, decreasing vision.  Patient/Family to call and return immediately to clinic should the symptoms of RD occur. Voiced good understanding with Q & A.      Plan  RTC 6 months gonio, IOP & HVF & OCT RNFL &  Adherence  RTC sooner prn with good understanding

## 2023-02-03 NOTE — PROGRESS NOTES
"Subjective:       Patient ID: Nan Palafox is a 73 y.o. female.    Chief Complaint: Follow-up    Diagnosis:  Typical carcinoid     Pre-operative therapy: none    Procedure(s) and date(s): 1/4/21: right robotic assisted middle lobectomy with MLND    Pathology: 2.0 cm well differentiated typical carcinoid, no VPI, no LVI, no margins, level 2,4,7,10,11 = negative, pT1bN0     Post-operative therapy: surveillance     HPI   73 y.o. female never smoker here today for 2 year follow-up from right robotic middle lobectomy. Uncomplicated post-operative course. Diagnosed with Guillain-[place] Syndrome after Pfizer vaccine in February 2021. Covid + Jan 2022. CT shortly after with scattered inflammatory opacities. Denies fever, chills, wheeze, cough, CP, palpitations, claudication,  N/V or changes in bowel and bladder functioning.       Review of Systems   Constitutional:  Negative for activity change, fatigue and fever.   Eyes:  Negative for visual disturbance.   Respiratory:  Positive for cough. Negative for chest tightness and wheezing.    Cardiovascular:  Negative for chest pain, palpitations and leg swelling.   Gastrointestinal: Negative.    Genitourinary:  Negative for difficulty urinating.   Musculoskeletal:  Positive for gait problem. Negative for arthralgias and myalgias.   Neurological:  Positive for coordination difficulties and coordination difficulties. Negative for dizziness, facial asymmetry and speech difficulty.        Residual lower extremity weakness resulting in some difficulty with coordination and ambulation   Psychiatric/Behavioral:  Negative for confusion. The patient is not nervous/anxious.        Objective:       Vitals:    02/08/23 0843   BP: 132/80   Pulse: (!) 56   Weight: 76.2 kg (167 lb 15.9 oz)   Height: 5' 5" (1.651 m)   PainSc: 0-No pain       Physical Exam  Constitutional:       Appearance: Normal appearance.   Eyes:      General: No scleral icterus.  Cardiovascular:      Rate and Rhythm: " Normal rate and regular rhythm.      Pulses: Normal pulses.   Pulmonary:      Effort: Pulmonary effort is normal.      Breath sounds: Normal breath sounds. No wheezing or rhonchi.   Chest:      Chest wall: No tenderness.   Abdominal:      General: Abdomen is flat.      Palpations: Abdomen is soft.   Musculoskeletal:      Right lower leg: No edema.      Left lower leg: No edema.   Neurological:      General: No focal deficit present.      Mental Status: She is alert and oriented to person, place, and time.      Gait: Gait normal.   Psychiatric:         Mood and Affect: Mood normal.       Chest CT 7/16/21:  1.  Stable postsurgical changes status post right middle lobectomy and mediastinal lymph node dissection.  2.   Interval development of focal consolidation within the right posterior lung base, which may represent atelectasis or infectious process, though malignancy cannot be excluded in this patient with a history of right middle lobe carcinoid tumor.  3.  Bilateral thyroid nodules.  4.  Hepatic cysts.    Chest CT 2/2/22:   1.  New left lower lobe ground-glass nodular opacities, favored to represent infectious/inflammatory etiology.  However, short-term follow-up evaluation is highly recommended.  2.  Persistent, stable appearance of focal opacity of the right posterior base.  It may merely represent focal atelectasis.  Consider follow-up evaluation to include intravenous contrast.  3.  Other findings as above without change.     Chest CT 8/10/22:  I reviewed the images and compared to the previous chest CT  1.  Interval resolution of ground-glass opacities within the left lower lobe.  Favored to represent resolved infectious or inflammatory etiology.  2.  Stable appearance of focal opacity in the right posterior base.  Imaging appearance may favor round atelectasis.    Chest CT 02/08/23: I reviewed the images and compared to the 8/10/22 CT  Stable RLL basilar opacity, likely postsurgical scar vs persistent  atelectasis  Mild PA trunk dilation  Assessment:       70 y.o. female here today for 2 year follow-up from right robotic middle lobectomy, pathology pT1bN0 typical carcinoid. Pathology reviewed with patient.   MAJOR  Residual effects from Guillain-[place] manifested by reduced LE mobility  Stable chest CT findings  Persistent PA trunk dilation    Plan:       I will speak with cardiac surgery about Pulmonary trunk finding.  RTC in 6 months with chest CT.

## 2023-02-08 ENCOUNTER — HOSPITAL ENCOUNTER (OUTPATIENT)
Dept: RADIOLOGY | Facility: HOSPITAL | Age: 73
Discharge: HOME | End: 2023-02-08
Attending: THORACIC SURGERY (CARDIOTHORACIC VASCULAR SURGERY)
Payer: MEDICARE

## 2023-02-08 ENCOUNTER — OFFICE VISIT (OUTPATIENT)
Dept: CARDIOTHORACIC SURGERY | Facility: CLINIC | Age: 73
End: 2023-02-08
Payer: MEDICARE

## 2023-02-08 VITALS
BODY MASS INDEX: 27.99 KG/M2 | HEIGHT: 65 IN | HEART RATE: 56 BPM | SYSTOLIC BLOOD PRESSURE: 132 MMHG | DIASTOLIC BLOOD PRESSURE: 80 MMHG | WEIGHT: 168 LBS

## 2023-02-08 DIAGNOSIS — C7A.090 CARCINOID BRONCHIAL ADENOMA OF RIGHT LUNG: Primary | ICD-10-CM

## 2023-02-08 DIAGNOSIS — C7A.090 CARCINOID BRONCHIAL ADENOMA OF RIGHT LUNG: ICD-10-CM

## 2023-02-08 PROCEDURE — 99999 PR PBB SHADOW E&M-EST. PATIENT-LVL IV: CPT | Mod: PBBFAC,,, | Performed by: THORACIC SURGERY (CARDIOTHORACIC VASCULAR SURGERY)

## 2023-02-08 PROCEDURE — 71250 CT THORAX DX C-: CPT | Mod: TC

## 2023-02-08 PROCEDURE — 99213 PR OFFICE/OUTPT VISIT, EST, LEVL III, 20-29 MIN: ICD-10-PCS | Mod: S$PBB,,, | Performed by: THORACIC SURGERY (CARDIOTHORACIC VASCULAR SURGERY)

## 2023-02-08 PROCEDURE — 99214 OFFICE O/P EST MOD 30 MIN: CPT | Mod: PBBFAC,25 | Performed by: THORACIC SURGERY (CARDIOTHORACIC VASCULAR SURGERY)

## 2023-02-08 PROCEDURE — 99999 PR PBB SHADOW E&M-EST. PATIENT-LVL IV: ICD-10-PCS | Mod: PBBFAC,,, | Performed by: THORACIC SURGERY (CARDIOTHORACIC VASCULAR SURGERY)

## 2023-02-08 PROCEDURE — 71250 CT THORAX DX C-: CPT | Mod: 26,,, | Performed by: RADIOLOGY

## 2023-02-08 PROCEDURE — 99213 OFFICE O/P EST LOW 20 MIN: CPT | Mod: S$PBB,,, | Performed by: THORACIC SURGERY (CARDIOTHORACIC VASCULAR SURGERY)

## 2023-02-08 PROCEDURE — 71250 CT CHEST WITHOUT CONTRAST: ICD-10-PCS | Mod: 26,,, | Performed by: RADIOLOGY

## 2023-02-08 RX ORDER — AMLODIPINE BESYLATE 10 MG/1
10 TABLET ORAL
COMMUNITY
Start: 2022-12-23

## 2023-03-08 ENCOUNTER — NURSE TRIAGE (OUTPATIENT)
Dept: ADMINISTRATIVE | Facility: CLINIC | Age: 73
End: 2023-03-08
Payer: MEDICARE

## 2023-03-08 ENCOUNTER — OFFICE VISIT (OUTPATIENT)
Dept: OPHTHALMOLOGY | Facility: CLINIC | Age: 73
End: 2023-03-08
Payer: MEDICARE

## 2023-03-08 DIAGNOSIS — H04.123 DRY EYE SYNDROME OF BOTH EYES: ICD-10-CM

## 2023-03-08 DIAGNOSIS — H11.31 SUBCONJUNCTIVAL HEMORRHAGE, NON-TRAUMATIC, RIGHT: Primary | ICD-10-CM

## 2023-03-08 DIAGNOSIS — H40.1132 PRIMARY OPEN ANGLE GLAUCOMA (POAG) OF BOTH EYES, MODERATE STAGE: ICD-10-CM

## 2023-03-08 DIAGNOSIS — H40.053 BILATERAL OCULAR HYPERTENSION: ICD-10-CM

## 2023-03-08 DIAGNOSIS — H25.13 NUCLEAR SCLEROSIS OF BOTH EYES: ICD-10-CM

## 2023-03-08 PROCEDURE — 99214 OFFICE O/P EST MOD 30 MIN: CPT | Mod: S$PBB,,, | Performed by: OPHTHALMOLOGY

## 2023-03-08 PROCEDURE — 99214 PR OFFICE/OUTPT VISIT, EST, LEVL IV, 30-39 MIN: ICD-10-PCS | Mod: S$PBB,,, | Performed by: OPHTHALMOLOGY

## 2023-03-08 NOTE — PROGRESS NOTES
Assessment /Plan     For exam results, see Encounter Report.    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 [employer]  --> Now honored at [employer] --> Internship with [employer]  Graduating May 2023    ==>   2022 AD // [place] --> BD 2022  Grieving    Grieving best friends x 2 loss  COVID May 2020  Difficult    2021 --> discussed  ==> Carcinoid Tumor Lung  ==> COVID Vaccine  ==> Guillain-[place] Syndrome  --> rehab      --> Reports No Glc drops during @ 7 week hospitalization      2023  Atraumatic DAVION OD  Reports No Changes in Meds  PF ATs  Reassurance    OHT  POAG OD > OS  Presented 2019  40 // 24    + FMHx Mother ( 94 yo) & Brother  Denies Blindness    CCT  568 // 567    < 21 --> Achieved & discussed    Both eyes  --> better adherence --> CSM  Xal q day  Cosopt BID    SP SLT OS 2020  --> consider SLT OD as discussed      NSC OU  CE PRN  MRx +275    Dry Eye Syndrome: discussed use of warm compresses, preserved & non-preserved artificial tears, gel and PM ointment options.  Also discussed options utilizing medications.        Hx Retinal Tear OD  RD precautions:  Discussed symptoms of RD with increased flashes, floaters, decreasing vision.  Patient/Family to call and return immediately to clinic should the symptoms of RD occur. Voiced good understanding with Q & A.      Plan  Keep appt as planned:  RTC 6 months gonio, IOP & HVF & OCT RNFL &  Adherence  RTC sooner prn with good understanding

## 2023-03-08 NOTE — TELEPHONE ENCOUNTER
Transferred from patient access. Per rep unable to make appointment for patient for today. States woke up with severe eye pain, started a little last night. Denies any interaction with a foreign substance. No fever, no drainage. Patient very concerned, only wants to see Dr. Hathaway. Triage done- dispo ED. Declines, states she is having someone drive her to hospital. Verb understanding.   Reason for Disposition   SEVERE eye pain    Additional Information   Negative: Chemical got in the eye   Negative: Piece of something got in the eye   Negative: Eye redness followed an eye injury   Negative: Yellow or green pus in the eyes   Negative: [1] Eyelid is swollen AND [2] no redness of white of eye (sclera)    Protocols used: Eye - Red Without Pus-A-AH

## 2023-03-16 ENCOUNTER — OFFICE VISIT (OUTPATIENT)
Dept: INTERNAL MEDICINE | Facility: CLINIC | Age: 73
End: 2023-03-16
Payer: COMMERCIAL

## 2023-03-16 VITALS
BODY MASS INDEX: 28.79 KG/M2 | HEIGHT: 65 IN | WEIGHT: 172.81 LBS | HEART RATE: 70 BPM | DIASTOLIC BLOOD PRESSURE: 78 MMHG | OXYGEN SATURATION: 99 % | TEMPERATURE: 99 F | SYSTOLIC BLOOD PRESSURE: 105 MMHG | RESPIRATION RATE: 18 BRPM

## 2023-03-16 DIAGNOSIS — R29.898 WEAKNESS OF BOTH LOWER EXTREMITIES: ICD-10-CM

## 2023-03-16 DIAGNOSIS — Z85.09 HISTORY OF GASTROINTESTINAL STROMAL TUMOR (GIST): ICD-10-CM

## 2023-03-16 DIAGNOSIS — R20.2 PARESTHESIAS: ICD-10-CM

## 2023-03-16 DIAGNOSIS — G82.20 PARAPARESIS: ICD-10-CM

## 2023-03-16 DIAGNOSIS — I70.0 ATHEROSCLEROSIS OF AORTA: ICD-10-CM

## 2023-03-16 DIAGNOSIS — R79.9 ABNORMAL FINDING OF BLOOD CHEMISTRY, UNSPECIFIED: ICD-10-CM

## 2023-03-16 DIAGNOSIS — C7A.090 CARCINOID BRONCHIAL ADENOMA OF RIGHT LUNG: ICD-10-CM

## 2023-03-16 DIAGNOSIS — R29.898 RIGHT HAND WEAKNESS: ICD-10-CM

## 2023-03-16 DIAGNOSIS — I10 PRIMARY HYPERTENSION: Primary | ICD-10-CM

## 2023-03-16 DIAGNOSIS — Z86.69 HISTORY OF GUILLAIN-BARRE SYNDROME: ICD-10-CM

## 2023-03-16 PROCEDURE — 99999 PR PBB SHADOW E&M-EST. PATIENT-LVL V: CPT | Mod: PBBFAC,,, | Performed by: INTERNAL MEDICINE

## 2023-03-16 PROCEDURE — 99214 OFFICE O/P EST MOD 30 MIN: CPT | Mod: S$GLB,,, | Performed by: INTERNAL MEDICINE

## 2023-03-16 PROCEDURE — 99215 OFFICE O/P EST HI 40 MIN: CPT | Mod: PBBFAC,PO | Performed by: INTERNAL MEDICINE

## 2023-03-16 PROCEDURE — 99999 PR PBB SHADOW E&M-EST. PATIENT-LVL V: ICD-10-PCS | Mod: PBBFAC,,, | Performed by: INTERNAL MEDICINE

## 2023-03-16 PROCEDURE — 99214 PR OFFICE/OUTPT VISIT, EST, LEVL IV, 30-39 MIN: ICD-10-PCS | Mod: S$GLB,,, | Performed by: INTERNAL MEDICINE

## 2023-03-16 RX ORDER — LORAZEPAM 1 MG/1
1 TABLET ORAL EVERY 8 HOURS PRN
Qty: 60 TABLET | Refills: 2 | Status: SHIPPED | OUTPATIENT
Start: 2023-03-16 | End: 2023-07-24

## 2023-03-31 ENCOUNTER — PES CALL (OUTPATIENT)
Dept: ADMINISTRATIVE | Facility: CLINIC | Age: 73
End: 2023-03-31
Payer: MEDICARE

## 2023-04-12 NOTE — PROGRESS NOTES
Subjective:       Patient ID: Nan Palafox is a 73 y.o. female.    Chief Complaint: Follow-up, Extremity Weakness (Right arm and leg. Right side weakness sometimes ).  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  She has been tolerating her blood pressure medication well without side effects.  She does not regularly monitor blood pressure.    The patient still notes bilateral lower extremity weakness.  She also notes tingling in both feet.  She has noted episodic weakness involving the right hand.  She describes loss of control of the hand while writing.  She does not have a tremor.  She has been able to drive without difficulty.  She states she has been trying to walk without using any assistance devices.  There is no bowel or bladder sphincter dysfunction.  We discussed obtaining follow-up with neurology regarding her continued lower extremity weakness as well as right hip hand weakness.  These may be sequelae of her Guillain-[place] syndrome.    Other significant medical conditions include intermittent anxiety, cervical spondylosis, anemia of chronic disease, history of GIST several years ago, GERD, coronary artery disease.    Review of Systems   Constitutional:  Negative for chills, fever and unexpected weight change.   Musculoskeletal:  Positive for gait problem.   Neurological:  Positive for weakness and numbness. Negative for dizziness, tremors, speech difficulty, headaches and memory loss.          Physical Exam  Vitals and nursing note reviewed.   Constitutional:       General: She is not in acute distress.     Appearance: Normal appearance. She is well-developed.      Comments: The patient has gained 5 lb since 10/11/22.   HENT:      Head: Normocephalic and atraumatic.   Eyes:       General: No scleral icterus.     Extraocular Movements: Extraocular movements intact.      Conjunctiva/sclera: Conjunctivae normal.   Neck:      Thyroid: No thyromegaly.      Vascular: No JVD.   Cardiovascular:      Rate and Rhythm: Normal rate and regular rhythm.      Heart sounds: Normal heart sounds. No murmur heard.    No friction rub. No gallop.   Pulmonary:      Effort: Pulmonary effort is normal. No respiratory distress.      Breath sounds: Normal breath sounds. No wheezing or rales.   Abdominal:      General: Bowel sounds are normal.      Palpations: Abdomen is soft. There is no mass.      Tenderness: There is no abdominal tenderness.   Musculoskeletal:         General: No tenderness. Normal range of motion.      Cervical back: Normal range of motion and neck supple.      Right lower leg: No edema.      Left lower leg: No edema.   Lymphadenopathy:      Cervical: No cervical adenopathy.   Skin:     General: Skin is warm and dry.      Findings: No rash.   Neurological:      Mental Status: She is alert and oriented to person, place, and time.   Psychiatric:         Mood and Affect: Mood normal.         Behavior: Behavior normal.             Assessment & Plan:      Nan was seen today for follow-up.  An EMG of the right upper extremity and both lower extremities will be obtained.  Neurology consultation will be obtained for follow-up of extremity weakness.    Fasting blood tests will be obtained in 4 months.  Current therapy will be continued for treatment of hypertension.  Patient will follow-up with her pulmonary specialist and thoracic surgery specialists as recommended.      Diagnoses and all orders for this visit:    Primary hypertension  -     Comprehensive Metabolic Panel; Future  -     CBC Auto Differential; Future  -     TSH; Future  -     Hemoglobin A1C; Future    History of Guillain-[place] syndrome  -     EMG W/ ULTRASOUND AND NERVE CONDUCTION TEST 3 Extremities; Future  -     Ambulatory  referral/consult to Neurology; Future  -     Comprehensive Metabolic Panel; Future  -     CBC Auto Differential; Future  -     TSH; Future  -     Hemoglobin A1C; Future    Weakness of both lower extremities  -     EMG W/ ULTRASOUND AND NERVE CONDUCTION TEST 3 Extremities; Future  -     Ambulatory referral/consult to Neurology; Future  -     Comprehensive Metabolic Panel; Future  -     CBC Auto Differential; Future  -     TSH; Future  -     Hemoglobin A1C; Future    Atherosclerosis of aorta    Paresthesias    Carcinoid bronchial adenoma of right lung    Paraparesis    Right hand weakness  -     EMG W/ ULTRASOUND AND NERVE CONDUCTION TEST 3 Extremities; Future  -     Ambulatory referral/consult to Neurology; Future    Abnormal finding of blood chemistry, unspecified  -     Hemoglobin A1C; Future    History of gastrointestinal stromal tumor (GIST)    Other orders  -     LORazepam (ATIVAN) 1 MG tablet; Take 1 tablet (1 mg total) by mouth every 8 (eight) hours as needed.         Follow up in about 4 months (around 7/16/2023).     Glenroy Jimenez MD

## 2023-04-14 ENCOUNTER — OFFICE VISIT (OUTPATIENT)
Dept: NEUROLOGY | Facility: CLINIC | Age: 73
End: 2023-04-14
Payer: MEDICARE

## 2023-04-14 VITALS
DIASTOLIC BLOOD PRESSURE: 75 MMHG | WEIGHT: 170 LBS | SYSTOLIC BLOOD PRESSURE: 119 MMHG | HEIGHT: 65 IN | BODY MASS INDEX: 28.32 KG/M2 | HEART RATE: 70 BPM

## 2023-04-14 DIAGNOSIS — R29.898 WEAKNESS OF BOTH LOWER EXTREMITIES: ICD-10-CM

## 2023-04-14 DIAGNOSIS — R20.8 VIBRATION SENSORY LOSS: Primary | ICD-10-CM

## 2023-04-14 DIAGNOSIS — Z86.69 HISTORY OF GUILLAIN-BARRE SYNDROME: ICD-10-CM

## 2023-04-14 DIAGNOSIS — R29.898 RIGHT HAND WEAKNESS: ICD-10-CM

## 2023-04-14 PROCEDURE — 99215 PR OFFICE/OUTPT VISIT, EST, LEVL V, 40-54 MIN: ICD-10-PCS | Mod: S$PBB,,, | Performed by: STUDENT IN AN ORGANIZED HEALTH CARE EDUCATION/TRAINING PROGRAM

## 2023-04-14 PROCEDURE — 99999 PR PBB SHADOW E&M-EST. PATIENT-LVL V: ICD-10-PCS | Mod: PBBFAC,,, | Performed by: STUDENT IN AN ORGANIZED HEALTH CARE EDUCATION/TRAINING PROGRAM

## 2023-04-14 PROCEDURE — 99215 OFFICE O/P EST HI 40 MIN: CPT | Mod: S$PBB,,, | Performed by: STUDENT IN AN ORGANIZED HEALTH CARE EDUCATION/TRAINING PROGRAM

## 2023-04-14 PROCEDURE — 99999 PR PBB SHADOW E&M-EST. PATIENT-LVL V: CPT | Mod: PBBFAC,,, | Performed by: STUDENT IN AN ORGANIZED HEALTH CARE EDUCATION/TRAINING PROGRAM

## 2023-04-14 PROCEDURE — 99215 OFFICE O/P EST HI 40 MIN: CPT | Mod: PBBFAC | Performed by: STUDENT IN AN ORGANIZED HEALTH CARE EDUCATION/TRAINING PROGRAM

## 2023-04-14 NOTE — PROGRESS NOTES
"        Patient ID: [id number]  Referring Physician: Glenroy Jimenez MD    Chief Complaint/Reason for Consult: establishing care for GBS     Subjective:     HPI  Nan Palafox is a very pleasant 73 y.o. 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 she is presenting today as a new patient to me to establish care after Dr. Prado left (last seen 9/29/2022).    Today, she reports no new neurological symptoms, has some paresthesias. Balance is improved compared to before, not using a cane. Hasn't had any recent falls. PT has helped a lot but she has completed her course and will need new orders. Has a group at [employer], feels depressed sometimes but still enjoys the activities with her [employer] group.     Per dr. Prado's note:  "Initial HPI: 7/1/2021  She is now using cane. January 2021 she underwent surgery thoracic surgery for removal of a mass. A few days later she got her first Pfizer vaccine. She noted tingling and numbenss. She could not move her legs. 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 She was treated with IVIG. The tingling has resolved but every once in a while she feels a heaviness in her toes. She had difficulty raisining her hands. She has noted that the right upper extremity continues to remain weak.     In 2020 she underwent a lung biospy. She states post biopsy she had change in speech, right upper extremity weakness. She was admitted at [facility] and was told she did not have a stroke but she was started on Keppra given the possiblity of a ? Seizure. She has stopped taking the Keppra for 6 months now and not had another event. Stopped the Keppra herself because she was not sure as to why she was taking it.     Review of Systems   Constitutional:  Negative for unexpected weight change.   Respiratory:  Negative for shortness of breath.    Musculoskeletal:  " Positive for gait problem.   Neurological:  Positive for numbness. Negative for tremors, coordination difficulties and coordination difficulties.   Psychiatric/Behavioral:  Positive for dysphoric mood. Negative for confusion, self-injury and suicidal ideas.    All other systems reviewed and are negative.    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 Lives alone,  is .     Medications:  Current Outpatient Medications   Medication Instructions    amLODIPine (NORVASC) 10 mg, Oral    ASCORBATE CALCIUM (VITAMIN C ORAL) 1 tablet, Oral, Daily    aspirin (ECOTRIN) 81 mg, Daily    carvediloL (COREG) 6.25 mg, Oral, 2 times daily, For blood pressure control    dorzolamide-timolol 2-0.5% (COSOPT) 22.3-6.8 mg/mL ophthalmic solution INSTILL 1 DROP INTO BOTH EYES TWICE DAILY    fluticasone (FLONASE) 50 mcg/actuation nasal spray 2 sprays, Each Nostril, Daily    hydrOXYzine pamoate (VISTARIL) 50 mg, Oral, Every 8 hours PRN    latanoprost 0.005 % ophthalmic solution INSTILL 1 DROP INTO BOTH EYES EVERY EVENING    LORazepam (ATIVAN) 1 mg, Oral, Every 8 hours PRN    multivitamin (THERAGRAN) tablet 1 tablet, Oral, Daily    rosuvastatin (CRESTOR) 20 mg, Oral, Daily    valsartan-hydrochlorothiazide (DIOVAN-HCT) 320-25 mg per tablet 1  tablet, Oral, Daily    vitamin D (VITAMIN D3) 1,000 Units, Oral, Daily    ZINC ORAL Oral, 3 times daily      Objective:     Vitals:    04/14/23 0928   BP: 119/75   Pulse: 70        General:  Well-appearing, well-nourished, NAD, cooperative    Neurologic Exam:   Awake, alert and oriented x3  Speech spontaneous and fluent, intact comprehension.   Adequate fund of knowledge, vocabulary.    CN II - CN XII:  PERRLA. EOM intact. No Nystagmus. No ophthalmoplegia.   Facial sensation is normal to light touch.   Facial expression is full and symmetric.   Hearing is intact bilaterally.   Palate elevates symmetrically.   SCM and Trapezius full strength bilaterally.   Tongue is midline.     Motor:  Normal bulk and tone in all four limbs.   There are no atrophy or fasciculations. No tremor.     Shoulder  Abd Shoulder Add Elbow   Flex Elbow  Ext Wrist   Flex Wrist  Ext Finger  Flex Finger  Ext Finger  Abd Finger   Add IO Opposition   Right 5 5 5 5       5    Left 5 5 5 5       5       Hip  Flex Hip  Ext Thigh   Abd Thigh  Add Knee  Flex Knee  Ext Plantar  Flex Dorsiflex   Right 5 5   5 5 5 5   Left 5 5   5 5 5 5     Sensory:  Light touch: intact throughout.  Vibration: decreased distally (BLE>>BUE)  Proprioception: intact figure writing. position intact in BUE  Romberg is impaired with swaying but no fall.    DTRs:   Biceps Brachioradialis Triceps Marci Patellar Ankle Plantar   Right 2+ 2+   2+     Left 2+ 2+   2+       Coordination:  Finger to nose is normal bilaterally.  Normal fine finger movements and rapid alternating movements.    Gait:  Wide based gait, impaired tandem.      Pertinent lab results    9/6/2022  CMP nl  TSH nl  A1C 5.5    Pertinent imaging results    *Images personally reviewed and interpreted:    10/22/2021  MRI Cervical Spine w/wo contrast:  Multilevel degenerative changes, most advanced at C7-T1 where there is a disc osteophyte complex and bilateral facet arthropathy contributing to moderate bilateral  foraminal stenosis.  Progressive endplate edema and enhancement at C7-T1, favored to be degenerative.  No new marrow replacing lesions    Other pertinent studies  None    Assessment:   Nan Palafox is a 73 y.o. right-handed female with multiple comorbidities including vascular risk factors and history of lung cancer and GBS.  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  Neurological exam today is notable for a vibratory loss in all extremities and a wide-based gait which is expected with her history. I will send serum workup for other metabolic etiologies that could contribute to this picture. I agree with obtaining an updated EMG-NCS study, this was ordered by Dr. Jimenez however not scheduled yet, we will assist with scheduling.  I will send new physical therapy orders since it has been very helpful in the past.  Also advised to stay away from daily drinking since it could contribute to the peripheral neuropathy. Additionally we discussed about mental health and that it is reassuring that she is still enjoying her hobbies and she has a good social support [place].  I advised her to reach out to me or her primary care provider should she need further assistance.  I provided the link to GBS Foundation website for additional support groups.     1. History of Guillain-[place] syndrome    2. Weakness of both lower extremities    3. Right hand weakness       Plan:     Labs:  B12, copper, SPEP/JANET, zinc, vitamin-E  Procedures: We will assist with scheduling EMG-NCS that was ordered by Dr. Jimenez  Referrals: outpatient PT, print orders provided to take to [facility]  Other recommendations:    Advised to avoid daily drinking since it could contribute to sensory disturbances in lower extremities  Provided online resources to reach out GBS foundation  Follow up: RTC in 6 months     Plan was discussed in detail  with the patient, who is in agreement.    Time spent on this encounter: 41 minutes. This includes face to face time (obtaining history, documenting clinical information in the EMR, physical exam, discussing the plan with patient) and non-face to face time (such as preparing to see the patient (ie. Chart review, reviewing and interpreting previous labs and imaging), further EMR documentation, ordering tests, independently interpreting results and communicating results to the patient/family/caregiver, or care coordinator).          Yazmin Rincon MD    [facility]  04/14/2023

## 2023-05-01 ENCOUNTER — TELEPHONE (OUTPATIENT)
Dept: NEUROLOGY | Facility: CLINIC | Age: 73
End: 2023-05-01
Payer: MEDICARE

## 2023-05-02 ENCOUNTER — PROCEDURE VISIT (OUTPATIENT)
Dept: NEUROLOGY | Facility: CLINIC | Age: 73
End: 2023-05-02
Payer: MEDICARE

## 2023-05-02 DIAGNOSIS — R29.898 WEAKNESS OF BOTH LOWER EXTREMITIES: ICD-10-CM

## 2023-05-02 DIAGNOSIS — R29.898 RIGHT HAND WEAKNESS: ICD-10-CM

## 2023-05-02 DIAGNOSIS — Z86.69 HISTORY OF GUILLAIN-BARRE SYNDROME: ICD-10-CM

## 2023-05-02 PROCEDURE — 95886 MUSC TEST DONE W/N TEST COMP: CPT | Mod: 26,S$PBB,, | Performed by: PHYSICAL MEDICINE & REHABILITATION

## 2023-05-02 PROCEDURE — 95913 NRV CNDJ TEST 13/> STUDIES: CPT | Mod: PBBFAC | Performed by: PHYSICAL MEDICINE & REHABILITATION

## 2023-05-02 PROCEDURE — 95886 MUSC TEST DONE W/N TEST COMP: CPT | Mod: PBBFAC | Performed by: PHYSICAL MEDICINE & REHABILITATION

## 2023-05-02 PROCEDURE — 95913 PR NERVE CONDUCTION STUDY; 13 OR MORE STUDIES: ICD-10-PCS | Mod: 26,S$PBB,, | Performed by: PHYSICAL MEDICINE & REHABILITATION

## 2023-05-02 PROCEDURE — 95886 PR EMG COMPLETE, W/ NERVE CONDUCTION STUDIES, 5+ MUSCLES: ICD-10-PCS | Mod: 26,S$PBB,, | Performed by: PHYSICAL MEDICINE & REHABILITATION

## 2023-05-02 PROCEDURE — 95913 NRV CNDJ TEST 13/> STUDIES: CPT | Mod: 26,S$PBB,, | Performed by: PHYSICAL MEDICINE & REHABILITATION

## 2023-05-02 NOTE — PROCEDURES
Test Date:  2023    Patient: Nan Palafox : 1950 Physician: Loc Chairez D.O.   ID#: [id number] SEX: Female Ref. Phys: Glenroy Jimenez MD     HPI: Nan Palafox is a 73 y.o.female who presents for NCS/EMG to evaluate for bilateral leg and right upper extremity paresthesias and weakness.  Pt with history of GBS in , required 3 weeks of hospitalization and 4 weeks of IRF level rehab.  Symptoms have not worsened, but have remained.  She does have neck and low back pain but no radicular symptoms.      NCV & EMG Findings:  Evaluation of the left median sensory and the right median sensory nerves showed prolonged distal peak latency and decreased conduction velocity.  All remaining nerves (as indicated in the following tables) were within normal limits.  Needle evaluation of the right Dorsal Interossei ped I muscle showed increased insertional activity, moderately increased spontaneous activity, increased motor unit amplitude, and diminished recruitment.  The right First Dorsal Interosseous and the right Abductor Pollicis Brevis muscles showed increased motor unit amplitude and increased motor unit duration.  All remaining muscles (as indicated in the following table) showed no evidence of electrical instability.    Impression:  There is electrophysiologic evidence of a bilateral sensory median mononeuropathy across the wrist (I.e. Carpal tunnel syndrome).  There is no motor axonal loss.  There is no active denervation.  This is graded as Mild in severity bilaterally.    There are chronic neuropathic changes isolated to the right hand muscles (first dorsal interosseus and abductor pollicis brevis).  This is suggestive of a chronic right C8/T1 radiculopathy, but not definitively so.  She does have neck pain though no radicular symptoms.  She has an MRI of the C spine from  which showed moderate foraminal stenosis at this level, R>L.  Despite the somewhat weak findings on today's study, in  the context of her clinical history and MRI findings, I suspect that these changes on needle EMG do represent a chronic right C8/T1 radiculopathy.          The right first dorsal interosseus pedis muscle showed signs of active denervation.  In isolation to one muscle (especially in the foot), this is of limited diagnostic significance.  No electrophysiologic evidence of a peripheral polyneuropathy.      ___________________________  Loc Chairez D.O.        NCS+  Motor Nerve Results      Latency Amplitude F-Lat Segment Distance CV Comment   Site (ms) Norm (mV) Norm (ms)  (cm) (m/s) Norm    Left Median (APB)   Wrist 4.1  < 4.4 5.6  > 3.8  Wrist-Palm - - -    Elbow 8.6 - 4.2 -  Elbow-Wrist 25 56  > 51    Right Median (APB)   Wrist 4.2  < 4.4 8.0  > 3.8 26.3 Wrist-Palm - - -    Elbow 8.4 - 8.2 -  Elbow-Wrist 23 55  > 51    Left Ulnar (ADM)   Wrist 3.5  < 3.7 8.0  > 3.0         Bel Elbow 7.5 - 6.6 -  Bel Elbow-Wrist 26 65  > 52    Abv Elbow 9.1 - 6.3 -  Abv Elbow-Bel Elbow 10 63  > 43    Right Ulnar (ADM)   Wrist 3.2  < 3.7 8.9  > 3.0 29.2        Bel Elbow 7.1 - 7.3 -  Bel Elbow-Wrist 26 67  > 52    Abv Elbow 8.8 - 7.0 -  Abv Elbow-Bel Elbow 10 59  > 43    Left Fibular (EDB)   Ankle 2.4  < 6.5 2.0  > 1.10         Bel Fib Head 10.1 - 1.43 -  Bel Fib Head-Ankle 36 47  > 39    Pop Fossa 13.0 - 1.48 -  Pop Fossa-Bel Fib Head 14 48  > 42    Right Fibular (EDB)   Ankle 3.5  < 6.5 3.7  > 1.10         Bel Fib Head 12.1 - 1.90 -  Bel Fib Head-Ankle 34 40  > 39    Pop Fossa 13.8 - 2.2 -  Pop Fossa-Bel Fib Head 13 76  > 42    Left Tibial (AHB)   Ankle 5.9  < 6.1 5.2  > 1.10 55.0        Right Tibial (AHB)   Ankle 4.3  < 6.1 3.7  > 1.10           Sensory Nerve Results      Latency (Peak) Amplitude (P-P) Segment Distance CV Comment   Site (ms) Norm (µV) Norm  (cm) (m/s) Norm    Left Median   Wrist-Dig II *4.2  < 4.0 61  > 8 Wrist-Dig II 14 *33  > 39    Right Median   Wrist-Dig II *4.1  < 4.0 43  > 8 Wrist-Dig II 14 *34  > 39     Left Ulnar   Wrist-Dig V 3.6  < 4.0 35  > 4 Wrist-Dig V 14 39  > 38    Right Ulnar   Wrist-Dig V 3.4  < 4.0 62  > 4 Wrist-Dig V 14 41  > 38    Right Radial   Forearm-Wrist 1.65  < 2.8 31  > 11 Forearm-Wrist 10 61 -    Left Sural   Calf-Lat Mall 3.0  < 4.5 16  > 4 Calf-Lat Mall 14 47  > 35    Right Sural   Calf-Lat Mall 2.7  < 4.5 12  > 4 Calf-Lat Mall 14 52  > 35      EMG+     Side Muscle Nerve Root Ins Act Fibs Psw Amp Dur Poly Recrt Int Pat Comment   Left Vastus Med Femoral L2-L4 Nml Nml Nml Nml Nml 0 Nml Nml    Left Tib Anterior Fibular,  Deep Fibula... L4-L5 Nml Nml Nml Nml Nml 0 Nml Nml    Left Fib Longus Fibular,  Superficial... L5-S1 Nml Nml Nml Nml Nml 0 Nml Nml    Left Gastroc Tibial S1-S2 Nml Nml Nml Nml Nml 0 Nml Nml    Left Dorsal Interossei ped I Lateral Plantar S2-S3 Nml Nml Nml Nml Nml 0 Nml Nml    Right Deltoid Axillary C5-C6 Nml Nml Nml Nml Nml 0 Nml Nml    Right Biceps Musculocut C5-C6 Nml Nml Nml Nml Nml 0 Nml Nml    Right Pronator Teres Median C6-C7 Nml Nml Nml Nml Nml 0 Nml Nml    Right Triceps Radial C6-C8 Nml Nml Nml Nml Nml 0 Nml Nml    Right EIP Post Interosseous,  R... C7-C8 Nml Nml Nml Nml Nml 0 Nml Nml    Right Cervical Parasp (Lower) Rami C7-C8 Nml Nml Nml         Right FDI Ulnar C8-T1 Nml Nml Nml *Incr *>12ms 0 Nml Nml    Right APB Median C8-T1 Nml Nml Nml *Incr *>12ms 0 Nml Nml    Right Vastus Med Femoral L2-L4 Nml Nml Nml Nml Nml 0 Nml Nml    Right Tib Anterior Fibular,  Deep Fibula... L4-L5 Nml Nml Nml Nml Nml 0 Nml Nml    Right Fib Longus Fibular,  Superficial... L5-S1 Nml Nml Nml Nml Nml 0 Nml Nml    Right Lumbo Parasp (Lower) Rami L5-S1 Nml Nml Nml         Right AHB Tibial,  Medial Plant... S1-S2 Nml Nml Nml Nml Nml 0 Nml Nml    Right Gastroc Tibial S1-S2 Nml Nml Nml Nml Nml 0 Nml Nml    Right Dorsal Interossei ped I Lateral Plantar S2-S3 *Incr *2+ *2+ *Incr Nml 0 *Reduced Nml            Waveforms:    Motor                         F-Wave         Sensory

## 2023-05-03 NOTE — PROGRESS NOTES
The EMG study showed evidence of mild carpal tunnel syndrome in both upper extremities.  There were also chronic changes in the right hand suggestive of some nerve compression at the level of the cervical spine.  There was no evidence of peripheral neuropathy.

## 2023-05-04 ENCOUNTER — LAB VISIT (OUTPATIENT)
Dept: LAB | Facility: HOSPITAL | Age: 73
End: 2023-05-04
Attending: PSYCHIATRY & NEUROLOGY
Payer: MEDICARE

## 2023-05-04 ENCOUNTER — OFFICE VISIT (OUTPATIENT)
Dept: INTERNAL MEDICINE | Facility: CLINIC | Age: 73
End: 2023-05-04
Payer: MEDICARE

## 2023-05-04 VITALS
HEART RATE: 68 BPM | RESPIRATION RATE: 14 BRPM | HEIGHT: 66 IN | SYSTOLIC BLOOD PRESSURE: 118 MMHG | WEIGHT: 170.5 LBS | BODY MASS INDEX: 27.4 KG/M2 | DIASTOLIC BLOOD PRESSURE: 68 MMHG

## 2023-05-04 DIAGNOSIS — R20.8 VIBRATION SENSORY LOSS: ICD-10-CM

## 2023-05-04 DIAGNOSIS — I25.10 CORONARY ARTERY DISEASE INVOLVING NATIVE CORONARY ARTERY OF NATIVE HEART WITHOUT ANGINA PECTORIS: ICD-10-CM

## 2023-05-04 DIAGNOSIS — M48.061 SPINAL STENOSIS OF LUMBAR REGION, UNSPECIFIED WHETHER NEUROGENIC CLAUDICATION PRESENT: ICD-10-CM

## 2023-05-04 DIAGNOSIS — I10 PRIMARY HYPERTENSION: ICD-10-CM

## 2023-05-04 DIAGNOSIS — Z00.00 ENCOUNTER FOR PREVENTIVE HEALTH EXAMINATION: Primary | ICD-10-CM

## 2023-05-04 DIAGNOSIS — M54.12 CERVICAL RADICULOPATHY: ICD-10-CM

## 2023-05-04 DIAGNOSIS — I77.1 TORTUOUS AORTA: ICD-10-CM

## 2023-05-04 DIAGNOSIS — D63.8 ANEMIA OF CHRONIC DISEASE: ICD-10-CM

## 2023-05-04 DIAGNOSIS — C7A.090 MALIGNANT CARCINOID TUMOR OF BRONCHUS AND LUNG: ICD-10-CM

## 2023-05-04 DIAGNOSIS — G60.3 IDIOPATHIC PROGRESSIVE POLYNEUROPATHY: ICD-10-CM

## 2023-05-04 DIAGNOSIS — E53.1 PYRIDOXINE DEFICIENCY: ICD-10-CM

## 2023-05-04 DIAGNOSIS — Z85.09 HISTORY OF GASTROINTESTINAL STROMAL TUMOR (GIST): ICD-10-CM

## 2023-05-04 DIAGNOSIS — E66.3 OVERWEIGHT (BMI 25.0-29.9): ICD-10-CM

## 2023-05-04 DIAGNOSIS — C7A.090 CARCINOID BRONCHIAL ADENOMA OF RIGHT LUNG: ICD-10-CM

## 2023-05-04 DIAGNOSIS — G56.03 BILATERAL CARPAL TUNNEL SYNDROME: ICD-10-CM

## 2023-05-04 DIAGNOSIS — R29.898 WEAKNESS OF BOTH LOWER EXTREMITIES: ICD-10-CM

## 2023-05-04 DIAGNOSIS — M47.22 CERVICAL SPONDYLOSIS WITH RADICULOPATHY: ICD-10-CM

## 2023-05-04 DIAGNOSIS — F41.9 ANXIETY: ICD-10-CM

## 2023-05-04 DIAGNOSIS — H40.1132 PRIMARY OPEN ANGLE GLAUCOMA (POAG) OF BOTH EYES, MODERATE STAGE: ICD-10-CM

## 2023-05-04 DIAGNOSIS — Z86.69 HISTORY OF GUILLAIN-BARRE SYNDROME: ICD-10-CM

## 2023-05-04 DIAGNOSIS — I70.0 ATHEROSCLEROSIS OF AORTA: ICD-10-CM

## 2023-05-04 DIAGNOSIS — K21.9 GERD WITHOUT ESOPHAGITIS: ICD-10-CM

## 2023-05-04 PROBLEM — E66.811 CLASS 1 OBESITY DUE TO EXCESS CALORIES WITH SERIOUS COMORBIDITY IN ADULT: Status: RESOLVED | Noted: 2020-09-16 | Resolved: 2023-05-04

## 2023-05-04 PROBLEM — R53.81 DEBILITY: Status: RESOLVED | Noted: 2021-05-02 | Resolved: 2023-05-04

## 2023-05-04 PROBLEM — E66.09 CLASS 1 OBESITY DUE TO EXCESS CALORIES WITH SERIOUS COMORBIDITY IN ADULT: Status: RESOLVED | Noted: 2020-09-16 | Resolved: 2023-05-04

## 2023-05-04 PROBLEM — E86.0 DEHYDRATION: Status: RESOLVED | Noted: 2021-01-31 | Resolved: 2023-05-04

## 2023-05-04 PROBLEM — Z91.199 CURRENT NON-ADHERENCE TO MEDICAL TREATMENT: Status: RESOLVED | Noted: 2021-11-22 | Resolved: 2023-05-04

## 2023-05-04 PROBLEM — H40.053 BILATERAL OCULAR HYPERTENSION: Status: RESOLVED | Noted: 2019-03-11 | Resolved: 2023-05-04

## 2023-05-04 PROBLEM — H11.31 SUBCONJUNCTIVAL HEMORRHAGE, NON-TRAUMATIC, RIGHT: Status: RESOLVED | Noted: 2023-03-08 | Resolved: 2023-05-04

## 2023-05-04 PROBLEM — E87.1 HYPONATREMIA: Status: RESOLVED | Noted: 2021-02-04 | Resolved: 2023-05-04

## 2023-05-04 PROBLEM — R26.2 UNABLE TO AMBULATE: Status: RESOLVED | Noted: 2021-01-29 | Resolved: 2023-05-04

## 2023-05-04 PROBLEM — H04.123 DRY EYE SYNDROME OF BOTH EYES: Status: RESOLVED | Noted: 2023-03-08 | Resolved: 2023-05-04

## 2023-05-04 PROBLEM — J98.4 ABNORMALITY OF LUNG: Status: RESOLVED | Noted: 2020-09-16 | Resolved: 2023-05-04

## 2023-05-04 LAB
ALBUMIN SERPL BCP-MCNC: 4.2 G/DL (ref 3.5–5.2)
ALP SERPL-CCNC: 52 U/L (ref 55–135)
ALT SERPL W/O P-5'-P-CCNC: 14 U/L (ref 10–44)
ANION GAP SERPL CALC-SCNC: 8 MMOL/L (ref 8–16)
AST SERPL-CCNC: 22 U/L (ref 10–40)
BASOPHILS # BLD AUTO: 0.04 K/UL (ref 0–0.2)
BASOPHILS NFR BLD: 0.9 % (ref 0–1.9)
BILIRUB SERPL-MCNC: 0.4 MG/DL (ref 0.1–1)
BUN SERPL-MCNC: 10 MG/DL (ref 8–23)
CALCIUM SERPL-MCNC: 9.9 MG/DL (ref 8.7–10.5)
CHLORIDE SERPL-SCNC: 99 MMOL/L (ref 95–110)
CO2 SERPL-SCNC: 30 MMOL/L (ref 23–29)
CREAT SERPL-MCNC: 0.7 MG/DL (ref 0.5–1.4)
DIFFERENTIAL METHOD: ABNORMAL
EOSINOPHIL # BLD AUTO: 0.2 K/UL (ref 0–0.5)
EOSINOPHIL NFR BLD: 3.7 % (ref 0–8)
ERYTHROCYTE [DISTWIDTH] IN BLOOD BY AUTOMATED COUNT: 15.2 % (ref 11.5–14.5)
EST. GFR  (NO RACE VARIABLE): >60 ML/MIN/1.73 M^2
GLUCOSE SERPL-MCNC: 98 MG/DL (ref 70–110)
HCT VFR BLD AUTO: 36.1 % (ref 37–48.5)
HGB BLD-MCNC: 11.7 G/DL (ref 12–16)
HIV 1+2 AB+HIV1 P24 AG SERPL QL IA: NORMAL
IMM GRANULOCYTES # BLD AUTO: 0 K/UL (ref 0–0.04)
IMM GRANULOCYTES NFR BLD AUTO: 0 % (ref 0–0.5)
LYMPHOCYTES # BLD AUTO: 1.9 K/UL (ref 1–4.8)
LYMPHOCYTES NFR BLD: 41.6 % (ref 18–48)
MCH RBC QN AUTO: 27.3 PG (ref 27–31)
MCHC RBC AUTO-ENTMCNC: 32.4 G/DL (ref 32–36)
MCV RBC AUTO: 84 FL (ref 82–98)
MONOCYTES # BLD AUTO: 0.4 K/UL (ref 0.3–1)
MONOCYTES NFR BLD: 8.6 % (ref 4–15)
NEUTROPHILS # BLD AUTO: 2.1 K/UL (ref 1.8–7.7)
NEUTROPHILS NFR BLD: 45.2 % (ref 38–73)
NRBC BLD-RTO: 0 /100 WBC
PLATELET # BLD AUTO: 352 K/UL (ref 150–450)
PMV BLD AUTO: 10.3 FL (ref 9.2–12.9)
POTASSIUM SERPL-SCNC: 3.7 MMOL/L (ref 3.5–5.1)
PROT SERPL-MCNC: 8.3 G/DL (ref 6–8.4)
RBC # BLD AUTO: 4.28 M/UL (ref 4–5.4)
SODIUM SERPL-SCNC: 137 MMOL/L (ref 136–145)
TSH SERPL DL<=0.005 MIU/L-ACNC: 0.95 UIU/ML (ref 0.4–4)
VIT B12 SERPL-MCNC: 1030 PG/ML (ref 210–950)
WBC # BLD AUTO: 4.54 K/UL (ref 3.9–12.7)

## 2023-05-04 PROCEDURE — 84165 PATHOLOGIST INTERPRETATION SPE: ICD-10-PCS | Mod: 26,,, | Performed by: PATHOLOGY

## 2023-05-04 PROCEDURE — 82525 ASSAY OF COPPER: CPT | Performed by: STUDENT IN AN ORGANIZED HEALTH CARE EDUCATION/TRAINING PROGRAM

## 2023-05-04 PROCEDURE — 86334 PATHOLOGIST INTERPRETATION IFE: ICD-10-PCS | Mod: 26,,, | Performed by: PATHOLOGY

## 2023-05-04 PROCEDURE — 85025 COMPLETE CBC W/AUTO DIFF WBC: CPT | Performed by: INTERNAL MEDICINE

## 2023-05-04 PROCEDURE — 84425 ASSAY OF VITAMIN B-1: CPT | Performed by: PSYCHIATRY & NEUROLOGY

## 2023-05-04 PROCEDURE — 86334 IMMUNOFIX E-PHORESIS SERUM: CPT | Performed by: STUDENT IN AN ORGANIZED HEALTH CARE EDUCATION/TRAINING PROGRAM

## 2023-05-04 PROCEDURE — 87389 HIV-1 AG W/HIV-1&-2 AB AG IA: CPT | Performed by: PSYCHIATRY & NEUROLOGY

## 2023-05-04 PROCEDURE — G0439 PPPS, SUBSEQ VISIT: HCPCS | Mod: S$GLB,,, | Performed by: NURSE PRACTITIONER

## 2023-05-04 PROCEDURE — 84630 ASSAY OF ZINC: CPT | Performed by: STUDENT IN AN ORGANIZED HEALTH CARE EDUCATION/TRAINING PROGRAM

## 2023-05-04 PROCEDURE — G0439 PR MEDICARE ANNUAL WELLNESS SUBSEQUENT VISIT: ICD-10-PCS | Mod: S$GLB,,, | Performed by: NURSE PRACTITIONER

## 2023-05-04 PROCEDURE — 84165 PROTEIN E-PHORESIS SERUM: CPT | Performed by: STUDENT IN AN ORGANIZED HEALTH CARE EDUCATION/TRAINING PROGRAM

## 2023-05-04 PROCEDURE — 84443 ASSAY THYROID STIM HORMONE: CPT | Performed by: INTERNAL MEDICINE

## 2023-05-04 PROCEDURE — 99999 PR PBB SHADOW E&M-EST. PATIENT-LVL V: ICD-10-PCS | Mod: PBBFAC,,, | Performed by: NURSE PRACTITIONER

## 2023-05-04 PROCEDURE — 86334 IMMUNOFIX E-PHORESIS SERUM: CPT | Mod: 26,,, | Performed by: PATHOLOGY

## 2023-05-04 PROCEDURE — 82607 VITAMIN B-12: CPT | Performed by: STUDENT IN AN ORGANIZED HEALTH CARE EDUCATION/TRAINING PROGRAM

## 2023-05-04 PROCEDURE — 99999 PR PBB SHADOW E&M-EST. PATIENT-LVL V: CPT | Mod: PBBFAC,,, | Performed by: NURSE PRACTITIONER

## 2023-05-04 PROCEDURE — 86592 SYPHILIS TEST NON-TREP QUAL: CPT | Performed by: PSYCHIATRY & NEUROLOGY

## 2023-05-04 PROCEDURE — 80053 COMPREHEN METABOLIC PANEL: CPT | Performed by: INTERNAL MEDICINE

## 2023-05-04 PROCEDURE — 99215 OFFICE O/P EST HI 40 MIN: CPT | Mod: PBBFAC,PO | Performed by: NURSE PRACTITIONER

## 2023-05-04 PROCEDURE — 84207 ASSAY OF VITAMIN B-6: CPT | Performed by: PSYCHIATRY & NEUROLOGY

## 2023-05-04 PROCEDURE — 84446 ASSAY OF VITAMIN E: CPT | Performed by: STUDENT IN AN ORGANIZED HEALTH CARE EDUCATION/TRAINING PROGRAM

## 2023-05-04 PROCEDURE — 84165 PROTEIN E-PHORESIS SERUM: CPT | Mod: 26,,, | Performed by: PATHOLOGY

## 2023-05-04 PROCEDURE — 36415 COLL VENOUS BLD VENIPUNCTURE: CPT | Mod: PO | Performed by: PSYCHIATRY & NEUROLOGY

## 2023-05-04 NOTE — PATIENT INSTRUCTIONS
Counseling and Referral of Other Preventative  (Italic type indicates deductible and co-insurance are waived)    Patient Name: Nan Palafox  Today's Date: 5/4/2023    Health Maintenance         Date Due Completion Date    Shingles Vaccine (1 of 2) Declined   4/29/2013    COVID-19 Vaccine (2 - Pfizer series) Declined- H/O GBS 1/21/2021    Influenza Vaccine (Season Ended) 09/01/2023 10/3/2020    Mammogram 10/19/2023 10/19/2022    Hemoglobin A1c (Prediabetes) 12/13/2023 12/13/2022    High Dose Statin 03/16/2024 3/16/2023    DEXA Scan 10/19/2024 10/19/2022    TETANUS VACCINE 05/14/2026 5/14/2016    Colorectal Cancer Screening 11/18/2027 11/18/2022    Lipid Panel    Ct chest    Fasting cmp, A1C, lipids    Other labs ordered in [facility] needs to be scheduled too:  10/4/2022  3/16/2023  4/14/2023  5/4/2023   Due      Ordered per MD   12/13/2022 2/8/2023          No orders of the defined types were placed in this encounter.    The following information is provided to all patients.  This information is to help you find resources for any of the problems found today that may be affecting your health:                Living healthy guide: www.[facility].[place].gov      Understanding Diabetes: www.diabetes.org      Eating healthy: www.cdc.gov/healthyweight      CDC home safety checklist: www.cdc.gov/steadi/patient.html      Agency on Aging: www.goea.[place].[facility]      Alcoholics anonymous (AA): www.aa.org      Physical Activity: www.darlin.nih.gov/hy7hojx      Tobacco use: www.quitwithusla.org

## 2023-05-04 NOTE — PROGRESS NOTES
"Nan Palafox presented for a  Medicare AWV and comprehensive Health Risk Assessment today. The following components were reviewed and updated:    Medical history  Family History  Social history  Allergies and Current Medications  Health Risk Assessment  Health Maintenance  Care Team     ** See Completed Assessments for Annual Wellness Visit within the encounter summary.**       The following assessments were completed:  Living Situation  CAGE  Depression Screening  Timed Get Up and Go  Whisper Test  Cognitive Function Screening  Nutrition Screening  ADL Screening  PAQ Screening      Vitals:    05/04/23 0717   BP: 118/68   BP Location: Left arm   Patient Position: Sitting   Pulse: 68   Resp: 14   Weight: 77.3 kg (170 lb 8.4 oz)   Height: 5' 5.5" (1.664 m)     Body mass index is 27.95 kg/m².  Physical Exam  Constitutional:       Comments: Younger in appearance than age   HENT:      Right Ear: There is no impacted cerumen.      Left Ear: There is no impacted cerumen.   Eyes:      General: No scleral icterus.  Cardiovascular:      Rate and Rhythm: Normal rate and regular rhythm.   Pulmonary:      Effort: Pulmonary effort is normal.      Breath sounds: Normal breath sounds.   Abdominal:      Palpations: Abdomen is soft.   Musculoskeletal:         General: No swelling.   Skin:     General: Skin is warm and dry.   Neurological:      Mental Status: She is alert and oriented to person, place, and time.   Psychiatric:         Mood and Affect: Mood normal.         Behavior: Behavior normal.         Thought Content: Thought content normal.         Judgment: Judgment normal.          Medication review & Opioid screening perform during rooming section. No prescribed opioid medications noted.  Review for substance use disorder screening performed during rooming section. No substance abuse noted on screening.      Diagnoses and health risks identified today and associated recommendations/orders:    1. Cervical spondylosis with " radiculopathy  Stable followed by PCP    2. Spinal stenosis of lumbar region, unspecified whether neurogenic claudication present  Stable followed by PCP    3. 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 Anxiety  Stable followed by PCP    5. Primary open angle glaucoma (POAG) of both eyes, moderate stage  Stable on medication & followed by ophth    6. Primary hypertension  Stable & controlled on medication &  followed by PCP    7. Atherosclerosis of aorta  Stable & controlled on medication &  followed by PCP  - Lipid Panel; Future    8. Tortuous aorta  Stable &  followed by PCP, thoracic sx    9. Coronary artery disease involving native coronary artery of native heart without angina pectoris  Stable & controlled on medication &  followed by PCP  - Lipid Panel; Future    10. 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 GERD without esophagitis  Stable followed by PCP    13. Malignant carcinoid tumor of bronchus and lung  Stable followed by thoracic sx    14. Anemia of chronic disease  Stable followed by PCP    15. Encounter for preventive health examination  Here for Health Risk Assessment/Annual Wellness Visit.  Health maintenance reviewed and updated. Follow up in one year.        16. Overweight (BMI 25.0-29.9)  Improving-. Followed by PCP.   Centers for Disease Control and Prevention (CDC)  weight recommendations for current BMI & ideal BMI range discussed with patient.  Recommended  continued gradual weight loss,  mediterranean diet or  Low fat  low cholesterol diet, structured regular exercise  every day.       17. Bilateral carpal tunnel syndrome- noted on EMG results  Stable followed by PCP    18. Cervical radiculopathy- noted on EMG results  Stable followed by  PCP        Provided Nan with a 5-10 year written screening schedule and personal prevention plan. Recommendations were developed using the USPSTF age appropriate recommendations. Education, counseling, and referrals were provided as needed. After Visit Summary printed and given to patient which includes a list of additional screenings\tests needed. Follow up in about 1 year (around 5/4/2024) for HRA. Pt education handouts on prevention of falls & low fat/mediterranean diet.     Adri Taveras NP I offered to discuss advanced care planning, including how to pick a person who would make decisions for you if you were unable to make them for yourself, called a health care power of , and what kind of decisions you might make such as use of life sustaining treatments such as ventilators and tube feeding when faced with a life limiting illness recorded on a living will that they will need to know. (How you want to be cared for as you near the end of your natural life)     X Patient is interested in learning more about how to make advanced directives.  I provided them paperwork and offered to discuss this with them.

## 2023-05-05 ENCOUNTER — TELEPHONE (OUTPATIENT)
Dept: INTERNAL MEDICINE | Facility: CLINIC | Age: 73
End: 2023-05-05
Payer: MEDICARE

## 2023-05-05 DIAGNOSIS — M54.12 CERVICAL RADICULOPATHY: Primary | ICD-10-CM

## 2023-05-05 DIAGNOSIS — G56.03 CARPAL TUNNEL SYNDROME, BILATERAL: ICD-10-CM

## 2023-05-05 LAB
ALBUMIN SERPL ELPH-MCNC: 4.34 G/DL (ref 3.35–5.55)
ALPHA1 GLOB SERPL ELPH-MCNC: 0.28 G/DL (ref 0.17–0.41)
ALPHA2 GLOB SERPL ELPH-MCNC: 0.64 G/DL (ref 0.43–0.99)
B-GLOBULIN SERPL ELPH-MCNC: 0.74 G/DL (ref 0.5–1.1)
GAMMA GLOB SERPL ELPH-MCNC: 1.71 G/DL (ref 0.67–1.58)
INTERPRETATION SERPL IFE-IMP: NORMAL
PROT SERPL-MCNC: 7.7 G/DL (ref 6–8.4)
RPR SER QL: NORMAL

## 2023-05-05 NOTE — TELEPHONE ENCOUNTER
----- Message from Glenroy Jimenez MD sent at 5/3/2023  5:30 PM CDT -----  The EMG study showed evidence of mild carpal tunnel syndrome in both upper extremities.  There were also chronic changes in the right hand suggestive of some nerve compression at the level of the cervical spine.  There was no evidence of peripheral neuropathy.

## 2023-05-05 NOTE — TELEPHONE ENCOUNTER
I reached her and gave her tara leija comments on emg..    She had mri cervical spine 2021 .  She does have pain and numbness  And wondering if she needs to see specialist?  If so, who? Pain management?     I told her Id call back Monday with your reply.  ( She will likely go to urgent care tomorrow because concerned has covid  Because tired and congested.)    Please advise.thanks lorin.

## 2023-05-08 LAB
COPPER SERPL-MCNC: 1170 UG/L (ref 810–1990)
ZINC SERPL-MCNC: 81 UG/DL (ref 60–130)

## 2023-05-08 NOTE — TELEPHONE ENCOUNTER
"Dr martinez says " I recommend consultation with pain management specialist to address symptoms of pain and numbness.  Physical therapy had been reordered by her neurologist and her most recent visit. "    Left msg on her voicemail with her name with dr leija comments.  Gave pain management phone and said good idea to also book phys therapy.    Call me back if need.       "

## 2023-05-09 ENCOUNTER — LAB VISIT (OUTPATIENT)
Dept: LAB | Facility: HOSPITAL | Age: 73
End: 2023-05-09
Payer: MEDICARE

## 2023-05-09 DIAGNOSIS — I25.10 CORONARY ARTERY DISEASE INVOLVING NATIVE CORONARY ARTERY OF NATIVE HEART WITHOUT ANGINA PECTORIS: ICD-10-CM

## 2023-05-09 DIAGNOSIS — R79.9 ABNORMAL FINDING OF BLOOD CHEMISTRY, UNSPECIFIED: ICD-10-CM

## 2023-05-09 DIAGNOSIS — Z86.69 HISTORY OF GUILLAIN-BARRE SYNDROME: ICD-10-CM

## 2023-05-09 DIAGNOSIS — R29.898 WEAKNESS OF BOTH LOWER EXTREMITIES: ICD-10-CM

## 2023-05-09 DIAGNOSIS — I70.0 ATHEROSCLEROSIS OF AORTA: ICD-10-CM

## 2023-05-09 DIAGNOSIS — I10 PRIMARY HYPERTENSION: ICD-10-CM

## 2023-05-09 LAB
A-TOCOPHEROL VIT E SERPL-MCNC: 1441 UG/DL (ref 500–1800)
CHOLEST SERPL-MCNC: 180 MG/DL (ref 120–199)
CHOLEST/HDLC SERPL: 2.4 {RATIO} (ref 2–5)
ESTIMATED AVG GLUCOSE: 117 MG/DL (ref 68–131)
HBA1C MFR BLD: 5.7 % (ref 4–5.6)
HDLC SERPL-MCNC: 76 MG/DL (ref 40–75)
HDLC SERPL: 42.2 % (ref 20–50)
LDLC SERPL CALC-MCNC: 95.8 MG/DL (ref 63–159)
NONHDLC SERPL-MCNC: 104 MG/DL
PYRIDOXAL SERPL-MCNC: 19 UG/L (ref 5–50)
TRIGL SERPL-MCNC: 41 MG/DL (ref 30–150)
VIT B1 BLD-MCNC: 49 UG/L (ref 38–122)

## 2023-05-09 PROCEDURE — 83036 HEMOGLOBIN GLYCOSYLATED A1C: CPT | Performed by: INTERNAL MEDICINE

## 2023-05-09 PROCEDURE — 36415 COLL VENOUS BLD VENIPUNCTURE: CPT | Mod: PO | Performed by: NURSE PRACTITIONER

## 2023-05-09 PROCEDURE — 80061 LIPID PANEL: CPT | Performed by: NURSE PRACTITIONER

## 2023-05-11 LAB
PATHOLOGIST INTERPRETATION IFE: NORMAL
PATHOLOGIST INTERPRETATION SPE: NORMAL

## 2023-05-22 ENCOUNTER — TELEPHONE (OUTPATIENT)
Dept: INFECTIOUS DISEASES | Facility: CLINIC | Age: 73
End: 2023-05-22
Payer: MEDICARE

## 2023-05-22 NOTE — TELEPHONE ENCOUNTER
Spoke to pt. Explained missael is no longer fda approved for pre-exposure ppx for covid due to not being effective against the newer covid strains. We do not have an approved, effective alternative at this time.  Recommend she continue to exercise basic precautions such as mask wearing in public spaces, avoiding crowded areas, handwashing.

## 2023-05-27 ENCOUNTER — OFFICE VISIT (OUTPATIENT)
Dept: URGENT CARE | Facility: CLINIC | Age: 73
End: 2023-05-27
Payer: MEDICARE

## 2023-05-27 VITALS
SYSTOLIC BLOOD PRESSURE: 124 MMHG | TEMPERATURE: 98 F | HEART RATE: 61 BPM | RESPIRATION RATE: 20 BRPM | HEIGHT: 66 IN | DIASTOLIC BLOOD PRESSURE: 79 MMHG | BODY MASS INDEX: 27.32 KG/M2 | OXYGEN SATURATION: 99 % | WEIGHT: 170 LBS

## 2023-05-27 DIAGNOSIS — E86.1 HYPOTENSION DUE TO HYPOVOLEMIA: Primary | ICD-10-CM

## 2023-05-27 PROCEDURE — 99213 OFFICE O/P EST LOW 20 MIN: CPT | Mod: S$GLB,,, | Performed by: FAMILY MEDICINE

## 2023-05-27 PROCEDURE — 99213 PR OFFICE/OUTPT VISIT, EST, LEVL III, 20-29 MIN: ICD-10-PCS | Mod: S$GLB,,, | Performed by: FAMILY MEDICINE

## 2023-05-27 NOTE — PROGRESS NOTES
"Subjective:      Patient ID: Nan Palafox is a 73 y.o. female.    Vitals:  height is 5' 5.5" (1.664 m) and weight is 77.1 kg (170 lb). Her oral temperature is 98.3 °F (36.8 °C). Her blood pressure is 124/79 and her pulse is 61. Her respiration is 20 and oxygen saturation is 99%.     Chief Complaint: Cough    This is a 73 y.o. female who presents today with a chief complaint of cough. Patient's cough started two days ago.  Patient became light-headed while walking today. Patient had a burning sensation in her chest on last night.      Cough  This is a new problem. The current episode started in the past 7 days. The problem has been unchanged. The problem occurs every few minutes. The cough is Non-productive. Associated symptoms include rhinorrhea. Nothing aggravates the symptoms. She has tried nothing for the symptoms. The treatment provided no relief.   Respiratory:  Positive for cough.     Objective:     Physical Exam   Constitutional: She is oriented to person, place, and time. She appears well-developed. She is cooperative.  Non-toxic appearance. She does not appear ill. No distress.   HENT:   Head: Normocephalic and atraumatic.   Ears:   Right Ear: Hearing, tympanic membrane, external ear and ear canal normal.   Left Ear: Hearing, tympanic membrane, external ear and ear canal normal.   Nose: Nose normal. No mucosal edema, rhinorrhea or nasal deformity. No epistaxis. Right sinus exhibits no maxillary sinus tenderness and no frontal sinus tenderness. Left sinus exhibits no maxillary sinus tenderness and no frontal sinus tenderness.   Mouth/Throat: Uvula is midline, oropharynx is clear and moist and mucous membranes are normal. No trismus in the jaw. Normal dentition. No uvula swelling. No oropharyngeal exudate, posterior oropharyngeal edema or posterior oropharyngeal erythema.   Eyes: Conjunctivae and lids are normal. No scleral icterus.   Neck: Trachea normal and phonation normal. Neck supple. No edema " present. No erythema present. No neck rigidity present.   Cardiovascular: Normal rate, regular rhythm, normal heart sounds and normal pulses.   Pulmonary/Chest: Effort normal and breath sounds normal. No respiratory distress. She has no decreased breath sounds. She has no rhonchi.   Abdominal: Normal appearance.   Musculoskeletal: Normal range of motion.         General: No deformity. Normal range of motion.   Neurological: She is alert and oriented to person, place, and time. She exhibits normal muscle tone. Coordination normal.   Skin: Skin is warm, dry, intact, not diaphoretic and not pale.   Psychiatric: Her speech is normal and behavior is normal. Judgment and thought content normal.   Nursing note and vitals reviewed.    Assessment:     1. Hypotension due to hypovolemia        Plan:       Hypotension due to hypovolemia    Continue make sure that drink enough water to stay adequately hydrated       Medical Decision Making:   Initial Assessment:   Pt came in after not drinking enough water earlier and was lightheaded then but has been drinking more water and feels better. Pt drank several cups of water here in office as wel

## 2023-06-29 ENCOUNTER — TELEPHONE (OUTPATIENT)
Dept: OPHTHALMOLOGY | Facility: CLINIC | Age: 73
End: 2023-06-29
Payer: MEDICARE

## 2023-06-29 NOTE — TELEPHONE ENCOUNTER
----- Message from Soni Lynch sent at 6/29/2023  7:59 AM CDT -----  Contact: pt @ [phone]  Nan Blackmon calling regarding Patient Advice (message) for #pt is calling to speak with nurse concerning her infection eye and its burning, asking for call back

## 2023-06-29 NOTE — TELEPHONE ENCOUNTER
Pt concerned that she irritated her eyes by walking through a fog of bug spray.   No changes in her vision or discharge.   Aggressive lubrication advised   Pt verbalized understanding.

## 2023-07-07 ENCOUNTER — HOSPITAL ENCOUNTER (EMERGENCY)
Facility: OTHER | Age: 73
Discharge: HOME | End: 2023-07-07
Attending: EMERGENCY MEDICINE
Payer: MEDICARE

## 2023-07-07 VITALS
HEIGHT: 65 IN | BODY MASS INDEX: 29.32 KG/M2 | WEIGHT: 176 LBS | SYSTOLIC BLOOD PRESSURE: 137 MMHG | OXYGEN SATURATION: 100 % | TEMPERATURE: 98 F | HEART RATE: 65 BPM | DIASTOLIC BLOOD PRESSURE: 76 MMHG | RESPIRATION RATE: 20 BRPM

## 2023-07-07 DIAGNOSIS — J06.9 VIRAL URI WITH COUGH: Primary | ICD-10-CM

## 2023-07-07 DIAGNOSIS — R05.9 COUGH: ICD-10-CM

## 2023-07-07 LAB
CTP QC/QA: YES
CTP QC/QA: YES
POC MOLECULAR INFLUENZA A AGN: NEGATIVE
POC MOLECULAR INFLUENZA B AGN: NEGATIVE
SARS-COV-2 RDRP RESP QL NAA+PROBE: NEGATIVE

## 2023-07-07 PROCEDURE — 87635 SARS-COV-2 COVID-19 AMP PRB: CPT | Performed by: EMERGENCY MEDICINE

## 2023-07-07 PROCEDURE — 99283 EMERGENCY DEPT VISIT LOW MDM: CPT | Mod: 25

## 2023-07-07 RX ORDER — BENZONATATE 100 MG/1
100 CAPSULE ORAL 3 TIMES DAILY PRN
Qty: 20 CAPSULE | Refills: 0 | Status: SHIPPED | OUTPATIENT
Start: 2023-07-07 | End: 2023-07-17

## 2023-07-07 RX ORDER — CETIRIZINE HYDROCHLORIDE 10 MG/1
10 TABLET ORAL DAILY
Qty: 30 TABLET | Refills: 0 | Status: SHIPPED | OUTPATIENT
Start: 2023-07-07 | End: 2024-03-18

## 2023-07-07 RX ORDER — PENICILLIN V POTASSIUM 500 MG/1
TABLET, FILM COATED ORAL
COMMUNITY
Start: 2023-06-17 | End: 2024-01-18

## 2023-07-07 NOTE — ED TRIAGE NOTES
C/O productive frequent cough x 1 week; chills, generalized aches, and fatigue onset last night. PMH lung cancer. Denies CP, SOB, n/v/d/c. NAD noted. VSS.

## 2023-07-07 NOTE — ED PROVIDER NOTES
Source of History:  The patient    Chief complaint:  Cough (Pt reports frequent cough x 1 week with chills and body aches and fatigue since last night; pt states she has hx of lung surgery)      HPI:  Nan Palafox is a 73 y.o. female presenting with gradual onset of a cough that has been going on for 1 week.  Denies any fevers but does have intermittent chills and body aches.  No nausea vomiting.  Does have some congestion.  She was concerned today because she just wanted to make sure everything was okay because she is had lung surgery in the past.    This is the extent to the patients complaints today here in the emergency department.    ROS:   See HPI.    Review of patient's allergies indicates:   Allergen Reactions    Iodine Hives       PMH:  As per HPI and below:  Past Medical History:   Diagnosis Date    Arthritis     Cataract     Cervical spondylosis with radiculopathy 04/11/2016    GBS (Guillain-[place] syndrome)     History of gastrointestinal stromal tumor (GIST) 12/02/2020    Hypertension     Malignant carcinoid tumor of bronchus and lung 12/02/2020    Nuclear sclerosis - Both Eyes 02/08/2013    PNA (pneumonia)     Primary open angle glaucoma (POAG) of both eyes, moderate stage 03/11/2019     Past Surgical History:   Procedure Laterality Date    BREAST BIOPSY Right     excisional bx    BREAST SURGERY Right     Excisional bx    INJECTION OF ANESTHETIC AGENT AROUND MULTIPLE INTERCOSTAL NERVES Right 01/04/2021    Procedure: BLOCK, NERVE, INTERCOSTAL, 2 OR MORE;  Surgeon: Ru Hernandez MD;  Location: [facility] OR [address] [place] [address];  Service: Thoracic;  Laterality: Right;    RETINAL DETACHMENT SURGERY      patient states that she had a retinal tear that was repaired in the past at [facility] but she is uncertain of which eye    STOMACH SURGERY      SURGICAL REMOVAL OF LYMPH NODE N/A 01/04/2021    Procedure: EXCISION, LYMPH NODE;  Surgeon: Ru Hernandez MD;  Location: [facility] OR [address] [place] [address];  Service: Thoracic;   "Laterality: N/A;    TOTAL KNEE ARTHROPLASTY Right     XI ROBOTIC RATS,WITH LOBECTOMY,LUNG Right 01/04/2021    Procedure: XI ROBOTIC RATS,WITH LOBECTOMY,LUNG;  Surgeon: Ru Hernandez MD;  Location: [facility] OR [address] [place] [address];  Service: Thoracic;  Laterality: Right;  Right middle lobectomy.       Social History     Tobacco Use    Smoking status: Never    Smokeless tobacco: Never   Substance Use Topics    Alcohol use: Not Currently     Comment: wine 3 glasses a week / weekend 3 glasses of champagne    Drug use: Never       Physical Exam:    /76   Pulse 65   Temp 98.2 °F (36.8 °C) (Oral)   Resp 20   Ht 5' 5" (1.651 m)   Wt 79.8 kg (176 lb)   SpO2 100%   BMI 29.29 kg/m²   Nursing note and vital signs reviewed.  Constitutional: No acute distress.  Nontoxic  Cardiovascular: Regular rate and rhythm.  No murmurs. No gallops. No rubs  Respiratory: Clear to auscultation bilaterally.  Good air movement.  No wheezes.  No rhonchi. No rales. No accessory muscle use..  Abdomen: Soft.  Not distended.  Nontender.  No guarding.  No rebound. Non-peritoneal.  Musculoskeletal: Good range of motion all joints.  No deformities.  Neck supple.  No meningismus.  Extremities: No pitting edema  Neuro: alert. At baseline.    Summary of Previous Medical Records:      MDM/ Differential Dx:    A 73-year-old female with history and physical suggestive of viral URI.  Influenza and COVID were initiated.  Will get a chest x-ray to rule out pneumonia.  Vital signs are normal lungs are clear.  Do not feel any blood work is indicated at this time as I do not suspect sepsis.  Also considered but do not suspect pulmonary embolus or pneumothorax.  History is inconsistent with ACS.      ED Course as of 07/07/23 1007 Fri Jul 07, 2023   0935 POC Molecular Influenza A Ag: Negative [SM]   0935 POC Molecular Influenza B Ag: Negative [SM]   0935 SARS-CoV-2 RNA, Amplification, Qual: Negative [SM]   0935 X-Ray Chest AP Portable  Chest x-ray independently " interpreted by myself shows normal cardiac size, no infiltrate or focal consolidation, no pneumothorax, no bony abnormalities.  Overall impression negative chest x-ray. [SM]   0935 SpO2: 99 % [SM]   1002 No further workup is indicated in the emergency department today.  I updated pt regarding results and I counseled pt regarding supportive care measures.  Diagnosis and treatment plan explained to patient. I have answered all questions and the patient is satisfied with the plan of care. Patient discharged home in stable condition.  [SM]      ED Course User Index  [SM] Nayan Chen DO                 Diagnostic Impression:    1. Viral URI with cough    2. Cough         ED Disposition Condition    Discharge Stable            ED Prescriptions       Medication Sig Dispense Start Date End Date Auth. Provider    benzonatate (TESSALON) 100 MG capsule Take 1 capsule (100 mg total) by mouth 3 (three) times daily as needed for Cough. 20 capsule 7/7/2023 7/17/2023 Nayan Chen DO    cetirizine (ZYRTEC) 10 MG tablet Take 1 tablet (10 mg total) by mouth once daily. 30 tablet 7/7/2023 7/6/2024 Nayan Chen DO          Follow-up Information       Follow up With Specialties Details Why Contact Info    Glenroy Jimenez MD Internal Medicine In 1 week As needed [address] [facility] [address]  [phone]               Nayan Chen DO  07/07/23 1007

## 2023-07-10 ENCOUNTER — CLINICAL SUPPORT (OUTPATIENT)
Dept: OPHTHALMOLOGY | Facility: CLINIC | Age: 73
End: 2023-07-10
Payer: MEDICARE

## 2023-07-10 ENCOUNTER — OFFICE VISIT (OUTPATIENT)
Dept: OPHTHALMOLOGY | Facility: CLINIC | Age: 73
End: 2023-07-10
Payer: MEDICARE

## 2023-07-10 DIAGNOSIS — H40.1132 PRIMARY OPEN ANGLE GLAUCOMA (POAG) OF BOTH EYES, MODERATE STAGE: ICD-10-CM

## 2023-07-10 DIAGNOSIS — H40.1132 PRIMARY OPEN ANGLE GLAUCOMA (POAG) OF BOTH EYES, MODERATE STAGE: Primary | ICD-10-CM

## 2023-07-10 DIAGNOSIS — H25.13 NUCLEAR SCLEROSIS OF BOTH EYES: ICD-10-CM

## 2023-07-10 PROCEDURE — 92020 PR SPECIAL EYE EVAL,GONIOSCOPY: ICD-10-PCS | Mod: S$PBB,,, | Performed by: OPHTHALMOLOGY

## 2023-07-10 PROCEDURE — 99999 PR PBB SHADOW E&M-EST. PATIENT-LVL III: CPT | Mod: PBBFAC,,, | Performed by: OPHTHALMOLOGY

## 2023-07-10 PROCEDURE — 99214 OFFICE O/P EST MOD 30 MIN: CPT | Mod: S$PBB,,, | Performed by: OPHTHALMOLOGY

## 2023-07-10 PROCEDURE — 99214 PR OFFICE/OUTPT VISIT, EST, LEVL IV, 30-39 MIN: ICD-10-PCS | Mod: S$PBB,,, | Performed by: OPHTHALMOLOGY

## 2023-07-10 PROCEDURE — 99999 PR PBB SHADOW E&M-EST. PATIENT-LVL III: ICD-10-PCS | Mod: PBBFAC,,, | Performed by: OPHTHALMOLOGY

## 2023-07-10 PROCEDURE — 92020 GONIOSCOPY: CPT | Mod: PBBFAC | Performed by: OPHTHALMOLOGY

## 2023-07-10 PROCEDURE — 92020 GONIOSCOPY: CPT | Mod: S$PBB,,, | Performed by: OPHTHALMOLOGY

## 2023-07-10 PROCEDURE — 99213 OFFICE O/P EST LOW 20 MIN: CPT | Mod: PBBFAC | Performed by: OPHTHALMOLOGY

## 2023-07-10 RX ORDER — IBUPROFEN 800 MG/1
800 TABLET ORAL EVERY 6 HOURS PRN
COMMUNITY
Start: 2023-06-17 | End: 2024-01-18

## 2023-07-10 NOTE — PROGRESS NOTES
OCT DONE OU     24-2  SS DONE OU     REL & FIX =  GOOD OU      COOP =    GOOD     PATIENT HAS NO ALLERGIES TO LATEX OR ADHESIVES AT THIS TIME    JTHOMAS      MRX    +.25 + 175 X 170   OD     + .25 + .75 X 180    OS

## 2023-07-14 NOTE — PROGRESS NOTES
Assessment /Plan     For exam results, see Encounter Report.    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      Grandson [employer] full scholarship  Sports Law --> had internship with [employer]  --> Now honored at [employer] --> Internship with [employer]  Graduating May 2023 -->  at [employer] --> very proud    ==>   2022 AD // [place] --> BD 2022  Grieving --> Wedding anniversary today 07/10 -->  at 19 yo & x 55 years    Grieving best friends x 2 loss  COVID May 2020  Difficult    2021 --> re-discussed  ==> Carcinoid Tumor Lung  ==> COVID Vaccine  ==> Guillain-[place] Syndrome  --> rehab      --> Reports No Glc drops during @ 7 week hospitalization      2023  Atraumatic DAVION OD  Reports No Changes in Meds  PF ATs  Reassurance    OHT  POAG OD > OS  Presented 2019  40 // 24    + FMHx Mother ( 94 yo) & Brother  Denies Blindness    ?? HVF taker --> follow OCT RNFL    CCT  568 // 567    < 21 --> Achieved & discussed    Both eyes  --> in general good adherence & tolerating well --> CSM  Xal q day  Cosopt BID    SP SLT OS 2020  --> consider SLT OD as discussed      NSC OU  CE PRN  MRx +275    Dry Eye Syndrome: discussed use of warm compresses, preserved & non-preserved artificial tears, gel and PM ointment options.  Also discussed options utilizing medications.        Hx Retinal Tear OD  RD precautions:  Discussed symptoms of RD with increased flashes, floaters, decreasing vision.  Patient/Family to call and return immediately to clinic should the symptoms of RD occur. Voiced good understanding with Q & A.      Plan  RTC 6 months IOP & DFE &  Adherence  RTC sooner prn with good understanding

## 2023-07-24 RX ORDER — LORAZEPAM 1 MG/1
TABLET ORAL
Qty: 60 TABLET | Refills: 2 | Status: SHIPPED | OUTPATIENT
Start: 2023-07-24 | End: 2023-12-12

## 2023-07-24 NOTE — TELEPHONE ENCOUNTER
No care due was identified.  [facility] Embedded Care Due Messages. Reference number: [id number].   7/24/2023 8:27:16 AM CDT

## 2023-07-26 ENCOUNTER — OFFICE VISIT (OUTPATIENT)
Dept: PRIMARY CARE CLINIC | Facility: CLINIC | Age: 73
End: 2023-07-26
Payer: MEDICARE

## 2023-07-26 VITALS
HEART RATE: 64 BPM | OXYGEN SATURATION: 98 % | BODY MASS INDEX: 29.27 KG/M2 | DIASTOLIC BLOOD PRESSURE: 84 MMHG | TEMPERATURE: 98 F | WEIGHT: 175.69 LBS | HEIGHT: 65 IN | SYSTOLIC BLOOD PRESSURE: 114 MMHG

## 2023-07-26 DIAGNOSIS — B96.89 ACUTE BACTERIAL BRONCHITIS: Primary | ICD-10-CM

## 2023-07-26 DIAGNOSIS — Z86.69 HISTORY OF GUILLAIN-BARRE SYNDROME: ICD-10-CM

## 2023-07-26 DIAGNOSIS — C7A.090 MALIGNANT CARCINOID TUMOR OF BRONCHUS AND LUNG: ICD-10-CM

## 2023-07-26 DIAGNOSIS — I10 PRIMARY HYPERTENSION: ICD-10-CM

## 2023-07-26 DIAGNOSIS — I25.10 CORONARY ARTERY DISEASE INVOLVING NATIVE CORONARY ARTERY OF NATIVE HEART WITHOUT ANGINA PECTORIS: ICD-10-CM

## 2023-07-26 DIAGNOSIS — J20.8 ACUTE BACTERIAL BRONCHITIS: Primary | ICD-10-CM

## 2023-07-26 PROCEDURE — 99214 PR OFFICE/OUTPT VISIT, EST, LEVL IV, 30-39 MIN: ICD-10-PCS | Mod: S$PBB,,, | Performed by: FAMILY MEDICINE

## 2023-07-26 PROCEDURE — 99999 PR PBB SHADOW E&M-EST. PATIENT-LVL V: CPT | Mod: PBBFAC,,, | Performed by: FAMILY MEDICINE

## 2023-07-26 PROCEDURE — 99214 OFFICE O/P EST MOD 30 MIN: CPT | Mod: S$PBB,,, | Performed by: FAMILY MEDICINE

## 2023-07-26 PROCEDURE — 99999 PR PBB SHADOW E&M-EST. PATIENT-LVL V: ICD-10-PCS | Mod: PBBFAC,,, | Performed by: FAMILY MEDICINE

## 2023-07-26 PROCEDURE — 99215 OFFICE O/P EST HI 40 MIN: CPT | Mod: PBBFAC,PN | Performed by: FAMILY MEDICINE

## 2023-07-26 RX ORDER — PROMETHAZINE HYDROCHLORIDE AND DEXTROMETHORPHAN HYDROBROMIDE 6.25; 15 MG/5ML; MG/5ML
5 SYRUP ORAL NIGHTLY PRN
Qty: 118 ML | Refills: 0 | Status: SHIPPED | OUTPATIENT
Start: 2023-07-26 | End: 2024-03-18

## 2023-07-26 RX ORDER — AZITHROMYCIN 250 MG/1
TABLET, FILM COATED ORAL
Qty: 6 TABLET | Refills: 0 | Status: SHIPPED | OUTPATIENT
Start: 2023-07-26 | End: 2023-07-31

## 2023-07-26 NOTE — PATIENT INSTRUCTIONS
Hydrate, rest, Mucinex Expectorant as directed for thinning out the mucus; or MucinexDM if with significant cough and mucus.  Zyrtec, Xyzal, Allegra or Claritin. (Would lean towards Allegra which may be a bit more effective than claritin and will not cause sedation as Xyzal or Zyrtec may.)  Nasal saline spray such as [place] brand as needed.  Flonase or Nasonex nasal spray after the nasal saline.  Warm salt water gargles and warm liquids may help soothe a sore throat better than cool liquids.  Tylenol as directed as needed for fever, body aches, headaches.   All are OTC  Most of all, stay well-hydrated.

## 2023-07-26 NOTE — PROGRESS NOTES
"    /84 (BP Location: Left arm, Patient Position: Sitting)   Pulse 64   Temp 98.1 °F (36.7 °C)   Ht 5' 5" (1.651 m)   Wt 79.7 kg (175 lb 11.3 oz)   SpO2 98%   BMI 29.24 kg/m²       ===========    Chief Complaint: No chief complaint on file.          HPI    Nan Palafox is a 73 y.o. female     here for    Cough and hoarseness for over a week. She woke up this am with hoarseness much worse. She is hydrating well today but wasn't yesterday.    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    No fevers or chills.  No severe shortness of breath.      Patient queried and denies any further complaints    Patient has MEDICAL HISTORY OF  Patient Active Problem List   Diagnosis    Nuclear sclerosis - Both Eyes    History of colon polyps    Primary hypertension    Anemia of chronic disease    GERD without esophagitis    Cervical spondylosis with radiculopathy    Primary open angle glaucoma (POAG) of both eyes, moderate stage    Atherosclerosis of aorta    Tortuous aorta    Malignant carcinoid tumor of bronchus and lung    History of gastrointestinal stromal tumor (GIST)    Coronary artery disease    Spinal stenosis of lumbar region    Anxiety    History of Guillain-[place] syndrome    Overweight (BMI 25.0-29.9)    Encounter for preventive health examination    Bilateral carpal tunnel syndrome    Cervical radiculopathy       SURGICAL AND MEDICAL HISTORY: updated and reviewed.  Past Surgical History:   Procedure Laterality Date    BREAST BIOPSY Right     excisional bx    BREAST SURGERY Right     Excisional bx    INJECTION OF ANESTHETIC AGENT AROUND MULTIPLE INTERCOSTAL NERVES Right 01/04/2021    Procedure: BLOCK, NERVE, INTERCOSTAL, 2 OR MORE;  Surgeon: Ru Hernandez MD;  Location: [facility] OR [address] [place] [address];  Service: Thoracic;  Laterality: Right;    RETINAL DETACHMENT SURGERY      patient states that she had a retinal tear that was repaired in the past at [facility] but she " is uncertain of which eye    STOMACH SURGERY      SURGICAL REMOVAL OF LYMPH NODE N/A 01/04/2021    Procedure: EXCISION, LYMPH NODE;  Surgeon: Ru Hernandez MD;  Location: [facility] OR 2ND FLR;  Service: Thoracic;  Laterality: N/A;    TOTAL KNEE ARTHROPLASTY Right     XI ROBOTIC RATS,WITH LOBECTOMY,LUNG Right 01/04/2021    Procedure: XI ROBOTIC RATS,WITH LOBECTOMY,LUNG;  Surgeon: Ru Hernandez MD;  [facility];  Service: Thoracic;  Laterality: Right;  Right middle lobectomy.     ALLERGIES updated and reviewed.  Review of patient's allergies indicates:   Allergen Reactions    Iodine Hives       CURRENT OUTPATIENT MEDICATIONS updated and reviewed    Current Outpatient Medications:     amLODIPine (NORVASC) 10 MG tablet, Take 10 mg by mouth., Disp: , Rfl:     ASCORBATE CALCIUM (VITAMIN C ORAL), Take 1 tablet by mouth once daily at 6am., Disp: , Rfl:     aspirin (ECOTRIN) 81 MG EC tablet, Take 81 mg by mouth once daily., Disp: , Rfl:     carvediloL (COREG) 6.25 MG tablet, TAKE 1 TABLET(6.25 MG) BY MOUTH TWICE DAILY FOR BLOOD PRESSURE, Disp: 180 tablet, Rfl: 3    cetirizine (ZYRTEC) 10 MG tablet, Take 1 tablet (10 mg total) by mouth once daily., Disp: 30 tablet, Rfl: 0    dorzolamide-timolol 2-0.5% (COSOPT) 22.3-6.8 mg/mL ophthalmic solution, INSTILL 1 DROP INTO BOTH EYES TWICE DAILY, Disp: 30 mL, Rfl: 3    fluticasone (FLONASE) 50 mcg/actuation nasal spray, 2 sprays by Each Nare route once daily., Disp: 1 Bottle, Rfl: 12    hydrOXYzine pamoate (VISTARIL) 25 MG Cap, Take 50 mg by mouth every 8 (eight) hours as needed., Disp: , Rfl:     ibuprofen (ADVIL,MOTRIN) 800 MG tablet, Take 800 mg by mouth every 6 (six) hours as needed., Disp: , Rfl:     latanoprost 0.005 % ophthalmic solution, INSTILL 1 DROP IN BOTH EYES EVERY EVENING, Disp: 7.5 mL, Rfl: 4    LORazepam (ATIVAN) 1 MG tablet, TAKE 1 TABLET(1 MG) BY MOUTH EVERY 8 HOURS AS NEEDED, Disp: 60 tablet, Rfl: 2    multivitamin (THERAGRAN) tablet, Take 1  tablet by mouth once daily., Disp: , Rfl:     penicillin v potassium (VEETID) 500 MG tablet, Take by mouth., Disp: , Rfl:     rosuvastatin (CRESTOR) 20 MG tablet, Take 1 tablet (20 mg total) by mouth once daily., Disp: 90 tablet, Rfl: 3    valsartan-hydrochlorothiazide (DIOVAN-HCT) 320-25 mg per tablet, Take 1 tablet by mouth once daily., Disp: , Rfl:     vitamin D (VITAMIN D3) 1000 units Tab, Take 1,000 Units by mouth once daily., Disp: , Rfl:     ZINC ORAL, Take by mouth 3 (three) times daily., Disp: , Rfl:     azithromycin (Z-CRISS) 250 MG tablet, Take 2 tablets by mouth on day 1; Take 1 tablet by mouth on days 2-5, Disp: 6 tablet, Rfl: 0    promethazine-dextromethorphan (PROMETHAZINE-DM) 6.25-15 mg/5 mL Syrp, Take 5 mLs by mouth nightly as needed (cough)., Disp: 118 mL, Rfl: 0    Review of Systems   Constitutional:  Positive for fever. Negative for activity change, appetite change, chills, diaphoresis, fatigue and unexpected weight change.   HENT:  Negative for congestion, ear discharge, ear pain, facial swelling, hearing loss, nosebleeds, postnasal drip, rhinorrhea, sinus pressure, sneezing, sore throat, tinnitus, trouble swallowing and voice change.    Eyes:  Negative for photophobia, pain, discharge, redness, itching and visual disturbance.   Respiratory:  Positive for cough. Negative for chest tightness, shortness of breath and wheezing.    Cardiovascular:  Negative for chest pain, palpitations and leg swelling.   Gastrointestinal:  Negative for abdominal distention, abdominal pain, anal bleeding, blood in stool, constipation, diarrhea, nausea, rectal pain and vomiting.   Endocrine: Negative for cold intolerance, heat intolerance, polydipsia, polyphagia and polyuria.   Genitourinary:  Negative for difficulty urinating, dysuria and flank pain.   Musculoskeletal:  Negative for arthralgias, back pain, joint swelling, myalgias and neck pain.   Skin:  Negative for rash.   Neurological:  Negative for dizziness,  "tremors, seizures, syncope, speech difficulty, weakness, light-headedness, numbness and headaches.   Psychiatric/Behavioral:  Negative for behavioral problems, confusion, decreased concentration, dysphoric mood, sleep disturbance and suicidal ideas. The patient is not nervous/anxious and is not hyperactive.        /84 (BP Location: Left arm, Patient Position: Sitting)   Pulse 64   Temp 98.1 °F (36.7 °C)   Ht 5' 5" (1.651 m)   Wt 79.7 kg (175 lb 11.3 oz)   SpO2 98%   BMI 29.24 kg/m²   Physical Exam  Vitals and nursing note reviewed.   Constitutional:       General: She is not in acute distress.     Appearance: Normal appearance. She is well-developed. She is not ill-appearing or toxic-appearing.   HENT:      Head: Normocephalic and atraumatic.      Right Ear: Tympanic membrane, ear canal and external ear normal.      Left Ear: Tympanic membrane, ear canal and external ear normal.      Nose: Nose normal.      Mouth/Throat:      Lips: Pink.      Mouth: Mucous membranes are moist.      Pharynx: Posterior oropharyngeal erythema present. No oropharyngeal exudate.   Eyes:      General: No scleral icterus.        Right eye: No discharge.         Left eye: No discharge.      Extraocular Movements: Extraocular movements intact.      Conjunctiva/sclera: Conjunctivae normal.   Cardiovascular:      Rate and Rhythm: Normal rate and regular rhythm.      Pulses: Normal pulses.      Heart sounds: Normal heart sounds. No murmur heard.  Pulmonary:      Effort: Pulmonary effort is normal. No respiratory distress.      Breath sounds: Normal breath sounds. No wheezing or rales.   Musculoskeletal:      Cervical back: Normal range of motion and neck supple. No rigidity or tenderness.   Lymphadenopathy:      Cervical: No cervical adenopathy.   Skin:     General: Skin is warm and dry.   Neurological:      Mental Status: She is alert. Mental status is at baseline.   Psychiatric:         Mood and Affect: Mood normal.         " Behavior: Behavior normal. Behavior is cooperative.         ASSESSMENT/PLAN  Diagnoses and all orders for this visit:    Acute bacterial bronchitis    Malignant carcinoid tumor of bronchus and lung    History of Guillain-[place] syndrome    Primary hypertension    Coronary artery disease involving native coronary artery of native heart without angina pectoris    Other orders  -     promethazine-dextromethorphan (PROMETHAZINE-DM) 6.25-15 mg/5 mL Syrp; Take 5 mLs by mouth nightly as needed (cough).  -     azithromycin (Z-CRISS) 250 MG tablet; Take 2 tablets by mouth on day 1; Take 1 tablet by mouth on days 2-5      Meds as above plus hydration, nasal saline, Flonase, plus/minus Allegra.  Also take Mucinex expectorant or Robitussin syrup    Follow-up with PCP in 2 weeks     If significant shortness of breath begins then go to the ER immediately.  Patient expresses understanding of the plan as evidenced by summary back to me.      All lab results over past 2 years available reviewed inc labs and any cardiology or radiology studies.  Any new prescription medications gone over in detail including reason for taking the medication, the general mechanism of action, most common possible side effects and possible costs, etcetera.    Chronic conditions updated. Other than changes or additions as above, cont current medications and maintain follow-up with specialists if indicated.     Tamir Elias MD  A dictation device was used to produce this document. Use of such devices sometimes results in grammatical errors or replacement of words that sound similarly.

## 2023-08-02 NOTE — PROGRESS NOTES
Subjective:       Patient ID: Nan Palafox is a 73 y.o. female.    Chief Complaint: No chief complaint on file.    Diagnosis:  Typical carcinoid     Pre-operative therapy: none    Procedure(s) and date(s): 1/4/21: right robotic assisted middle lobectomy with MLND    Pathology: 2.0 cm well differentiated typical carcinoid, no VPI, no LVI, no margins, level 2,4,7,10,11 = negative, pT1bN0     Post-operative therapy: surveillance     HPI   73 y.o. female never smoker here today for 2.5 year follow-up from right robotic middle lobectomy. Uncomplicated post-operative course. Diagnosed with Guillain-[place] Syndrome after Pfizer vaccine in February 2021. Covid + Jan 2022. CT shortly after with scattered inflammatory opacities. Denies fever, chills, wheeze, cough, CP, palpitations, claudication,  N/V or changes in bowel and bladder functioning.       Review of Systems   Constitutional:  Negative for activity change, fatigue and fever.   Eyes:  Negative for visual disturbance.   Respiratory:  Positive for cough. Negative for chest tightness and wheezing.    Cardiovascular:  Negative for chest pain, palpitations and leg swelling.   Gastrointestinal: Negative.    Genitourinary:  Negative for difficulty urinating.   Musculoskeletal:  Positive for gait problem. Negative for arthralgias and myalgias.   Neurological:  Positive for coordination difficulties and coordination difficulties. Negative for dizziness, facial asymmetry and speech difficulty.        Residual lower extremity weakness resulting in some difficulty with coordination and ambulation   Psychiatric/Behavioral:  Positive for dysphoric mood. Negative for confusion. The patient is not nervous/anxious.         She is having emotional challenges dealing with the death of her  and asked for help         Objective:       There were no vitals filed for this visit.      Physical Exam  Constitutional:       Appearance: Normal appearance.   Eyes:      General: No  scleral icterus.     Extraocular Movements: Extraocular movements intact.      Pupils: Pupils are equal, round, and reactive to light.   Cardiovascular:      Rate and Rhythm: Normal rate and regular rhythm.      Pulses: Normal pulses.   Pulmonary:      Effort: Pulmonary effort is normal.      Breath sounds: Normal breath sounds. No wheezing or rhonchi.   Chest:      Chest wall: No tenderness.   Abdominal:      General: Abdomen is flat.      Palpations: Abdomen is soft.   Musculoskeletal:         General: Normal range of motion.      Right lower leg: No edema.      Left lower leg: No edema.   Neurological:      General: No focal deficit present.      Mental Status: She is alert and oriented to person, place, and time.      Gait: Gait normal.   Psychiatric:         Mood and Affect: Mood normal.         Chest CT 7/16/21:  1.  Stable postsurgical changes status post right middle lobectomy and mediastinal lymph node dissection.  2.   Interval development of focal consolidation within the right posterior lung base, which may represent atelectasis or infectious process, though malignancy cannot be excluded in this patient with a history of right middle lobe carcinoid tumor.  3.  Bilateral thyroid nodules.  4.  Hepatic cysts.    Chest CT 2/2/22:   1.  New left lower lobe ground-glass nodular opacities, favored to represent infectious/inflammatory etiology.  However, short-term follow-up evaluation is highly recommended.  2.  Persistent, stable appearance of focal opacity of the right posterior base.  It may merely represent focal atelectasis.  Consider follow-up evaluation to include intravenous contrast.  3.  Other findings as above without change.     Chest CT 8/10/22:  I reviewed the images and compared to the previous chest CT  1.  Interval resolution of ground-glass opacities within the left lower lobe.  Favored to represent resolved infectious or inflammatory etiology.  2.  Stable appearance of focal opacity in the  right posterior base.  Imaging appearance may favor round atelectasis.    Chest CT 02/08/23: I reviewed the images and compared to the 8/10/22 CT  Stable RLL basilar opacity, likely postsurgical scar vs persistent atelectasis  Mild PA trunk dilation    Chest CT 8/9/23:  I reviewed the images  Stable RLL postoperative changes  MAJOR    Assessment:       70 y.o. female here today for 2 year follow-up from right robotic middle lobectomy, pathology pT1bN0 typical carcinoid. Pathology reviewed with patient.   MAJOR  Needs bereavement emotional support  Residual effects from Guillain-[place] have improved  Stable chest CT findings      Plan:       RTC in 6 months with chest CT.   Consult Psychiatric Oncology Team to assist patient with bereavement challenges

## 2023-08-07 PROBLEM — Z00.00 ENCOUNTER FOR PREVENTIVE HEALTH EXAMINATION: Chronic | Status: RESOLVED | Noted: 2023-05-04 | Resolved: 2023-08-07

## 2023-08-09 ENCOUNTER — OFFICE VISIT (OUTPATIENT)
Dept: CARDIOTHORACIC SURGERY | Facility: CLINIC | Age: 73
End: 2023-08-09
Payer: MEDICARE

## 2023-08-09 ENCOUNTER — HOSPITAL ENCOUNTER (OUTPATIENT)
Dept: RADIOLOGY | Facility: HOSPITAL | Age: 73
Discharge: HOME | End: 2023-08-09
Attending: THORACIC SURGERY (CARDIOTHORACIC VASCULAR SURGERY)
Payer: MEDICARE

## 2023-08-09 VITALS
HEART RATE: 64 BPM | BODY MASS INDEX: 29.38 KG/M2 | HEIGHT: 65 IN | OXYGEN SATURATION: 98 % | DIASTOLIC BLOOD PRESSURE: 76 MMHG | SYSTOLIC BLOOD PRESSURE: 126 MMHG | WEIGHT: 176.38 LBS

## 2023-08-09 DIAGNOSIS — C7A.090 CARCINOID BRONCHIAL ADENOMA OF RIGHT LUNG: Primary | ICD-10-CM

## 2023-08-09 DIAGNOSIS — C7A.090 CARCINOID BRONCHIAL ADENOMA OF RIGHT LUNG: ICD-10-CM

## 2023-08-09 DIAGNOSIS — Z63.4 DEATH OF HUSBAND: ICD-10-CM

## 2023-08-09 PROCEDURE — 99999 PR PBB SHADOW E&M-EST. PATIENT-LVL IV: ICD-10-PCS | Mod: PBBFAC,,, | Performed by: THORACIC SURGERY (CARDIOTHORACIC VASCULAR SURGERY)

## 2023-08-09 PROCEDURE — 99999 PR PBB SHADOW E&M-EST. PATIENT-LVL IV: CPT | Mod: PBBFAC,,, | Performed by: THORACIC SURGERY (CARDIOTHORACIC VASCULAR SURGERY)

## 2023-08-09 PROCEDURE — 99213 PR OFFICE/OUTPT VISIT, EST, LEVL III, 20-29 MIN: ICD-10-PCS | Mod: S$PBB,,, | Performed by: THORACIC SURGERY (CARDIOTHORACIC VASCULAR SURGERY)

## 2023-08-09 PROCEDURE — 71250 CT THORAX DX C-: CPT | Mod: TC

## 2023-08-09 PROCEDURE — 99213 OFFICE O/P EST LOW 20 MIN: CPT | Mod: S$PBB,,, | Performed by: THORACIC SURGERY (CARDIOTHORACIC VASCULAR SURGERY)

## 2023-08-09 PROCEDURE — 99214 OFFICE O/P EST MOD 30 MIN: CPT | Mod: PBBFAC,25 | Performed by: THORACIC SURGERY (CARDIOTHORACIC VASCULAR SURGERY)

## 2023-08-09 PROCEDURE — 71250 CT CHEST WITHOUT CONTRAST: ICD-10-PCS | Mod: 26,,, | Performed by: STUDENT IN AN ORGANIZED HEALTH CARE EDUCATION/TRAINING PROGRAM

## 2023-08-09 PROCEDURE — 71250 CT THORAX DX C-: CPT | Mod: 26,,, | Performed by: STUDENT IN AN ORGANIZED HEALTH CARE EDUCATION/TRAINING PROGRAM

## 2023-08-09 SDOH — SOCIAL DETERMINANTS OF HEALTH (SDOH): DISSAPEARANCE AND DEATH OF FAMILY MEMBER: Z63.4

## 2023-08-21 ENCOUNTER — OFFICE VISIT (OUTPATIENT)
Dept: INTERNAL MEDICINE | Facility: CLINIC | Age: 73
End: 2023-08-21
Payer: MEDICARE

## 2023-08-21 ENCOUNTER — HOSPITAL ENCOUNTER (OUTPATIENT)
Dept: RADIOLOGY | Facility: HOSPITAL | Age: 73
Discharge: HOME | End: 2023-08-21
Attending: INTERNAL MEDICINE
Payer: MEDICARE

## 2023-08-21 VITALS
HEIGHT: 65 IN | HEART RATE: 64 BPM | RESPIRATION RATE: 16 BRPM | DIASTOLIC BLOOD PRESSURE: 68 MMHG | WEIGHT: 176.38 LBS | BODY MASS INDEX: 29.38 KG/M2 | TEMPERATURE: 96 F | SYSTOLIC BLOOD PRESSURE: 108 MMHG

## 2023-08-21 DIAGNOSIS — M54.50 BILATERAL LOW BACK PAIN WITHOUT SCIATICA, UNSPECIFIED CHRONICITY: ICD-10-CM

## 2023-08-21 DIAGNOSIS — Z86.69 HISTORY OF GUILLAIN-BARRE SYNDROME: ICD-10-CM

## 2023-08-21 DIAGNOSIS — Z12.31 ENCOUNTER FOR SCREENING MAMMOGRAM FOR BREAST CANCER: ICD-10-CM

## 2023-08-21 DIAGNOSIS — I10 PRIMARY HYPERTENSION: Primary | ICD-10-CM

## 2023-08-21 DIAGNOSIS — M48.061 SPINAL STENOSIS OF LUMBAR REGION, UNSPECIFIED WHETHER NEUROGENIC CLAUDICATION PRESENT: ICD-10-CM

## 2023-08-21 DIAGNOSIS — I70.0 ATHEROSCLEROSIS OF AORTA: ICD-10-CM

## 2023-08-21 DIAGNOSIS — G56.03 CARPAL TUNNEL SYNDROME, BILATERAL: ICD-10-CM

## 2023-08-21 PROCEDURE — 99999 PR PBB SHADOW E&M-EST. PATIENT-LVL V: ICD-10-PCS | Mod: PBBFAC,,, | Performed by: INTERNAL MEDICINE

## 2023-08-21 PROCEDURE — 99999 PR PBB SHADOW E&M-EST. PATIENT-LVL V: CPT | Mod: PBBFAC,,, | Performed by: INTERNAL MEDICINE

## 2023-08-21 PROCEDURE — 72100 XR LUMBAR SPINE AP AND LATERAL: ICD-10-PCS | Mod: 26,,, | Performed by: INTERNAL MEDICINE

## 2023-08-21 PROCEDURE — 99215 OFFICE O/P EST HI 40 MIN: CPT | Mod: PBBFAC,PO | Performed by: INTERNAL MEDICINE

## 2023-08-21 PROCEDURE — 99214 OFFICE O/P EST MOD 30 MIN: CPT | Mod: S$PBB,,, | Performed by: INTERNAL MEDICINE

## 2023-08-21 PROCEDURE — 72100 X-RAY EXAM L-S SPINE 2/3 VWS: CPT | Mod: TC,PO

## 2023-08-21 PROCEDURE — 99214 PR OFFICE/OUTPT VISIT, EST, LEVL IV, 30-39 MIN: ICD-10-PCS | Mod: S$PBB,,, | Performed by: INTERNAL MEDICINE

## 2023-08-21 PROCEDURE — 72100 X-RAY EXAM L-S SPINE 2/3 VWS: CPT | Mod: 26,,, | Performed by: INTERNAL MEDICINE

## 2023-08-24 ENCOUNTER — TELEPHONE (OUTPATIENT)
Dept: INTERNAL MEDICINE | Facility: CLINIC | Age: 73
End: 2023-08-24

## 2023-08-24 NOTE — TELEPHONE ENCOUNTER
----- Message from Heidi Andersen sent at 8/24/2023  9:59 AM CDT -----  Contact: P [phone]  2TESTRESULTS    Type: Test Results    What test was performed?XR EOS    Who ordered the test? Dr. Jimenez     When and where were the test performed? 08/21/2023    Would you like response via Mychart: Phone     Comments:

## 2023-08-24 NOTE — TELEPHONE ENCOUNTER
Lumbar spine x-ray showed evidence of arthritis involving the lower back.  Mild degenerative disc changes were noted.  No fracture or bone destruction was noted.

## 2023-08-25 ENCOUNTER — PATIENT MESSAGE (OUTPATIENT)
Dept: INTERNAL MEDICINE | Facility: CLINIC | Age: 73
End: 2023-08-25
Payer: MEDICARE

## 2023-09-19 ENCOUNTER — TELEPHONE (OUTPATIENT)
Dept: NEUROLOGY | Facility: CLINIC | Age: 73
End: 2023-09-19

## 2023-09-19 DIAGNOSIS — E04.1 THYROID NODULE: Primary | ICD-10-CM

## 2023-09-19 NOTE — TELEPHONE ENCOUNTER
----- Message from Shadia Iverson sent at 9/19/2023 12:32 PM CDT -----  Regarding: concerns  Name of Who is Calling: Nan           What is the request in detail: Patient is requesting a call back to find out about what she need to do about the future COVID vaccines since she cant take the vaccines.           Can the clinic reply by [facility]: No           What Number to Call Back if not in [facility]: [phone]

## 2023-09-21 ENCOUNTER — TELEPHONE (OUTPATIENT)
Dept: NEUROLOGY | Facility: CLINIC | Age: 73
End: 2023-09-21
Payer: MEDICARE

## 2023-09-21 NOTE — TELEPHONE ENCOUNTER
Staff left patient a vm informing her she has been scheduled for next available appointment in October

## 2023-09-21 NOTE — PROGRESS NOTES
Subjective:       Patient ID: Nan Palafox is a 73 y.o. female.    Chief Complaint: Hypertension (5 mos fol up w/labs. May.  Needs coreg refills.  Needs referral to infectious disease to discuss what covid vaccines she can get. )    HPI  The patient presents for follow-up of medical conditions which include hypertension, bilateral carpal tunnel syndrome, anemia.  The patient reports she is breathing better.  He was recently diagnosed with bronchitis.  Her cough and wheezing have resolved.    She is status post removal of pulmonary carcinoid tumor.  She recently saw her cardiothoracic surgeon Dr. Hernandez.  She has been clinically stable with no evidence of disease recurrence.    The patient reports she has been out of carvedilol for nearly 4 weeks now.  She is currently taking a half tablet of valsartan/HCTZ and 1 tablet of her amlodipine daily.    She has history of Guillain-[place] syndrome.  She notes occasional tightening of the muscles in her feet.  She does still have mild symptoms bilateral carpal tunnel syndrome.    Occasional midline lower back pain is noted.  The pain is nonradiating.    Review of Systems   Constitutional:  Negative for activity change, appetite change, fatigue and unexpected weight change.   Eyes:  Negative for visual disturbance.   Respiratory:  Negative for cough and shortness of breath.    Cardiovascular:  Negative for chest pain, palpitations and leg swelling.   Gastrointestinal:  Negative for abdominal pain, blood in stool, constipation and diarrhea.   Genitourinary:  Negative for dysuria and hematuria.   Musculoskeletal:  Positive for back pain and leg pain. Negative for arthralgias, neck pain and neck stiffness.   Integumentary:  Negative for rash.   Neurological:  Positive for numbness. Negative for dizziness, syncope and headaches.   Psychiatric/Behavioral:  Negative for sleep disturbance.             Physical Exam  Vitals and nursing note reviewed.   Constitutional:        General: She is not in acute distress.     Appearance: Normal appearance. She is well-developed.      Comments: The patient has gained 4 lb since 03/16/2023.   HENT:      Head: Normocephalic and atraumatic.   Eyes:      General: No scleral icterus.     Extraocular Movements: Extraocular movements intact.      Conjunctiva/sclera: Conjunctivae normal.   Neck:      Thyroid: No thyromegaly.      Vascular: No JVD.   Cardiovascular:      Rate and Rhythm: Normal rate and regular rhythm.      Heart sounds: Normal heart sounds. No murmur heard.     No friction rub. No gallop.   Pulmonary:      Effort: Pulmonary effort is normal. No respiratory distress.      Breath sounds: Normal breath sounds. No wheezing or rales.   Abdominal:      General: Bowel sounds are normal.      Palpations: Abdomen is soft. There is no mass.      Tenderness: There is no abdominal tenderness.   Musculoskeletal:         General: No tenderness. Normal range of motion.      Cervical back: Normal range of motion and neck supple.      Right lower leg: No edema.      Left lower leg: No edema.      Comments: Back:  SI joint tenderness is present bilaterally.  No paraspinous muscle tenderness is present.  No vertebral percussion tenderness is present.   Lymphadenopathy:      Cervical: No cervical adenopathy.   Skin:     General: Skin is warm and dry.      Findings: No rash.   Neurological:      Mental Status: She is alert and oriented to person, place, and time.   Psychiatric:         Mood and Affect: Mood normal.         Behavior: Behavior normal.               Assessment & Plan:      Nan was seen today for hypertension.  Lumbar spine x-ray will be obtained.  A screening mammogram will be obtained after 10/19/2023.    Carvedilol will be discontinued.  The patient can continue amlodipine as prescribed.  The patient can also continue a half tablet of valsartan/HCTZ as she is currently doing.    Fasting blood tests will be obtained in 6 months.    Diagnoses  and all orders for this visit:    Primary hypertension  -     Comprehensive Metabolic Panel; Future  -     Lipid Panel; Future  -     CBC Auto Differential; Future  -     TSH; Future    History of Guillain-[place] syndrome    Spinal stenosis of lumbar region, unspecified whether neurogenic claudication present  -     Comprehensive Metabolic Panel; Future  -     Lipid Panel; Future  -     CBC Auto Differential; Future  -     TSH; Future    Bilateral low back pain without sciatica, unspecified chronicity  -     X-Ray Lumbar Spine Ap And Lateral; Future    Carpal tunnel syndrome, bilateral    Atherosclerosis of aorta  -     Comprehensive Metabolic Panel; Future  -     Lipid Panel; Future  -     CBC Auto Differential; Future  -     TSH; Future    Encounter for screening mammogram for breast cancer  -     Mammo Digital Screening Bilat w/ Sridhar; Future         Follow up in about 6 months (around 2/21/2024).     Glenroy Jimenez MD

## 2023-09-21 NOTE — TELEPHONE ENCOUNTER
----- Message from Ryann Greene RN sent at 9/20/2023  4:28 PM CDT -----  Contact: CELSA CHADWICK [[id number]]    ----- Message -----  From: Jane Ty  Sent: 9/20/2023   2:00 PM CDT  To: Mckenzie Arce Staff    Type: Call Back      Who called: CELSA CHADWICK [[id number]]      What is the request in detail: Patient is requesting a call back in regard to scheduling an appointment to be seen for her issues regarding her walking, she is unable to wait until the first available in November.    Please advise.     [facility]      Would the patient rather a call back or a response via [facility]? Call back       Best call back number: [phone] (home)       Additional Information:

## 2023-10-11 ENCOUNTER — OFFICE VISIT (OUTPATIENT)
Dept: NEUROLOGY | Facility: CLINIC | Age: 73
End: 2023-10-11
Payer: MEDICARE

## 2023-10-11 VITALS — HEIGHT: 65 IN | BODY MASS INDEX: 29.32 KG/M2 | WEIGHT: 176 LBS

## 2023-10-11 DIAGNOSIS — Z71.85 VACCINE COUNSELING: ICD-10-CM

## 2023-10-11 DIAGNOSIS — Z86.69 HISTORY OF GUILLAIN-BARRE SYNDROME: Primary | ICD-10-CM

## 2023-10-11 PROCEDURE — 99214 OFFICE O/P EST MOD 30 MIN: CPT | Mod: PBBFAC | Performed by: STUDENT IN AN ORGANIZED HEALTH CARE EDUCATION/TRAINING PROGRAM

## 2023-10-11 PROCEDURE — 99999 PR PBB SHADOW E&M-EST. PATIENT-LVL IV: ICD-10-PCS | Mod: PBBFAC,,, | Performed by: STUDENT IN AN ORGANIZED HEALTH CARE EDUCATION/TRAINING PROGRAM

## 2023-10-11 PROCEDURE — 99214 OFFICE O/P EST MOD 30 MIN: CPT | Mod: S$PBB,,, | Performed by: STUDENT IN AN ORGANIZED HEALTH CARE EDUCATION/TRAINING PROGRAM

## 2023-10-11 PROCEDURE — 99999 PR PBB SHADOW E&M-EST. PATIENT-LVL IV: CPT | Mod: PBBFAC,,, | Performed by: STUDENT IN AN ORGANIZED HEALTH CARE EDUCATION/TRAINING PROGRAM

## 2023-10-11 PROCEDURE — 99214 PR OFFICE/OUTPT VISIT, EST, LEVL IV, 30-39 MIN: ICD-10-PCS | Mod: S$PBB,,, | Performed by: STUDENT IN AN ORGANIZED HEALTH CARE EDUCATION/TRAINING PROGRAM

## 2023-10-11 NOTE — PATIENT INSTRUCTIONS
Zinc supplements in moderation  B12 supplementation 2-3 times per week  Vitamin D supplementation not more than [id number] IU daily since high levels can be toxic

## 2023-10-11 NOTE — PROGRESS NOTES
"        Patient ID: [id number]  Chief Complaint/Reason for Consult: f/u on GBS     Subjective:     HPI  Nan Palafox is a very pleasant 73 y.o. 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 she is presenting for routine f/u.    Interval history (10/11/2023):  Overall gait and balance has improved significantly, up to 85% per last PT note. She is not requiring assistive device, however, she reports intermittent numbness and imbalance which might be the residual 15-20%. She is now concerned with the new flu and COVID season. She was previously getting Evusheld for COVID PEP due to inability to get vaccines. Now that Evusheld is off the  market she is wondering what she should do.     Initial HPI (4/14/2023):  Today, she reports no new neurological symptoms, has some paresthesias. Balance is improved compared to before, not using a cane. Hasn't had any recent falls. PT has helped a lot but she has completed her course and will need new orders. Has a group at [employer], feels depressed sometimes but still enjoys the activities with her [employer] group.     Per dr. Prado's note:  "Initial HPI: 7/1/2021  She is now using cane. January 2021 she underwent surgery thoracic surgery for removal of a mass. A few days later she got her first Pfizer vaccine. She noted tingling and numbenss. She could not move her legs. 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 She was treated with IVIG. The tingling has resolved but every once in a while she feels a heaviness in her toes. She had difficulty raisining her hands. She has noted that the right upper extremity continues to remain weak.     In 2020 she underwent a lung biospy. She states post biopsy she had change in speech, right upper extremity weakness. She was admitted at [facility] and was told she did not have a stroke but she was started on Keppra given the possiblity of " a ? Seizure. She has stopped taking the Keppra for 6 months now and not had another event. Stopped the Keppra herself because she was not sure as to why she was taking it.     Review of Systems   Constitutional:  Negative for unexpected weight change.   Respiratory:  Negative for shortness of breath.    Musculoskeletal:  Positive for gait problem.   Neurological:  Positive for numbness. Negative for tremors and coordination difficulties.   Psychiatric/Behavioral:  Positive for dysphoric mood. Negative for confusion, self-injury and suicidal ideas.    All other systems reviewed and are negative.    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)     History of gastrointestinal stromal tumor (GIST) 2020    Hypertension     Malignant carcinoid tumor of bronchus and lung 2020    Nuclear sclerosis - Both Eyes 2013    PNA (pneumonia)     Primary open angle glaucoma (POAG) of both eyes, moderate stage 2019     Allergies:  Review of patient's allergies indicates:   Allergen Reactions    Iodine Hives       Pertinent Family History:  Family History   Problem Relation Age of Onset    Glaucoma Mother     Cataracts Mother     Blindness Mother     Cancer Mother 68        colon, myeloma    No Known Problems Father     No Known Problems Sister     Glaucoma Brother     Cataracts Brother     Cancer Brother         esophageal     Pertinent Social History:  No tobacco, rare alcohol (used to drink 3 champagne glasses nightly up until recently), no marijuana, no illicit substance use. Lives alone,  is .     Medications:  Current Outpatient Medications   Medication Instructions    amLODIPine (NORVASC) 10 mg, Oral    ASCORBATE CALCIUM (VITAMIN C ORAL) 1 tablet, Oral, Daily    aspirin (ECOTRIN) 81 mg, Daily    cetirizine (ZYRTEC) 10 mg, Oral, Daily    dorzolamide-timolol  2-0.5% (COSOPT) 22.3-6.8 mg/mL ophthalmic solution INSTILL 1 DROP INTO BOTH EYES TWICE DAILY    fluticasone (FLONASE) 50 mcg/actuation nasal spray 2 sprays, Each Nostril, Daily    hydrOXYzine pamoate (VISTARIL) 50 mg, Oral, Every 8 hours PRN    ibuprofen (ADVIL,MOTRIN) 800 mg, Oral, Every 6 hours PRN    latanoprost 0.005 % ophthalmic solution INSTILL 1 DROP IN BOTH EYES EVERY EVENING    LORazepam (ATIVAN) 1 MG tablet TAKE 1 TABLET(1 MG) BY MOUTH EVERY 8 HOURS AS NEEDED    multivitamin (THERAGRAN) tablet 1 tablet, Oral, Daily    penicillin v potassium (VEETID) 500 MG tablet Oral    promethazine-dextromethorphan (PROMETHAZINE-DM) 6.25-15 mg/5 mL Syrp 5 mLs, Oral, Nightly PRN    rosuvastatin (CRESTOR) 20 mg, Oral, Daily    valsartan-hydrochlorothiazide (DIOVAN-HCT) 320-25 mg per tablet 1 tablet, Oral, Daily    vitamin D (VITAMIN D3) 1,000 Units, Oral, Daily    ZINC ORAL Oral, 3 times daily      Objective:     There were no vitals filed for this visit.       General:  Well-appearing, well-nourished, NAD, cooperative    Neurologic Exam:   Awake, alert and oriented x3  Speech spontaneous and fluent, intact comprehension.   Adequate fund of knowledge, vocabulary.    CN II - CN XII:  PERRLA. EOM intact. No Nystagmus. No ophthalmoplegia.   Facial sensation is normal to light touch.   Facial expression is full and symmetric.   Hearing is intact bilaterally.   Palate elevates symmetrically.   SCM and Trapezius full strength bilaterally.   Tongue is midline.     Motor:  Normal bulk and tone in all four limbs.   There are no atrophy or fasciculations. No tremor.     Shoulder  Abd Shoulder Add Elbow   Flex Elbow  Ext Wrist   Flex Wrist  Ext Finger  Flex Finger  Ext Finger  Abd Finger   Add IO Opposition   Right 5 5 5 5       5    Left 5 5 5 5       5       Hip  Flex Hip  Ext Thigh   Abd Thigh  Add Knee  Flex Knee  Ext Plantar  Flex Dorsiflex   Right 5 5   5 5 5 5   Left 5 5   5 5 5 5     Sensory:  Light touch: intact  throughout.  Vibration: decreased distally (BLE>>BUE)  Proprioception: intact figure writing. position intact in BUE  Romberg is positive.    DTRs:   Biceps Brachioradialis Triceps Marci Patellar Ankle Plantar   Right 2+ 2+   2+     Left 2+ 2+   2+       Coordination:  Finger to nose is normal bilaterally.  Normal fine finger movements and rapid alternating movements.    Gait:  Wide based gait, impaired tandem.      Pertinent lab results  Lab Results   Component Value Date    ALPHATOCOPHE 1441 05/04/2023    ANGIOTENSINC 1.0 01/31/2021    FOLATE 11.5 02/01/2021    EGNRAGMO16 1030 (H) 05/04/2023    THIAMINEBLOO 49 05/04/2023    VITAMINB6 19 05/04/2023    ERUUHSQR07OQ 41 01/30/2021    ZINC 81 05/04/2023    COPPER 1170 05/04/2023    LACTATE 0.8 02/09/2021     Lab Results   Component Value Date    PATHINTPSPE REVIEWED 05/04/2023    PATHINTPSIF REVIEWED 05/04/2023     Lab Results   Component Value Date    ANASCREEN Positive (A) 02/01/2021    ANATITER 1:320 02/01/2021    ANAP1 Homogeneous 02/01/2021    DSDNA Negative 1:10 02/01/2021    DSDNA Negative 1:10 02/01/2021    ANTISSAANTIB 0.09 02/01/2021    ANTISSAANTIB 0.09 02/01/2021    RF 12.0 02/01/2021    SEDRATE 34 08/21/2021    APAISOTYPEIG <9.40 02/01/2021    APAISOTYPEIG <9.40 02/01/2021     Lab Results   Component Value Date    OFE80IHFT Non-reactive 05/04/2023    RPR Non-reactive 05/04/2023    LYMEAB 0.23 02/01/2021     Lab Results   Component Value Date     01/31/2021    TSH 0.953 05/04/2023    FREET4 1.02 12/17/2020    WBC 4.54 05/04/2023    LYMPH 1.9 05/04/2023    LYMPH 41.6 05/04/2023    RBC 4.28 05/04/2023    HGB 11.7 (L) 05/04/2023    HCT 36.1 (L) 05/04/2023    MCV 84 05/04/2023     05/04/2023     05/04/2023    K 3.7 05/04/2023    CO2 30 (H) 05/04/2023    BUN 10 05/04/2023    CREATININE 0.7 05/04/2023    CALCIUM 9.9 05/04/2023    AST 22 05/04/2023    ALT 14 05/04/2023     Pertinent imaging results    *Images personally reviewed and  interpreted:    10/22/2021  MRI Cervical Spine w/wo contrast:  Multilevel degenerative changes, most advanced at C7-T1 where there is a disc osteophyte complex and bilateral facet arthropathy contributing to moderate bilateral foraminal stenosis.  Progressive endplate edema and enhancement at C7-T1, favored to be degenerative.  No new marrow replacing lesions    Other pertinent studies    5/2/2023  EMG-NCS:  There is electrophysiologic evidence of a bilateral sensory median mononeuropathy across the wrist (I.e. Carpal tunnel syndrome).  There is no motor axonal loss.  There is no active denervation.  This is graded as Mild in severity bilaterally.    There are chronic neuropathic changes isolated to the right hand muscles (first dorsal interosseus and abductor pollicis brevis).  This is suggestive of a chronic right C8/T1 radiculopathy, but not definitively so.  She does have neck pain though no radicular symptoms.  She has an MRI of the C spine from 2021 which showed moderate foraminal stenosis at this level, R>L.  Despite the somewhat weak findings on today's study, in the context of her clinical history and MRI findings, I suspect that these changes on needle EMG do represent a chronic right C8/T1 radiculopathy.          The right first dorsal interosseus pedis muscle showed signs of active denervation.  In isolation to one muscle (especially in the foot), this is of limited diagnostic significance.  No electrophysiologic evidence of a peripheral polyneuropathy.      Assessment:   Nan Palafox is a 73 y.o. right-handed female with multiple comorbidities including vascular risk factors and history of lung cancer and GBS (2021), requiring intubation. 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  Neurological exam is notable for a vibratory loss in all extremities, gait has improved. Serum work up did not reveal any overlapping metabolic etiologies that could  contribute to this picture. EMG-NCS did not show ongoing denervation. She has responded well to PT which I encourage to continue, recently signed orders for extended therapy plan.we discussed potential toxic effects of vitamin D if it's subtherapeutic and moderation in zinc supplementation (since it may cause copper deficiency). She may take B12 intermittently since her levels are normal. I agree that she should not get any additional vaccines given the Hx of GBS, I will refer her to ID to advise on what substitute is available to Miki.     1. History of Guillain-[place] syndrome    2. Vaccine counseling      Plan:     Continue outpatient PT   Advise to take supplementations for zinc and vitamin D in moderation  Referral to ID to discuss any alternate option to Miki and overall recommendations for preventing infections for which she can not get vaccines   Follow up: RTC in 6 months     Plan was discussed in detail with the patient, who is in agreement.    Time spent on this encounter: 34 minutes. This includes face to face time (obtaining history, documenting clinical information in the EMR, physical exam, discussing the plan with patient) and non-face to face time (such as preparing to see the patient (ie. Chart review, reviewing and interpreting previous labs and imaging), further EMR documentation, ordering tests, independently interpreting results and communicating results to the patient/family/caregiver, or care coordinator).          Yazmin Rincon MD    [facility]  10/11/2023

## 2023-10-20 ENCOUNTER — PATIENT MESSAGE (OUTPATIENT)
Dept: RADIOLOGY | Facility: HOSPITAL | Age: 73
End: 2023-10-20
Payer: MEDICARE

## 2023-10-24 ENCOUNTER — HOSPITAL ENCOUNTER (OUTPATIENT)
Dept: RADIOLOGY | Facility: HOSPITAL | Age: 73
Discharge: HOME | End: 2023-10-24
Attending: INTERNAL MEDICINE
Payer: MEDICARE

## 2023-10-24 VITALS — HEIGHT: 65 IN | WEIGHT: 176 LBS | BODY MASS INDEX: 29.32 KG/M2

## 2023-10-24 DIAGNOSIS — Z12.31 ENCOUNTER FOR SCREENING MAMMOGRAM FOR BREAST CANCER: ICD-10-CM

## 2023-10-24 PROCEDURE — 77067 SCR MAMMO BI INCL CAD: CPT | Mod: TC

## 2023-10-24 PROCEDURE — 77063 MAMMO DIGITAL SCREENING BILAT WITH TOMO: ICD-10-PCS | Mod: 26,,, | Performed by: RADIOLOGY

## 2023-10-24 PROCEDURE — 77063 BREAST TOMOSYNTHESIS BI: CPT | Mod: 26,,, | Performed by: RADIOLOGY

## 2023-10-24 PROCEDURE — 77067 MAMMO DIGITAL SCREENING BILAT WITH TOMO: ICD-10-PCS | Mod: 26,,, | Performed by: RADIOLOGY

## 2023-10-24 PROCEDURE — 77067 SCR MAMMO BI INCL CAD: CPT | Mod: 26,,, | Performed by: RADIOLOGY

## 2023-10-27 ENCOUNTER — OFFICE VISIT (OUTPATIENT)
Dept: INFECTIOUS DISEASES | Facility: CLINIC | Age: 73
End: 2023-10-27
Payer: MEDICARE

## 2023-10-27 DIAGNOSIS — Z86.69 HISTORY OF GUILLAIN-BARRE SYNDROME: ICD-10-CM

## 2023-10-27 PROCEDURE — 99213 OFFICE O/P EST LOW 20 MIN: CPT | Mod: PBBFAC | Performed by: INTERNAL MEDICINE

## 2023-10-27 PROCEDURE — 99999 PR PBB SHADOW E&M-EST. PATIENT-LVL III: ICD-10-PCS | Mod: PBBFAC,,, | Performed by: INTERNAL MEDICINE

## 2023-10-27 PROCEDURE — 99203 PR OFFICE/OUTPT VISIT, NEW, LEVL III, 30-44 MIN: ICD-10-PCS | Mod: S$PBB,,, | Performed by: INTERNAL MEDICINE

## 2023-10-27 PROCEDURE — 99203 OFFICE O/P NEW LOW 30 MIN: CPT | Mod: S$PBB,,, | Performed by: INTERNAL MEDICINE

## 2023-10-27 PROCEDURE — 99999 PR PBB SHADOW E&M-EST. PATIENT-LVL III: CPT | Mod: PBBFAC,,, | Performed by: INTERNAL MEDICINE

## 2023-10-27 NOTE — PROGRESS NOTES
Subjective:      Patient ID: Nan Palafox is a 73 y.o. female.    Chief Complaint:Immunizations      History of Present Illness    Ms. Palafox is a 73 year old female who was referred to me to discuss immunizations.  She has a history of Guillane barre syndrome and has a lot of concerns in regards to immunizations.  She was previously on evusheld.  However this is no longer effective.  She is referred to me to discuss other alternatives as she can't get the covid vaccine.    Medical History  Lung cancer (carcinoid)  Guillane barre syndrome    Surgical History  Lung rlobectomy    Social History  Retired.  Became a  after 55 years of marriage ( passed in 2021 December).  Has a daughter who lives in [facility] and a grandson.  She is active in the [employer].      Review of Systems   All other systems reviewed and are negative.    Objective:   Physical Exam  Constitutional:       General: She is not in acute distress.     Appearance: Normal appearance. She is not ill-appearing, toxic-appearing or diaphoretic.   HENT:      Nose: No congestion or rhinorrhea.   Eyes:      General: No scleral icterus.        Right eye: No discharge.         Left eye: No discharge.   Pulmonary:      Effort: Pulmonary effort is normal.   Skin:     Coloration: Skin is not jaundiced.   Neurological:      General: No focal deficit present.      Mental Status: She is alert and oriented to person, place, and time.       Assessment:     1. History of Guillain-[place] syndrome      73 year old female with a history of Guillain-[place] syndrome here to discuss immunizations and possible associated risks.  She was on evusheld for covid prevention but his is no longer available.  At this time, there are no other biological products for covid prevention besides the vaccine.  She is unable to get the covid vaccine due to her history of Guillane-barre.  Advised her on mask wearing in crowded settings.  Risk of Guillain-[place] with covid vaccination is  very low but given her history it is reasonable to avoid the vaccine.  She is to follow up as needed.    Plan:      History of Guillain-[place] syndrome  -     Ambulatory referral/consult to Infectious Disease

## 2023-11-21 ENCOUNTER — OFFICE VISIT (OUTPATIENT)
Dept: OPHTHALMOLOGY | Facility: CLINIC | Age: 73
End: 2023-11-21
Payer: MEDICARE

## 2023-11-21 DIAGNOSIS — H25.13 NUCLEAR SCLEROSIS OF BOTH EYES: ICD-10-CM

## 2023-11-21 DIAGNOSIS — H40.1132 PRIMARY OPEN ANGLE GLAUCOMA (POAG) OF BOTH EYES, MODERATE STAGE: Primary | ICD-10-CM

## 2023-11-21 PROCEDURE — 99214 OFFICE O/P EST MOD 30 MIN: CPT | Mod: S$PBB,,, | Performed by: OPHTHALMOLOGY

## 2023-11-21 PROCEDURE — 99214 PR OFFICE/OUTPT VISIT, EST, LEVL IV, 30-39 MIN: ICD-10-PCS | Mod: S$PBB,,, | Performed by: OPHTHALMOLOGY

## 2023-11-21 PROCEDURE — 99999 PR PBB SHADOW E&M-EST. PATIENT-LVL III: CPT | Mod: PBBFAC,,, | Performed by: OPHTHALMOLOGY

## 2023-11-21 PROCEDURE — 99999 PR PBB SHADOW E&M-EST. PATIENT-LVL III: ICD-10-PCS | Mod: PBBFAC,,, | Performed by: OPHTHALMOLOGY

## 2023-11-21 PROCEDURE — 99213 OFFICE O/P EST LOW 20 MIN: CPT | Mod: PBBFAC | Performed by: OPHTHALMOLOGY

## 2023-11-21 RX ORDER — PANTOPRAZOLE SODIUM 40 MG/1
TABLET, DELAYED RELEASE ORAL
COMMUNITY
Start: 2023-10-12

## 2023-11-21 NOTE — PROGRESS NOTES
Assessment /Plan     For exam results, see Encounter Report.    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      [employer] full scholarship  [employer] --> had internship with [employer]  --> Now honored at [employer] --> Internship with [employer]  Graduating May 2023 -->  at [employer] --> very proud    ==>   2022 AD // [place] --> BD 2022  Grieving --> Wedding anniversary today 07/10 -->  at 21 yo x 55 years    Grieving best friends x 2 loss / COVID May 2020  Difficult    2021 --> re-discussed  ==> Carcinoid Tumor Lung  ==> COVID Vaccine  ==> Guillain-[place] Syndrome  --> rehab      --> Reports No Glc drops during @ 7 week hospitalization      OHT  POAG OD > OS  Presented 2019  40 // 24    + FMHx Mother ( 94 yo) & Brother  Denies Blindness    ?? HVF taker --> follow OCT RNFL  Try HVF Faster    CCT  568 // 567    < 21 --> Achieved & discussed    Both eyes  --> tolerating well &  good adherence --> CSM  Xal q day  Cosopt BID    SP SLT OS 2020  --> consider SLT OD as discussed      NSC OU  CE PRN  MRx +275    Dry Eye Syndrome: discussed use of warm compresses, preserved & non-preserved artificial tears, gel and PM ointment options.  Also discussed options utilizing medications.        Hx Retinal Tear OD  RD precautions:  Discussed symptoms of RD with increased flashes, floaters, decreasing vision.  Patient/Family to call and return immediately to clinic should the symptoms of RD occur. Voiced good understanding with Q & A.      Plan  RTC 4 months gonio, IOP & DFE & HVF Faster  Adherence ---> then Gonio & OCT RNFL  RTC sooner prn with good understanding

## 2023-12-10 NOTE — TELEPHONE ENCOUNTER
No care due was identified.  [facility] Embedded Care Due Messages. Reference number: [id number].   12/09/2023 10:25:38 PM CST

## 2023-12-12 RX ORDER — LORAZEPAM 1 MG/1
TABLET ORAL
Qty: 60 TABLET | Refills: 2 | Status: SHIPPED | OUTPATIENT
Start: 2023-12-12

## 2024-01-05 ENCOUNTER — TELEPHONE (OUTPATIENT)
Dept: INFECTIOUS DISEASES | Facility: CLINIC | Age: 74
End: 2024-01-05
Payer: MEDICARE

## 2024-01-05 NOTE — TELEPHONE ENCOUNTER
----- Message from Rita Morrison sent at 1/5/2024  8:25 AM CST -----  Regarding: pt experiencing symptoms  Contact: pt @ [phone]  Pt is calling to speak to someone in the office to discuss symptoms they are having. Pt is asking for a call back today. Please call to advise. Thanks.         Symptoms: aches, a dry cough (clear mucus) and pain         How long has patient had these symptoms: 72hours            Additional Information: no fever and oxygen is good

## 2024-01-05 NOTE — TELEPHONE ENCOUNTER
Pt states that she has taken Tylenol for the aches and she hasn't had any fever. She states she has a dry cough and chills as well. She states she was told to call you if she was not feeling well.

## 2024-01-06 ENCOUNTER — OFFICE VISIT (OUTPATIENT)
Dept: URGENT CARE | Facility: CLINIC | Age: 74
End: 2024-01-06
Payer: MEDICARE

## 2024-01-06 VITALS
BODY MASS INDEX: 29.32 KG/M2 | OXYGEN SATURATION: 96 % | RESPIRATION RATE: 18 BRPM | WEIGHT: 176 LBS | HEIGHT: 65 IN | TEMPERATURE: 98 F | DIASTOLIC BLOOD PRESSURE: 83 MMHG | HEART RATE: 88 BPM | SYSTOLIC BLOOD PRESSURE: 129 MMHG

## 2024-01-06 DIAGNOSIS — R05.9 COUGH, UNSPECIFIED TYPE: Primary | ICD-10-CM

## 2024-01-06 PROCEDURE — 87502 INFLUENZA DNA AMP PROBE: CPT | Mod: QW,S$GLB,, | Performed by: NURSE PRACTITIONER

## 2024-01-06 PROCEDURE — 99214 OFFICE O/P EST MOD 30 MIN: CPT | Mod: S$GLB,,, | Performed by: NURSE PRACTITIONER

## 2024-01-06 PROCEDURE — 87811 SARS-COV-2 COVID19 W/OPTIC: CPT | Mod: QW,S$GLB,, | Performed by: NURSE PRACTITIONER

## 2024-01-06 RX ORDER — MONTELUKAST SODIUM 10 MG/1
10 TABLET ORAL NIGHTLY
Qty: 30 TABLET | Refills: 0 | Status: SHIPPED | OUTPATIENT
Start: 2024-01-06 | End: 2024-02-05

## 2024-01-06 RX ORDER — BENZONATATE 200 MG/1
200 CAPSULE ORAL 3 TIMES DAILY PRN
Qty: 30 CAPSULE | Refills: 0 | Status: SHIPPED | OUTPATIENT
Start: 2024-01-06 | End: 2024-01-16

## 2024-01-06 RX ORDER — ROSUVASTATIN CALCIUM 40 MG/1
40 TABLET, COATED ORAL
COMMUNITY
Start: 2023-11-07

## 2024-01-06 NOTE — PROGRESS NOTES
"Subjective:      Patient ID: Nan Palafox is a 73 y.o. female.    Vitals:  height is 5' 5" (1.651 m) and weight is 79.8 kg (176 lb). Her oral temperature is 98.2 °F (36.8 °C). Her blood pressure is 129/83 and her pulse is 88. Her respiration is 18 and oxygen saturation is 96%.     Chief Complaint: Cough    Pt. Presents c.o. cough.Symps include bodyaches, and chills. Symps began 1 weeks ago.    Cough  The current episode started in the past 7 days. The problem has been unchanged. The problem occurs constantly. The cough is Productive of sputum. Pertinent negatives include no chest pain or chills. Nothing aggravates the symptoms. Treatments tried: night quil. The treatment provided mild relief. There is no history of asthma, bronchiectasis, bronchitis, COPD, emphysema, environmental allergies or pneumonia. lung cancer       Constitution: Negative for chills.   Neck: neck negative.   Cardiovascular:  Negative for chest pain.   Respiratory:  Positive for cough.    Allergic/Immunologic: Negative for environmental allergies.      Objective:     Physical Exam   HENT:   Head: Normocephalic.   Ears:   Right Ear: Tympanic membrane, external ear and ear canal normal.   Left Ear: Tympanic membrane, external ear and ear canal normal.   Nose: Nose normal.   Mouth/Throat: Mucous membranes are moist. Oropharynx is clear.   Cardiovascular: Normal rate and regular rhythm.   Pulmonary/Chest: Effort normal and breath sounds normal.   Neurological: She is alert.       Assessment:     1. Cough, unspecified type        Plan:       Cough, unspecified type  -     SARS Coronavirus 2 Antigen, POCT Manual Read  -     POCT Influenza A/B MOLECULAR  -     montelukast (SINGULAIR) 10 mg tablet; Take 1 tablet (10 mg total) by mouth every evening.  Dispense: 30 tablet; Refill: 0  -     benzonatate (TESSALON) 200 MG capsule; Take 1 capsule (200 mg total) by mouth 3 (three) times daily as needed.  Dispense: 30 capsule; Refill: 0        Results for " orders placed or performed in visit on 01/06/24   SARS Coronavirus 2 Antigen, POCT Manual Read   Result Value Ref Range    SARS Coronavirus 2 Antigen Negative Negative     Acceptable Yes    POCT Influenza A/B MOLECULAR   Result Value Ref Range    POC Molecular Influenza A Ag Negative Negative, Not Reported    POC Molecular Influenza B Ag Negative Negative, Not Reported     Acceptable Yes       Patient Instructions   To help you feel better:  Use a cool mist humidifier to avoid breathing dry air.  Use hard candy or cough drops to soothe sore throat and cough.  Gargle with salt water (mix 1/2 teaspoon salt with 1 cup warm water) a few times a day.  Spray saltwater mist in each nostril. Any normal saline spray works.  Sip warm liquids to keep your throat moist.  Take warm, steamy showers to help soothe the cough.

## 2024-01-06 NOTE — PATIENT INSTRUCTIONS
To help you feel better:  Use a cool mist humidifier to avoid breathing dry air.  Use hard candy or cough drops to soothe sore throat and cough.  Gargle with salt water (mix 1/2 teaspoon salt with 1 cup warm water) a few times a day.  Spray saltwater mist in each nostril. Any normal saline spray works.  Sip warm liquids to keep your throat moist.  Take warm, steamy showers to help soothe the cough.

## 2024-01-06 NOTE — TELEPHONE ENCOUNTER
Had cough.  Whole body was aching.  She was having chills.      Plan  Advised patient to go to urgent care and get tested for COVID and influenza.

## 2024-01-16 NOTE — PROGRESS NOTES
NEW PATIENT VISIT    Subjective:      Chief Complaint: Consult      HPI: Nan Palafox is a 73 y.o. 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)     History of gastrointestinal stromal tumor (GIST) 12/02/2020    Hypertension     Malignant carcinoid tumor of bronchus and lung 12/02/2020    Nuclear sclerosis - Both Eyes 02/08/2013    PNA (pneumonia)     Primary open angle glaucoma (POAG) of both eyes, moderate stage 03/11/2019     S/p RML lung lobectomy    Regarding Thyroid Nodule:    - Thyroid nodules originally found on Ultrasound  - Preexisting thyroid disease: no  - Most recent thyroid ultrasound:       - Symptoms  No   Yes    [x]    []   Weight changes  [x]    []   BM changes  [x]    []   Tremor  [x]    []   Temperature Intolerance  [x]    []   Palpitations  [x]    []   Proximal Muscle weakness  [x]    []   Hair loss/Brittle Nails/Dry skin  [x]    []   Neck swelling, pain, pressure  [x]    []   Periorbital swelling  [x]    []   Hoarseness    - Denies radiation to the head/neck, personal history of colon or breast cancer, tobacco use, or family history of thyroid cancer  - Denies  Lithium or Amiodarone use, h/o chemotherapy, recent severe illness, exogenous thyroid medication    Lab Results   Component Value Date    TSH 0.953 05/04/2023    TSH 0.956 09/06/2022    TSH 0.880 08/21/2021       Previous biopsy results:   - Left nodule 10/12/21: benign follicular nodule   - Right nodule:   - Isthmus nodules:       ROS: See HPI    Past Medical History:   Diagnosis Date    Arthritis     Cataract     Cervical spondylosis with radiculopathy 04/11/2016    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)     History of gastrointestinal stromal tumor (GIST) 12/02/2020    Hypertension     Malignant carcinoid tumor of bronchus and lung 12/02/2020    Nuclear sclerosis - Both Eyes 02/08/2013    PNA  "(pneumonia)     Primary open angle glaucoma (POAG) of both eyes, moderate stage 03/11/2019       Past Surgical History:   Procedure Laterality Date    BREAST BIOPSY Right     excisional bx    BREAST SURGERY Right     Excisional bx    HYSTERECTOMY      INJECTION OF ANESTHETIC AGENT AROUND MULTIPLE INTERCOSTAL NERVES Right 01/04/2021    Procedure: BLOCK, NERVE, INTERCOSTAL, 2 OR MORE;  Surgeon: Ru Hernandez MD;  Location: [facility] OR [address] [place] [address];  Service: Thoracic;  Laterality: Right;    RETINAL DETACHMENT SURGERY      patient states that she had a retinal tear that was repaired in the past at [facility] but she is uncertain of which eye    STOMACH SURGERY      SURGICAL REMOVAL OF LYMPH NODE N/A 01/04/2021    Procedure: EXCISION, LYMPH NODE;  Surgeon: Ru Hernandez MD;  Location: [facility] OR [address] [place] [address];  Service: Thoracic;  Laterality: N/A;    TOTAL KNEE ARTHROPLASTY Right     XI ROBOTIC RATS,WITH LOBECTOMY,LUNG Right 01/04/2021    Procedure: XI ROBOTIC RATS,WITH LOBECTOMY,LUNG;  Surgeon: Ru Hernandez MD;  Location: [facility] OR [address] [place] [address];  Service: Thoracic;  Laterality: Right;  Right middle lobectomy.       Reviewed past medical, family, social history and updated as appropriate.    Objective:     /70 (BP Location: Right arm, Patient Position: Sitting, BP Method: Medium (Manual))   Ht 5' 5" (1.651 m)   Wt 83.7 kg (184 lb 8.4 oz)   BMI 30.71 kg/m²     BP Readings from Last 5 Encounters:   01/18/24 130/70   01/06/24 129/83   08/21/23 108/68   08/09/23 126/76   07/26/23 114/84       Body mass index is 30.71 kg/m².    Wt Readings from Last 3 Encounters:   01/18/24 1043 83.7 kg (184 lb 8.4 oz)   01/06/24 1048 79.8 kg (176 lb)   10/24/23 0851 79.8 kg (176 lb)       Physical Exam  Constitutional:       Appearance: She is not ill-appearing.   HENT:      Nose: Nose normal.   Eyes:      Conjunctiva/sclera: Conjunctivae normal.   Neck:      Comments: No thyromegaly, no goiter, no palpable nodule  Cardiovascular:      Rate " "and Rhythm: Normal rate.      Pulses: Normal pulses.   Pulmonary:      Effort: Pulmonary effort is normal. No respiratory distress.   Musculoskeletal:         General: Normal range of motion.      Cervical back: Neck supple.      Right lower leg: No edema.      Left lower leg: No edema.   Neurological:      Mental Status: She is alert. Mental status is at baseline.   Psychiatric:         Mood and Affect: Mood normal.         Behavior: Behavior normal.         Lab Review:   Lab Results   Component Value Date    HGBA1C 5.7 (H) 05/09/2023     Lab Results   Component Value Date    CHOL 180 05/09/2023    HDL 76 (H) 05/09/2023    LDLCALC 95.8 05/09/2023    TRIG 41 05/09/2023    CHOLHDL 42.2 05/09/2023     Lab Results   Component Value Date     05/04/2023    K 3.7 05/04/2023    CL 99 05/04/2023    CO2 30 (H) 05/04/2023    GLU 98 05/04/2023    BUN 10 05/04/2023    CREATININE 0.7 05/04/2023    CALCIUM 9.9 05/04/2023    PROT 8.3 05/04/2023    ALBUMIN 4.2 05/04/2023    BILITOT 0.4 05/04/2023    ALKPHOS 52 (L) 05/04/2023    AST 22 05/04/2023    ALT 14 05/04/2023    ANIONGAP 8 05/04/2023    ESTGFRAFRICA >60.0 04/04/2022    EGFRNONAA >60.0 04/04/2022    TSH 0.953 05/04/2023     No results found for: "[id number]"  Lab Results   Component Value Date    WBC 4.54 05/04/2023    HGB 11.7 (L) 05/04/2023    HCT 36.1 (L) 05/04/2023    MCV 84 05/04/2023     05/04/2023         Assessment/Plan:     Problem List Items Addressed This Visit          Neuro    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  Thyroid function has been unremarkable    Repeat thyroid ultrasound in 2023 showed no significant changes in size. No indication to biopsy again.    She needs surveillance ultrasound in 2025 and can continue follow up with her PCP for this.             No follow-ups on file.

## 2024-01-18 ENCOUNTER — OFFICE VISIT (OUTPATIENT)
Dept: ENDOCRINOLOGY | Facility: CLINIC | Age: 74
End: 2024-01-18
Payer: MEDICARE

## 2024-01-18 VITALS
WEIGHT: 184.5 LBS | DIASTOLIC BLOOD PRESSURE: 70 MMHG | BODY MASS INDEX: 30.74 KG/M2 | SYSTOLIC BLOOD PRESSURE: 130 MMHG | HEIGHT: 65 IN

## 2024-01-18 DIAGNOSIS — Z86.69 HISTORY OF GUILLAIN-BARRE SYNDROME: Primary | ICD-10-CM

## 2024-01-18 DIAGNOSIS — R73.03 PREDIABETES: ICD-10-CM

## 2024-01-18 DIAGNOSIS — E04.1 THYROID NODULE: ICD-10-CM

## 2024-01-18 PROCEDURE — 99214 OFFICE O/P EST MOD 30 MIN: CPT | Mod: PBBFAC | Performed by: STUDENT IN AN ORGANIZED HEALTH CARE EDUCATION/TRAINING PROGRAM

## 2024-01-18 PROCEDURE — 99999 PR PBB SHADOW E&M-EST. PATIENT-LVL IV: CPT | Mod: PBBFAC,GC,, | Performed by: STUDENT IN AN ORGANIZED HEALTH CARE EDUCATION/TRAINING PROGRAM

## 2024-01-18 PROCEDURE — 99204 OFFICE O/P NEW MOD 45 MIN: CPT | Mod: S$PBB,GC,, | Performed by: STUDENT IN AN ORGANIZED HEALTH CARE EDUCATION/TRAINING PROGRAM

## 2024-01-18 NOTE — ASSESSMENT & PLAN NOTE
Lab Results   Component Value Date    HGBA1C 5.7 (H) 05/09/2023     Recommended dietary modifications, but no indication to start treatment at this time

## 2024-01-18 NOTE — ASSESSMENT & PLAN NOTE
Subcentimeter thyroid nodule identified in 2021 at which time was biopsied due to high risk in the setting of lung CA and was found to be a benign follicular nodule.  Thyroid function has been unremarkable    Repeat thyroid ultrasound in 2023 showed no significant changes in size. No indication to biopsy again.    She needs surveillance ultrasound in 2025 and can continue follow up with her PCP for this.

## 2024-01-24 ENCOUNTER — OFFICE VISIT (OUTPATIENT)
Dept: OBSTETRICS AND GYNECOLOGY | Facility: CLINIC | Age: 74
End: 2024-01-24
Payer: MEDICARE

## 2024-01-24 VITALS
WEIGHT: 183.19 LBS | DIASTOLIC BLOOD PRESSURE: 86 MMHG | BODY MASS INDEX: 30.49 KG/M2 | SYSTOLIC BLOOD PRESSURE: 134 MMHG

## 2024-01-24 DIAGNOSIS — E66.3 OVERWEIGHT (BMI 25.0-29.9): ICD-10-CM

## 2024-01-24 DIAGNOSIS — H40.1132 PRIMARY OPEN ANGLE GLAUCOMA (POAG) OF BOTH EYES, MODERATE STAGE: ICD-10-CM

## 2024-01-24 DIAGNOSIS — I10 PRIMARY HYPERTENSION: ICD-10-CM

## 2024-01-24 DIAGNOSIS — R73.03 PREDIABETES: ICD-10-CM

## 2024-01-24 DIAGNOSIS — Z86.69 HISTORY OF GUILLAIN-BARRE SYNDROME: ICD-10-CM

## 2024-01-24 DIAGNOSIS — Z12.31 VISIT FOR SCREENING MAMMOGRAM: ICD-10-CM

## 2024-01-24 DIAGNOSIS — Z01.419 ENCOUNTER FOR GYNECOLOGICAL EXAMINATION WITHOUT ABNORMAL FINDING: Primary | ICD-10-CM

## 2024-01-24 PROCEDURE — 99213 OFFICE O/P EST LOW 20 MIN: CPT | Mod: PBBFAC | Performed by: OBSTETRICS & GYNECOLOGY

## 2024-01-24 PROCEDURE — 99999 PR PBB SHADOW E&M-EST. PATIENT-LVL III: CPT | Mod: PBBFAC,,, | Performed by: OBSTETRICS & GYNECOLOGY

## 2024-01-24 PROCEDURE — G0101 CA SCREEN;PELVIC/BREAST EXAM: HCPCS | Mod: S$PBB,GZ,, | Performed by: OBSTETRICS & GYNECOLOGY

## 2024-01-24 NOTE — PROGRESS NOTES
HISTORY OF PRESENT ILLNESS:    Nan Palafox is a 74 y.o. female, , No LMP recorded. Patient has had a hysterectomy.,  presents for a routine exam and has no complaints.  Patient's history reviewed and updated.  She is in good spirits today.    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)     History of gastrointestinal stromal tumor (GIST) 2020    Hypertension     Malignant carcinoid tumor of bronchus and lung 2020    Nuclear sclerosis - Both Eyes 2013    PNA (pneumonia)     Primary open angle glaucoma (POAG) of both eyes, moderate stage 2019       Past Surgical History:   Procedure Laterality Date    BREAST BIOPSY Right     excisional bx    BREAST SURGERY Right     Excisional bx    HYSTERECTOMY      INJECTION OF ANESTHETIC AGENT AROUND MULTIPLE INTERCOSTAL NERVES Right 2021    Procedure: BLOCK, NERVE, INTERCOSTAL, 2 OR MORE;  Surgeon: Ru Hernandez MD;  Location: [facility] OR [address] [place] [address];  Service: Thoracic;  Laterality: Right;    RETINAL DETACHMENT SURGERY      patient states that she had a retinal tear that was repaired in the past at [facility] but she is uncertain of which eye    STOMACH SURGERY      SURGICAL REMOVAL OF LYMPH NODE N/A 2021    Procedure: EXCISION, LYMPH NODE;  Surgeon: Ru Hernandez MD;  Location: [facility] OR [address] [place] [address];  Service: Thoracic;  Laterality: N/A;    TOTAL KNEE ARTHROPLASTY Right     XI ROBOTIC RATS,WITH LOBECTOMY,LUNG Right 2021    Procedure: XI ROBOTIC RATS,WITH LOBECTOMY,LUNG;  Surgeon: Ru Hernandez MD;  Location: [facility] OR [address] [place] [address];  Service: Thoracic;  Laterality: Right;  Right middle lobectomy.       MEDICATIONS AND ALLERGIES:      Current Outpatient Medications:     amLODIPine (NORVASC) 10 MG tablet, Take 10 mg by mouth., Disp: , Rfl:     ASCORBATE CALCIUM (VITAMIN C ORAL), Take 1 tablet by mouth once daily at 6am., Disp:  , Rfl:     aspirin (ECOTRIN) 81 MG EC tablet, Take 81 mg by mouth once daily., Disp: , Rfl:     dorzolamide-timolol 2-0.5% (COSOPT) 22.3-6.8 mg/mL ophthalmic solution, INSTILL 1 DROP INTO BOTH EYES TWICE DAILY, Disp: 30 mL, Rfl: 3    fluticasone (FLONASE) 50 mcg/actuation nasal spray, 2 sprays by Each Nare route once daily., Disp: 1 Bottle, Rfl: 12    latanoprost 0.005 % ophthalmic solution, INSTILL 1 DROP IN BOTH EYES EVERY EVENING, Disp: 7.5 mL, Rfl: 4    LORazepam (ATIVAN) 1 MG tablet, TAKE 1 TABLET(1 MG) BY MOUTH EVERY 8 HOURS AS NEEDED, Disp: 60 tablet, Rfl: 2    multivitamin (THERAGRAN) tablet, Take 1 tablet by mouth once daily., Disp: , Rfl:     rosuvastatin (CRESTOR) 20 MG tablet, Take 1 tablet (20 mg total) by mouth once daily., Disp: 90 tablet, Rfl: 3    valsartan-hydrochlorothiazide (DIOVAN-HCT) 320-25 mg per tablet, Take 1 tablet by mouth once daily., Disp: , Rfl:     vitamin D (VITAMIN D3) 1000 units Tab, Take 1,000 Units by mouth once daily., Disp: , Rfl:     ZINC ORAL, Take by mouth 3 (three) times daily., Disp: , Rfl:     cetirizine (ZYRTEC) 10 MG tablet, Take 1 tablet (10 mg total) by mouth once daily. (Patient not taking: Reported on 1/24/2024), Disp: 30 tablet, Rfl: 0    hydrOXYzine pamoate (VISTARIL) 25 MG Cap, Take 50 mg by mouth every 8 (eight) hours as needed., Disp: , Rfl:     montelukast (SINGULAIR) 10 mg tablet, Take 1 tablet (10 mg total) by mouth every evening. (Patient not taking: Reported on 1/24/2024), Disp: 30 tablet, Rfl: 0    pantoprazole (PROTONIX) 40 MG tablet, TAKE 1 TABLET BY MOUTH EVERY MORNING 30 MINUTES BEFORE BREAKFAST., Disp: , Rfl:     promethazine-dextromethorphan (PROMETHAZINE-DM) 6.25-15 mg/5 mL Syrp, Take 5 mLs by mouth nightly as needed (cough). (Patient not taking: Reported on 1/24/2024), Disp: 118 mL, Rfl: 0    rosuvastatin (CRESTOR) 40 MG Tab, Take 40 mg by mouth., Disp: , Rfl:     Review of patient's allergies indicates:   Allergen Reactions    Iodine Hives        Family History   Problem Relation Age of Onset    Glaucoma Mother     Cataracts Mother     Blindness Mother     Cancer Mother 68        colon, myeloma    No Known Problems Father     No Known Problems Sister     Glaucoma Brother     Cataracts Brother     Cancer Brother         esophageal    No Known Problems Maternal Aunt     No Known Problems Maternal Uncle     No Known Problems Paternal Aunt     No Known Problems Paternal Uncle     No Known Problems Maternal Grandmother     No Known Problems Maternal Grandfather     No Known Problems Paternal Grandmother     No Known Problems Paternal Grandfather     Amblyopia Neg Hx     Macular degeneration Neg Hx     Retinal detachment Neg Hx     Strabismus Neg Hx     Diabetes Neg Hx     Hypertension Neg Hx     Stroke Neg Hx     Thyroid disease Neg Hx     Breast cancer Neg Hx     Ovarian cancer Neg Hx        Social History     Socioeconomic History    Marital status:    Tobacco Use    Smoking status: Never    Smokeless tobacco: Never   Substance and Sexual Activity    Alcohol use: Yes     Comment: wine 3 glasses a week / weekend 3 glasses of champagne    Drug use: Never    Sexual activity: Not Currently     Partners: Male     Social Determinants of Health     Financial Resource Strain: Patient Declined (5/4/2023)    Overall Financial Resource Strain (CARDIA)     Difficulty of Paying Living Expenses: Patient declined   Food Insecurity: No Food Insecurity (5/4/2023)    Hunger Vital Sign     Worried About Running Out of Food in the Last Year: Never true     Ran Out of Food in the Last Year: Never true   Transportation Needs: No Transportation Needs (5/4/2023)    PRAPARE - Transportation     Lack of Transportation (Medical): No     Lack of Transportation (Non-Medical): No   Physical Activity: Unknown (9/16/2020)    Exercise Vital Sign     Days of Exercise per Week: 0 days   Stress: No Stress Concern Present (5/4/2023)    [employer] -  Occupational Stress Questionnaire     Feeling of Stress : Not at all   Social Connections: Unknown (5/4/2023)    Social Connection and Isolation Panel [NHANES]     Marital Status:    Housing Stability: Unknown (5/4/2023)    Housing Stability Vital Sign     Unable to Pay for Housing in the Last Year: No     Unstable Housing in the Last Year: No       COMPREHENSIVE GYN HISTORY:  PAP History: Denies abnormal Paps.  Infection History: Denies STDs. Denies PID.  Benign History: Denies uterine fibroids. Denies ovarian cysts. Denies endometriosis. Denies other conditions.  Cancer History: Denies cervical cancer. Denies uterine cancer or hyperplasia. Denies ovarian cancer. Denies vulvar cancer or pre-cancer. Denies vaginal cancer or pre-cancer. Denies breast cancer. Denies colon cancer.  Sexual Activity History: Reports currently being sexually active  Menstrual History: Monthly. Mod then light flow.   Dysmenorrhea History: Reports mild dysmenorrhea.       ROS:  GENERAL: No weight changes. No swelling. No fatigue. No fever.  CARDIOVASCULAR: No chest pain. No shortness of breath. No leg cramps.   NEUROLOGICAL: No headaches. No vision changes.  BREASTS: No pain. No lumps. No discharge.  ABDOMEN: No pain. No nausea. No vomiting. No diarrhea. No constipation.  REPRODUCTIVE: No abnormal bleeding.   VULVA: No pain. No lesions. No itching.  VAGINA: No relaxation. No itching. No odor. No discharge. No lesions.  URINARY: No incontinence. No nocturia. No frequency. No dysuria.    /86   Wt 83.1 kg (183 lb 3.2 oz)   BMI 30.49 kg/m²     PE:  APPEARANCE: Well nourished, well developed, in no acute distress.  AFFECT: WNL, alert and oriented x 3.  SKIN: No acne or hirsutism.  NECK: Neck symmetric, without masses or thyromegaly.  NODES: No inguinal, cervical, axillary or femoral lymph node enlargement.  CHEST: Good respiratory effort.   ABDOMEN: Soft. No tenderness or masses. No hepatosplenomegaly. No hernias.  BREASTS:  Symmetrical, no skin changes, visible lesions, palpable masses or nipple discharge bilaterally.  PELVIC: External female genitalia without lesions.  Female hair distribution. Adequate perineal body, Normal urethral meatus. Vagina moist and well rugated without lesions or discharge.  No significant cystocele or rectocele present. Cervix pink without lesions, discharge or tenderness. Uterus is 4-6 week size, regular, mobile and nontender. Adnexa without masses or tenderness.  EXTREMITIES: No edema    DIAGNOSIS:  1. Encounter for gynecological examination without abnormal finding    2. Visit for screening mammogram    3. History of Guillain-[place] syndrome    4. Primary open angle glaucoma (POAG) of both eyes, moderate stage    5. Primary hypertension    6. Overweight (BMI 25.0-29.9)    7. Prediabetes      PLAN:    COUNSELING:  The patient was counseled today on:  -A.C.S. Pap and pelvic exam guidelines (pap every 3 years), recomendations for yearly mammogram;  -to follow up with her PCP for other health maintenance.    FOLLOW-UP with me annually.

## 2024-01-26 ENCOUNTER — TELEPHONE (OUTPATIENT)
Dept: INTERNAL MEDICINE | Facility: CLINIC | Age: 74
End: 2024-01-26
Payer: MEDICARE

## 2024-01-26 NOTE — PROGRESS NOTES
I have reviewed and concur with Dr. Chen's history, physical, assessment, and plan.  I have personally interviewed and examined the patient.      Tawanna Garcia MD

## 2024-01-26 NOTE — PROGRESS NOTES
Nan Palafox  1950        Subjective     Chief Complaint: Cough    History of Present Illness:  Ms. Nan Palafox is a 74 y.o. female who presents to clinic for cough.  Started 3-4 weeks ago. Seen at  and was Covd and Flu negative.  Given singulair and tessalon but only received tessalon.  No fever but chills. Fatigued.   +sputum at night (clear). Cough worse during the day.   No sore throat, no sinus congestion.  Not worse but not better.    Also had hx of carcinoid of the lung s/p right middle lobectomy.  Had a cough back then too.    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    Hx of Glaucoma, on Coopt eye drops.       Review of Systems   Constitutional:  Positive for chills and malaise/fatigue. Negative for fever.   HENT:  Negative for congestion, sinus pain and sore throat.    Respiratory:  Positive for cough, sputum production and wheezing.    Cardiovascular:  Negative for chest pain.   Gastrointestinal:  Negative for abdominal pain, diarrhea, nausea and vomiting.   Neurological:  Negative for speech change.   Psychiatric/Behavioral:  The patient is nervous/anxious.         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)     History of gastrointestinal stromal tumor (GIST) 12/02/2020    Hypertension     Malignant carcinoid tumor of bronchus and lung 12/02/2020    Nuclear sclerosis - Both Eyes 02/08/2013    PNA (pneumonia)     Primary open angle glaucoma (POAG) of both eyes, moderate stage 03/11/2019       Past Surgical History:   Procedure Laterality Date    BREAST BIOPSY Right     excisional bx    BREAST SURGERY Right     Excisional bx    HYSTERECTOMY      INJECTION OF ANESTHETIC AGENT AROUND MULTIPLE INTERCOSTAL NERVES Right 01/04/2021    Procedure: BLOCK, NERVE, INTERCOSTAL, 2 OR MORE;  Surgeon: Ru Hernandez MD;   [facility];  Service: Thoracic;  Laterality: Right;    RETINAL DETACHMENT SURGERY      patient states that she had a retinal tear that was repaired in the past at [facility] but she is uncertain of which eye    STOMACH SURGERY      SURGICAL REMOVAL OF LYMPH NODE N/A 01/04/2021    Procedure: EXCISION, LYMPH NODE;  Surgeon: Ru Hernandez MD;  Location: [facility] OR [address] [place] [address];  Service: Thoracic;  Laterality: N/A;    TOTAL KNEE ARTHROPLASTY Right     XI ROBOTIC RATS,WITH LOBECTOMY,LUNG Right 01/04/2021    Procedure: XI ROBOTIC RATS,WITH LOBECTOMY,LUNG;  Surgeon: Ru Hernandez MD;  Location: [facility] OR [address] [place] [address];  Service: Thoracic;  Laterality: Right;  Right middle lobectomy.       Family History   Problem Relation Age of Onset    Glaucoma Mother     Cataracts Mother     Blindness Mother     Cancer Mother 68        colon, myeloma    No Known Problems Father     No Known Problems Sister     Glaucoma Brother     Cataracts Brother     Cancer Brother         esophageal    No Known Problems Maternal Aunt     No Known Problems Maternal Uncle     No Known Problems Paternal Aunt     No Known Problems Paternal Uncle     No Known Problems Maternal Grandmother     No Known Problems Maternal Grandfather     No Known Problems Paternal Grandmother     No Known Problems Paternal Grandfather     Amblyopia Neg Hx     Macular degeneration Neg Hx     Retinal detachment Neg Hx     Strabismus Neg Hx     Diabetes Neg Hx     Hypertension Neg Hx     Stroke Neg Hx     Thyroid disease Neg Hx     Breast cancer Neg Hx     Ovarian cancer Neg Hx        Social History     Socioeconomic History    Marital status:    Tobacco Use    Smoking status: Never    Smokeless tobacco: Never   Substance and Sexual Activity    Alcohol use: Yes     Comment: wine 3 glasses a week / weekend 3 glasses of champagne    Drug use: Never    Sexual activity: Not Currently     Partners: Male     Social Determinants of Health     Financial Resource Strain:  Patient Declined (5/4/2023)    Overall Financial Resource Strain (CARDIA)     Difficulty of Paying Living Expenses: Patient declined   Food Insecurity: No Food Insecurity (5/4/2023)    Hunger Vital Sign     Worried About Running Out of Food in the Last Year: Never true     Ran Out of Food in the Last Year: Never true   Transportation Needs: No Transportation Needs (5/4/2023)    PRAPARE - Transportation     Lack of Transportation (Medical): No     Lack of Transportation (Non-Medical): No   Physical Activity: Unknown (9/16/2020)    Exercise Vital Sign     Days of Exercise per Week: 0 days   Stress: No Stress Concern Present (5/4/2023)    [employer] [place] [employer] - Occupational Stress Questionnaire     Feeling of Stress : Not at all   Social Connections: Unknown (5/4/2023)    Social Connection and Isolation Panel [NHANES]     Marital Status:    Housing Stability: Unknown (5/4/2023)    Housing Stability Vital Sign     Unable to Pay for Housing in the Last Year: No     Unstable Housing in the Last Year: No       MEDICATIONS & ALLERGIES:     Current Outpatient Medications on File Prior to Visit   Medication Sig    amLODIPine (NORVASC) 10 MG tablet Take 10 mg by mouth.    ASCORBATE CALCIUM (VITAMIN C ORAL) Take 1 tablet by mouth once daily at 6am.    aspirin (ECOTRIN) 81 MG EC tablet Take 81 mg by mouth once daily.    cetirizine (ZYRTEC) 10 MG tablet Take 1 tablet (10 mg total) by mouth once daily. (Patient not taking: Reported on 1/24/2024)    dorzolamide-timolol 2-0.5% (COSOPT) 22.3-6.8 mg/mL ophthalmic solution INSTILL 1 DROP INTO BOTH EYES TWICE DAILY    fluticasone (FLONASE) 50 mcg/actuation nasal spray 2 sprays by Each Nare route once daily.    hydrOXYzine pamoate (VISTARIL) 25 MG Cap Take 50 mg by mouth every 8 (eight) hours as needed.    latanoprost 0.005 % ophthalmic solution INSTILL 1 DROP IN BOTH EYES EVERY EVENING    LORazepam (ATIVAN) 1 MG tablet TAKE 1 TABLET(1 MG) BY MOUTH EVERY 8  HOURS AS NEEDED    montelukast (SINGULAIR) 10 mg tablet Take 1 tablet (10 mg total) by mouth every evening. (Patient not taking: Reported on 1/24/2024)    multivitamin (THERAGRAN) tablet Take 1 tablet by mouth once daily.    pantoprazole (PROTONIX) 40 MG tablet TAKE 1 TABLET BY MOUTH EVERY MORNING 30 MINUTES BEFORE BREAKFAST.    promethazine-dextromethorphan (PROMETHAZINE-DM) 6.25-15 mg/5 mL Syrp Take 5 mLs by mouth nightly as needed (cough). (Patient not taking: Reported on 1/24/2024)    rosuvastatin (CRESTOR) 20 MG tablet Take 1 tablet (20 mg total) by mouth once daily.    rosuvastatin (CRESTOR) 40 MG Tab Take 40 mg by mouth.    valsartan-hydrochlorothiazide (DIOVAN-HCT) 320-25 mg per tablet Take 1 tablet by mouth once daily.    vitamin D (VITAMIN D3) 1000 units Tab Take 1,000 Units by mouth once daily.    ZINC ORAL Take by mouth 3 (three) times daily.     No current facility-administered medications on file prior to visit.       Review of patient's allergies indicates:   Allergen Reactions    Iodine Hives       OBJECTIVE:     Vital Signs:  Vitals:    01/27/24 0830 01/27/24 0844   BP: 120/70    Pulse:  80   Temp:  98.7 °F (37.1 °C)       There is no height or weight on file to calculate BMI.     Physical Exam:  Physical Exam  Vitals and nursing note reviewed.   Constitutional:       General: She is not in acute distress.     Appearance: Normal appearance. She is not ill-appearing, toxic-appearing or diaphoretic.      Comments: Well-appearing  Speaking full sentences without issue  No accessory muscle usage   HENT:      Head: Normocephalic and atraumatic.      Right Ear: Tympanic membrane and ear canal normal. There is impacted cerumen.      Left Ear: Tympanic membrane, ear canal and external ear normal. There is no impacted cerumen.      Mouth/Throat:      Mouth: Mucous membranes are moist.      Pharynx: Oropharynx is clear. No oropharyngeal exudate or posterior oropharyngeal erythema.   Eyes:      General: No  scleral icterus.        Right eye: No discharge.         Left eye: No discharge.      Conjunctiva/sclera: Conjunctivae normal.   Cardiovascular:      Rate and Rhythm: Normal rate and regular rhythm.      Pulses: Normal pulses.      Heart sounds: Normal heart sounds. No murmur heard.  Pulmonary:      Effort: Pulmonary effort is normal. No respiratory distress.      Breath sounds: Normal breath sounds. No stridor. No wheezing, rhonchi or rales.   Musculoskeletal:         General: Tenderness (No redness or asymmetry noted) present. Normal range of motion.      Cervical back: Normal range of motion.      Right lower leg: No edema.      Left lower leg: No edema.   Skin:     General: Skin is warm and dry.   Neurological:      Mental Status: She is alert and oriented to person, place, and time.      Gait: Gait normal.   Psychiatric:         Mood and Affect: Mood and affect normal.         Behavior: Behavior normal.            Laboratory  Lab Results   Component Value Date    WBC 4.54 05/04/2023    HGB 11.7 (L) 05/04/2023    HCT 36.1 (L) 05/04/2023    MCV 84 05/04/2023     05/04/2023     Lab Results   Component Value Date    GLU 98 05/04/2023     05/04/2023    K 3.7 05/04/2023    CL 99 05/04/2023    CO2 30 (H) 05/04/2023    BUN 10 05/04/2023    CREATININE 0.7 05/04/2023    CALCIUM 9.9 05/04/2023    MG 1.7 02/10/2021     Lab Results   Component Value Date    INR 1.1 11/23/2020    INR 1.1 11/23/2020    INR 1.1 11/18/2015     Lab Results   Component Value Date    HGBA1C 5.7 (H) 05/09/2023           Health Maintenance         Date Due Completion Date    RSV Vaccine (Age 60+ and Pregnant patients) (1 - 1-dose 60+ series) Never done ---    Influenza Vaccine (1) 09/01/2023 10/3/2020    Hemoglobin A1c (Prediabetes) 08/03/2024 8/3/2023    DEXA Scan 10/19/2024 10/19/2022    High Dose Statin 01/24/2025 1/24/2024    Aspirin/Antiplatelet Therapy 01/24/2025 1/24/2024    TETANUS VACCINE 05/14/2026 5/14/2016    Colorectal  Cancer Screening 11/18/2027 11/18/2022    Lipid Panel 08/03/2028 8/3/2023              ASSESSMENT & PLAN:   Ms. Nan Palafox is a 74 y.o. female who was seen today in clinic for cough, x3-4 weeks. Not improving. Pt has hx of lung cancer and GBS. Strep and covid neg. Well-appearing. Will get CXR (unable to be done today due to being Saturday) and echo given her persistence and concern.  Empirically start zpack as she has done well with this in the past.   ED precautions discussed.       1. Bronchitis  -     X-Ray Chest PA And Lateral; Future; Expected date: 01/27/2024  -     Echo; Future  -     azithromycin (Z-CRISS) 250 MG tablet; Take 2 tablets by mouth on day 1; Take 1 tablet by mouth on days 2-5  Dispense: 6 tablet; Refill: 0    2. Subacute cough  -     X-Ray Chest PA And Lateral; Future; Expected date: 01/27/2024  -     Echo; Future  -     azithromycin (Z-CRISS) 250 MG tablet; Take 2 tablets by mouth on day 1; Take 1 tablet by mouth on days 2-5  Dispense: 6 tablet; Refill: 0    3. History of Guillain-[place] syndrome  -     X-Ray Chest PA And Lateral; Future; Expected date: 01/27/2024  -     Echo; Future  -     azithromycin (Z-CRISS) 250 MG tablet; Take 2 tablets by mouth on day 1; Take 1 tablet by mouth on days 2-5  Dispense: 6 tablet; Refill: 0    4. Malignant carcinoid tumor of bronchus and lung  -     X-Ray Chest PA And Lateral; Future; Expected date: 01/27/2024  -     Echo; Future  -     azithromycin (Z-CRISS) 250 MG tablet; Take 2 tablets by mouth on day 1; Take 1 tablet by mouth on days 2-5  Dispense: 6 tablet; Refill: 0         Jaylene Lowe MD  Internal Medicine         Portions of this note may have been generated using voice recognition software.  Please excuse any spelling/grammatical errors. Occasional wrong-word or sound-a-like substitutions may have also occurred due to the inherent limitations of voice recognition software. Please read the chart carefully and recognize, using context, where  substitutions have occurred.

## 2024-01-26 NOTE — TELEPHONE ENCOUNTER
Lov 8/21/23,  nov 2/22/24    Cough over a week now.  She did go to urgent care  1/6. Chart now.  Tested for flu & covid-   Tessalon & singulair given.    Never started singulair, ins didn't cover.    Cough, chills previously..  Denies color phlegm and fever.     Only doing tessalon.  Told her ok to use her flonase today.  We booked with dr jiame for tomorrow am  For eval and see if need cxr.    Direction given to osvaldo.

## 2024-01-26 NOTE — TELEPHONE ENCOUNTER
----- Message from Josephine Mcginnis MA sent at 1/26/2024  9:10 AM CST -----  Contact: Self  [phone]    ----- Message -----  From: Mulu Simon  Sent: 1/26/2024   9:02 AM CST  To: Tony Tim A Staff    Would like to receive medical advice.    Symptoms (please be specific):  cough    How long has the patient had these symptoms:  1 week     Pharmacy name/number (copy/paste from chart):      [id number] [address] AT [address] [address]  Phone: [phone] Fax: [phone]      Would they like a call back or a response via [facility]:  call back    Additional information:  Calling to speak with the nurse regarding a chronic illness and cough. Pt states she did go to an UC and would like advice or if an xray is needed.

## 2024-01-27 ENCOUNTER — OFFICE VISIT (OUTPATIENT)
Dept: INTERNAL MEDICINE | Facility: CLINIC | Age: 74
End: 2024-01-27
Payer: MEDICARE

## 2024-01-27 VITALS — HEART RATE: 80 BPM | SYSTOLIC BLOOD PRESSURE: 120 MMHG | DIASTOLIC BLOOD PRESSURE: 70 MMHG | TEMPERATURE: 99 F

## 2024-01-27 DIAGNOSIS — Z86.69 HISTORY OF GUILLAIN-BARRE SYNDROME: ICD-10-CM

## 2024-01-27 DIAGNOSIS — C7A.090 MALIGNANT CARCINOID TUMOR OF BRONCHUS AND LUNG: ICD-10-CM

## 2024-01-27 DIAGNOSIS — R05.2 SUBACUTE COUGH: ICD-10-CM

## 2024-01-27 DIAGNOSIS — J40 BRONCHITIS: Primary | ICD-10-CM

## 2024-01-27 PROCEDURE — 99212 OFFICE O/P EST SF 10 MIN: CPT | Mod: PBBFAC,PO | Performed by: STUDENT IN AN ORGANIZED HEALTH CARE EDUCATION/TRAINING PROGRAM

## 2024-01-27 PROCEDURE — 99999 PR PBB SHADOW E&M-EST. PATIENT-LVL II: CPT | Mod: PBBFAC,,, | Performed by: STUDENT IN AN ORGANIZED HEALTH CARE EDUCATION/TRAINING PROGRAM

## 2024-01-27 PROCEDURE — 99214 OFFICE O/P EST MOD 30 MIN: CPT | Mod: S$PBB,,, | Performed by: STUDENT IN AN ORGANIZED HEALTH CARE EDUCATION/TRAINING PROGRAM

## 2024-01-27 RX ORDER — AZITHROMYCIN 250 MG/1
TABLET, FILM COATED ORAL
Qty: 6 TABLET | Refills: 0 | Status: SHIPPED | OUTPATIENT
Start: 2024-01-27 | End: 2024-03-18 | Stop reason: ALTCHOICE

## 2024-01-29 ENCOUNTER — HOSPITAL ENCOUNTER (OUTPATIENT)
Dept: RADIOLOGY | Facility: HOSPITAL | Age: 74
Discharge: HOME | End: 2024-01-29
Attending: STUDENT IN AN ORGANIZED HEALTH CARE EDUCATION/TRAINING PROGRAM
Payer: MEDICARE

## 2024-01-29 DIAGNOSIS — J40 BRONCHITIS: ICD-10-CM

## 2024-01-29 DIAGNOSIS — C7A.090 MALIGNANT CARCINOID TUMOR OF BRONCHUS AND LUNG: ICD-10-CM

## 2024-01-29 DIAGNOSIS — R05.2 SUBACUTE COUGH: ICD-10-CM

## 2024-01-29 DIAGNOSIS — Z86.69 HISTORY OF GUILLAIN-BARRE SYNDROME: ICD-10-CM

## 2024-01-29 PROCEDURE — 71046 X-RAY EXAM CHEST 2 VIEWS: CPT | Mod: TC,PN

## 2024-01-29 PROCEDURE — 71046 X-RAY EXAM CHEST 2 VIEWS: CPT | Mod: 26,,, | Performed by: RADIOLOGY

## 2024-01-30 ENCOUNTER — HOSPITAL ENCOUNTER (OUTPATIENT)
Dept: CARDIOLOGY | Facility: HOSPITAL | Age: 74
Discharge: HOME | End: 2024-01-30
Attending: STUDENT IN AN ORGANIZED HEALTH CARE EDUCATION/TRAINING PROGRAM
Payer: MEDICARE

## 2024-01-30 VITALS
HEART RATE: 73 BPM | SYSTOLIC BLOOD PRESSURE: 138 MMHG | WEIGHT: 184 LBS | HEIGHT: 65 IN | DIASTOLIC BLOOD PRESSURE: 80 MMHG | BODY MASS INDEX: 30.66 KG/M2

## 2024-01-30 DIAGNOSIS — C7A.090 MALIGNANT CARCINOID TUMOR OF BRONCHUS AND LUNG: ICD-10-CM

## 2024-01-30 DIAGNOSIS — J40 BRONCHITIS: ICD-10-CM

## 2024-01-30 DIAGNOSIS — Z86.69 HISTORY OF GUILLAIN-BARRE SYNDROME: ICD-10-CM

## 2024-01-30 DIAGNOSIS — R05.2 SUBACUTE COUGH: ICD-10-CM

## 2024-01-30 DIAGNOSIS — I27.20 PULMONARY HTN: Primary | ICD-10-CM

## 2024-01-30 LAB
ASCENDING AORTA: 3.39 CM
AV INDEX (PROSTH): 0.77
AV MEAN GRADIENT: 3 MMHG
AV PEAK GRADIENT: 4 MMHG
AV VALVE AREA BY VELOCITY RATIO: 2.29 CM²
AV VALVE AREA: 2.7 CM²
AV VELOCITY RATIO: 0.65
BSA FOR ECHO PROCEDURE: 1.96 M2
CV ECHO LV RWT: 0.38 CM
DOP CALC AO PEAK VEL: 1.04 M/S
DOP CALC AO VTI: 23.22 CM
DOP CALC LVOT AREA: 3.5 CM2
DOP CALC LVOT DIAMETER: 2.11 CM
DOP CALC LVOT PEAK VEL: 0.68 M/S
DOP CALC LVOT STROKE VOLUME: 62.77 CM3
DOP CALCLVOT PEAK VEL VTI: 17.96 CM
E WAVE DECELERATION TIME: 261.07 MSEC
E/A RATIO: 0.75
E/E' RATIO: 9.71 M/S
ECHO LV POSTERIOR WALL: 0.75 CM (ref 0.6–1.1)
FRACTIONAL SHORTENING: 33 % (ref 28–44)
INTERVENTRICULAR SEPTUM: 0.94 CM (ref 0.6–1.1)
IVRT: 122.74 MSEC
LA MAJOR: 5.75 CM
LA MINOR: 5.36 CM
LA WIDTH: 3.29 CM
LEFT ATRIUM SIZE: 2.88 CM
LEFT ATRIUM VOLUME INDEX MOD: 22 ML/M2
LEFT ATRIUM VOLUME INDEX: 23.4 ML/M2
LEFT ATRIUM VOLUME MOD: 42.06 CM3
LEFT ATRIUM VOLUME: 44.68 CM3
LEFT INTERNAL DIMENSION IN SYSTOLE: 2.66 CM (ref 2.1–4)
LEFT VENTRICLE DIASTOLIC VOLUME INDEX: 35.74 ML/M2
LEFT VENTRICLE DIASTOLIC VOLUME: 68.26 ML
LEFT VENTRICLE MASS INDEX: 52 G/M2
LEFT VENTRICLE SYSTOLIC VOLUME INDEX: 13.6 ML/M2
LEFT VENTRICLE SYSTOLIC VOLUME: 25.95 ML
LEFT VENTRICULAR INTERNAL DIMENSION IN DIASTOLE: 3.96 CM (ref 3.5–6)
LEFT VENTRICULAR MASS: 98.99 G
LV LATERAL E/E' RATIO: 9.71 M/S
LV SEPTAL E/E' RATIO: 9.71 M/S
MV PEAK A VEL: 0.91 M/S
MV PEAK E VEL: 0.68 M/S
MV STENOSIS PRESSURE HALF TIME: 75.71 MS
MV VALVE AREA P 1/2 METHOD: 2.91 CM2
PISA TR MAX VEL: 3.42 M/S
RA MAJOR: 3.77 CM
RA PRESSURE ESTIMATED: 8 MMHG
RA WIDTH: 3.45 CM
RIGHT VENTRICULAR END-DIASTOLIC DIMENSION: 3.9 CM
RV TB RVSP: 11 MMHG
SINUS: 3.26 CM
STJ: 2.77 CM
TDI LATERAL: 0.07 M/S
TDI SEPTAL: 0.07 M/S
TDI: 0.07 M/S
TR MAX PG: 47 MMHG
TRICUSPID ANNULAR PLANE SYSTOLIC EXCURSION: 1.89 CM
TV REST PULMONARY ARTERY PRESSURE: 55 MMHG
Z-SCORE OF LEFT VENTRICULAR DIMENSION IN END DIASTOLE: -3.05
Z-SCORE OF LEFT VENTRICULAR DIMENSION IN END SYSTOLE: -1.73

## 2024-01-30 PROCEDURE — 93306 TTE W/DOPPLER COMPLETE: CPT | Mod: 26,,, | Performed by: INTERNAL MEDICINE

## 2024-01-30 PROCEDURE — 93306 TTE W/DOPPLER COMPLETE: CPT | Mod: PO

## 2024-01-31 ENCOUNTER — TELEPHONE (OUTPATIENT)
Dept: INTERNAL MEDICINE | Facility: CLINIC | Age: 74
End: 2024-01-31
Payer: MEDICARE

## 2024-01-31 NOTE — TELEPHONE ENCOUNTER
Patient calling her pcp, dr martinez, requesting echo  Results from yesterday be sent to her cardiologist  Dr micky mays.    Echo done 1/30 was ordered by dr jaime who  Saw patient 1/27/24 for bronchitis.    He has to give ok to release results to patient and give ok to send to another dr.     Please advise patient on results and if ok to   Release results to her card, get information from  Patient.    Thanks lorin sierra/ dr martinez

## 2024-01-31 NOTE — TELEPHONE ENCOUNTER
----- Message from Nita Simon sent at 1/31/2024  9:16 AM CST -----  Contact: [phone]  Pt is requesting to have her results from her Echo on yesterday sent over to her Cardiologist Dr Yinka Rivas.     Pt is also requesting a callback from Josephine to discuss this matter further. Please call.                Thank you

## 2024-02-15 ENCOUNTER — LAB VISIT (OUTPATIENT)
Dept: LAB | Facility: HOSPITAL | Age: 74
End: 2024-02-15
Attending: INTERNAL MEDICINE
Payer: MEDICARE

## 2024-02-15 DIAGNOSIS — I70.0 ATHEROSCLEROSIS OF AORTA: ICD-10-CM

## 2024-02-15 DIAGNOSIS — I10 PRIMARY HYPERTENSION: ICD-10-CM

## 2024-02-15 DIAGNOSIS — M48.061 SPINAL STENOSIS OF LUMBAR REGION, UNSPECIFIED WHETHER NEUROGENIC CLAUDICATION PRESENT: ICD-10-CM

## 2024-02-15 LAB
ALBUMIN SERPL BCP-MCNC: 4 G/DL (ref 3.5–5.2)
ALP SERPL-CCNC: 46 U/L (ref 55–135)
ALT SERPL W/O P-5'-P-CCNC: 12 U/L (ref 10–44)
ANION GAP SERPL CALC-SCNC: 10 MMOL/L (ref 8–16)
AST SERPL-CCNC: 21 U/L (ref 10–40)
BASOPHILS # BLD AUTO: 0.05 K/UL (ref 0–0.2)
BASOPHILS NFR BLD: 0.9 % (ref 0–1.9)
BILIRUB SERPL-MCNC: 0.5 MG/DL (ref 0.1–1)
BUN SERPL-MCNC: 11 MG/DL (ref 8–23)
CALCIUM SERPL-MCNC: 9.9 MG/DL (ref 8.7–10.5)
CHLORIDE SERPL-SCNC: 100 MMOL/L (ref 95–110)
CHOLEST SERPL-MCNC: 177 MG/DL (ref 120–199)
CHOLEST/HDLC SERPL: 2.3 {RATIO} (ref 2–5)
CO2 SERPL-SCNC: 28 MMOL/L (ref 23–29)
CREAT SERPL-MCNC: 0.8 MG/DL (ref 0.5–1.4)
DIFFERENTIAL METHOD BLD: ABNORMAL
EOSINOPHIL # BLD AUTO: 0.1 K/UL (ref 0–0.5)
EOSINOPHIL NFR BLD: 2.4 % (ref 0–8)
ERYTHROCYTE [DISTWIDTH] IN BLOOD BY AUTOMATED COUNT: 15.5 % (ref 11.5–14.5)
EST. GFR  (NO RACE VARIABLE): >60 ML/MIN/1.73 M^2
GLUCOSE SERPL-MCNC: 114 MG/DL (ref 70–110)
HCT VFR BLD AUTO: 37.3 % (ref 37–48.5)
HDLC SERPL-MCNC: 77 MG/DL (ref 40–75)
HDLC SERPL: 43.5 % (ref 20–50)
HGB BLD-MCNC: 12.2 G/DL (ref 12–16)
IMM GRANULOCYTES # BLD AUTO: 0.02 K/UL (ref 0–0.04)
IMM GRANULOCYTES NFR BLD AUTO: 0.4 % (ref 0–0.5)
LDLC SERPL CALC-MCNC: 88 MG/DL (ref 63–159)
LYMPHOCYTES # BLD AUTO: 1.9 K/UL (ref 1–4.8)
LYMPHOCYTES NFR BLD: 35.1 % (ref 18–48)
MCH RBC QN AUTO: 27.7 PG (ref 27–31)
MCHC RBC AUTO-ENTMCNC: 32.7 G/DL (ref 32–36)
MCV RBC AUTO: 85 FL (ref 82–98)
MONOCYTES # BLD AUTO: 0.4 K/UL (ref 0.3–1)
MONOCYTES NFR BLD: 6.7 % (ref 4–15)
NEUTROPHILS # BLD AUTO: 3 K/UL (ref 1.8–7.7)
NEUTROPHILS NFR BLD: 54.5 % (ref 38–73)
NONHDLC SERPL-MCNC: 100 MG/DL
NRBC BLD-RTO: 0 /100 WBC
PLATELET # BLD AUTO: 345 K/UL (ref 150–450)
PMV BLD AUTO: 11.3 FL (ref 9.2–12.9)
POTASSIUM SERPL-SCNC: 3.7 MMOL/L (ref 3.5–5.1)
PROT SERPL-MCNC: 7.7 G/DL (ref 6–8.4)
RBC # BLD AUTO: 4.4 M/UL (ref 4–5.4)
SODIUM SERPL-SCNC: 138 MMOL/L (ref 136–145)
TRIGL SERPL-MCNC: 60 MG/DL (ref 30–150)
TSH SERPL DL<=0.005 MIU/L-ACNC: 0.71 UIU/ML (ref 0.4–4)
WBC # BLD AUTO: 5.52 K/UL (ref 3.9–12.7)

## 2024-02-15 PROCEDURE — 80053 COMPREHEN METABOLIC PANEL: CPT | Performed by: INTERNAL MEDICINE

## 2024-02-15 PROCEDURE — 84443 ASSAY THYROID STIM HORMONE: CPT | Performed by: INTERNAL MEDICINE

## 2024-02-15 PROCEDURE — 36415 COLL VENOUS BLD VENIPUNCTURE: CPT | Mod: PO | Performed by: INTERNAL MEDICINE

## 2024-02-15 PROCEDURE — 85025 COMPLETE CBC W/AUTO DIFF WBC: CPT | Performed by: INTERNAL MEDICINE

## 2024-02-15 PROCEDURE — 80061 LIPID PANEL: CPT | Performed by: INTERNAL MEDICINE

## 2024-02-16 ENCOUNTER — HOSPITAL ENCOUNTER (EMERGENCY)
Facility: OTHER | Age: 74
Discharge: HOME | End: 2024-02-16
Attending: EMERGENCY MEDICINE
Payer: MEDICARE

## 2024-02-16 ENCOUNTER — TELEPHONE (OUTPATIENT)
Dept: PRIMARY CARE CLINIC | Facility: CLINIC | Age: 74
End: 2024-02-16
Payer: MEDICARE

## 2024-02-16 VITALS
RESPIRATION RATE: 18 BRPM | HEART RATE: 72 BPM | BODY MASS INDEX: 28.82 KG/M2 | OXYGEN SATURATION: 99 % | DIASTOLIC BLOOD PRESSURE: 69 MMHG | SYSTOLIC BLOOD PRESSURE: 122 MMHG | WEIGHT: 173 LBS | HEIGHT: 65 IN | TEMPERATURE: 98 F

## 2024-02-16 DIAGNOSIS — R42 LIGHTHEADEDNESS: ICD-10-CM

## 2024-02-16 DIAGNOSIS — J20.8 VIRAL BRONCHITIS: Primary | ICD-10-CM

## 2024-02-16 LAB
CTP QC/QA: YES
CTP QC/QA: YES
OHS QRS DURATION: 76 MS
OHS QTC CALCULATION: 419 MS
POC MOLECULAR INFLUENZA A AGN: NEGATIVE
POC MOLECULAR INFLUENZA B AGN: NEGATIVE
SARS-COV-2 RDRP RESP QL NAA+PROBE: NEGATIVE

## 2024-02-16 PROCEDURE — 25000003 PHARM REV CODE 250: Performed by: PHYSICIAN ASSISTANT

## 2024-02-16 PROCEDURE — 87635 SARS-COV-2 COVID-19 AMP PRB: CPT | Performed by: PHYSICIAN ASSISTANT

## 2024-02-16 PROCEDURE — 93005 ELECTROCARDIOGRAM TRACING: CPT

## 2024-02-16 PROCEDURE — 99284 EMERGENCY DEPT VISIT MOD MDM: CPT | Mod: 25

## 2024-02-16 PROCEDURE — 93010 ELECTROCARDIOGRAM REPORT: CPT | Mod: ,,, | Performed by: INTERNAL MEDICINE

## 2024-02-16 RX ORDER — ONDANSETRON 4 MG/1
4 TABLET, ORALLY DISINTEGRATING ORAL
Status: COMPLETED | OUTPATIENT
Start: 2024-02-16 | End: 2024-02-16

## 2024-02-16 RX ADMIN — ONDANSETRON 4 MG: 4 TABLET, ORALLY DISINTEGRATING ORAL at 01:02

## 2024-02-16 NOTE — TELEPHONE ENCOUNTER
----- Message from Vidhi Nicholson sent at 2/16/2024  8:31 AM CST -----  Contact: Nan Murphy   Nan asked that I send the message to Dr Lowe  She has seen her in the past She had appt on 01/27/24   ----- Message -----  From: Elenita Acharya MA  Sent: 2/16/2024   8:22 AM CST  To: Vidhi Nicholson    Good morning  Wrong provider.     Have a great day      ----- Message -----  From: Vidhi Nicholson  Sent: 2/16/2024   8:17 AM CST  To: Mynor Mariee Staff    Nan would elisha a call back about a cough, light headed & vomiting.

## 2024-02-16 NOTE — ED TRIAGE NOTES
PT arrived with c/o chills, coughing, SOB, and fatigue x 2 weeks with vomiting since this AM.  Pt states she finished her zpak 2 weeks ago.  Pt denies any fever.  Pt reports hx of Guillan-[place] Syndrome.  Pt answering questions appropriately, speaking in complete sentences, respirations even and unlabored.  Aao x 4.

## 2024-02-16 NOTE — FIRST PROVIDER EVALUATION
Emergency Department TeleTriage Encounter Note      CHIEF COMPLAINT    Chief Complaint   Patient presents with    Cough     Cough x2 weeks with new onset N/V today, just finished Z-aubree. Hx of lung CA, GBS.        VITAL SIGNS   Initial Vitals [02/16/24 1257]   BP Pulse Resp Temp SpO2   125/65 66 19 97.8 °F (36.6 °C) 98 %      MAP       --            ALLERGIES    Review of patient's allergies indicates:   Allergen Reactions    Iodine Hives       PROVIDER TRIAGE NOTE  Patient presents with nausea, vomiting and lightheaded that started today. This week she has had generalized weakness. She has had cough for 2 weeks. History of lobectomy secondary to lung cancer and history of Guillain [place]       ORDERS  Labs Reviewed - No data to display    ED Orders (720h ago, onward)      None              Virtual Visit Note: The provider triage portion of this emergency department evaluation and documentation was performed via [employer], a HIPAA-compliant telemedicine application, in concert with a tele-presenter in the room. A face to face patient evaluation with one of my colleagues will occur once the patient is placed in an emergency department room.      DISCLAIMER: This note was prepared with [employer] voice recognition transcription software. Garbled syntax, mangled pronouns, and other bizarre constructions may be attributed to that software system.

## 2024-02-16 NOTE — ED PROVIDER NOTES
"Encounter Date: 2/16/2024    SCRIBE #1 NOTE: I, Raya Kilday, am scribing for, and in the presence of,  Daniel Watkins II, MD. I have scribed the following portions of the note - Other sections scribed: HPI, PE.       History     Chief Complaint   Patient presents with    Cough     Cough x2 weeks with new onset N/V today, just finished Z-aubree. Hx of lung CA, GBS.      Patient is a 74 y.o. female with a PMHx of stage 1 lung cancer, Guillain-[place] syndrome complaining of coughing for the past 3 weeks and vomiting this morning. Pt states that she went to see a midlevel practitioner at [facility] 3 weeks ago due to a cough, given her history of lung cancer. She was prescribed a Z-pack, which she finished 2 days ago. However, she reports that the coughing returned a week ago, though only during the day, not during the night. She states that the coughing has been productive, with clear mucous. Pt additionally notes that she has felt fatigued all week, and her legs felt "heavy" prior to today, which scared her. She denies smoking. She is not undergoing chemotherapy or radiation. This is the extent of the patient's complaints at this time.        The history is provided by the patient and medical records. No  was used.     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)     History of gastrointestinal stromal tumor (GIST) 12/02/2020    Hypertension     Malignant carcinoid tumor of bronchus and lung 12/02/2020    Nuclear sclerosis - Both Eyes 02/08/2013    PNA (pneumonia)     Primary open angle glaucoma (POAG) of both eyes, moderate stage 03/11/2019     Past Surgical History:   Procedure Laterality Date    BREAST BIOPSY Right     excisional bx    BREAST SURGERY Right     Excisional bx    HYSTERECTOMY      " INJECTION OF ANESTHETIC AGENT AROUND MULTIPLE INTERCOSTAL NERVES Right 01/04/2021    Procedure: BLOCK, NERVE, INTERCOSTAL, 2 OR MORE;  Surgeon: Ru Hernandez MD;  Location: [facility] OR [address] [place] [address];  Service: Thoracic;  Laterality: Right;    RETINAL DETACHMENT SURGERY      patient states that she had a retinal tear that was repaired in the past at [facility] but she is uncertain of which eye    STOMACH SURGERY      SURGICAL REMOVAL OF LYMPH NODE N/A 01/04/2021    Procedure: EXCISION, LYMPH NODE;  Surgeon: Ru Hernandez MD;  Location: [facility] OR [address] [place] [address];  Service: Thoracic;  Laterality: N/A;    TOTAL KNEE ARTHROPLASTY Right     XI ROBOTIC RATS,WITH LOBECTOMY,LUNG Right 01/04/2021    Procedure: XI ROBOTIC RATS,WITH LOBECTOMY,LUNG;  Surgeon: Ru Hernandez MD;  Location: [facility] OR [address] [place] [address];  Service: Thoracic;  Laterality: Right;  Right middle lobectomy.     Family History   Problem Relation Age of Onset    Glaucoma Mother     Cataracts Mother     Blindness Mother     Cancer Mother 68        colon, myeloma    No Known Problems Father     No Known Problems Sister     Glaucoma Brother     Cataracts Brother     Cancer Brother         esophageal    No Known Problems Maternal Aunt     No Known Problems Maternal Uncle     No Known Problems Paternal Aunt     No Known Problems Paternal Uncle     No Known Problems Maternal Grandmother     No Known Problems Maternal Grandfather     No Known Problems Paternal Grandmother     No Known Problems Paternal Grandfather     Amblyopia Neg Hx     Macular degeneration Neg Hx     Retinal detachment Neg Hx     Strabismus Neg Hx     Diabetes Neg Hx     Hypertension Neg Hx     Stroke Neg Hx     Thyroid disease Neg Hx     Breast cancer Neg Hx     Ovarian cancer Neg Hx      Social History     Tobacco Use    Smoking status: Never    Smokeless tobacco: Never   Substance Use Topics    Alcohol use: Yes     Comment: wine 3 glasses a week / weekend 3 glasses of champagne    Drug use: Never     Review  of Systems  See HPI    Physical Exam     Initial Vitals [02/16/24 1257]   BP Pulse Resp Temp SpO2   125/65 66 19 97.8 °F (36.6 °C) 98 %      MAP       --         Physical Exam    Nursing note and vitals reviewed.  Constitutional: She appears well-developed and well-nourished. She is not diaphoretic. No distress.   HENT:   Head: Normocephalic and atraumatic.   Moist mucous membranes.   Eyes: EOM are normal. Pupils are equal, round, and reactive to light.   No pallor or icterus.   Neck: Neck supple.   Normal range of motion.  Cardiovascular:  Normal rate, regular rhythm and normal heart sounds.     Exam reveals no gallop and no friction rub.       No murmur heard.  Pulmonary/Chest: Breath sounds normal. No respiratory distress. She has no wheezes. She has no rhonchi. She has no rales.   Abdominal: Abdomen is soft. There is no abdominal tenderness.   Musculoskeletal:         General: No tenderness or edema. Normal range of motion.      Cervical back: Normal range of motion and neck supple.     Lymphadenopathy:     She has no cervical adenopathy.   Neurological: She is alert and oriented to person, place, and time.   Skin: Skin is warm and dry.   Psychiatric: She has a normal mood and affect. Her behavior is normal. Judgment and thought content normal.         ED Course   Procedures  Labs Reviewed   SARS-COV-2 RDRP GENE   POCT INFLUENZA A/B MOLECULAR     EKG Readings: (Independently Interpreted)   Sinus rhythm. Rate of 70. Prolonged SD interval. No ST or T wave changes. No ischemia, arrhythmia, or pericarditis.     ECG Results              EKG 12-lead (Final result)        Collection Time Result Time QRS Duration OHS QTC Calculation    02/16/24 13:54:23 02/16/24 16:06:05 76 419                     Final result by Interface, Lab In [facility] (02/16/24 16:06:09)                   Narrative:    Test Reason : R42,    Vent. Rate : 070 BPM     Atrial Rate : 070 BPM     P-R Int : 240 ms          QRS Dur : 076 ms      QT Int :  388 ms       P-R-T Axes : 061 055 055 degrees     QTc Int : 419 ms    Sinus rhythm with 1st degree A-V block  Otherwise normal ECG    Confirmed by Umu De Paz MD ([id number]) on 2/16/2024 4:06:01 PM    Referred By: CONRADO   SELF           Confirmed By:Umu De Paz MD                                  Imaging Results              X-Ray Chest AP Portable (Final result)  Result time 02/16/24 14:27:04      Final result by Perry Rhodes MD (02/16/24 14:27:04)                   Impression:      1. No acute cardiopulmonary process peer      Electronically signed by: Perry Rhodes MD  Date:    02/16/2024  Time:    14:27               Narrative:    EXAMINATION:  XR CHEST AP PORTABLE    CLINICAL HISTORY:  cough x2 weeks; history lobectomy;    TECHNIQUE:  Single frontal view of the chest was performed.    COMPARISON:  01/29/2024    FINDINGS:  The cardiomediastinal silhouette is prominent, magnified by technique noting calcification of the aorta..  There is no pleural effusion.  The trachea is midline.  The lungs are symmetrically expanded bilaterally without evidence of acute parenchymal process. No large focal consolidation seen.  There is no pneumothorax.  The osseous structures are remarkable for degenerative change..                                    X-Rays:   Independently Interpreted Readings:   Chest X-Ray: No focal infiltrate or effusion. No pulmonary edema.       Medications   ondansetron disintegrating tablet 4 mg (4 mg Oral Given 2/16/24 1350)     Medical Decision Making  Amount and/or Complexity of Data Reviewed  Radiology: ordered and independent interpretation performed. Decision-making details documented in ED Course.  ECG/medicine tests: ordered and independent interpretation performed. Decision-making details documented in ED Course.    Patient presents complaining of cough for the past week or 2.  Seen by her primary care physician, placed on a Z-Dylon, symptoms somewhat improved the patient was  concerned especially given history of lung cancer.  On exam she is well-appearing, afebrile, lung fields are clear.  COVID and flu swabs are negative and chest x-ray shows no evidence of pneumonia fluid or mass.  Suspect viral bronchitis or similar benign etiology.  Symptomatic treatment.  Patient is comfortable with this plan of care.  Encouraged follow-up with primary care, especially if symptoms persist.  Return precautions discussed for the interim.          Scribe Attestation:   Scribe #1: I performed the above scribed service and the documentation accurately describes the services I performed. I attest to the accuracy of the note.         Physician Attestation for Scribe: I, [facility], reviewed documentation as scribed in my presence, which is both accurate and complete.                        Clinical Impression:  Final diagnoses:  [R42] Lightheadedness  [J20.8] Viral bronchitis (Primary)          ED Disposition Condition    Discharge Stable          ED Prescriptions    None       Follow-up Information       Follow up With Specialties Details Why Contact Info    Glenroy Jimenez MD Internal Medicine In 5 days  [address] [facility] [address]  [phone]               Daniel Watkins II, MD  02/17/24 6880

## 2024-02-20 ENCOUNTER — PATIENT OUTREACH (OUTPATIENT)
Dept: EMERGENCY MEDICINE | Facility: OTHER | Age: 74
End: 2024-02-20
Payer: MEDICARE

## 2024-02-20 NOTE — PROGRESS NOTES
Patient was seen in the ED on 2/16/24 for viral bronchitis. Patient was contacted for post ED discharge navigation. They have an appointment scheduled with Dr. Glenroy Jimenez on 2/22/24 at 1:30. ED navigator will remind patient of appointment.

## 2024-02-21 ENCOUNTER — PATIENT OUTREACH (OUTPATIENT)
Dept: EMERGENCY MEDICINE | Facility: OTHER | Age: 74
End: 2024-02-21
Payer: MEDICARE

## 2024-02-21 NOTE — PROGRESS NOTES
ED Navigator reminded the patient of scheduled appointment with Dr. Glenroy Jimenez on 2/22/24 at 1:30. Patient instructed to bring a photo I.D., health insurance card, and a list of current medications.

## 2024-02-22 ENCOUNTER — OFFICE VISIT (OUTPATIENT)
Dept: INTERNAL MEDICINE | Facility: CLINIC | Age: 74
End: 2024-02-22
Payer: MEDICARE

## 2024-02-22 VITALS
HEIGHT: 65 IN | DIASTOLIC BLOOD PRESSURE: 70 MMHG | RESPIRATION RATE: 16 BRPM | OXYGEN SATURATION: 99 % | SYSTOLIC BLOOD PRESSURE: 122 MMHG | TEMPERATURE: 97 F | BODY MASS INDEX: 30.12 KG/M2 | HEART RATE: 64 BPM | WEIGHT: 180.75 LBS

## 2024-02-22 DIAGNOSIS — J06.9 VIRAL URI WITH COUGH: ICD-10-CM

## 2024-02-22 DIAGNOSIS — J40 BRONCHITIS: ICD-10-CM

## 2024-02-22 DIAGNOSIS — Z86.69 HISTORY OF GUILLAIN-BARRE SYNDROME: ICD-10-CM

## 2024-02-22 DIAGNOSIS — I10 PRIMARY HYPERTENSION: Primary | ICD-10-CM

## 2024-02-22 PROCEDURE — 99214 OFFICE O/P EST MOD 30 MIN: CPT | Mod: S$PBB,,, | Performed by: INTERNAL MEDICINE

## 2024-02-22 PROCEDURE — 99214 OFFICE O/P EST MOD 30 MIN: CPT | Mod: PBBFAC,PO | Performed by: INTERNAL MEDICINE

## 2024-02-22 PROCEDURE — 99999 PR PBB SHADOW E&M-EST. PATIENT-LVL IV: CPT | Mod: PBBFAC,,, | Performed by: INTERNAL MEDICINE

## 2024-03-18 ENCOUNTER — OFFICE VISIT (OUTPATIENT)
Dept: NEUROLOGY | Facility: CLINIC | Age: 74
End: 2024-03-18
Payer: MEDICARE

## 2024-03-18 DIAGNOSIS — R29.898 RIGHT HAND WEAKNESS: ICD-10-CM

## 2024-03-18 DIAGNOSIS — Z86.69 HISTORY OF GUILLAIN-BARRE SYNDROME: Primary | ICD-10-CM

## 2024-03-18 DIAGNOSIS — R26.89 IMBALANCE: ICD-10-CM

## 2024-03-18 PROCEDURE — 99213 OFFICE O/P EST LOW 20 MIN: CPT | Mod: PBBFAC | Performed by: STUDENT IN AN ORGANIZED HEALTH CARE EDUCATION/TRAINING PROGRAM

## 2024-03-18 PROCEDURE — 99999 PR PBB SHADOW E&M-EST. PATIENT-LVL III: CPT | Mod: PBBFAC,,, | Performed by: STUDENT IN AN ORGANIZED HEALTH CARE EDUCATION/TRAINING PROGRAM

## 2024-03-18 PROCEDURE — 99213 OFFICE O/P EST LOW 20 MIN: CPT | Mod: S$PBB,,, | Performed by: STUDENT IN AN ORGANIZED HEALTH CARE EDUCATION/TRAINING PROGRAM

## 2024-03-18 NOTE — PROGRESS NOTES
"        Patient ID: [id number]  Chief Complaint/Reason for Consult: GBS f/u     Subjective:     HPI  Nan Palafox is a very pleasant 74 y.o. 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 she is presenting for f/u.    Interval history (03/18/2024):  Intermittent right hand weakness/heaviness or "not feeling the same as left hand".  Since she has stopped PT she feels her legs are feeing heavy, reports veering off to one side occasionally.  Also intermittently feels the numbness/tingling and is concerned that her GBS might be back.    Interval history (10/11/2023):  Overall gait and balance has improved significantly, up to 85% per last PT note. She is not requiring assistive device, however, she reports intermittent numbness and imbalance which might be the residual 15-20%. She is now concerned with the new flu and COVID season. She was previously getting Evusheld for COVID PEP due to inability to get vaccines. Now that Evusheld is off the  market she is wondering what she should do.     Initial HPI (4/14/2023):  Today, she reports no new neurological symptoms, has some paresthesias. Balance is improved compared to before, not using a cane. Hasn't had any recent falls. PT has helped a lot but she has completed her course and will need new orders. Has a group at [employer], feels depressed sometimes but still enjoys the activities with her [employer] group.     Per dr. Prado's note:  "Initial HPI: 7/1/2021  She is now using cane. January 2021 she underwent surgery thoracic surgery for removal of a mass. A few days later she got her first Pfizer vaccine. She noted tingling and numbenss. She could not move her legs. 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 She was treated with IVIG. The tingling has resolved but every once in a while she feels a heaviness in her toes. She had difficulty raisining her hands. She has " noted that the right upper extremity continues to remain weak.     In 2020 she underwent a lung biospy. She states post biopsy she had change in speech, right upper extremity weakness. She was admitted at [facility] and was told she did not have a stroke but she was started on Keppra given the possiblity of a ? Seizure. She has stopped taking the Keppra for 6 months now and not had another event. Stopped the Keppra herself because she was not sure as to why she was taking it.     Review of Systems   Constitutional:  Negative for unexpected weight change.   Respiratory:  Negative for shortness of breath.    Musculoskeletal:  Positive for gait problem. Negative for leg pain.   Neurological:  Positive for numbness. Negative for tremors, speech difficulty, weakness and coordination difficulties.   Psychiatric/Behavioral:  Negative for confusion, dysphoric mood, self-injury and suicidal ideas.    All other systems reviewed and are negative.    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)     History of gastrointestinal stromal tumor (GIST) 12/02/2020    Hypertension     Malignant carcinoid tumor of bronchus and lung 12/02/2020    Nuclear sclerosis - Both Eyes 02/08/2013    PNA (pneumonia)     Primary open angle glaucoma (POAG) of both eyes, moderate stage 03/11/2019     Allergies:  Review of patient's allergies indicates:   Allergen Reactions    Iodine Hives       Pertinent Family History:  Family History   Problem Relation Age of Onset    Glaucoma Mother     Cataracts Mother     Blindness Mother     Cancer Mother 68        colon, myeloma    No Known Problems Father     No Known Problems Sister     Glaucoma Brother     Cataracts Brother     Cancer Brother         esophageal     Pertinent Social History:  No tobacco, rare alcohol (used to drink 3 champagne glasses nightly up until  recently), no marijuana, no illicit substance use. Lives alone,  is .     Medications:  Current Outpatient Medications   Medication Instructions    amLODIPine (NORVASC) 10 mg, Oral    ASCORBATE CALCIUM (VITAMIN C ORAL) 1 tablet, Oral, Daily    aspirin (ECOTRIN) 81 mg, Daily    azithromycin (Z-CRISS) 250 MG tablet Take 2 tablets by mouth on day 1; Take 1 tablet by mouth on days 2-5<BR>    cetirizine (ZYRTEC) 10 mg, Oral, Daily    dorzolamide-timolol 2-0.5% (COSOPT) 22.3-6.8 mg/mL ophthalmic solution INSTILL 1 DROP INTO BOTH EYES TWICE DAILY    fluticasone (FLONASE) 50 mcg/actuation nasal spray 2 sprays, Each Nostril, Daily    hydrOXYzine pamoate (VISTARIL) 50 mg, Oral, Every 8 hours PRN    latanoprost 0.005 % ophthalmic solution INSTILL 1 DROP IN BOTH EYES EVERY EVENING    LORazepam (ATIVAN) 1 MG tablet TAKE 1 TABLET(1 MG) BY MOUTH EVERY 8 HOURS AS NEEDED    multivitamin (THERAGRAN) tablet 1 tablet, Oral, Daily    pantoprazole (PROTONIX) 40 MG tablet TAKE 1 TABLET BY MOUTH EVERY MORNING 30 MINUTES BEFORE BREAKFAST.    promethazine-dextromethorphan (PROMETHAZINE-DM) 6.25-15 mg/5 mL Syrp 5 mLs, Oral, Nightly PRN    rosuvastatin (CRESTOR) 40 mg, Oral    valsartan-hydrochlorothiazide (DIOVAN-HCT) 320-25 mg per tablet 1 tablet, Oral, Daily    vitamin D (VITAMIN D3) 1,000 Units, Oral, Daily    ZINC ORAL Oral, 3 times daily      Objective:     There were no vitals filed for this visit.     General:  Well-appearing, well-nourished, NAD, cooperative    Neurologic Exam:   Awake, alert and oriented x3  Speech spontaneous and fluent, intact comprehension.   Adequate fund of knowledge, vocabulary.    CN II - CN XII:  PERRLA. EOM intact. No Nystagmus. No ophthalmoplegia.   Facial sensation is normal to light touch.   Facial expression is full and symmetric.   Hearing is intact bilaterally.   Palate elevates symmetrically.   SCM and Trapezius full strength bilaterally.   Tongue is midline.     Motor:  Normal bulk and  tone in all four limbs.   There are no atrophy or fasciculations. No tremor.     Shoulder  Abd Shoulder Add Elbow   Flex Elbow  Ext Wrist   Flex Wrist  Ext Finger  Flex Finger  Ext Finger  Abd Finger   Add IO Opposition   Right 5 5 5 5       5    Left 5 5 5 5       5       Hip  Flex Hip  Ext Thigh   Abd Thigh  Add Knee  Flex Knee  Ext Plantar  Flex Dorsiflex   Right 5 5   5 5 5 5   Left 5 5   5 5 5 5     Sensory:  Light touch: intact throughout.  Vibration: decreased distally (BLE>>BUE)  Proprioception: intact figure writing. position intact in BUE  Romberg is impaired with swaying but no fall.    DTRs:   Biceps Brachioradialis Triceps Marci Patellar Ankle Plantar   Right 2+ 2+   silent silent    Left 2+ 2+   silent silent      Coordination:  Finger to nose is normal bilaterally.  Normal fine finger movements and rapid alternating movements.    Gait:  Wide based gait, impaired tandem (unchanged)      Pertinent lab results  Lab Results   Component Value Date    ALPHATOCOPHE 1441 05/04/2023    ANGIOTENSINC 1.0 01/31/2021    FOLATE 11.5 02/01/2021    RCPHRKFU62 1030 (H) 05/04/2023    THIAMINEBLOO 49 05/04/2023    VITAMINB6 19 05/04/2023    XJUCFLOT88AS 41 01/30/2021    ZINC 81 05/04/2023    COPPER 1170 05/04/2023    LACTATE 0.8 02/09/2021     Lab Results   Component Value Date    PATHINTPSPE REVIEWED 05/04/2023    PATHINTPSIF REVIEWED 05/04/2023     Lab Results   Component Value Date    ANASCREEN Positive (A) 02/01/2021    ANATITER 1:320 02/01/2021    ANAP1 Homogeneous 02/01/2021    DSDNA Negative 1:10 02/01/2021    DSDNA Negative 1:10 02/01/2021    ANTISSAANTIB 0.09 02/01/2021    ANTISSAANTIB 0.09 02/01/2021    RF 12.0 02/01/2021    SEDRATE 34 08/21/2021    APAISOTYPEIG <9.40 02/01/2021    APAISOTYPEIG <9.40 02/01/2021     Lab Results   Component Value Date    ONA89BLMO Non-reactive 05/04/2023    RPR Non-reactive 05/04/2023    LYMEAB 0.23 02/01/2021     Lab Results   Component Value Date     01/31/2021     TSH 0.714 02/15/2024    FREET4 1.02 12/17/2020    WBC 5.52 02/15/2024    LYMPH 1.9 02/15/2024    LYMPH 35.1 02/15/2024    RBC 4.40 02/15/2024    HGB 12.2 02/15/2024    HCT 37.3 02/15/2024    MCV 85 02/15/2024     02/15/2024     02/15/2024    K 3.7 02/15/2024    CO2 28 02/15/2024    BUN 11 02/15/2024    CREATININE 0.8 02/15/2024    CALCIUM 9.9 02/15/2024    AST 21 02/15/2024    ALT 12 02/15/2024     Pertinent imaging results    *Images personally reviewed and interpreted:    10/22/2021  MRI Cervical Spine w/wo contrast:  Multilevel degenerative changes, most advanced at C7-T1 where there is a disc osteophyte complex and bilateral facet arthropathy contributing to moderate bilateral foraminal stenosis.  Progressive endplate edema and enhancement at C7-T1, favored to be degenerative.  No new marrow replacing lesions    Other pertinent studies    5/2/2023  EMG-NCS:  There is electrophysiologic evidence of a bilateral sensory median mononeuropathy across the wrist (I.e. Carpal tunnel syndrome).  There is no motor axonal loss.  There is no active denervation.  This is graded as Mild in severity bilaterally.    There are chronic neuropathic changes isolated to the right hand muscles (first dorsal interosseus and abductor pollicis brevis).  This is suggestive of a chronic right C8/T1 radiculopathy, but not definitively so.  She does have neck pain though no radicular symptoms.  She has an MRI of the C spine from 2021 which showed moderate foraminal stenosis at this level, R>L.  Despite the somewhat weak findings on today's study, in the context of her clinical history and MRI findings, I suspect that these changes on needle EMG do represent a chronic right C8/T1 radiculopathy.          The right first dorsal interosseus pedis muscle showed signs of active denervation.  In isolation to one muscle (especially in the foot), this is of limited diagnostic significance.  No electrophysiologic evidence of a peripheral  polyneuropathy.      Assessment:   Nan Palafox is a 74 y.o. right-handed female with multiple comorbidities including vascular risk factors and history of lung cancer and GBS (2021), requiring intubation. 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 She is reporting mild intermittent worsening and subjective weakness, however, exam is reassuring with 5/5 motor strength in BLE and hands/wrists. Perhaps sensation of heaviness is more secondary to residual numbness. She also experiences some deconditioning every time she discontinues PT. It would be beneficial to continue maintenance PT once every year to increase endurance and to work on balance. I advised her to call our clinic if she experiences more persistent/progression of weakness in the right hand. We consider repeating MRI and/or EMG-NCS.    1. History of Guillain-[place] syndrome    2. Imbalance    3. Right hand weakness      Plan:     - Ambulatory referral/consult to Physical/Occupational Therapy; Future  - Follow up: RTC in 12 months or sooner if needed    Plan was discussed in detail with the patient, who is in agreement.    Time spent on this encounter: 25 minutes. This includes face to face time (obtaining history, documenting clinical information in the EMR, physical exam, discussing the plan with patient) and non-face to face time (such as preparing to see the patient (ie. Chart review, reviewing and interpreting previous labs and imaging), further EMR documentation, ordering tests, independently interpreting results and communicating results to the patient/family/caregiver, or care coordinator).          Yazmin Rincon MD    [facility]  03/18/2024

## 2024-04-05 RX ORDER — LORAZEPAM 1 MG/1
TABLET ORAL
Qty: 60 TABLET | Refills: 2 | Status: SHIPPED | OUTPATIENT
Start: 2024-04-05

## 2024-04-05 NOTE — TELEPHONE ENCOUNTER
No care due was identified.  [facility] Embedded Care Due Messages. Reference number: [id number].   4/04/2024 11:01:28 PM CDT

## 2024-04-09 NOTE — PROGRESS NOTES
Subjective:       Patient ID: Nan Palafox is a 74 y.o. female.    Chief Complaint: Hypertension (6 mos wlabs. ) and Bronchitis (Seen urgent care 1/27 and er 2/16 . See test results./Echo faxed to dr rivas/ cardiol.   Still has cough. Using flonase and doesn't feel helps.  Dry cough seems to come from chest. )    HPI  The patient presents for follow-up of medical conditions which include hypertension, bronchitis, hyperlipidemia, persistent cough, and history of GuillainBarre syndrome.  The patient still notes some residual impairment of balance from her GuillainBarre syndrome although she states symptoms have significantly improved.  She denies having any lower extremity weakness.  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  No tumor recurrence has been noted.  She is followed Cardiothoracic Surgery (Dr. Hernandez) and her pulmonologist Dr. Javad Franks (at [facility]).    She was recently diagnosed to have bronchitis at an urgent care clinic visit.  A dry cough is still noted.  Symptoms are intermittent.  She denies experiencing any chest congestion or wheezing.  No PND or orthopnea is described.  No chest pain has been noted.    The patient is tolerating her blood pressure medication without side effects.  She does not monitor blood pressures at home regularly.  She is also followed by her cardiologist Dr. Yinka Rivas ([facility] affiliated).    Review of Systems   Constitutional:  Negative for activity change, appetite change, fatigue and unexpected weight change.   Eyes:  Negative for visual disturbance.   Respiratory:  Positive for cough. Negative for shortness of breath.    Cardiovascular:  Negative for chest pain, palpitations and leg swelling.   Gastrointestinal:  Negative for abdominal pain, blood in stool, constipation and diarrhea.   Genitourinary:  Negative for dysuria and hematuria.   Musculoskeletal:  Positive for gait problem (Intermittent loss of balance  reported). Negative for arthralgias, neck pain and neck stiffness.   Integumentary:  Negative for rash.   Neurological:  Negative for dizziness, syncope and headaches.   Psychiatric/Behavioral:  Negative for sleep disturbance.             Physical Exam  Vitals and nursing note reviewed.   Constitutional:       General: She is not in acute distress.     Appearance: Normal appearance. She is well-developed.   HENT:      Head: Normocephalic and atraumatic.   Eyes:      General: No scleral icterus.     Extraocular Movements: Extraocular movements intact.      Conjunctiva/sclera: Conjunctivae normal.   Neck:      Thyroid: No thyromegaly.      Vascular: No JVD.   Cardiovascular:      Rate and Rhythm: Normal rate and regular rhythm.      Heart sounds: Normal heart sounds. No murmur heard.     No friction rub. No gallop.   Pulmonary:      Effort: Pulmonary effort is normal. No respiratory distress.      Breath sounds: Normal breath sounds. No wheezing or rales.   Abdominal:      General: Bowel sounds are normal.      Palpations: Abdomen is soft. There is no mass.      Tenderness: There is no abdominal tenderness.   Musculoskeletal:         General: No tenderness. Normal range of motion.      Cervical back: Normal range of motion and neck supple.      Right lower leg: No edema.      Left lower leg: No edema.   Lymphadenopathy:      Cervical: No cervical adenopathy.   Skin:     General: Skin is warm and dry.      Findings: No rash.   Neurological:      Mental Status: She is alert and oriented to person, place, and time.   Psychiatric:         Mood and Affect: Mood normal.         Behavior: Behavior normal.           Admission on 02/16/2024, Discharged on 02/16/2024   Component Date Value Ref Range Status    POC Rapid COVID 02/16/2024 Negative  Negative Final     Acceptable 02/16/2024 Yes   Final    POC Molecular Influenza A Ag 02/16/2024 Negative  Negative, Not Reported Final    POC Molecular Influenza B Ag  02/16/2024 Negative  Negative, Not Reported Final     Acceptable 02/16/2024 Yes   Final    QRS Duration 02/16/2024 76  ms Final    [facility] QTC Calculation 02/16/2024 419  ms Final   Lab Visit on 02/15/2024   Component Date Value Ref Range Status    Sodium 02/15/2024 138  136 - 145 mmol/L Final    Potassium 02/15/2024 3.7  3.5 - 5.1 mmol/L Final    Chloride 02/15/2024 100  95 - 110 mmol/L Final    CO2 02/15/2024 28  23 - 29 mmol/L Final    Glucose 02/15/2024 114 (H)  70 - 110 mg/dL Final    BUN 02/15/2024 11  8 - 23 mg/dL Final    Creatinine 02/15/2024 0.8  0.5 - 1.4 mg/dL Final    Calcium 02/15/2024 9.9  8.7 - 10.5 mg/dL Final    Total Protein 02/15/2024 7.7  6.0 - 8.4 g/dL Final    Albumin 02/15/2024 4.0  3.5 - 5.2 g/dL Final    Total Bilirubin 02/15/2024 0.5  0.1 - 1.0 mg/dL Final    Comment: For infants and newborns, interpretation of results should be based  on gestational age, weight and in agreement with clinical  observations.    Premature Infant recommended reference ranges:  Up to 24 hours.............<8.0 mg/dL  Up to 48 hours............<12.0 mg/dL  3-5 days..................<15.0 mg/dL  6-29 days.................<15.0 mg/dL      Alkaline Phosphatase 02/15/2024 46 (L)  55 - 135 U/L Final    AST 02/15/2024 21  10 - 40 U/L Final    ALT 02/15/2024 12  10 - 44 U/L Final    eGFR 02/15/2024 >60.0  >60 mL/min/1.73 m^2 Final    Anion Gap 02/15/2024 10  8 - 16 mmol/L Final    Cholesterol 02/15/2024 177  120 - 199 mg/dL Final    Comment: The National Cholesterol Education Program (NCEP) has set the  following guidelines (reference ranges) for Cholesterol:  Optimal.....................<200 mg/dL  Borderline High.............200-239 mg/dL  High........................> or = 240 mg/dL      Triglycerides 02/15/2024 60  30 - 150 mg/dL Final    Comment: The National Cholesterol Education Program (NCEP) has set the  following guidelines (reference values) for triglycerides:  Normal......................<150  mg/dL  Borderline High.............150-199 mg/dL  High........................200-499 mg/dL      HDL 02/15/2024 77 (H)  40 - 75 mg/dL Final    Comment: The National Cholesterol Education Program (NCEP) has set the  following guidelines (reference values) for HDL Cholesterol:  Low...............<40 mg/dL  Optimal...........>60 mg/dL      LDL Cholesterol 02/15/2024 88.0  63.0 - 159.0 mg/dL Final    Comment: The National Cholesterol Education Program (NCEP) has set the  following guidelines (reference values) for LDL Cholesterol:  Optimal.......................<130 mg/dL  Borderline High...............130-159 mg/dL  High..........................160-189 mg/dL  Very High.....................>190 mg/dL      HDL/Cholesterol Ratio 02/15/2024 43.5  20.0 - 50.0 % Final    Total Cholesterol/HDL Ratio 02/15/2024 2.3  2.0 - 5.0 Final    Non-HDL Cholesterol 02/15/2024 100  mg/dL Final    Comment: Risk category and Non-HDL cholesterol goals:  Coronary heart disease (CHD)or equivalent (10-year risk of CHD >20%):  Non-HDL cholesterol goal     <130 mg/dL  Two or more CHD risk factors and 10-year risk of CHD <= 20%:  Non-HDL cholesterol goal     <160 mg/dL  0 to 1 CHD risk factor:  Non-HDL cholesterol goal     <190 mg/dL      WBC 02/15/2024 5.52  3.90 - 12.70 K/uL Final    RBC 02/15/2024 4.40  4.00 - 5.40 M/uL Final    Hemoglobin 02/15/2024 12.2  12.0 - 16.0 g/dL Final    Hematocrit 02/15/2024 37.3  37.0 - 48.5 % Final    MCV 02/15/2024 85  82 - 98 fL Final    MCH 02/15/2024 27.7  27.0 - 31.0 pg Final    MCHC 02/15/2024 32.7  32.0 - 36.0 g/dL Final    RDW 02/15/2024 15.5 (H)  11.5 - 14.5 % Final    Platelets 02/15/2024 345  150 - 450 K/uL Final    MPV 02/15/2024 11.3  9.2 - 12.9 fL Final    Immature Granulocytes 02/15/2024 0.4  0.0 - 0.5 % Final    Gran # (ANC) 02/15/2024 3.0  1.8 - 7.7 K/uL Final    Immature Grans (Abs) 02/15/2024 0.02  0.00 - 0.04 K/uL Final    Comment: Mild elevation in immature granulocytes is non specific and    can be seen in a variety of conditions including stress response,   acute inflammation, trauma and pregnancy. Correlation with other   laboratory and clinical findings is essential.      Lymph # 02/15/2024 1.9  1.0 - 4.8 K/uL Final    Mono # 02/15/2024 0.4  0.3 - 1.0 K/uL Final    Eos # 02/15/2024 0.1  0.0 - 0.5 K/uL Final    Baso # 02/15/2024 0.05  0.00 - 0.20 K/uL Final    nRBC 02/15/2024 0  0 /100 WBC Final    Gran % 02/15/2024 54.5  38.0 - 73.0 % Final    Lymph % 02/15/2024 35.1  18.0 - 48.0 % Final    Mono % 02/15/2024 6.7  4.0 - 15.0 % Final    Eosinophil % 02/15/2024 2.4  0.0 - 8.0 % Final    Basophil % 02/15/2024 0.9  0.0 - 1.9 % Final    Differential Method 02/15/2024 Automated   Final    TSH 02/15/2024 0.714  0.400 - 4.000 uIU/mL Final   Hospital Outpatient Visit on 01/30/2024   Component Date Value Ref Range Status    RA Width 01/30/2024 3.45  cm Final    LA volume (mod) 01/30/2024 42.06  cm3 Final    Left Atrium Major Axis 01/30/2024 5.75  cm Final    Left Atrium Minor Axis 01/30/2024 5.36  cm Final    RA Major Axis 01/30/2024 3.77  cm Final    LV Diastolic Volume 01/30/2024 68.26  mL Final    LV Systolic Volume 01/30/2024 25.95  mL Final    MV Peak A Oleg 01/30/2024 0.91  m/s Final    MV stenosis pressure 1/2 time 01/30/2024 75.71  ms Final    TR Max Oleg 01/30/2024 3.42  m/s Final    MV Peak E Oleg 01/30/2024 0.68  m/s Final    Ao VTI 01/30/2024 23.22  cm Final    Ao peak oleg 01/30/2024 1.04  m/s Final    LVOT peak VTI 01/30/2024 17.96  cm Final    LVOT peak oleg 01/30/2024 0.68  m/s Final    LVOT diameter 01/30/2024 2.11  cm Final    IVRT 01/30/2024 122.74  msec Final    E wave deceleration time 01/30/2024 261.07  msec Final    AV mean gradient 01/30/2024 3  mmHg Final    TAPSE 01/30/2024 1.89  cm Final    RVDD 01/30/2024 3.90  cm Final    LA size 01/30/2024 2.88  cm Final    Ascending aorta 01/30/2024 3.39  cm Final    STJ 01/30/2024 2.77  cm Final    Sinus 01/30/2024 3.26  cm Final    LVIDs 01/30/2024  2.66  2.1 - 4.0 cm Final    Posterior Wall 01/30/2024 0.75  0.6 - 1.1 cm Final    IVS 01/30/2024 0.94  0.6 - 1.1 cm Final    LVIDd 01/30/2024 3.96  3.5 - 6.0 cm Final    TDI LATERAL 01/30/2024 0.07  m/s Final    LA WIDTH 01/30/2024 3.29  cm Final    TDI SEPTAL 01/30/2024 0.07  m/s Final    LV LATERAL E/E' RATIO 01/30/2024 9.71  m/s Final    LV SEPTAL E/E' RATIO 01/30/2024 9.71  m/s Final    FS 01/30/2024 33  28 - 44 % Final    LA volume 01/30/2024 44.68  cm3 Final    LV mass 01/30/2024 98.99  g Final    Left Ventricle Relative Wall Thick* 01/30/2024 0.38  cm Final    AV valve area 01/30/2024 2.70  cm² Final    AV Velocity Ratio 01/30/2024 0.65   Final    AV index (prosthetic) 01/30/2024 0.77   Final    MV valve area p 1/2 method 01/30/2024 2.91  cm2 Final    E/A ratio 01/30/2024 0.75   Final    Mean e' 01/30/2024 0.07  m/s Final    LVOT area 01/30/2024 3.5  cm2 Final    LVOT stroke volume 01/30/2024 62.77  cm3 Final    AV peak gradient 01/30/2024 4  mmHg Final    E/E' ratio 01/30/2024 9.71  m/s Final    Triscuspid Valve Regurgitation Pea* 01/30/2024 47  mmHg Final    ADELINE by Velocity Ratio 01/30/2024 2.29  cm² Final    BSA 01/30/2024 1.96  m2 Final    LV Systolic Volume Index 01/30/2024 13.6  mL/m2 Final    LV Diastolic Volume Index 01/30/2024 35.74  mL/m2 Final    LV Mass Index 01/30/2024 52  g/m2 Final    LA Volume Index 01/30/2024 23.4  mL/m2 Final    LA Volume Index (Mod) 01/30/2024 22.0  mL/m2 Final    ZLVIDS 01/30/2024 -1.73   Final    ZLVIDD 01/30/2024 -3.05   Final    TV resting pulmonary artery pressu* 01/30/2024 55  mmHg Final    RV TB RVSP 01/30/2024 11  mmHg Final    Est. RA pres 01/30/2024 8  mmHg Final   Office Visit on 01/06/2024   Component Date Value Ref Range Status    SARS Coronavirus 2 Antigen 01/06/2024 Negative  Negative Final     Acceptable 01/06/2024 Yes   Final    POC Molecular Influenza A Ag 01/06/2024 Negative  Negative, Not Reported Final    POC Molecular Influenza B Ag  01/06/2024 Negative  Negative, Not Reported Final     Acceptable 01/06/2024 Yes   Final       Assessment & Plan:      Nan was seen today for hypertension and bronchitis.  The patient's dry cough appears to be gradually improving.  Recommended use of Tessalon Perles for symptom relief.  The patient will follow-up with her pulmonologist if symptoms persist.  Current therapy will be continued for management of hypertension.  She has been encouraged to exercise daily as tolerated.    Diagnoses and all orders for this visit:    Primary hypertension    History of Guillain-Pearl syndrome    Bronchitis    Viral URI with cough         Follow up in about 6 months (around 8/22/2024).     Glenroy Jimenez MD

## 2024-05-03 NOTE — PROGRESS NOTES
Subjective:       Patient ID: Nan Palafox is a 74 y.o. female.    Chief Complaint: Follow-up    Diagnosis:  Typical carcinoid     Pre-operative therapy: none    Procedure(s) and date(s): 1/4/21: right robotic assisted middle lobectomy with MLND    Pathology: 2.0 cm well differentiated typical carcinoid, no VPI, no LVI, no margins, level 2,4,7,10,11 = negative, pT1bN0     Post-operative therapy: surveillance     HPI   74 y.o. female never smoker here today for 3.5 year follow-up from right robotic middle lobectomy. Uncomplicated post-operative course. Diagnosed with Guillain-Laura Syndrome after Pfizer vaccine in February 2021. Covid + Jan 2022. CT shortly after with scattered inflammatory opacities. Denies fever, chills, wheeze, cough, CP, palpitations, claudication,  N/V or changes in bowel and bladder functioning.       Review of Systems   Constitutional:  Negative for activity change, fatigue and fever.   Eyes:  Negative for visual disturbance.   Respiratory:  Positive for cough and shortness of breath. Negative for chest tightness and wheezing.         Occasional exertional dyspnea.  She has undergone an extensive w/u including negative right and left cardiac caths.     Cardiovascular:  Negative for chest pain, palpitations and leg swelling.   Gastrointestinal: Negative.    Genitourinary:  Negative for difficulty urinating.   Musculoskeletal:  Positive for gait problem. Negative for arthralgias and myalgias.   Neurological:  Positive for coordination difficulties. Negative for dizziness, facial asymmetry and speech difficulty.        Residual lower extremity weakness resulting in some difficulty with coordination and ambulation   Psychiatric/Behavioral:  Positive for dysphoric mood. Negative for confusion. The patient is not nervous/anxious.         She is having emotional challenges dealing with the death of her  and asked for help         Objective:       Vitals:    05/08/24 0836   BP: 139/88  "  Pulse: 61   SpO2: 97%   Weight: 83.2 kg (183 lb 6.8 oz)   Height: 5' 5" (1.651 m)   PainSc: 0-No pain         Physical Exam  Constitutional:       Appearance: Normal appearance.   Eyes:      General: No scleral icterus.     Extraocular Movements: Extraocular movements intact.      Pupils: Pupils are equal, round, and reactive to light.   Cardiovascular:      Rate and Rhythm: Normal rate and regular rhythm.      Pulses: Normal pulses.   Pulmonary:      Effort: Pulmonary effort is normal.      Breath sounds: Normal breath sounds. No wheezing or rhonchi.   Chest:      Chest wall: No tenderness.   Abdominal:      General: Abdomen is flat.      Palpations: Abdomen is soft.   Musculoskeletal:         General: Normal range of motion.      Right lower leg: No edema.      Left lower leg: No edema.   Skin:     General: Skin is warm and dry.      Capillary Refill: Capillary refill takes less than 2 seconds.   Neurological:      General: No focal deficit present.      Mental Status: She is alert and oriented to person, place, and time.      Gait: Gait normal.   Psychiatric:         Mood and Affect: Mood normal.         Chest CT 7/16/21:  1.  Stable postsurgical changes status post right middle lobectomy and mediastinal lymph node dissection.  2.   Interval development of focal consolidation within the right posterior lung base, which may represent atelectasis or infectious process, though malignancy cannot be excluded in this patient with a history of right middle lobe carcinoid tumor.  3.  Bilateral thyroid nodules.  4.  Hepatic cysts.    Chest CT 2/2/22:   1.  New left lower lobe ground-glass nodular opacities, favored to represent infectious/inflammatory etiology.  However, short-term follow-up evaluation is highly recommended.  2.  Persistent, stable appearance of focal opacity of the right posterior base.  It may merely represent focal atelectasis.  Consider follow-up evaluation to include intravenous contrast.  3.  " Other findings as above without change.     Chest CT 8/10/22:  I reviewed the images and compared to the previous chest CT  1.  Interval resolution of ground-glass opacities within the left lower lobe.  Favored to represent resolved infectious or inflammatory etiology.  2.  Stable appearance of focal opacity in the right posterior base.  Imaging appearance may favor round atelectasis.    Chest CT 02/08/23: I reviewed the images and compared to the 8/10/22 CT  Stable RLL basilar opacity, likely postsurgical scar vs persistent atelectasis  Mild PA trunk dilation    Chest CT 8/9/23:  I reviewed the images  Stable RLL postoperative changes  MAJOR    Chest CT 05/08/24: images reviewed.   Stable RLL subcentimeter nodule  No mediastinal or hilar lymphadenopathy  MAJOR  Assessment:       70 y.o. female here today for 3.5 year follow-up from right robotic middle lobectomy, pathology pT1bN0 typical carcinoid. Pathology reviewed with patient.   MAJOR  FOX with negative cardiac workup. She is 3.5 years status post right middle lobectomy, so it is unlikely that the FOX is of pulmonary origin.  Residual effects from Guillain-[place] have improved but may account for some of the exertional dyspnea  Stable chest CT findings      Plan:       RTC in 12 months with chest CT.     Continue follow-up with Neurology for Guillain-[place] management

## 2024-05-08 ENCOUNTER — HOSPITAL ENCOUNTER (OUTPATIENT)
Dept: RADIOLOGY | Facility: HOSPITAL | Age: 74
Discharge: HOME | End: 2024-05-08
Attending: THORACIC SURGERY (CARDIOTHORACIC VASCULAR SURGERY)
Payer: MEDICARE

## 2024-05-08 ENCOUNTER — OFFICE VISIT (OUTPATIENT)
Dept: CARDIOTHORACIC SURGERY | Facility: CLINIC | Age: 74
End: 2024-05-08
Attending: THORACIC SURGERY (CARDIOTHORACIC VASCULAR SURGERY)
Payer: MEDICARE

## 2024-05-08 VITALS
HEIGHT: 65 IN | DIASTOLIC BLOOD PRESSURE: 88 MMHG | OXYGEN SATURATION: 97 % | WEIGHT: 183.44 LBS | HEART RATE: 61 BPM | SYSTOLIC BLOOD PRESSURE: 139 MMHG | BODY MASS INDEX: 30.56 KG/M2

## 2024-05-08 DIAGNOSIS — R91.1 PULMONARY NODULE, RIGHT: ICD-10-CM

## 2024-05-08 DIAGNOSIS — C7A.090 CARCINOID BRONCHIAL ADENOMA OF RIGHT LUNG: Primary | ICD-10-CM

## 2024-05-08 DIAGNOSIS — C7A.090 CARCINOID BRONCHIAL ADENOMA OF RIGHT LUNG: ICD-10-CM

## 2024-05-08 PROCEDURE — 99213 OFFICE O/P EST LOW 20 MIN: CPT | Mod: S$PBB,,, | Performed by: THORACIC SURGERY (CARDIOTHORACIC VASCULAR SURGERY)

## 2024-05-08 PROCEDURE — 99999 PR PBB SHADOW E&M-EST. PATIENT-LVL IV: CPT | Mod: PBBFAC,,, | Performed by: THORACIC SURGERY (CARDIOTHORACIC VASCULAR SURGERY)

## 2024-05-08 PROCEDURE — 71250 CT THORAX DX C-: CPT | Mod: 26,,, | Performed by: RADIOLOGY

## 2024-05-08 PROCEDURE — 71250 CT THORAX DX C-: CPT | Mod: TC

## 2024-05-08 PROCEDURE — 99214 OFFICE O/P EST MOD 30 MIN: CPT | Mod: PBBFAC,25 | Performed by: THORACIC SURGERY (CARDIOTHORACIC VASCULAR SURGERY)

## 2024-05-20 ENCOUNTER — OFFICE VISIT (OUTPATIENT)
Dept: OPTOMETRY | Facility: CLINIC | Age: 74
End: 2024-05-20
Payer: MEDICARE

## 2024-05-20 DIAGNOSIS — H52.7 REFRACTIVE ERROR: ICD-10-CM

## 2024-05-20 DIAGNOSIS — H25.13 NUCLEAR SCLEROSIS OF BOTH EYES: Primary | ICD-10-CM

## 2024-05-20 DIAGNOSIS — H40.1132 PRIMARY OPEN ANGLE GLAUCOMA (POAG) OF BOTH EYES, MODERATE STAGE: ICD-10-CM

## 2024-05-20 PROCEDURE — 92015 DETERMINE REFRACTIVE STATE: CPT | Mod: ,,, | Performed by: OPTOMETRIST

## 2024-05-20 PROCEDURE — 92012 INTRM OPH EXAM EST PATIENT: CPT | Mod: S$PBB,,, | Performed by: OPTOMETRIST

## 2024-05-20 PROCEDURE — 99213 OFFICE O/P EST LOW 20 MIN: CPT | Mod: PBBFAC,PO | Performed by: OPTOMETRIST

## 2024-05-20 PROCEDURE — 99999 PR PBB SHADOW E&M-EST. PATIENT-LVL III: CPT | Mod: PBBFAC,,, | Performed by: OPTOMETRIST

## 2024-05-20 NOTE — PROGRESS NOTES
HPI    Ocular health visit [facility] 11/21/23    Pt sees Dr. Hathaway, needs an updated spec Rx. Pt complains of blurred   near vision w specs. Pt stated woke up on wed w swollen eye, pt denies eye   pain but notices increase in irritation and itchiness. Pt uses compress to   relief itchiness but no relief. Pt uses latanoprost OU QHS, and cosopt    BID OU. HTN controlled w aid  Last edited by Siomara Crockett on 5/20/2024  3:20 PM.            Assessment /Plan     For exam results, see Encounter Report.    Nuclear sclerosis of both eyes    Primary open angle glaucoma (POAG) of both eyes, moderate stage    Refractive error      Educated pt on presence of cataracts and effects on vision. No surgery at this time. Recheck in one year.  2. Keep up iop ck and dfe with Rivas  3. New Spectacle Rx given, discussed different options for glasses. RTC 1 year routine eye exam.

## 2024-05-28 DIAGNOSIS — Z00.00 ENCOUNTER FOR MEDICARE ANNUAL WELLNESS EXAM: ICD-10-CM

## 2024-06-21 DIAGNOSIS — H40.053 BILATERAL OCULAR HYPERTENSION: ICD-10-CM

## 2024-06-24 RX ORDER — LATANOPROST 50 UG/ML
SOLUTION/ DROPS OPHTHALMIC
Qty: 7.5 ML | Refills: 4 | Status: SHIPPED | OUTPATIENT
Start: 2024-06-24

## 2024-07-19 ENCOUNTER — OFFICE VISIT (OUTPATIENT)
Dept: PRIMARY CARE CLINIC | Facility: CLINIC | Age: 74
End: 2024-07-19
Payer: MEDICARE

## 2024-07-19 ENCOUNTER — LAB VISIT (OUTPATIENT)
Dept: LAB | Facility: HOSPITAL | Age: 74
End: 2024-07-19
Attending: STUDENT IN AN ORGANIZED HEALTH CARE EDUCATION/TRAINING PROGRAM
Payer: MEDICARE

## 2024-07-19 VITALS
HEART RATE: 93 BPM | HEIGHT: 65 IN | WEIGHT: 183.63 LBS | OXYGEN SATURATION: 98 % | TEMPERATURE: 98 F | BODY MASS INDEX: 30.59 KG/M2 | SYSTOLIC BLOOD PRESSURE: 112 MMHG | DIASTOLIC BLOOD PRESSURE: 62 MMHG

## 2024-07-19 DIAGNOSIS — R05.1 ACUTE COUGH: Primary | ICD-10-CM

## 2024-07-19 DIAGNOSIS — R53.83 FATIGUE, UNSPECIFIED TYPE: ICD-10-CM

## 2024-07-19 LAB
ALBUMIN SERPL BCP-MCNC: 3.9 G/DL (ref 3.5–5.2)
ALP SERPL-CCNC: 51 U/L (ref 55–135)
ALT SERPL W/O P-5'-P-CCNC: 13 U/L (ref 10–44)
ANION GAP SERPL CALC-SCNC: 9 MMOL/L (ref 8–16)
AST SERPL-CCNC: 23 U/L (ref 10–40)
BASOPHILS # BLD AUTO: 0.04 K/UL (ref 0–0.2)
BASOPHILS NFR BLD: 0.8 % (ref 0–1.9)
BILIRUB SERPL-MCNC: 0.4 MG/DL (ref 0.1–1)
BUN SERPL-MCNC: 13 MG/DL (ref 8–23)
CALCIUM SERPL-MCNC: 9.9 MG/DL (ref 8.7–10.5)
CHLORIDE SERPL-SCNC: 100 MMOL/L (ref 95–110)
CO2 SERPL-SCNC: 27 MMOL/L (ref 23–29)
CREAT SERPL-MCNC: 0.8 MG/DL (ref 0.5–1.4)
CTP QC/QA: YES
DIFFERENTIAL METHOD BLD: ABNORMAL
EOSINOPHIL # BLD AUTO: 0.2 K/UL (ref 0–0.5)
EOSINOPHIL NFR BLD: 3.2 % (ref 0–8)
ERYTHROCYTE [DISTWIDTH] IN BLOOD BY AUTOMATED COUNT: 15.2 % (ref 11.5–14.5)
EST. GFR  (NO RACE VARIABLE): >60 ML/MIN/1.73 M^2
GLUCOSE SERPL-MCNC: 112 MG/DL (ref 70–110)
HCT VFR BLD AUTO: 37.1 % (ref 37–48.5)
HGB BLD-MCNC: 12 G/DL (ref 12–16)
IMM GRANULOCYTES # BLD AUTO: 0.01 K/UL (ref 0–0.04)
IMM GRANULOCYTES NFR BLD AUTO: 0.2 % (ref 0–0.5)
LYMPHOCYTES # BLD AUTO: 1.8 K/UL (ref 1–4.8)
LYMPHOCYTES NFR BLD: 36.7 % (ref 18–48)
MCH RBC QN AUTO: 27.2 PG (ref 27–31)
MCHC RBC AUTO-ENTMCNC: 32.3 G/DL (ref 32–36)
MCV RBC AUTO: 84 FL (ref 82–98)
MONOCYTES # BLD AUTO: 0.4 K/UL (ref 0.3–1)
MONOCYTES NFR BLD: 8.8 % (ref 4–15)
NEUTROPHILS # BLD AUTO: 2.5 K/UL (ref 1.8–7.7)
NEUTROPHILS NFR BLD: 50.3 % (ref 38–73)
NRBC BLD-RTO: 0 /100 WBC
PLATELET # BLD AUTO: 350 K/UL (ref 150–450)
PMV BLD AUTO: 10 FL (ref 9.2–12.9)
POTASSIUM SERPL-SCNC: 3.7 MMOL/L (ref 3.5–5.1)
PROT SERPL-MCNC: 7.9 G/DL (ref 6–8.4)
RBC # BLD AUTO: 4.41 M/UL (ref 4–5.4)
SARS-COV-2 RDRP RESP QL NAA+PROBE: NEGATIVE
SODIUM SERPL-SCNC: 136 MMOL/L (ref 136–145)
TSH SERPL DL<=0.005 MIU/L-ACNC: 0.52 UIU/ML (ref 0.4–4)
WBC # BLD AUTO: 5.02 K/UL (ref 3.9–12.7)

## 2024-07-19 PROCEDURE — 99999PBSHW: Mod: PBBFAC,,,

## 2024-07-19 PROCEDURE — 85025 COMPLETE CBC W/AUTO DIFF WBC: CPT | Performed by: STUDENT IN AN ORGANIZED HEALTH CARE EDUCATION/TRAINING PROGRAM

## 2024-07-19 PROCEDURE — 84443 ASSAY THYROID STIM HORMONE: CPT | Performed by: STUDENT IN AN ORGANIZED HEALTH CARE EDUCATION/TRAINING PROGRAM

## 2024-07-19 PROCEDURE — 36415 COLL VENOUS BLD VENIPUNCTURE: CPT | Mod: PN | Performed by: STUDENT IN AN ORGANIZED HEALTH CARE EDUCATION/TRAINING PROGRAM

## 2024-07-19 PROCEDURE — 80053 COMPREHEN METABOLIC PANEL: CPT | Performed by: STUDENT IN AN ORGANIZED HEALTH CARE EDUCATION/TRAINING PROGRAM

## 2024-07-19 PROCEDURE — 99214 OFFICE O/P EST MOD 30 MIN: CPT | Mod: PBBFAC,PN | Performed by: STUDENT IN AN ORGANIZED HEALTH CARE EDUCATION/TRAINING PROGRAM

## 2024-07-19 PROCEDURE — 99999 PR PBB SHADOW E&M-EST. PATIENT-LVL IV: CPT | Mod: PBBFAC,,, | Performed by: STUDENT IN AN ORGANIZED HEALTH CARE EDUCATION/TRAINING PROGRAM

## 2024-07-19 PROCEDURE — 99213 OFFICE O/P EST LOW 20 MIN: CPT | Mod: S$PBB,,, | Performed by: STUDENT IN AN ORGANIZED HEALTH CARE EDUCATION/TRAINING PROGRAM

## 2024-07-19 PROCEDURE — 87635 SARS-COV-2 COVID-19 AMP PRB: CPT | Mod: PBBFAC,PN | Performed by: STUDENT IN AN ORGANIZED HEALTH CARE EDUCATION/TRAINING PROGRAM

## 2024-07-19 NOTE — PROGRESS NOTES
Office visit  Patient: Nan Palafox   7/19/2024     Assessment:     1. Acute cough    2. Fatigue, unspecified type      Plan:         1. Acute cough  -     POCT COVID-19 Rapid Screening        -suspect viral URI; COVID was negative        -supportive care with OTC medications    2. Fatigue, unspecified type  -     POCT COVID-19 Rapid Screening  -     CBC W/ AUTO DIFFERENTIAL; Future; Expected date: 07/19/2024  -     COMPREHENSIVE METABOLIC PANEL; Future; Expected date: 07/19/2024  -     TSH; Future; Expected date: 07/19/2024        -will check basic labs for anemia and liver/kidney function        CHIEF COMPLAINT: multiple concerns    HPI: Nan Palafox is a 74 y.o. female who presents for multiple concerns. She states she's been coughing recently (started a few days ago) and feeling under the weather.  When she woke up this morning, she felt wobbly and off balance.  This is a sudden thing. She has been drinking water. Her throat was scratchy for the past couple days.  Mild rhinorrhea, and a little diarrhea. No nausea, vomiting, constipation.        Current Outpatient Medications   Medication Instructions    amLODIPine (NORVASC) 10 mg, Oral    ASCORBATE CALCIUM (VITAMIN C ORAL) 1 tablet, Oral, Daily    aspirin (ECOTRIN) 81 mg, Daily    dorzolamide-timolol 2-0.5% (COSOPT) 22.3-6.8 mg/mL ophthalmic solution INSTILL 1 DROP INTO BOTH EYES TWICE DAILY    fluticasone (FLONASE) 50 mcg/actuation nasal spray 2 sprays, Each Nostril, Daily    hydrOXYzine pamoate (VISTARIL) 50 mg, Oral, Every 8 hours PRN    latanoprost 0.005 % ophthalmic solution INSTILL 1 DROP IN BOTH EYES EVERY EVENING    LORazepam (ATIVAN) 1 MG tablet TAKE 1 TABLET(1 MG) BY MOUTH EVERY 8 HOURS AS NEEDED    multivitamin (THERAGRAN) tablet 1 tablet, Oral, Daily    pantoprazole (PROTONIX) 40 MG tablet TAKE 1 TABLET BY MOUTH EVERY MORNING 30 MINUTES BEFORE BREAKFAST.    rosuvastatin (CRESTOR) 40 mg, Oral    valsartan-hydrochlorothiazide (DIOVAN-HCT) 320-25  "mg per tablet 1 tablet, Oral, Daily    vitamin D (VITAMIN D3) 1,000 Units, Oral, Daily    ZINC ORAL Oral, 3 times daily       Lab Results   Component Value Date    HGBA1C 5.7 (H) 05/09/2023    HGBA1C 5.5 09/06/2022    HGBA1C 5.5 04/04/2022     No results found for: "MICALBCREAT"  Lab Results   Component Value Date    LDLCALC 88.0 02/15/2024    LDLCALC 95.8 05/09/2023    CHOL 177 02/15/2024    HDL 77 (H) 02/15/2024    TRIG 60 02/15/2024       Lab Results   Component Value Date     07/19/2024    K 3.7 07/19/2024     07/19/2024    CO2 27 07/19/2024     (H) 07/19/2024    BUN 13 07/19/2024    CREATININE 0.8 07/19/2024    CALCIUM 9.9 07/19/2024    PROT 7.9 07/19/2024    ALBUMIN 3.9 07/19/2024    BILITOT 0.4 07/19/2024    ALKPHOS 51 (L) 07/19/2024    AST 23 07/19/2024    ALT 13 07/19/2024    ANIONGAP 9 07/19/2024    ESTGFRAFRICA >60.0 04/04/2022    EGFRNONAA >60.0 04/04/2022    WBC 5.02 07/19/2024    HGB 12.0 07/19/2024    HGB 12.2 02/15/2024    HCT 37.1 07/19/2024    MCV 84 07/19/2024     07/19/2024    TSH 0.517 07/19/2024    HEPCAB Negative 10/07/2016       Lab Results   Component Value Date    FSH 56.80 09/08/2018    ESTRADIOL <10 (A) 09/08/2018    VFHTQTYV24LI 41 01/30/2021    HMTMJEHI09XM 32 04/22/2016    YOJVPOSM89 1030 (H) 05/04/2023    FERRITIN 119 11/18/2008    IRON 93 11/18/2008    TIBC 298 11/18/2008    FESATURATED 31 11/18/2008    ZINC 81 05/04/2023         Past Medical History:   Diagnosis Date    Arthritis     Cataract     Cervical spondylosis with radiculopathy 04/11/2016    GBS (Guillain-Mogadore syndrome)     History of gastrointestinal stromal tumor (GIST) 12/02/2020    Hypertension     Malignant carcinoid tumor of bronchus and lung 12/02/2020    Nuclear sclerosis - Both Eyes 02/08/2013    PNA (pneumonia)     Primary open angle glaucoma (POAG) of both eyes, moderate stage 03/11/2019     Past Surgical History:   Procedure Laterality Date    BREAST BIOPSY Right     excisional bx    " "BREAST SURGERY Right     Excisional bx    HYSTERECTOMY      INJECTION OF ANESTHETIC AGENT AROUND MULTIPLE INTERCOSTAL NERVES Right 01/04/2021    Procedure: BLOCK, NERVE, INTERCOSTAL, 2 OR MORE;  Surgeon: Ru Hernandez MD;  Location: [facility] OR [address] [place] [address];  Service: Thoracic;  Laterality: Right;    RETINAL DETACHMENT SURGERY      patient states that she had a retinal tear that was repaired in the past at [facility] but she is uncertain of which eye    STOMACH SURGERY      SURGICAL REMOVAL OF LYMPH NODE N/A 01/04/2021    Procedure: EXCISION, LYMPH NODE;  Surgeon: Ru Hernandez MD;  Location: [facility] OR [address] [place] [address];  Service: Thoracic;  Laterality: N/A;    TOTAL KNEE ARTHROPLASTY Right     XI ROBOTIC RATS,WITH LOBECTOMY,LUNG Right 01/04/2021    Procedure: XI ROBOTIC RATS,WITH LOBECTOMY,LUNG;  Surgeon: Ru Hernandez MD;  Location: [facility] OR [address] [place] [address];  Service: Thoracic;  Laterality: Right;  Right middle lobectomy.       Social History     Tobacco Use    Smoking status: Never    Smokeless tobacco: Never   Substance Use Topics    Alcohol use: Yes     Comment: wine 3 glasses a week / weekend 3 glasses of champagne    Drug use: Never       family history includes Blindness in her mother; Cancer in her brother; Cancer (age of onset: 68) in her mother; Cataracts in her brother and mother; Glaucoma in her brother and mother; No Known Problems in her father, maternal aunt, maternal grandfather, maternal grandmother, maternal uncle, paternal aunt, paternal grandfather, paternal grandmother, paternal uncle, and sister.    Vitals:    07/19/24 1432   BP: 112/62   Pulse: 93   Temp: 98.2 °F (36.8 °C)   TempSrc: Oral   SpO2: 98%   Weight: 83.3 kg (183 lb 10.3 oz)   Height: 5' 5" (1.651 m)   PainSc:   7     Objective:   Physical Exam  Vitals reviewed.   Constitutional:       General: She is not in acute distress.     Appearance: Normal appearance. She is well-developed.   HENT:      Head: Normocephalic and atraumatic.      Right Ear: " Tympanic membrane, ear canal and external ear normal.      Left Ear: Tympanic membrane, ear canal and external ear normal.      Nose: Nose normal.      Mouth/Throat:      Mouth: Mucous membranes are moist.      Pharynx: No oropharyngeal exudate or posterior oropharyngeal erythema.   Eyes:      General: No scleral icterus.        Right eye: No discharge.         Left eye: No discharge.      Conjunctiva/sclera: Conjunctivae normal.   Neck:      Thyroid: No thyromegaly or thyroid tenderness.   Cardiovascular:      Rate and Rhythm: Normal rate and regular rhythm.      Heart sounds: Normal heart sounds. No murmur heard.     No friction rub. No gallop.   Pulmonary:      Effort: Pulmonary effort is normal. No respiratory distress.      Breath sounds: Normal breath sounds. No wheezing, rhonchi or rales.   Musculoskeletal:         General: No deformity.      Right lower leg: No edema.      Left lower leg: No edema.   Lymphadenopathy:      Cervical: No cervical adenopathy.   Skin:     General: Skin is warm and dry.   Neurological:      General: No focal deficit present.      Mental Status: She is alert and oriented to person, place, and time.   Psychiatric:         Mood and Affect: Mood normal.         Behavior: Behavior normal.         Thought Content: Thought content normal.             Tanya Mcclure MD  Internal Medicine and Pediatrics

## 2024-07-30 RX ORDER — LORAZEPAM 1 MG/1
TABLET ORAL
Qty: 60 TABLET | Refills: 1 | Status: SHIPPED | OUTPATIENT
Start: 2024-07-30

## 2024-07-30 NOTE — TELEPHONE ENCOUNTER
No care due was identified.  [facility] Embedded Care Due Messages. Reference number: [id number].   7/29/2024 10:13:13 PM CDT

## 2024-08-19 ENCOUNTER — TELEPHONE (OUTPATIENT)
Dept: INTERNAL MEDICINE | Facility: CLINIC | Age: 74
End: 2024-08-19
Payer: MEDICARE

## 2024-08-19 NOTE — TELEPHONE ENCOUNTER
----- Message from Josephine Mcginnis MA sent at 8/19/2024  8:44 AM CDT -----  Contact: Pt  [phone]    ----- Message -----  From: Tanya Austin  Sent: 8/19/2024   8:30 AM CDT  To: Tony Tim A Staff    Patient is requesting a call back from Josephine concerning her medical history. (Would not go into details)    Please call and advise.    Thank You

## 2024-08-19 NOTE — TELEPHONE ENCOUNTER
Lov 2/22/24    I returned her call.    Coughing and worried.  Has appt Thursday 8/22 and wanted earlier in the  Day.     I moved to sept , I had an 830 cancellation.

## 2024-09-03 ENCOUNTER — OFFICE VISIT (OUTPATIENT)
Dept: INTERNAL MEDICINE | Facility: CLINIC | Age: 74
End: 2024-09-03
Payer: MEDICARE

## 2024-09-03 ENCOUNTER — LAB VISIT (OUTPATIENT)
Dept: LAB | Facility: HOSPITAL | Age: 74
End: 2024-09-03
Attending: INTERNAL MEDICINE
Payer: MEDICARE

## 2024-09-03 VITALS
OXYGEN SATURATION: 96 % | TEMPERATURE: 97 F | BODY MASS INDEX: 29.12 KG/M2 | HEART RATE: 76 BPM | WEIGHT: 181.19 LBS | SYSTOLIC BLOOD PRESSURE: 118 MMHG | DIASTOLIC BLOOD PRESSURE: 84 MMHG | RESPIRATION RATE: 16 BRPM | HEIGHT: 66 IN

## 2024-09-03 DIAGNOSIS — K21.9 GASTROESOPHAGEAL REFLUX DISEASE, UNSPECIFIED WHETHER ESOPHAGITIS PRESENT: ICD-10-CM

## 2024-09-03 DIAGNOSIS — I10 PRIMARY HYPERTENSION: ICD-10-CM

## 2024-09-03 DIAGNOSIS — R10.84 GENERALIZED ABDOMINAL PAIN: ICD-10-CM

## 2024-09-03 DIAGNOSIS — I10 PRIMARY HYPERTENSION: Primary | ICD-10-CM

## 2024-09-03 DIAGNOSIS — R10.9 ABDOMINAL PAIN, UNSPECIFIED ABDOMINAL LOCATION: ICD-10-CM

## 2024-09-03 DIAGNOSIS — Z85.09 HISTORY OF GASTROINTESTINAL STROMAL TUMOR (GIST): ICD-10-CM

## 2024-09-03 DIAGNOSIS — Z86.69 HISTORY OF GUILLAIN-BARRE SYNDROME: ICD-10-CM

## 2024-09-03 DIAGNOSIS — R13.10 DYSPHAGIA, UNSPECIFIED TYPE: ICD-10-CM

## 2024-09-03 DIAGNOSIS — R73.03 PREDIABETES: ICD-10-CM

## 2024-09-03 LAB
BILIRUB UR QL STRIP: NEGATIVE
CLARITY UR REFRACT.AUTO: ABNORMAL
COLOR UR AUTO: YELLOW
GLUCOSE UR QL STRIP: NEGATIVE
HGB UR QL STRIP: NEGATIVE
KETONES UR QL STRIP: NEGATIVE
LEUKOCYTE ESTERASE UR QL STRIP: NEGATIVE
NITRITE UR QL STRIP: NEGATIVE
PH UR STRIP: 8 [PH] (ref 5–8)
PROT UR QL STRIP: ABNORMAL
SP GR UR STRIP: 1.02 (ref 1–1.03)
URN SPEC COLLECT METH UR: ABNORMAL

## 2024-09-03 PROCEDURE — 81003 URINALYSIS AUTO W/O SCOPE: CPT | Performed by: INTERNAL MEDICINE

## 2024-09-03 PROCEDURE — 99215 OFFICE O/P EST HI 40 MIN: CPT | Mod: S$PBB,,, | Performed by: INTERNAL MEDICINE

## 2024-09-03 PROCEDURE — 87086 URINE CULTURE/COLONY COUNT: CPT | Performed by: INTERNAL MEDICINE

## 2024-09-03 PROCEDURE — 99999 PR PBB SHADOW E&M-EST. PATIENT-LVL V: CPT | Mod: PBBFAC,,, | Performed by: INTERNAL MEDICINE

## 2024-09-03 PROCEDURE — 99215 OFFICE O/P EST HI 40 MIN: CPT | Mod: PBBFAC,PO | Performed by: INTERNAL MEDICINE

## 2024-09-03 RX ORDER — PANTOPRAZOLE SODIUM 40 MG/1
TABLET, DELAYED RELEASE ORAL
Qty: 30 TABLET | Refills: 3 | Status: SHIPPED | OUTPATIENT
Start: 2024-09-03

## 2024-09-03 RX ORDER — LORAZEPAM 1 MG/1
TABLET ORAL
Qty: 60 TABLET | Refills: 1 | Status: SHIPPED | OUTPATIENT
Start: 2024-09-03

## 2024-09-03 NOTE — PROGRESS NOTES
Subjective:       Patient ID: Nan Palafox is a 74 y.o. female.    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  The patient is followed regularly by her pulmonologist Dr. Javad Franks at [facility] and her Cardiothoracic surgeon Dr. Reese macias.  She is status post thoracic surgery for treatment of bronchial carcinoid.  She has not exhibited any evidence of tumor recurrence since the surgery.  She does complain of chronic intermittent cough.  Her pulmonologist has prescribed low-dose nocturnal oxygen for home use.    She has been receiving physical therapy 3 days a week at [facility] physical therapy group.  She has noted improvement in her balance and lower extremity strength.    She does complain of abdominal bloating with some upper abdominal pain, burning in the esophagus after swallowing.  She is not experiencing nausea or vomiting.  Intermittent acid reflux symptoms are noted.  She is not routinely using Protonix.  She feels her appetite has remained stable.  The patient does have a distant history of endoscopic resection of a GIST.  This procedure was complicated by a full-thickness perforation that required emergency surgery for a wedge gastroplasty.  The patient's last colonoscopy was was performed by an outside specialist on 11/18/2022.  She was advised to have a follow-up colonoscopy in 5 years in view of family history of colon cancer.  She did not have any colon polyps at the time of the examination.  Mild diverticulosis was noted.    The patient has hypertension.  She currently takes amlodipine and valsartan-HCTZ for management.  She has not experienced any medication side effects.  She has hyperlipidemia in his using rosuvastatin for cholesterol control.  She is not experiencing any muscle pains.    Review of Systems    Constitutional:  Negative for activity change, appetite change, fatigue, fever, night sweats and unexpected weight change.   HENT:  Positive for trouble swallowing.    Eyes:  Negative for visual disturbance.   Respiratory:  Positive for cough. Negative for chest tightness, shortness of breath and wheezing.    Cardiovascular:  Negative for chest pain, palpitations and leg swelling.   Gastrointestinal:  Positive for abdominal distention, abdominal pain and reflux. Negative for blood in stool, constipation, diarrhea, nausea and vomiting.   Genitourinary:  Positive for dysuria. Negative for bladder incontinence, flank pain and hematuria.   Musculoskeletal:  Negative for arthralgias, neck pain and neck stiffness.   Integumentary:  Negative for rash.   Neurological:  Negative for dizziness, syncope and headaches.   Psychiatric/Behavioral:  Positive for sleep disturbance. Negative for confusion, depressed mood and hallucinations. The patient is nervous/anxious.             Physical Exam  Vitals and nursing note reviewed.   Constitutional:       General: She is not in acute distress.     Appearance: Normal appearance. She is well-developed.      Comments: Weight has remained stable since 02/22/2024.   HENT:      Head: Normocephalic and atraumatic.   Eyes:      General: No scleral icterus.     Extraocular Movements: Extraocular movements intact.      Conjunctiva/sclera: Conjunctivae normal.   Neck:      Thyroid: No thyromegaly.      Vascular: No JVD.   Cardiovascular:      Rate and Rhythm: Normal rate and regular rhythm.      Heart sounds: Normal heart sounds. No murmur heard.     No friction rub. No gallop.   Pulmonary:      Effort: Pulmonary effort is normal. No respiratory distress.      Breath sounds: Normal breath sounds. No wheezing or rales.   Abdominal:      General: Bowel sounds are normal.      Palpations: Abdomen is soft. There is no mass.      Tenderness: There is abdominal tenderness (mild epigastric  tenderness noted.).   Musculoskeletal:         General: No tenderness. Normal range of motion.      Cervical back: Normal range of motion and neck supple.      Right lower leg: No edema.      Left lower leg: No edema.   Lymphadenopathy:      Cervical: No cervical adenopathy.   Skin:     General: Skin is warm and dry.      Findings: No rash.   Neurological:      Mental Status: She is alert and oriented to person, place, and time.      Cranial Nerves: No cranial nerve deficit.      Motor: Weakness present.      Gait: Gait normal.      Comments: Muscle strength:  -upper extremity:  5/5 bilaterally.  -lower extremity:  4/5 on the right; 5/5 on the left.   Psychiatric:         Mood and Affect: Mood normal.         Behavior: Behavior normal.      Comments: The patient is calm and responsive today.  There is no suicidal or homicidal ideation.  No hallucinosis or delusions.  She does admit to feeling anxious at times.         Lab Visit on 07/19/2024   Component Date Value Ref Range Status    WBC 07/19/2024 5.02  3.90 - 12.70 K/uL Final    RBC 07/19/2024 4.41  4.00 - 5.40 M/uL Final    Hemoglobin 07/19/2024 12.0  12.0 - 16.0 g/dL Final    Hematocrit 07/19/2024 37.1  37.0 - 48.5 % Final    MCV 07/19/2024 84  82 - 98 fL Final    MCH 07/19/2024 27.2  27.0 - 31.0 pg Final    MCHC 07/19/2024 32.3  32.0 - 36.0 g/dL Final    RDW 07/19/2024 15.2 (H)  11.5 - 14.5 % Final    Platelets 07/19/2024 350  150 - 450 K/uL Final    MPV 07/19/2024 10.0  9.2 - 12.9 fL Final    Immature Granulocytes 07/19/2024 0.2  0.0 - 0.5 % Final    Gran # (ANC) 07/19/2024 2.5  1.8 - 7.7 K/uL Final    Immature Grans (Abs) 07/19/2024 0.01  0.00 - 0.04 K/uL Final    Comment: Mild elevation in immature granulocytes is non specific and   can be seen in a variety of conditions including stress response,   acute inflammation, trauma and pregnancy. Correlation with other   laboratory and clinical findings is essential.      Lymph # 07/19/2024 1.8  1.0 - 4.8 K/uL  Final    Mono # 07/19/2024 0.4  0.3 - 1.0 K/uL Final    Eos # 07/19/2024 0.2  0.0 - 0.5 K/uL Final    Baso # 07/19/2024 0.04  0.00 - 0.20 K/uL Final    nRBC 07/19/2024 0  0 /100 WBC Final    Gran % 07/19/2024 50.3  38.0 - 73.0 % Final    Lymph % 07/19/2024 36.7  18.0 - 48.0 % Final    Mono % 07/19/2024 8.8  4.0 - 15.0 % Final    Eosinophil % 07/19/2024 3.2  0.0 - 8.0 % Final    Basophil % 07/19/2024 0.8  0.0 - 1.9 % Final    Differential Method 07/19/2024 Automated   Final    Sodium 07/19/2024 136  136 - 145 mmol/L Final    Potassium 07/19/2024 3.7  3.5 - 5.1 mmol/L Final    Chloride 07/19/2024 100  95 - 110 mmol/L Final    CO2 07/19/2024 27  23 - 29 mmol/L Final    Glucose 07/19/2024 112 (H)  70 - 110 mg/dL Final    BUN 07/19/2024 13  8 - 23 mg/dL Final    Creatinine 07/19/2024 0.8  0.5 - 1.4 mg/dL Final    Calcium 07/19/2024 9.9  8.7 - 10.5 mg/dL Final    Total Protein 07/19/2024 7.9  6.0 - 8.4 g/dL Final    Albumin 07/19/2024 3.9  3.5 - 5.2 g/dL Final    Total Bilirubin 07/19/2024 0.4  0.1 - 1.0 mg/dL Final    Comment: For infants and newborns, interpretation of results should be based  on gestational age, weight and in agreement with clinical  observations.    Premature Infant recommended reference ranges:  Up to 24 hours.............<8.0 mg/dL  Up to 48 hours............<12.0 mg/dL  3-5 days..................<15.0 mg/dL  6-29 days.................<15.0 mg/dL      Alkaline Phosphatase 07/19/2024 51 (L)  55 - 135 U/L Final    AST 07/19/2024 23  10 - 40 U/L Final    ALT 07/19/2024 13  10 - 44 U/L Final    eGFR 07/19/2024 >60.0  >60 mL/min/1.73 m^2 Final    Anion Gap 07/19/2024 9  8 - 16 mmol/L Final    TSH 07/19/2024 0.517  0.400 - 4.000 uIU/mL Final   Office Visit on 07/19/2024   Component Date Value Ref Range Status    POC Rapid COVID 07/19/2024 Negative  Negative Final     Acceptable 07/19/2024 Yes   Final       Assessment & Plan:      Nan was seen today for follow-up.  Further GI workup will  be obtained.  An EGD will be obtained in view of the patient's symptoms of dysphagia and recurrent reflux.  The patient also has a past history of GIST tumor which was endoscopically resected.  A CT of the abdomen and pelvis will also be obtained.  A trial of Protonix will be ordered for symptom management.    Updated lab studies will be obtained today.    Diagnoses and all orders for this visit:    Primary hypertension  -     Comprehensive Metabolic Panel; Future  -     Lipid Panel; Future  -     CBC Auto Differential; Future  -     Hemoglobin A1C; Future  -     Urine culture; Future  -     Urinalysis; Future    History of Guillain-Irvona syndrome  -     Comprehensive Metabolic Panel; Future  -     Lipid Panel; Future  -     CBC Auto Differential; Future  -     Hemoglobin A1C; Future  -     Urine culture; Future  -     Urinalysis; Future    History of gastrointestinal stromal tumor (GIST)  -     Ambulatory referral/consult to Endo Procedure ; Future    Prediabetes  -     Comprehensive Metabolic Panel; Future  -     Lipid Panel; Future  -     CBC Auto Differential; Future  -     Hemoglobin A1C; Future  -     Urine culture; Future  -     Urinalysis; Future    Generalized abdominal pain  -     Comprehensive Metabolic Panel; Future  -     Lipid Panel; Future  -     CBC Auto Differential; Future  -     Hemoglobin A1C; Future  -     Urine culture; Future  -     Urinalysis; Future    Dysphagia, unspecified type  -     Ambulatory referral/consult to Endo Procedure ; Future    Gastroesophageal reflux disease, unspecified whether esophagitis present  -     Ambulatory referral/consult to Endo Procedure ; Future    Abdominal pain, unspecified abdominal location  -     CT Abdomen Pelvis  Without Contrast; Future    Other orders  -     pantoprazole (PROTONIX) 40 MG tablet; Take 1 tab po before breakfast for reflux control.  -     LORazepam (ATIVAN) 1 MG tablet; TAKE 1 TABLET(1 MG) BY MOUTH EVERY 8 HOURS  AS NEEDED         Follow up in about 6 months (around 3/3/2025).     Glenroy Jimenez MD

## 2024-09-04 ENCOUNTER — TELEPHONE (OUTPATIENT)
Dept: INTERNAL MEDICINE | Facility: CLINIC | Age: 74
End: 2024-09-04
Payer: MEDICARE

## 2024-09-04 LAB
BACTERIA UR CULT: NORMAL
BACTERIA UR CULT: NORMAL

## 2024-09-04 NOTE — TELEPHONE ENCOUNTER
----- Message from Glenroy Jimenez MD sent at 9/4/2024  1:59 PM CDT -----  Blood chemistry profile shows normal electrolytes and normal kidney and liver function test results.  The cholesterol profile is within acceptable range.  The hemoglobin A1c level of 5.7% is consistent with prediabetes.  The level has remained unchanged since 05/09/2023.

## 2024-09-09 ENCOUNTER — HOSPITAL ENCOUNTER (OUTPATIENT)
Dept: RADIOLOGY | Facility: HOSPITAL | Age: 74
Discharge: HOME | End: 2024-09-09
Attending: INTERNAL MEDICINE
Payer: MEDICARE

## 2024-09-09 ENCOUNTER — CLINICAL SUPPORT (OUTPATIENT)
Dept: ENDOSCOPY | Facility: HOSPITAL | Age: 74
End: 2024-09-09
Attending: INTERNAL MEDICINE
Payer: MEDICARE

## 2024-09-09 ENCOUNTER — TELEPHONE (OUTPATIENT)
Dept: ENDOSCOPY | Facility: HOSPITAL | Age: 74
End: 2024-09-09

## 2024-09-09 DIAGNOSIS — R10.9 ABDOMINAL PAIN, UNSPECIFIED ABDOMINAL LOCATION: ICD-10-CM

## 2024-09-09 DIAGNOSIS — K21.9 GASTROESOPHAGEAL REFLUX DISEASE, UNSPECIFIED WHETHER ESOPHAGITIS PRESENT: ICD-10-CM

## 2024-09-09 DIAGNOSIS — R13.10 DYSPHAGIA, UNSPECIFIED TYPE: ICD-10-CM

## 2024-09-09 DIAGNOSIS — Z85.09 HISTORY OF GASTROINTESTINAL STROMAL TUMOR (GIST): ICD-10-CM

## 2024-09-09 PROCEDURE — 74176 CT ABD & PELVIS W/O CONTRAST: CPT | Mod: 26,,, | Performed by: RADIOLOGY

## 2024-09-09 PROCEDURE — 74176 CT ABD & PELVIS W/O CONTRAST: CPT | Mod: TC

## 2024-09-09 NOTE — TELEPHONE ENCOUNTER
Spoke to patient in regards to scheduling EGD. Dates and times offered to patient did not work with her schedule and she stated she would have her procedure at another facility.

## 2024-09-16 ENCOUNTER — TELEPHONE (OUTPATIENT)
Dept: INTERNAL MEDICINE | Facility: CLINIC | Age: 74
End: 2024-09-16
Payer: MEDICARE

## 2024-09-16 NOTE — TELEPHONE ENCOUNTER
----- Message from Patricia Caba sent at 9/16/2024  9:28 AM CDT -----  Contact: Patient   [phone]  2TESTRESULTS    Type: Test Results    What test was performed?  Patient wants to discuss TEST RESULTS - does not understand results    Who ordered the test? Dr MAGAÑA    When and where were the test performed? Week ago at [place]    Would you like a call back and or thru [facility]:   Call @ Patient   [phone]    Comments:

## 2024-09-16 NOTE — TELEPHONE ENCOUNTER
Please release your comments on portal  Or let me know what I can tell her.    She is calling for ct results from 9/9  She saw on portal and doesn't understand.    Thanks lorin

## 2024-09-17 NOTE — TELEPHONE ENCOUNTER
I spoke with the patient regarding the CT results.  Images were obtained of the abdomen and pelvis.  No acute changes were noted except for nonspecific changes suggesting possible proctitis.  The patient is asymptomatic with no complaints of left-sided abdominal or rectal pain.  No tenesmus is noted.  No rectal bleeding is present.  No passage of mucus per rectum is noted.  Bowel movements are normal.  Initial imaging study was obtained for evaluation of diffuse abdominal discomfort and postprandial bloating.  I do still recommend that the patient obtain an EGD to complete the evaluation.  Lower GI endoscopic examination can be obtained if the patient develops any lower GI symptoms.  The patient is in agreement that she does not want to pursue any lower GI endoscopy unless she becomes symptomatic related to this area.  (Last screening colonoscopy was obtained on 11/18/2022.  A follow-up screening colonoscopy was recommended in 5 years).

## 2024-09-23 ENCOUNTER — TELEPHONE (OUTPATIENT)
Dept: INTERNAL MEDICINE | Facility: CLINIC | Age: 74
End: 2024-09-23
Payer: MEDICARE

## 2024-09-23 NOTE — TELEPHONE ENCOUNTER
----- Message from Josephine Mcginnis MA sent at 9/23/2024  8:52 AM CDT -----  Contact: [phone]@[email or web address]    ----- Message -----  From: Leanne Branham  Sent: 9/23/2024   8:48 AM CDT  To: Tony KENT Staff    Good morning patient would like a  call back to discuss getting the office to fax over her scan results to her cardiologist at  [phone]. Patient wants  to call the cardiologist if the office can not fax over the [phone]

## 2024-10-24 ENCOUNTER — TELEPHONE (OUTPATIENT)
Dept: OPHTHALMOLOGY | Facility: CLINIC | Age: 74
End: 2024-10-24
Payer: MEDICARE

## 2024-10-24 NOTE — TELEPHONE ENCOUNTER
----- Message from Masood sent at 10/24/2024  8:14 AM CDT -----  Regarding: Scheduling Request  Contact: [phone]  Scheduling Request           Appt Type: Sudden Blood in eye and seeing slack spots     Date/Time Preference:today     Treating Provider: Dr. Hathaway     Caller Name: Nan Palafox     Contact Preference:  [phone]     Comments/notes:

## 2024-11-11 ENCOUNTER — OFFICE VISIT (OUTPATIENT)
Dept: OPHTHALMOLOGY | Facility: CLINIC | Age: 74
End: 2024-11-11
Payer: MEDICARE

## 2024-11-11 DIAGNOSIS — H40.1132 PRIMARY OPEN ANGLE GLAUCOMA (POAG) OF BOTH EYES, MODERATE STAGE: Primary | ICD-10-CM

## 2024-11-11 DIAGNOSIS — H25.13 NUCLEAR SCLEROSIS OF BOTH EYES: ICD-10-CM

## 2024-11-11 PROCEDURE — 99999 PR PBB SHADOW E&M-EST. PATIENT-LVL III: CPT | Mod: PBBFAC,,, | Performed by: OPHTHALMOLOGY

## 2024-11-11 PROCEDURE — 92133 CPTRZD OPH DX IMG PST SGM ON: CPT | Mod: PBBFAC | Performed by: OPHTHALMOLOGY

## 2024-11-11 PROCEDURE — G2211 COMPLEX E/M VISIT ADD ON: HCPCS | Mod: S$PBB,,, | Performed by: OPHTHALMOLOGY

## 2024-11-11 PROCEDURE — 99213 OFFICE O/P EST LOW 20 MIN: CPT | Mod: PBBFAC | Performed by: OPHTHALMOLOGY

## 2024-11-11 PROCEDURE — 92020 GONIOSCOPY: CPT | Mod: S$PBB,,, | Performed by: OPHTHALMOLOGY

## 2024-11-11 PROCEDURE — 99214 OFFICE O/P EST MOD 30 MIN: CPT | Mod: S$PBB,,, | Performed by: OPHTHALMOLOGY

## 2024-11-11 PROCEDURE — 92020 GONIOSCOPY: CPT | Mod: PBBFAC | Performed by: OPHTHALMOLOGY

## 2024-11-11 NOTE — PROGRESS NOTES
Assessment /Plan     For exam results, see Encounter Report.    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 HVF taker --> follow OCT RNFL  Try HVF Faster    CCT  568 // 567    < 21 --> Achieved & discussed    Both eyes  --> tolerating well &  good adherence --> CSM  Xal q day  Cosopt BID    SP SLT OS 2020  --> consider SLT OD as discussed      NSC OU  CE PRN  MRx +275    Dry Eye Syndrome: discussed use of warm compresses, preserved & non-preserved artificial tears, gel and PM ointment options.  Also discussed options utilizing medications.        Hx Retinal Tear OD  RD precautions:  Discussed symptoms of RD with increased flashes, floaters, decreasing vision.  Patient/Family to call and return immediately to clinic should the symptoms of RD occur. Voiced good understanding with Q & A.      Plan  RTC 4 months IOP &  HVF Faster  Adherence --> then IOP  RTC sooner prn with good understanding

## 2024-11-20 DIAGNOSIS — Z78.0 MENOPAUSE: ICD-10-CM

## 2024-11-23 DIAGNOSIS — F41.9 ANXIETY: Primary | ICD-10-CM

## 2024-11-23 NOTE — TELEPHONE ENCOUNTER
No care due was identified.  [facility] Embedded Care Due Messages. Reference number: [id number].   11/23/2024 7:08:28 AM CST

## 2024-11-25 DIAGNOSIS — H40.053 BILATERAL OCULAR HYPERTENSION: ICD-10-CM

## 2024-11-25 RX ORDER — LORAZEPAM 1 MG/1
TABLET ORAL
Qty: 60 TABLET | Refills: 1 | Status: SHIPPED | OUTPATIENT
Start: 2024-11-25

## 2024-11-26 ENCOUNTER — PATIENT MESSAGE (OUTPATIENT)
Dept: ADMINISTRATIVE | Facility: HOSPITAL | Age: 74
End: 2024-11-26
Payer: MEDICARE

## 2024-11-26 RX ORDER — DORZOLAMIDE HYDROCHLORIDE AND TIMOLOL MALEATE 20; 5 MG/ML; MG/ML
1 SOLUTION/ DROPS OPHTHALMIC 2 TIMES DAILY
Qty: 20 ML | Refills: 4 | Status: SHIPPED | OUTPATIENT
Start: 2024-11-26

## 2024-12-02 ENCOUNTER — HOSPITAL ENCOUNTER (EMERGENCY)
Facility: OTHER | Age: 74
Discharge: HOME | End: 2024-12-02
Attending: EMERGENCY MEDICINE
Payer: MEDICARE

## 2024-12-02 VITALS
DIASTOLIC BLOOD PRESSURE: 81 MMHG | BODY MASS INDEX: 30.15 KG/M2 | WEIGHT: 184 LBS | HEART RATE: 74 BPM | TEMPERATURE: 98 F | RESPIRATION RATE: 17 BRPM | OXYGEN SATURATION: 98 % | SYSTOLIC BLOOD PRESSURE: 137 MMHG

## 2024-12-02 DIAGNOSIS — R10.11 RUQ ABDOMINAL PAIN: Primary | ICD-10-CM

## 2024-12-02 DIAGNOSIS — R68.83 CHILLS: ICD-10-CM

## 2024-12-02 DIAGNOSIS — R52 BODY ACHES: ICD-10-CM

## 2024-12-02 LAB
ALBUMIN SERPL BCP-MCNC: 4.4 G/DL (ref 3.5–5.2)
ALP SERPL-CCNC: 56 U/L (ref 40–150)
ALT SERPL W/O P-5'-P-CCNC: 11 U/L (ref 10–44)
ANION GAP SERPL CALC-SCNC: 14 MMOL/L (ref 8–16)
AST SERPL-CCNC: 20 U/L (ref 10–40)
BASOPHILS # BLD AUTO: 0.03 K/UL (ref 0–0.2)
BASOPHILS NFR BLD: 0.6 % (ref 0–1.9)
BILIRUB SERPL-MCNC: 0.6 MG/DL (ref 0.1–1)
BILIRUB UR QL STRIP: NEGATIVE
BUN SERPL-MCNC: 12 MG/DL (ref 8–23)
CALCIUM SERPL-MCNC: 10 MG/DL (ref 8.7–10.5)
CHLORIDE SERPL-SCNC: 98 MMOL/L (ref 95–110)
CK SERPL-CCNC: 58 U/L (ref 20–180)
CLARITY UR: CLEAR
CO2 SERPL-SCNC: 27 MMOL/L (ref 23–29)
COLOR UR: YELLOW
CREAT SERPL-MCNC: 0.7 MG/DL (ref 0.5–1.4)
CREAT SERPL-MCNC: 0.7 MG/DL (ref 0.5–1.4)
CTP QC/QA: YES
CTP QC/QA: YES
DIFFERENTIAL METHOD BLD: ABNORMAL
EOSINOPHIL # BLD AUTO: 0.1 K/UL (ref 0–0.5)
EOSINOPHIL NFR BLD: 1.8 % (ref 0–8)
ERYTHROCYTE [DISTWIDTH] IN BLOOD BY AUTOMATED COUNT: 14.9 % (ref 11.5–14.5)
EST. GFR  (NO RACE VARIABLE): >60 ML/MIN/1.73 M^2
GLUCOSE SERPL-MCNC: 94 MG/DL (ref 70–110)
GLUCOSE UR QL STRIP: NEGATIVE
HCT VFR BLD AUTO: 38.1 % (ref 37–48.5)
HCV AB SERPL QL IA: NEGATIVE
HGB BLD-MCNC: 13.1 G/DL (ref 12–16)
HGB UR QL STRIP: NEGATIVE
IMM GRANULOCYTES # BLD AUTO: 0.01 K/UL (ref 0–0.04)
IMM GRANULOCYTES NFR BLD AUTO: 0.2 % (ref 0–0.5)
KETONES UR QL STRIP: NEGATIVE
LEUKOCYTE ESTERASE UR QL STRIP: NEGATIVE
LIPASE SERPL-CCNC: 10 U/L (ref 4–60)
LYMPHOCYTES # BLD AUTO: 1.7 K/UL (ref 1–4.8)
LYMPHOCYTES NFR BLD: 33.1 % (ref 18–48)
MCH RBC QN AUTO: 28.2 PG (ref 27–31)
MCHC RBC AUTO-ENTMCNC: 34.4 G/DL (ref 32–36)
MCV RBC AUTO: 82 FL (ref 82–98)
MONOCYTES # BLD AUTO: 0.4 K/UL (ref 0.3–1)
MONOCYTES NFR BLD: 8.4 % (ref 4–15)
NEUTROPHILS # BLD AUTO: 2.9 K/UL (ref 1.8–7.7)
NEUTROPHILS NFR BLD: 55.9 % (ref 38–73)
NITRITE UR QL STRIP: NEGATIVE
NRBC BLD-RTO: 0 /100 WBC
PH UR STRIP: 7 [PH] (ref 5–8)
PLATELET # BLD AUTO: 388 K/UL (ref 150–450)
PMV BLD AUTO: 9.9 FL (ref 9.2–12.9)
POC MOLECULAR INFLUENZA A AGN: NEGATIVE
POC MOLECULAR INFLUENZA B AGN: NEGATIVE
POTASSIUM SERPL-SCNC: 3.6 MMOL/L (ref 3.5–5.1)
PROT SERPL-MCNC: 8.9 G/DL (ref 6–8.4)
PROT UR QL STRIP: NEGATIVE
RBC # BLD AUTO: 4.64 M/UL (ref 4–5.4)
SAMPLE: NORMAL
SARS-COV-2 RDRP RESP QL NAA+PROBE: NEGATIVE
SODIUM SERPL-SCNC: 139 MMOL/L (ref 136–145)
SP GR UR STRIP: 1.02 (ref 1–1.03)
URN SPEC COLLECT METH UR: NORMAL
UROBILINOGEN UR STRIP-ACNC: NEGATIVE EU/DL
WBC # BLD AUTO: 5.1 K/UL (ref 3.9–12.7)

## 2024-12-02 PROCEDURE — 83690 ASSAY OF LIPASE: CPT | Performed by: EMERGENCY MEDICINE

## 2024-12-02 PROCEDURE — 99284 EMERGENCY DEPT VISIT MOD MDM: CPT | Mod: 25

## 2024-12-02 PROCEDURE — 36415 COLL VENOUS BLD VENIPUNCTURE: CPT | Performed by: EMERGENCY MEDICINE

## 2024-12-02 PROCEDURE — 85025 COMPLETE CBC W/AUTO DIFF WBC: CPT | Performed by: NURSE PRACTITIONER

## 2024-12-02 PROCEDURE — 80053 COMPREHEN METABOLIC PANEL: CPT | Performed by: EMERGENCY MEDICINE

## 2024-12-02 PROCEDURE — 82550 ASSAY OF CK (CPK): CPT | Performed by: EMERGENCY MEDICINE

## 2024-12-02 PROCEDURE — 81003 URINALYSIS AUTO W/O SCOPE: CPT | Performed by: NURSE PRACTITIONER

## 2024-12-02 PROCEDURE — 86803 HEPATITIS C AB TEST: CPT | Performed by: NURSE PRACTITIONER

## 2024-12-02 PROCEDURE — 87635 SARS-COV-2 COVID-19 AMP PRB: CPT | Performed by: NURSE PRACTITIONER

## 2024-12-02 NOTE — DISCHARGE INSTRUCTIONS
You do not have any findings to explain your right upper quadrant abdominal pain.  You do have a stone in the lower pole of your left kidney which should not cause pain.  You do have a possible cyst to your liver which has been on previous scans and this should be followed up by your primary care with outpatient imaging.

## 2024-12-02 NOTE — ED PROVIDER NOTES
Encounter Date: 12/2/2024       History     Chief Complaint   Patient presents with    COVID-19 Concerns     Reports body aches, chills, back pain, and abd pain onset last night     75 y/o female with arthritis, cataracts, cervical spondylosis, hx of GBS, HTN, right lower lung cancer (s/p resection), and glaucoma which presents to the ED with body aches, chills, and RUQ abdominal pain that radiates to her back for the past week. Pt states she has had chills since yesterday. Denies N/V but has not been hungry and therefore has not eaten a lot lately. She I also concerned about muscle cramping that began a couple days ago. No other complaints at this time.     The history is provided by the patient.     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)     History of gastrointestinal stromal tumor (GIST) 12/02/2020    Hypertension     Malignant carcinoid tumor of bronchus and lung 12/02/2020    Nuclear sclerosis - Both Eyes 02/08/2013    PNA (pneumonia)     Primary open angle glaucoma (POAG) of both eyes, moderate stage 03/11/2019     Past Surgical History:   Procedure Laterality Date    BREAST BIOPSY Right     excisional bx    BREAST SURGERY Right     Excisional bx    HYSTERECTOMY      INJECTION OF ANESTHETIC AGENT AROUND MULTIPLE INTERCOSTAL NERVES Right 01/04/2021    Procedure: BLOCK, NERVE, INTERCOSTAL, 2 OR MORE;  Surgeon: Ru Hernandez MD;  Location: [facility] OR [address] [place] [address];  Service: Thoracic;  Laterality: Right;    RETINAL DETACHMENT SURGERY      patient states that she had a retinal tear that was repaired in the past at [facility] but she is uncertain of which eye    STOMACH SURGERY      SURGICAL REMOVAL OF LYMPH NODE N/A 01/04/2021    Procedure: EXCISION, LYMPH NODE;  Surgeon: Ru Hernandez MD;  Location: [facility] OR [address] [place] [address];   Service: Thoracic;  Laterality: N/A;    TOTAL KNEE ARTHROPLASTY Right     XI ROBOTIC RATS,WITH LOBECTOMY,LUNG Right 01/04/2021    Procedure: XI ROBOTIC RATS,WITH LOBECTOMY,LUNG;  Surgeon: Ru Hernandez MD;  Location: [facility] OR [address] [place] [address];  Service: Thoracic;  Laterality: Right;  Right middle lobectomy.     Family History   Problem Relation Name Age of Onset    Glaucoma Mother      Cataracts Mother      Blindness Mother      Cancer Mother  68        colon, myeloma    No Known Problems Father      No Known Problems Sister      Glaucoma Brother      Cataracts Brother      Cancer Brother          esophageal    No Known Problems Maternal Aunt      No Known Problems Maternal Uncle      No Known Problems Paternal Aunt      No Known Problems Paternal Uncle      No Known Problems Maternal Grandmother      No Known Problems Maternal Grandfather      No Known Problems Paternal Grandmother      No Known Problems Paternal Grandfather      Amblyopia Neg Hx      Macular degeneration Neg Hx      Retinal detachment Neg Hx      Strabismus Neg Hx      Diabetes Neg Hx      Hypertension Neg Hx      Stroke Neg Hx      Thyroid disease Neg Hx      Breast cancer Neg Hx      Ovarian cancer Neg Hx       Social History     Tobacco Use    Smoking status: Never    Smokeless tobacco: Never   Substance Use Topics    Alcohol use: Yes     Comment: wine 3 glasses a week / weekend 3 glasses of champagne    Drug use: Never     Review of Systems   Constitutional:  Positive for chills. Negative for fever.   HENT:  Negative for sore throat.    Respiratory:  Negative for shortness of breath.    Cardiovascular:  Negative for chest pain.   Gastrointestinal:  Negative for nausea.   Genitourinary:  Negative for dysuria.   Musculoskeletal:  Negative for back pain.   Skin:  Negative for rash.   Neurological:  Negative for weakness.   Hematological:  Does not bruise/bleed easily.   All other systems reviewed and are negative.    Physical Exam     Initial Vitals  [12/02/24 0956]   BP Pulse Resp Temp SpO2   138/86 71 16 97.7 °F (36.5 °C) 100 %      MAP       --         Physical Exam    Nursing note and vitals reviewed.  Constitutional: She appears well-developed and well-nourished.   HENT:   Head: Normocephalic and atraumatic.   Eyes: Conjunctivae and EOM are normal. Pupils are equal, round, and reactive to light.   Neck:   Normal range of motion.  Cardiovascular:  Normal rate, regular rhythm, normal heart sounds and intact distal pulses.     Exam reveals no gallop and no friction rub.       No murmur heard.  Pulmonary/Chest: Breath sounds normal. No respiratory distress. She has no wheezes. She has no rhonchi. She has no rales. She exhibits no tenderness.   Abdominal: Abdomen is soft. Bowel sounds are normal. She exhibits no distension and no mass. There is abdominal tenderness in the right upper quadrant.   Pt states the pain is very dull There is no rebound and no guarding.   Musculoskeletal:         General: No tenderness or edema. Normal range of motion.      Cervical back: Normal range of motion.     Neurological: She is alert and oriented to person, place, and time. She has normal strength. GCS score is 15. GCS eye subscore is 4. GCS verbal subscore is 5. GCS motor subscore is 6.   Skin: Skin is warm. Capillary refill takes less than 2 seconds. No rash noted. No erythema.   Psychiatric: She has a normal mood and affect.       ED Course   Procedures  Labs Reviewed   CBC W/ AUTO DIFFERENTIAL - Abnormal       Result Value    WBC 5.10      RBC 4.64      Hemoglobin 13.1      Hematocrit 38.1      MCV 82      MCH 28.2      MCHC 34.4      RDW 14.9 (*)     Platelets 388      MPV 9.9      Immature Granulocytes 0.2      Gran # (ANC) 2.9      Immature Grans (Abs) 0.01      Lymph # 1.7      Mono # 0.4      Eos # 0.1      Baso # 0.03      nRBC 0      Gran % 55.9      Lymph % 33.1      Mono % 8.4      Eosinophil % 1.8      Basophil % 0.6      Differential Method Automated      COMPREHENSIVE METABOLIC PANEL - Abnormal    Sodium 139      Potassium 3.6      Chloride 98      CO2 27      Glucose 94      BUN 12      Creatinine 0.7      Calcium 10.0      Total Protein 8.9 (*)     Albumin 4.4      Total Bilirubin 0.6      Alkaline Phosphatase 56      AST 20      ALT 11      eGFR >60      Anion Gap 14     HEPATITIS C ANTIBODY    Hepatitis C Ab Negative      Narrative:     Release to patient->Immediate   URINALYSIS, REFLEX TO URINE CULTURE    Specimen UA Urine, Clean Catch      Color, UA Yellow      Appearance, UA Clear      pH, UA 7.0      Specific Gravity, UA 1.020      Protein, UA Negative      Glucose, UA Negative      Ketones, UA Negative      Bilirubin (UA) Negative      Occult Blood UA Negative      Nitrite, UA Negative      Urobilinogen, UA Negative      Leukocytes, UA Negative      Narrative:     Specimen Source->Urine   CK    CPK 58     LIPASE    Lipase 10     SARS-COV-2 RDRP GENE    POC Rapid COVID Negative       Acceptable Yes     POCT INFLUENZA A/B MOLECULAR    POC Molecular Influenza A Ag Negative      POC Molecular Influenza B Ag Negative       Acceptable Yes     ISTAT CREATININE    POC Creatinine 0.7      Sample VENOUS            Imaging Results              CT Abdomen Pelvis  Without Contrast (Final result)  Result time 12/02/24 15:03:34      Final result by Anoop Marks MD (12/02/24 15:03:34)                   Impression:      Tiny nonobstructing calculus in the lower pole of the left kidney, unchanged from prior.      Electronically signed by: Anoop Marks MD  Date:    12/02/2024  Time:    15:03               Narrative:    EXAMINATION:  CT ABDOMEN PELVIS WITHOUT CONTRAST    CLINICAL HISTORY:  Flank pain, kidney stone suspected;    TECHNIQUE:  Low dose axial images, sagittal and coronal reformations were obtained from the lung bases to the pubic symphysis without IV contrast.  No oral contrast was  administered    COMPARISON:  09/09/2024    FINDINGS:  Limited evaluation of the lung bases show mild atelectasis.    Liver is normal in size.  No solid mass noting lack of IV contrast.  Small hypodense lesion lateral aspect the right hepatic lobe stable since at least 2020, likely a small cyst.  No biliary ductal dilatation.  Gallbladder is grossly unremarkable.    Spleen, adrenal glands, and pancreas show no focal abnormalities noting lack of IV contrast.    Bilateral kidneys are normal in size and position.  There is a tiny nonobstructing calculus in the lower pole of the left kidney.  No nephrolithiasis on the right.  Urinary bladder shows no focal wall thickening.  Uterus is absent.    Gastrointestinal tract shows prior gastric bypass.  There is colonic diverticulosis without evidence of acute diverticulitis.  No free fluid or free gas in the visualized abdomen.  No concerning lymphadenopathy.    Vascular structures show atherosclerosis without aneurysm.  No evidence of an acute fracture or destructive osseous lesion.                                       Medications - No data to display  Medical Decision Making  73 y/o female with chills, RUQ abdominal pain, and body aches for the past week. Labs are unremarkable. CT non con of abdomen/pelvis show chronic liver cyst and left renal stone. No other findings that can be contributed to her pain or symptoms. COVID and influenza are negative. Pt advised to follow up with her PCP for further evaluation of the liver cyst. Patient given strict return precautions and voiced understanding of all discharge instructions. Pt was stable at discharge.       Differential Diagnosis: gastroenteritis, gastritis, ulcer, cholecystitis, gallstones, pancreatitis, ileus, small bowel obstruction, appendicitis, constipation, COVID, influenza, viral uri    Problems Addressed:  Body aches: acute illness or injury  Chills: acute illness or injury  RUQ abdominal pain: acute illness or  injury    Amount and/or Complexity of Data Reviewed  Labs: ordered. Decision-making details documented in ED Course.  Radiology: ordered.    Risk  OTC drugs.               ED Course as of 12/02/24 1710   Mon Dec 02, 2024   1034 BP: 138/86 [AT]   1034 Temp: 97.7 °F (36.5 °C) [AT]   1034 Temp Source: Oral [AT]   1034 Pulse: 71 [AT]   1034 Resp: 16 [AT]   1034 SpO2: 100 % [AT]   1444 CPK: 58 [AT]   1444 Lipase: 10 [AT]   1453 Dispo delayed due to  [AT]      ED Course User Index  [AT] Nazia Gilmore FNP                           Clinical Impression:  Final diagnoses:  [R10.11] RUQ abdominal pain (Primary)  [R68.83] Chills  [R52] Body aches          ED Disposition Condition    Discharge Stable          ED Prescriptions    None       Follow-up Information       Follow up With Specialties Details Why Contact Info    Glenroy Jimenez MD Internal Medicine Schedule an appointment as soon as possible for a visit  As needed [address] [facility] [id number]  [phone]               Nazia Gilmore FNP  12/02/24 1710

## 2024-12-02 NOTE — ED NOTES
Lab called specimens hemolyzed x2, orders placed / phlebotomy notified orders.  Provider updated.

## 2024-12-10 ENCOUNTER — HOSPITAL ENCOUNTER (OUTPATIENT)
Dept: RADIOLOGY | Facility: HOSPITAL | Age: 74
Discharge: HOME | End: 2024-12-10
Payer: MEDICARE

## 2024-12-10 VITALS — BODY MASS INDEX: 30.05 KG/M2 | HEIGHT: 66 IN | WEIGHT: 187 LBS

## 2024-12-10 DIAGNOSIS — Z12.31 VISIT FOR SCREENING MAMMOGRAM: ICD-10-CM

## 2024-12-10 PROCEDURE — 77067 SCR MAMMO BI INCL CAD: CPT | Mod: TC

## 2024-12-19 ENCOUNTER — TELEPHONE (OUTPATIENT)
Dept: CARDIOTHORACIC SURGERY | Facility: CLINIC | Age: 74
End: 2024-12-19
Payer: MEDICARE

## 2024-12-19 NOTE — TELEPHONE ENCOUNTER
----- Message from Summer sent at 12/19/2024  2:20 PM CST -----  Patient Returning Call    Who Called? PT     Who Left Message for Patient? Johnson Ann RN    Does the patient know what this is regarding?: PLEASE REACH BACK OUT TO PT ABOUT THE MISSED CALL. I HAD PT ON HOLD WHILE I CALLED THE OFFICE SHE HUNG UP. WHEN I TOOK HER CALL I ASKED HER HER INFORMATION SO I COULD GET HER TO THE CORRECT PERSON.  BUT SHE WAS VERY RUDE WITH ME BECAUSE I ASKED FOR IT.     Would the patient rather a call back? YES      Best Call Back Number: [phone]    Additional Information:  THANK YOU

## 2024-12-19 NOTE — TELEPHONE ENCOUNTER
----- Message from Yvonne sent at 12/19/2024 12:01 PM CST -----  Regarding: PT ADVICE  Contact: [phone]  Pt is requesting  a call from someone in the office and is asking for a return call back soon. Thanks.     Reason for call:discuss plan of care     Patient's DX:     Patient requesting call back or [facility] msg: [phone]

## 2025-01-02 DIAGNOSIS — F41.9 ANXIETY: ICD-10-CM

## 2025-01-03 NOTE — TELEPHONE ENCOUNTER
No care due was identified.  Health [facility] Embedded Care Due Messages. Reference number: [id number].   1/02/2025 10:37:20 PM CST

## 2025-01-06 RX ORDER — LORAZEPAM 1 MG/1
TABLET ORAL
Qty: 60 TABLET | Refills: 1 | Status: SHIPPED | OUTPATIENT
Start: 2025-01-06

## 2025-01-13 ENCOUNTER — TELEPHONE (OUTPATIENT)
Dept: OPHTHALMOLOGY | Facility: CLINIC | Age: 75
End: 2025-01-13
Payer: MEDICARE

## 2025-01-13 NOTE — TELEPHONE ENCOUNTER
"----- Message from Vlad sent at 1/13/2025  2:06 PM CST -----  Scheduling Request      Patient Status: Est    Scheduling Appt: Bursted blood vessel (left eye)    Time/Date Preference:  Today    Contact Preference?: [phone]     Treating Provider: Miguel    Do you feel you need to be seen today? Yes    Additional Notes:  "Thank you for all that you do for our patients"  "

## 2025-01-14 ENCOUNTER — TELEPHONE (OUTPATIENT)
Dept: OPHTHALMOLOGY | Facility: CLINIC | Age: 75
End: 2025-01-14
Payer: MEDICARE

## 2025-01-14 NOTE — TELEPHONE ENCOUNTER
----- Message from Masood sent at 1/14/2025  9:59 AM CST -----  Regarding: Consult/Advisory  Contact: [phone]  Consult/Advisory     Name Of Caller: Nan Palafox        Contact Preference:  [phone]     Nature of call: Left eye is blood red and has been for a few days. Please call back to further assist.

## 2025-01-27 ENCOUNTER — TELEPHONE (OUTPATIENT)
Dept: NEUROLOGY | Facility: CLINIC | Age: 75
End: 2025-01-27
Payer: MEDICARE

## 2025-02-04 ENCOUNTER — OFFICE VISIT (OUTPATIENT)
Dept: OPTOMETRY | Facility: CLINIC | Age: 75
End: 2025-02-04
Payer: MEDICARE

## 2025-02-04 DIAGNOSIS — H52.7 REFRACTIVE ERROR: Primary | ICD-10-CM

## 2025-02-04 PROCEDURE — 92015 DETERMINE REFRACTIVE STATE: CPT | Mod: ,,, | Performed by: OPTOMETRIST

## 2025-02-04 PROCEDURE — 99999 PR PBB SHADOW E&M-EST. PATIENT-LVL III: CPT | Mod: PBBFAC,,, | Performed by: OPTOMETRIST

## 2025-02-04 PROCEDURE — 99499 UNLISTED E&M SERVICE: CPT | Mod: S$PBB,,, | Performed by: OPTOMETRIST

## 2025-02-04 PROCEDURE — 99213 OFFICE O/P EST LOW 20 MIN: CPT | Mod: PBBFAC,PO | Performed by: OPTOMETRIST

## 2025-02-04 NOTE — PROGRESS NOTES
HPI    DLS: 5/20/24    1. POAG OD>OS   2. NS OU   3. Hx Retinal Tear OS     MEDS:   Cosopt BID OU   Xalatan QHS OU     Pt here for glasses check. Pt states she has difficulty seeing things up   close with her new eyeglasses. Pt got the Rx from Dr Hathaway on 11/11/24.    Pt states she gets light headed.   Last edited by Felicia Melchor MA on 2/4/2025  2:31 PM.            Assessment /Plan     For exam results, see Encounter Report.    Refractive error      Remake Spectacle Rx given, discussed different options for glasses. RTC 1 year routine eye exam.

## 2025-02-12 ENCOUNTER — PATIENT MESSAGE (OUTPATIENT)
Dept: NEUROLOGY | Facility: CLINIC | Age: 75
End: 2025-02-12
Payer: MEDICARE

## 2025-02-12 ENCOUNTER — OFFICE VISIT (OUTPATIENT)
Dept: NEUROLOGY | Facility: CLINIC | Age: 75
End: 2025-02-12
Payer: MEDICARE

## 2025-02-12 VITALS
WEIGHT: 178.69 LBS | HEART RATE: 66 BPM | HEIGHT: 65 IN | BODY MASS INDEX: 29.77 KG/M2 | SYSTOLIC BLOOD PRESSURE: 130 MMHG | DIASTOLIC BLOOD PRESSURE: 84 MMHG | RESPIRATION RATE: 18 BRPM

## 2025-02-12 DIAGNOSIS — R26.89 IMBALANCE: ICD-10-CM

## 2025-02-12 DIAGNOSIS — Z86.69 HISTORY OF GUILLAIN-BARRE SYNDROME: ICD-10-CM

## 2025-02-12 DIAGNOSIS — R20.2 PARESTHESIA OF BOTH LOWER EXTREMITIES: Primary | ICD-10-CM

## 2025-02-12 DIAGNOSIS — R29.898 WEAKNESS OF BOTH LOWER EXTREMITIES: ICD-10-CM

## 2025-02-12 PROCEDURE — 99999 PR PBB SHADOW E&M-EST. PATIENT-LVL V: CPT | Mod: PBBFAC,,, | Performed by: STUDENT IN AN ORGANIZED HEALTH CARE EDUCATION/TRAINING PROGRAM

## 2025-02-12 PROCEDURE — 99215 OFFICE O/P EST HI 40 MIN: CPT | Mod: PBBFAC | Performed by: STUDENT IN AN ORGANIZED HEALTH CARE EDUCATION/TRAINING PROGRAM

## 2025-02-12 RX ORDER — DEXTROMETHORPHAN HYDROBROMIDE, GUAIFENESIN 5; 100 MG/5ML; MG/5ML
500 LIQUID ORAL EVERY 8 HOURS
COMMUNITY

## 2025-02-12 NOTE — PROGRESS NOTES
"        Patient ID: [id number]  Chief Complaint/Reason for Consult: GBS f/u     Subjective:     HPI  Nan Palafox is a nat 75 y.o. 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 she is presenting for f/u.    Interval history (02/12/2025):  She occasionally develops a sharp shooting pain in her right hand, she may lose control over her hand when writing.   Denies tremors, tingling or numbness.  She had one episode that felt a wave of lightheadedness when having lunch with a friend.   This passed after a couple of minutes. She denies palpitation, chest pain, speech disturbance, confusion, or nausea.  Feels heavy in the legs and stumbles frequently. No recent falls.    Interval history (03/18/2024):  Intermittent right hand weakness/heaviness or "not feeling the same as left hand".  Since she has stopped PT she feels her legs are feeing heavy, reports veering off to one side occasionally.  Also intermittently feels the numbness/tingling and is concerned that her GBS might be back.    Interval history (10/11/2023):  Overall gait and balance has improved significantly, up to 85% per last PT note. She is not requiring assistive device, however, she reports intermittent numbness and imbalance which might be the residual 15-20%. She is now concerned with the new flu and COVID season. She was previously getting Evusheld for COVID PEP due to inability to get vaccines. Now that Evusheld is off the  market she is wondering what she should do.     Initial HPI (4/14/2023):  Today, she reports no new neurological symptoms, has some paresthesias. Balance is improved compared to before, not using a cane. Hasn't had any recent falls. PT has helped a lot but she has completed her course and will need new orders. Has a group at [employer], feels depressed sometimes but still enjoys the activities with her [employer] group.     Per dr. Prado's note:  "Initial HPI: " 7/1/2021  She is now using cane. January 2021 she underwent surgery thoracic surgery for removal of a mass. A few days later she got her first Pfizer vaccine. She noted tingling and numbenss. She could not move her legs. 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 She was treated with IVIG. The tingling has resolved but every once in a while she feels a heaviness in her toes. She had difficulty raisining her hands. She has noted that the right upper extremity continues to remain weak.     In 2020 she underwent a lung biospy. She states post biopsy she had change in speech, right upper extremity weakness. She was admitted at [facility] and was told she did not have a stroke but she was started on Keppra given the possiblity of a ? Seizure. She has stopped taking the Keppra for 6 months now and not had another event. Stopped the Keppra herself because she was not sure as to why she was taking it.     Review of Systems   Constitutional:  Negative for unexpected weight change.   Respiratory:  Negative for shortness of breath.    Musculoskeletal:  Positive for gait problem. Negative for leg pain.        Pain in the right hand   Neurological:  Positive for light-headedness and numbness. Negative for tremors, speech difficulty, weakness and coordination difficulties.   Psychiatric/Behavioral:  Negative for confusion, dysphoric mood, self-injury and suicidal ideas.    All other systems reviewed and are negative.    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)     History of gastrointestinal stromal tumor (GIST) 12/02/2020    Hypertension     Malignant carcinoid tumor of bronchus and lung 12/02/2020    Nuclear sclerosis - Both Eyes 02/08/2013    PNA (pneumonia)     Primary open angle glaucoma (POAG) of both eyes, moderate stage 03/11/2019      Allergies:  Review of patient's allergies indicates:   Allergen Reactions    Iodine Hives       Pertinent Family History:  Family History   Problem Relation Age of Onset    Glaucoma Mother     Cataracts Mother     Blindness Mother     Cancer Mother 68        colon, myeloma    No Known Problems Father     No Known Problems Sister     Glaucoma Brother     Cataracts Brother     Cancer Brother         esophageal     Pertinent Social History:  No tobacco, rare alcohol (used to drink 3 champagne glasses nightly up until recently), no marijuana, no illicit substance use. Lives alone,  is .     Medications:  Current Outpatient Medications   Medication Instructions    acetaminophen (TYLENOL) 500 mg, Every 8 hours    amLODIPine (NORVASC) 10 mg    ASCORBATE CALCIUM (VITAMIN C ORAL) 1 tablet, Daily    aspirin (ECOTRIN) 81 mg, Daily    dorzolamide-timolol 2-0.5% (COSOPT) 22.3-6.8 mg/mL ophthalmic solution 1 drop, Both Eyes, 2 times daily    fluticasone (FLONASE) 50 mcg/actuation nasal spray 2 sprays, Each Nostril, Daily    hydrOXYzine pamoate (VISTARIL) 50 mg, Oral, Every 8 hours PRN    latanoprost 0.005 % ophthalmic solution INSTILL 1 DROP IN BOTH EYES EVERY EVENING    LORazepam (ATIVAN) 1 MG tablet TAKE 1 TABLET(1 MG) BY MOUTH EVERY 8 HOURS AS NEEDED    multivitamin (THERAGRAN) tablet 1 tablet, Oral, Daily    pantoprazole (PROTONIX) 40 MG tablet Take 1 tab po before breakfast for reflux control.    rosuvastatin (CRESTOR) 40 mg    valsartan-hydrochlorothiazide (DIOVAN-HCT) 320-25 mg per tablet 1 tablet, Daily    vitamin D (VITAMIN D3) 1,000 Units, Daily    ZINC ORAL 3 times daily      Objective:     Vitals:    25 0842   BP: 130/84   Pulse: 66   Resp: 18     General:  Well-appearing, well-nourished, NAD, cooperative    Neurologic Exam:   Awake, alert and oriented x3  Speech spontaneous and fluent, intact comprehension.   Adequate fund of knowledge, vocabulary.  EOM intact. No ophthalmoplegia.   Facial  expression is full and symmetric.   Hearing is intact.  No tremor.    5/5 throughout BLE.  Gait is wide based.   Romberg is impaired with swaying but no fall.       Pertinent lab results  Lab Results   Component Value Date    TSH 0.517 07/19/2024    WBC 5.10 12/02/2024    LYMPH 1.7 12/02/2024    LYMPH 33.1 12/02/2024    RBC 4.64 12/02/2024    HGB 13.1 12/02/2024    HCT 38.1 12/02/2024    MCV 82 12/02/2024     12/02/2024     12/02/2024    K 3.6 12/02/2024    CO2 27 12/02/2024    BUN 12 12/02/2024    CREATININE 0.7 12/02/2024    CALCIUM 10.0 12/02/2024    AST 20 12/02/2024    ALT 11 12/02/2024     Pertinent imaging results    *previously reviewed images:  10/22/2021  MRI Cervical Spine w/wo contrast:  Multilevel degenerative changes, most advanced at C7-T1 where there is a disc osteophyte complex and bilateral facet arthropathy contributing to moderate bilateral foraminal stenosis.  Progressive endplate edema and enhancement at C7-T1, favored to be degenerative.  No new marrow replacing lesions    Other pertinent studies    * previously reviewed results  5/2/2023  EMG-NCS:  There is electrophysiologic evidence of a bilateral sensory median mononeuropathy across the wrist (I.e. Carpal tunnel syndrome).  There is no motor axonal loss.  There is no active denervation.  This is graded as Mild in severity bilaterally.    There are chronic neuropathic changes isolated to the right hand muscles (first dorsal interosseus and abductor pollicis brevis).  This is suggestive of a chronic right C8/T1 radiculopathy, but not definitively so.  She does have neck pain though no radicular symptoms.  She has an MRI of the C spine from 2021 which showed moderate foraminal stenosis at this level, R>L.  Despite the somewhat weak findings on today's study, in the context of her clinical history and MRI findings, I suspect that these changes on needle EMG do represent a chronic right C8/T1 radiculopathy.          The right first  dorsal interosseus pedis muscle showed signs of active denervation.  In isolation to one muscle (especially in the foot), this is of limited diagnostic significance.  No electrophysiologic evidence of a peripheral polyneuropathy.      Assessment:   Nan Palafox is a 75 y.o. right-handed female with Hx of GBS (2021), who present with increased heaviness in BLE. Exam shows preserved motor strength and she always had sensory deficits and a wide based gait. I'd recommend repeating the EMG to investigate any active demyelinating findings to suggest CIDP instead. We will also look for CTS findings that could be the cause of pain and intermittent weakness in the right hand. She will monitor for recurrence of lightheadedness and let me know.     1. Paresthesia of both lower extremities    2. Weakness of both lower extremities    3. History of Guillain-[place] syndrome    4. Imbalance      Plan:     - EMG W/ ULTRASOUND AND NERVE CONDUCTION TEST 4 Extremities; Future  - Ambulatory referral/consult to Physical/Occupational Therapy; Future        Plan was discussed in detail with the patient, who is in agreement.      Based on our encounter today, my overall Medical Decision Making is a Level 4     Complexity of Problem: Moderate (1 undiagnosed new problem with uncertain prognosis)  Complexity of Data: Moderate (Review of the results of each unit test and Ordering each unique test)  Risk of Complications and/or morbidity/mortality of Management: Low risk of morbidity from additional diagnostic testing or treatment          Yazmin Rincon MD    [facility]  02/12/2025

## 2025-02-18 DIAGNOSIS — H40.053 BILATERAL OCULAR HYPERTENSION: ICD-10-CM

## 2025-02-18 DIAGNOSIS — F41.9 ANXIETY: ICD-10-CM

## 2025-02-18 RX ORDER — LATANOPROST 50 UG/ML
1 SOLUTION/ DROPS OPHTHALMIC NIGHTLY
Qty: 7.5 ML | Refills: 4 | Status: SHIPPED | OUTPATIENT
Start: 2025-02-18

## 2025-02-18 NOTE — TELEPHONE ENCOUNTER
No care due was identified.  Health [facility] Embedded Care Due Messages. Reference number: [id number].   2/18/2025 8:43:16 AM CST

## 2025-02-19 RX ORDER — LORAZEPAM 1 MG/1
TABLET ORAL
Qty: 60 TABLET | Refills: 1 | Status: SHIPPED | OUTPATIENT
Start: 2025-02-19

## 2025-02-21 DIAGNOSIS — Z00.00 ENCOUNTER FOR MEDICARE ANNUAL WELLNESS EXAM: ICD-10-CM

## 2025-02-27 ENCOUNTER — HOSPITAL ENCOUNTER (EMERGENCY)
Facility: OTHER | Age: 75
Discharge: HOME | End: 2025-02-27
Attending: EMERGENCY MEDICINE
Payer: MEDICARE

## 2025-02-27 VITALS
SYSTOLIC BLOOD PRESSURE: 131 MMHG | HEART RATE: 67 BPM | DIASTOLIC BLOOD PRESSURE: 77 MMHG | WEIGHT: 178 LBS | BODY MASS INDEX: 29.66 KG/M2 | OXYGEN SATURATION: 99 % | HEIGHT: 65 IN | TEMPERATURE: 98 F | RESPIRATION RATE: 17 BRPM

## 2025-02-27 DIAGNOSIS — N39.0 URINARY TRACT INFECTION WITHOUT HEMATURIA, SITE UNSPECIFIED: ICD-10-CM

## 2025-02-27 DIAGNOSIS — R42 LIGHT HEADEDNESS: Primary | ICD-10-CM

## 2025-02-27 DIAGNOSIS — R53.83 FATIGUE: ICD-10-CM

## 2025-02-27 DIAGNOSIS — I95.9 HYPOTENSION, UNSPECIFIED HYPOTENSION TYPE: ICD-10-CM

## 2025-02-27 LAB
ALBUMIN SERPL BCP-MCNC: 4.1 G/DL (ref 3.5–5.2)
ALP SERPL-CCNC: 49 U/L (ref 40–150)
ALT SERPL W/O P-5'-P-CCNC: 13 U/L (ref 10–44)
ANION GAP SERPL CALC-SCNC: 12 MMOL/L (ref 8–16)
AST SERPL-CCNC: 32 U/L (ref 10–40)
BACTERIA #/AREA URNS HPF: ABNORMAL /HPF
BASOPHILS # BLD AUTO: 0.04 K/UL (ref 0–0.2)
BASOPHILS NFR BLD: 0.8 % (ref 0–1.9)
BILIRUB SERPL-MCNC: 0.4 MG/DL (ref 0.1–1)
BILIRUB UR QL STRIP: NEGATIVE
BNP SERPL-MCNC: 25 PG/ML (ref 0–99)
BUN SERPL-MCNC: 12 MG/DL (ref 8–23)
CALCIUM SERPL-MCNC: 9.9 MG/DL (ref 8.7–10.5)
CHLORIDE SERPL-SCNC: 100 MMOL/L (ref 95–110)
CLARITY UR: ABNORMAL
CO2 SERPL-SCNC: 24 MMOL/L (ref 23–29)
COLOR UR: YELLOW
CREAT SERPL-MCNC: 0.8 MG/DL (ref 0.5–1.4)
CTP QC/QA: YES
CTP QC/QA: YES
DIFFERENTIAL METHOD BLD: ABNORMAL
EOSINOPHIL # BLD AUTO: 0.2 K/UL (ref 0–0.5)
EOSINOPHIL NFR BLD: 3.1 % (ref 0–8)
ERYTHROCYTE [DISTWIDTH] IN BLOOD BY AUTOMATED COUNT: 15 % (ref 11.5–14.5)
EST. GFR  (NO RACE VARIABLE): >60 ML/MIN/1.73 M^2
GLUCOSE SERPL-MCNC: 108 MG/DL (ref 70–110)
GLUCOSE UR QL STRIP: NEGATIVE
HCT VFR BLD AUTO: 36.9 % (ref 37–48.5)
HGB BLD-MCNC: 12.5 G/DL (ref 12–16)
HGB UR QL STRIP: NEGATIVE
HYALINE CASTS #/AREA URNS LPF: 5 /LPF
IMM GRANULOCYTES # BLD AUTO: 0.01 K/UL (ref 0–0.04)
IMM GRANULOCYTES NFR BLD AUTO: 0.2 % (ref 0–0.5)
KETONES UR QL STRIP: ABNORMAL
LEUKOCYTE ESTERASE UR QL STRIP: ABNORMAL
LYMPHOCYTES # BLD AUTO: 2 K/UL (ref 1–4.8)
LYMPHOCYTES NFR BLD: 41 % (ref 18–48)
MCH RBC QN AUTO: 27.9 PG (ref 27–31)
MCHC RBC AUTO-ENTMCNC: 33.9 G/DL (ref 32–36)
MCV RBC AUTO: 82 FL (ref 82–98)
MICROSCOPIC COMMENT: ABNORMAL
MONOCYTES # BLD AUTO: 0.5 K/UL (ref 0.3–1)
MONOCYTES NFR BLD: 9.3 % (ref 4–15)
NEUTROPHILS # BLD AUTO: 2.2 K/UL (ref 1.8–7.7)
NEUTROPHILS NFR BLD: 45.6 % (ref 38–73)
NITRITE UR QL STRIP: NEGATIVE
NRBC BLD-RTO: 0 /100 WBC
OHS QRS DURATION: 66 MS
OHS QTC CALCULATION: 406 MS
PH UR STRIP: 8 [PH] (ref 5–8)
PLATELET # BLD AUTO: 349 K/UL (ref 150–450)
PMV BLD AUTO: 10 FL (ref 9.2–12.9)
POC MOLECULAR INFLUENZA A AGN: NEGATIVE
POC MOLECULAR INFLUENZA B AGN: NEGATIVE
POTASSIUM SERPL-SCNC: 4.6 MMOL/L (ref 3.5–5.1)
PROT SERPL-MCNC: 8.8 G/DL (ref 6–8.4)
PROT UR QL STRIP: ABNORMAL
RBC # BLD AUTO: 4.48 M/UL (ref 4–5.4)
RBC #/AREA URNS HPF: 4 /HPF (ref 0–4)
SARS-COV-2 RDRP RESP QL NAA+PROBE: NEGATIVE
SODIUM SERPL-SCNC: 136 MMOL/L (ref 136–145)
SP GR UR STRIP: 1.03 (ref 1–1.03)
SQUAMOUS #/AREA URNS HPF: 6 /HPF
TROPONIN I SERPL DL<=0.01 NG/ML-MCNC: <0.006 NG/ML (ref 0–0.03)
URN SPEC COLLECT METH UR: ABNORMAL
UROBILINOGEN UR STRIP-ACNC: ABNORMAL EU/DL
WBC # BLD AUTO: 4.85 K/UL (ref 3.9–12.7)
WBC #/AREA URNS HPF: 7 /HPF (ref 0–5)

## 2025-02-27 PROCEDURE — 80053 COMPREHEN METABOLIC PANEL: CPT | Performed by: NURSE PRACTITIONER

## 2025-02-27 PROCEDURE — 83880 ASSAY OF NATRIURETIC PEPTIDE: CPT | Performed by: NURSE PRACTITIONER

## 2025-02-27 PROCEDURE — 84484 ASSAY OF TROPONIN QUANT: CPT | Performed by: NURSE PRACTITIONER

## 2025-02-27 PROCEDURE — 85025 COMPLETE CBC W/AUTO DIFF WBC: CPT | Performed by: NURSE PRACTITIONER

## 2025-02-27 PROCEDURE — 93010 ELECTROCARDIOGRAM REPORT: CPT | Mod: ,,, | Performed by: INTERNAL MEDICINE

## 2025-02-27 PROCEDURE — 96361 HYDRATE IV INFUSION ADD-ON: CPT

## 2025-02-27 PROCEDURE — 25000003 PHARM REV CODE 250: Performed by: NURSE PRACTITIONER

## 2025-02-27 PROCEDURE — 81000 URINALYSIS NONAUTO W/SCOPE: CPT | Performed by: NURSE PRACTITIONER

## 2025-02-27 PROCEDURE — 87635 SARS-COV-2 COVID-19 AMP PRB: CPT | Performed by: EMERGENCY MEDICINE

## 2025-02-27 PROCEDURE — 99285 EMERGENCY DEPT VISIT HI MDM: CPT | Mod: 25

## 2025-02-27 PROCEDURE — 93005 ELECTROCARDIOGRAM TRACING: CPT

## 2025-02-27 PROCEDURE — 96360 HYDRATION IV INFUSION INIT: CPT

## 2025-02-27 RX ORDER — CEPHALEXIN 500 MG/1
500 CAPSULE ORAL 3 TIMES DAILY
Qty: 28 CAPSULE | Refills: 0 | Status: SHIPPED | OUTPATIENT
Start: 2025-02-27 | End: 2025-03-06

## 2025-02-27 RX ADMIN — SODIUM CHLORIDE 1000 ML: 9 INJECTION, SOLUTION INTRAVENOUS at 03:02

## 2025-02-27 NOTE — FIRST PROVIDER EVALUATION
" Emergency Department TeleTriage Encounter Note      CHIEF COMPLAINT    Chief Complaint   Patient presents with    Fatigue     Generalized weakness x 1 week, fatigue, headache, R rib cage pain. States she had an episode of lightheadedness earlier today but does not feel dizzy at this time. States "something is just not right". Vital signs WNL.       VITAL SIGNS   Initial Vitals [02/27/25 1349]   BP Pulse Resp Temp SpO2   92/64 82 18 98.1 °F (36.7 °C) 99 %      MAP       --            ALLERGIES    Review of patient's allergies indicates:   Allergen Reactions    Iodine Hives       PROVIDER TRIAGE NOTE  This is a teletriage evaluation of a 75 y.o. female presenting to the ED complaining of fatigue and light-headedness for one week.  Occasional headaches and right rib area pain.      Alert, sitting upright, no distress.    BP low. Pt states she is compliant with home BP medications as prescribed.     Initial orders will be placed and care will be transferred to an alternate provider when patient is roomed for a full evaluation. Any additional orders and the final disposition will be determined by that provider.         ORDERS  Labs Reviewed   CBC W/ AUTO DIFFERENTIAL   COMPREHENSIVE METABOLIC PANEL   TROPONIN I   B-TYPE NATRIURETIC PEPTIDE   URINALYSIS, REFLEX TO URINE CULTURE       ED Orders (720h ago, onward)      Start Ordered     Status Ordering Provider    02/27/25 1400 02/27/25 1358  sodium chloride 0.9% bolus 1,000 mL 1,000 mL  ED 1 Time         Ordered CASSIDY IVY N.    02/27/25 1359 02/27/25 1358  Urinalysis, Reflex to Urine Culture Urine, Clean Catch  STAT         Ordered CASSIDY IVY N.    02/27/25 1358 02/27/25 1358  Vital signs  Every 15 min         Ordered CASSIDY IVY N.    02/27/25 1358 02/27/25 1358  Cardiac Monitoring - Adult  Continuous        Comments: Notify Physician If:    Ordered CASSIDY IVY N.    02/27/25 1358 02/27/25 1358  Pulse Oximetry Continuous  " Continuous         Ordered CASSIDY IVY N.    02/27/25 1358 02/27/25 1358  Diet NPO  Diet effective now         Ordered CASSIDY IVY N.    02/27/25 1358 02/27/25 1358  Saline lock IV  Once         Ordered CASSIDY IVY N.    02/27/25 1358 02/27/25 1358  EKG 12-lead  Once        Comments: Do not perform if previously done during this visit/ triage    Ordered CASSIDY IVY N.    02/27/25 1358 02/27/25 1358  CBC auto differential  STAT         Ordered CASSIDY IVY N.    02/27/25 1358 02/27/25 1358  Comprehensive metabolic panel  STAT         Ordered CASSIDY IVY N.    02/27/25 1358 02/27/25 1358  Troponin I #1  STAT         Ordered CASSIDY IVY N.    02/27/25 1358 02/27/25 1358  B-Type natriuretic peptide (BNP)  STAT         Ordered CASSIDY IVY.              Virtual Visit Note: The provider triage portion of this emergency department evaluation and documentation was performed via [employer], a HIPAA-compliant telemedicine application, in concert with a tele-presenter in the room. A face to face patient evaluation with one of my colleagues will occur once the patient is placed in an emergency department room.      DISCLAIMER: This note was prepared with [employer] voice recognition transcription software. Garbled syntax, mangled pronouns, and other bizarre constructions may be attributed to that software system.

## 2025-02-27 NOTE — DISCHARGE INSTRUCTIONS
Your blood pressure today was low.  You should therefore stop taking 1 of your blood pressure medicines (Diovan-HCT) until your appointment with Dr. Jimenez this coming Monday.  You may continue your other blood pressure medication, Norvasc.  Drink adequate fluids.  Did not skip meals

## 2025-02-27 NOTE — ED PROVIDER NOTES
"Chief complaint:  Fatigue (Generalized weakness x 1 week, fatigue, headache, R rib cage pain. States she had an episode of lightheadedness earlier today but does not feel dizzy at this time. States "something is just not right". Vital signs WNL.)      Source of information:  Patient, old chart    HPI:  Nan Palafox is a 75 y.o. female presenting with complaints of fatigue and generalized weakness for the past 4 days.  Earlier today she had an episode of lightheadedness while shopping in [email or web address] which lasted about 30 minutes.  No palpitations or chest pain.  No fevers.  She does have some postprandial epigastric pain but no nausea vomiting or diarrhea.  She has noticed some sticking pains in the right flank but has not had hematuria or burning with urination.  She does report more frequent urination.  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  She has not had any focal weakness, or increased difficulty walking.  No other acute complaints    ROS: As per HPI    Review of patient's allergies indicates:   Allergen Reactions    Iodine Hives       Medications Ordered Prior to Encounter[1]    PMH:  As per HPI and below:  Past Medical History:   Diagnosis Date    Arthritis     Cataract     Cervical spondylosis with radiculopathy 04/11/2016    GBS (Guillain-Bourbon syndrome)     History of gastrointestinal stromal tumor (GIST) 12/02/2020    Hypertension     Malignant carcinoid tumor of bronchus and lung 12/02/2020    Nuclear sclerosis - Both Eyes 02/08/2013    PNA (pneumonia)     Primary open angle glaucoma (POAG) of both eyes, moderate stage 03/11/2019     Past Surgical History:   Procedure Laterality Date    BREAST BIOPSY Right     excisional bx    BREAST SURGERY Right     Excisional bx    HYSTERECTOMY      INJECTION OF ANESTHETIC AGENT AROUND MULTIPLE INTERCOSTAL NERVES Right 01/04/2021    " Procedure: BLOCK, NERVE, INTERCOSTAL, 2 OR MORE;  Surgeon: Ru Hernandez MD;  Location: [facility] OR [address] [place] [address];  Service: Thoracic;  Laterality: Right;    RETINAL DETACHMENT SURGERY      patient states that she had a retinal tear that was repaired in the past at [facility] but she is uncertain of which eye    STOMACH SURGERY      SURGICAL REMOVAL OF LYMPH NODE N/A 01/04/2021    Procedure: EXCISION, LYMPH NODE;  Surgeon: Ru Hernandez MD;  [facility];  Service: Thoracic;  Laterality: N/A;    TOTAL KNEE ARTHROPLASTY Right     XI ROBOTIC RATS,WITH LOBECTOMY,LUNG Right 01/04/2021    Procedure: XI ROBOTIC RATS,WITH LOBECTOMY,LUNG;  Surgeon: Ru Hernandez MD;  Location: [facility] OR [address] [place] [address];  Service: Thoracic;  Laterality: Right;  Right middle lobectomy.         Physical Exam:    Vitals:    02/27/25 1717   BP: 131/77   Pulse: 67   Resp: 17   Temp: 98.2 °F (36.8 °C)       General: No acute distress. Well developed. Well nourished.  Eyes: PERRL. EOM intact. no photophobia, no nystagmus  Conjunctivae - no pallor or icterus.   ENT: HEAD: Normal - atraumatic. Normal external ears. Normal nose.  No facial asymmetry. Mucous membranes - dry.  Neck: Neck supple. No JVD.  Cardiovascular: Regular rate and rhythm. Normal S1 and S2. No murmur. No gallop. No rub.  2+ peripheral pulses  Respiratory: Normal breath sounds. No rales. No rhonchi. No wheezes. No tachypnea with exertion.  GI: Soft. Nontender. Nondistended. No guarding. No rebound. Normal BS.  No CVA tenderness to percussion.  Musculoskeletal:  No deformities. Normal ROM x4.   Integument: No acute skin rashes. No clubbing or cyanosis  Neurologic:  5/5 strength throughout upper and lower extremities. normal sensation x4.  No facial asymmetry.  No expressive or receptive aphasia.  No confusion.  Ambulating with a steady gait.  Psychiatric: Awake, alert.  Oriented x3.  Normal speech and mentation.        Labs Reviewed   CBC W/ AUTO DIFFERENTIAL - Abnormal        Result Value    WBC 4.85      RBC 4.48      Hemoglobin 12.5      Hematocrit 36.9 (*)     MCV 82      MCH 27.9      MCHC 33.9      RDW 15.0 (*)     Platelets 349      MPV 10.0      Immature Granulocytes 0.2      Gran # (ANC) 2.2      Immature Grans (Abs) 0.01      Lymph # 2.0      Mono # 0.5      Eos # 0.2      Baso # 0.04      nRBC 0      Gran % 45.6      Lymph % 41.0      Mono % 9.3      Eosinophil % 3.1      Basophil % 0.8      Differential Method Automated     COMPREHENSIVE METABOLIC PANEL - Abnormal    Sodium 136      Potassium 4.6      Chloride 100      CO2 24      Glucose 108      BUN 12      Creatinine 0.8      Calcium 9.9      Total Protein 8.8 (*)     Albumin 4.1      Total Bilirubin 0.4      Alkaline Phosphatase 49      AST 32      ALT 13      eGFR >60      Anion Gap 12     URINALYSIS, REFLEX TO URINE CULTURE - Abnormal    Specimen UA Urine, Clean Catch      Color, UA Yellow      Appearance, UA Hazy (*)     pH, UA 8.0      Specific Gravity, UA 1.030      Protein, UA 1+ (*)     Glucose, UA Negative      Ketones, UA Trace (*)     Bilirubin (UA) Negative      Occult Blood UA Negative      Nitrite, UA Negative      Urobilinogen, UA 2.0-3.0 (*)     Leukocytes, UA 1+ (*)     Narrative:     Specimen Source->Urine   URINALYSIS MICROSCOPIC - Abnormal    RBC, UA 4      WBC, UA 7 (*)     Bacteria Rare      Squam Epithel, UA 6      Hyaline Casts, UA 5 (*)     Microscopic Comment SEE COMMENT      Narrative:     Specimen Source->Urine   TROPONIN I    Troponin I <0.006     B-TYPE NATRIURETIC PEPTIDE    BNP 25     SARS-COV-2 RDRP GENE    POC Rapid COVID Negative       Acceptable Yes     POCT INFLUENZA A/B MOLECULAR    POC Molecular Influenza A Ag Negative      POC Molecular Influenza B Ag Negative       Acceptable Yes         Medications   sodium chloride 0.9% bolus 1,000 mL 1,000 mL (0 mLs Intravenous Stopped 2/27/25 1725)     Medical Decision Making  Differential diagnosis includes  dehydration, hypotension, hyponatremia, UTI, COVID, pneumonia, anemia    Amount and/or Complexity of Data Reviewed  External Data Reviewed: labs and notes.  Labs: ordered. Decision-making details documented in ED Course.  Radiology: ordered and independent interpretation performed. Decision-making details documented in ED Course.  ECG/medicine tests: ordered and independent interpretation performed. Decision-making details documented in ED Course.    Risk  Prescription drug management.          Independently interpreted x-ray and/or EKG:  Twelve lead EKG shows sinus rhythm, rate of 75.  There is sinus arrhythmia and prolonged MS interval.  No ST changes.  No acute ischemia.  Chest x-ray shows no focal infiltrate or effusion, no pulmonary edema.    MDM:    75 y.o. female with complaint of fatigue/weakness for a few days with 1 episode of lightheadedness.  Feeling better upon arrival here.  On arrival she is noted to be hypotensive, 92/64.  She does have history of hypertension, is compliant with her medications.  Remainder of vital signs were unremarkable.  She appeared mildly dehydrated, but not acutely ill-appearing.  IV fluid repletion begun.  Laboratory workup is reassuring without leukocytosis or anemia.  Normal electrolytes renal function.  Workup was initiated by triage included troponin and BNP although the patient had no chest pain.  These were negative.  EKG was unremarkable and patient had no arrhythmia while on the monitor.  Urinalysis did show some bacteria, white blood cells and as the patient reported some urinary symptoms, will place her on antibiotics for possible UTI.  Advised the patient to hold her Diovan HCT for the next several days.  She already had a previously scheduled appointment with the primary care physician this Monday.  Advised that since she does not have a cuff at home, she should discuss with him when to restart the Diovan.  At this time she is feeling better, her blood pressure has  normalized, and she is stable for discharge    Medications   sodium chloride 0.9% bolus 1,000 mL 1,000 mL (0 mLs Intravenous Stopped 2/27/25 7874)       ASSESSMENT:   1. Light headedness    2. Fatigue    3. Hypotension, unspecified hypotension type    4. Urinary tract infection without hematuria, site unspecified               [1]   No current facility-administered medications on file prior to encounter.     Current Outpatient Medications on File Prior to Encounter   Medication Sig Dispense Refill    acetaminophen (TYLENOL) 650 MG TbSR Take 500 mg by mouth every 8 (eight) hours.      amLODIPine (NORVASC) 10 MG tablet Take 10 mg by mouth.      ASCORBATE CALCIUM (VITAMIN C ORAL) Take 1 tablet by mouth once daily at 6am.      aspirin (ECOTRIN) 81 MG EC tablet Take 81 mg by mouth once daily.      dorzolamide-timolol 2-0.5% (COSOPT) 22.3-6.8 mg/mL ophthalmic solution Place 1 drop into both eyes 2 (two) times daily. 20 mL 4    fluticasone (FLONASE) 50 mcg/actuation nasal spray 2 sprays by Each Nare route once daily. 1 Bottle 12    latanoprost 0.005 % ophthalmic solution Place 1 drop into both eyes every evening. 7.5 mL 4    LORazepam (ATIVAN) 1 MG tablet TAKE 1 TABLET(1 MG) BY MOUTH EVERY 8 HOURS AS NEEDED 60 tablet 1    multivitamin (THERAGRAN) tablet Take 1 tablet by mouth once daily.      pantoprazole (PROTONIX) 40 MG tablet Take 1 tab po before breakfast for reflux control. 30 tablet 3    rosuvastatin (CRESTOR) 40 MG Tab Take 40 mg by mouth.      valsartan-hydrochlorothiazide (DIOVAN-HCT) 320-25 mg per tablet Take 1 tablet by mouth once daily.      vitamin D (VITAMIN D3) 1000 units Tab Take 1,000 Units by mouth once daily.      ZINC ORAL Take by mouth 3 (three) times daily.          Daniel Watkins II, MD  02/27/25 6050

## 2025-02-27 NOTE — ED TRIAGE NOTES
Pt presents to ED c/o generalized weakness for the past four days. Pt states she had an episode of lightheadedness for about 30 mins that occurred today. Endorses epigastric pain and urinary frequency. Denies hematuria, N/V. Aaox4, NAD noted.

## 2025-03-03 ENCOUNTER — OFFICE VISIT (OUTPATIENT)
Dept: INTERNAL MEDICINE | Facility: CLINIC | Age: 75
End: 2025-03-03
Payer: MEDICARE

## 2025-03-03 VITALS
HEIGHT: 65 IN | DIASTOLIC BLOOD PRESSURE: 70 MMHG | HEART RATE: 67 BPM | WEIGHT: 178 LBS | SYSTOLIC BLOOD PRESSURE: 122 MMHG | RESPIRATION RATE: 16 BRPM | OXYGEN SATURATION: 99 % | BODY MASS INDEX: 29.66 KG/M2 | TEMPERATURE: 98 F

## 2025-03-03 DIAGNOSIS — R13.10 DYSPHAGIA, UNSPECIFIED TYPE: ICD-10-CM

## 2025-03-03 DIAGNOSIS — R10.10 PAIN OF UPPER ABDOMEN: ICD-10-CM

## 2025-03-03 DIAGNOSIS — I10 PRIMARY HYPERTENSION: Primary | ICD-10-CM

## 2025-03-03 DIAGNOSIS — I70.0 ATHEROSCLEROSIS OF AORTA: ICD-10-CM

## 2025-03-03 DIAGNOSIS — Z85.09 HISTORY OF GASTROINTESTINAL STROMAL TUMOR (GIST): ICD-10-CM

## 2025-03-03 DIAGNOSIS — Z86.69 HISTORY OF GUILLAIN-BARRE SYNDROME: ICD-10-CM

## 2025-03-03 PROCEDURE — 99214 OFFICE O/P EST MOD 30 MIN: CPT | Mod: S$PBB,,, | Performed by: INTERNAL MEDICINE

## 2025-03-03 PROCEDURE — 99215 OFFICE O/P EST HI 40 MIN: CPT | Mod: PBBFAC,PO | Performed by: INTERNAL MEDICINE

## 2025-03-03 PROCEDURE — 99999 PR PBB SHADOW E&M-EST. PATIENT-LVL V: CPT | Mod: PBBFAC,,, | Performed by: INTERNAL MEDICINE

## 2025-03-03 RX ORDER — VALSARTAN AND HYDROCHLOROTHIAZIDE 320; 12.5 MG/1; MG/1
1 TABLET, FILM COATED ORAL DAILY
Qty: 90 TABLET | Refills: 3 | Status: SHIPPED | OUTPATIENT
Start: 2025-03-03 | End: 2026-03-03

## 2025-03-03 NOTE — PROGRESS NOTES
"Subjective:       Patient ID: Nan Palafox is a 75 y.o. female.    Chief Complaint: Hypertension (6 mos ck. ) and er follow up (Went to er last nite.  Was lightheaded (bp was 90/64 at 64 )and wobbly and they dx uti and on antib.  Also having abdomen distended and pain goes around to right back pain.  )    History of Present Illness    Nan presents today for follow-up of medical conditions which include hypertension, fatigue, recent UTI, impaired gait imbalance, and dysphagia and early satiety.  GI symptoms have been noted over the past month.    She has an episode of lightheadedness week ago.  Her blood pressure was 98/64.  Urinary tract infection was also diagnosed recently.  She has noted right-sided abdominal and flank pain.  She is currently on antibiotic therapy.  She has been noting urinary urgency and episodes of nocturia x4 daily.    She is scheduled to follow-up with chest surgery for interval follow-up in May 2025.  She is status post right middle lobectomy on 01/04/2021 for right middle lobe malignant carcinoid tumor.  She is followed by Dr. Ru Hernandez.    URINARY SYMPTOMS:  She reports nocturia up to 4 times per night with urinary urgency, but denies daytime frequency. Urinalysis showed elevated WBCs (7), RBCs (4), 1+ esterase, and 5 hyaline casts.    SHORTNESS OF BREATH:  She experiences intermittent dyspnea with activities occurring approximately twice weekly. While some days are normal, she becomes short of breath after walking from her car to office or performing light household tasks like vacuuming and moving between rooms.    GASTROINTESTINAL:  She reports early satiety with small amounts of food and sensation of food getting stuck while eating. She has decreased appetite for certain foods, specifically aversion to stewed chicken unless consumed with salad. She experiences right upper quadrant abdominal pain that radiates to the back, described as "pinching" and intermittent. The pain " extends from the right side of abdomen to the center of back, though not present during visit.    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 Also significant for history of rare lung cancer requiring partial right lung resection, with no history of smoking or alcohol use.    FAMILY HISTORY:  Daughter recently diagnosed with Stage 1 stomach cancer. She has sickle cell trait.    CURRENT MEDICATIONS:  Takes Valsartan /25, and supplements including Zinc, B12, and Vitamin C.      ROS:  Constitutional: +loss in appetite, +lightheadedness  Respiratory: +shortness of breath  Gastrointestinal: +abdominal pain  Genitourinary: +nocturia              Physical Exam  Vitals and nursing note reviewed.   Constitutional:       General: She is not in acute distress.     Appearance: Normal appearance. She is well-developed.      Comments: The patient has lost 3 lb since 09/03/2024.   HENT:      Head: Normocephalic and atraumatic.   Eyes:      General: No scleral icterus.     Extraocular Movements: Extraocular movements intact.      Conjunctiva/sclera: Conjunctivae normal.   Neck:      Thyroid: No thyromegaly.      Vascular: No JVD.   Cardiovascular:      Rate and Rhythm: Normal rate and regular rhythm.      Heart sounds: Normal heart sounds. No murmur heard.     No friction rub. No gallop.   Pulmonary:      Effort: Pulmonary effort is normal. No respiratory distress.      Breath sounds: Normal breath sounds. No wheezing or rales.   Abdominal:      General: Bowel sounds are normal.      Palpations: Abdomen is soft. There is no mass.      Tenderness: There is no abdominal tenderness. There is no right CVA tenderness or left CVA tenderness.   Musculoskeletal:         General: No tenderness. Normal range of motion.      Cervical back: Normal range of motion and neck supple.      Right lower leg: No edema.      Left lower leg: No edema.    Lymphadenopathy:      Cervical: No cervical adenopathy.   Skin:     General: Skin is warm and dry.      Findings: No rash.   Neurological:      Mental Status: She is alert and oriented to person, place, and time.   Psychiatric:         Mood and Affect: Mood normal.         Behavior: Behavior normal.           Admission on 02/27/2025, Discharged on 02/27/2025   Component Date Value Ref Range Status    QRS Duration 02/27/2025 66  ms Final    [facility] QTC Calculation 02/27/2025 406  ms Final    WBC 02/27/2025 4.85  3.90 - 12.70 K/uL Final    RBC 02/27/2025 4.48  4.00 - 5.40 M/uL Final    Hemoglobin 02/27/2025 12.5  12.0 - 16.0 g/dL Final    Hematocrit 02/27/2025 36.9 (L)  37.0 - 48.5 % Final    MCV 02/27/2025 82  82 - 98 fL Final    MCH 02/27/2025 27.9  27.0 - 31.0 pg Final    MCHC 02/27/2025 33.9  32.0 - 36.0 g/dL Final    RDW 02/27/2025 15.0 (H)  11.5 - 14.5 % Final    Platelets 02/27/2025 349  150 - 450 K/uL Final    MPV 02/27/2025 10.0  9.2 - 12.9 fL Final    Immature Granulocytes 02/27/2025 0.2  0.0 - 0.5 % Final    Gran # (ANC) 02/27/2025 2.2  1.8 - 7.7 K/uL Final    Immature Grans (Abs) 02/27/2025 0.01  0.00 - 0.04 K/uL Final    Comment: Mild elevation in immature granulocytes is non specific and   can be seen in a variety of conditions including stress response,   acute inflammation, trauma and pregnancy. Correlation with other   laboratory and clinical findings is essential.      Lymph # 02/27/2025 2.0  1.0 - 4.8 K/uL Final    Mono # 02/27/2025 0.5  0.3 - 1.0 K/uL Final    Eos # 02/27/2025 0.2  0.0 - 0.5 K/uL Final    Baso # 02/27/2025 0.04  0.00 - 0.20 K/uL Final    nRBC 02/27/2025 0  0 /100 WBC Final    Gran % 02/27/2025 45.6  38.0 - 73.0 % Final    Lymph % 02/27/2025 41.0  18.0 - 48.0 % Final    Mono % 02/27/2025 9.3  4.0 - 15.0 % Final    Eosinophil % 02/27/2025 3.1  0.0 - 8.0 % Final    Basophil % 02/27/2025 0.8  0.0 - 1.9 % Final    Differential Method 02/27/2025 Automated   Final    Sodium 02/27/2025 136   136 - 145 mmol/L Final    Potassium 02/27/2025 4.6  3.5 - 5.1 mmol/L Final    Specimen moderately hemolyzed    Chloride 02/27/2025 100  95 - 110 mmol/L Final    CO2 02/27/2025 24  23 - 29 mmol/L Final    Glucose 02/27/2025 108  70 - 110 mg/dL Final    BUN 02/27/2025 12  8 - 23 mg/dL Final    Creatinine 02/27/2025 0.8  0.5 - 1.4 mg/dL Final    Calcium 02/27/2025 9.9  8.7 - 10.5 mg/dL Final    Total Protein 02/27/2025 8.8 (H)  6.0 - 8.4 g/dL Final    Albumin 02/27/2025 4.1  3.5 - 5.2 g/dL Final    Total Bilirubin 02/27/2025 0.4  0.1 - 1.0 mg/dL Final    Comment: For infants and newborns, interpretation of results should be based  on gestational age, weight and in agreement with clinical  observations.    Premature Infant recommended reference ranges:  Up to 24 hours.............<8.0 mg/dL  Up to 48 hours............<12.0 mg/dL  3-5 days..................<15.0 mg/dL  6-29 days.................<15.0 mg/dL      Alkaline Phosphatase 02/27/2025 49  40 - 150 U/L Final    AST 02/27/2025 32  10 - 40 U/L Final    ALT 02/27/2025 13  10 - 44 U/L Final    eGFR 02/27/2025 >60  >60 mL/min/1.73 m^2 Final    Anion Gap 02/27/2025 12  8 - 16 mmol/L Final    Troponin I 02/27/2025 <0.006  0.000 - 0.026 ng/mL Final    Comment: The reference interval for Troponin I represents the 99th percentile   cutoff   for our facility and is consistent with 3rd generation assay   performance.      BNP 02/27/2025 25  0 - 99 pg/mL Final    Values of less than 100 pg/ml are consistent with non-CHF populations.    Specimen UA 02/27/2025 Urine, Clean Catch   Final    Color, UA 02/27/2025 Yellow  Yellow, Straw, Niharika Final    Appearance, UA 02/27/2025 Hazy (A)  Clear Final    pH, UA 02/27/2025 8.0  5.0 - 8.0 Final    Specific Gravity, UA 02/27/2025 1.030  1.005 - 1.030 Final    Protein, UA 02/27/2025 1+ (A)  Negative Final    Comment: Recommend a 24 hour urine protein or a urine   protein/creatinine ratio if globulin induced proteinuria is  clinically  suspected.      Glucose, UA 02/27/2025 Negative  Negative Final    Ketones, UA 02/27/2025 Trace (A)  Negative Final    Bilirubin (UA) 02/27/2025 Negative  Negative Final    Occult Blood UA 02/27/2025 Negative  Negative Final    Nitrite, UA 02/27/2025 Negative  Negative Final    Urobilinogen, UA 02/27/2025 2.0-3.0 (A)  <2.0 EU/dL Final    Leukocytes, UA 02/27/2025 1+ (A)  Negative Final    POC Rapid COVID 02/27/2025 Negative  Negative Final     Acceptable 02/27/2025 Yes   Final    POC Molecular Influenza A Ag 02/27/2025 Negative  Negative Final    POC Molecular Influenza B Ag 02/27/2025 Negative  Negative Final     Acceptable 02/27/2025 Yes   Final    RBC, UA 02/27/2025 4  0 - 4 /hpf Final    WBC, UA 02/27/2025 7 (H)  0 - 5 /hpf Final    Bacteria 02/27/2025 Rare  None-Occ /hpf Final    Squam Epithel, UA 02/27/2025 6  /hpf Final    Hyaline Casts, UA 02/27/2025 5 (A)  0-1/lpf /lpf Final    Microscopic Comment 02/27/2025 SEE COMMENT   Final    Comment: Other formed elements not mentioned in the report are not   present in the microscopic examination.          Assessment & Plan:     Assessment & Plan     Evaluated recent ER visit for urinary tract infection and low blood pressure   Considered dehydration as potential cause of lightheadedness and low blood pressure   Reviewed recent lab results, including normal BNP and troponin levels   Assessed urinalysis showing slight elevation in white blood cell count (7 WBC/hpf)   Will reduce diuretic component of valsartan HCT to address blood pressure concerns   Ordered endoscopy to investigate abdominal pain, early satiety, and dysphagia   Initiated [employer] program for hypertension monitoring    CHRONIC RESPIRATORY FAILURE WITH HYPOXIA:   Monitor patient's intermittent shortness of breath, occurring approximately twice weekly with minimal exertion.   Physical exam revealed clear lungs with no wheezing, and SpO2 levels are within normal  "range.   Addressed patient's concerns regarding their lung condition and reviewed previous test results.   Schedule an endoscopy to investigate other reported symptoms.    HYPERTENSION, UNSPECIFIED:   Instruct patient to monitor blood pressure at home using a new blood pressure machine and enroll in the [employer] program for continuous hypertension monitoring.   Adjusted valsartan HCT dosage from 320/25 mg to 320/12.5 mg daily to reduce the diuretic component.   Nan reports a recent visit to the cardiologist this month.   Physical exam showed regular heart rhythm with no wheezing.   BNP and troponin levels, which screen for heart failure and heart muscle damage respectively, were found to be within normal range.   Schedule a follow-up visit in 1 month for a blood pressure recheck with the nurse.    LUNG CANCER:   Monitor patient's condition following partial right lung resection due to rare lung cancer.   Schedule annual cancer checkup with Dr. Capone in May.   Nan reports feeling "good right now" and hopes for no cancer recurrence.   Typically, recurrence occurs after 8 years.   Recent CT scan of the abdomen in December showed no acute findings.    URINARY TRACT INFECTIONS:   Discussed importance of staying hydrated, especially with urinary tract infections.    OTHER INSTRUCTIONS:   Nan has never smoked or consumed alcohol.    FOLLOW UP:   Contact the office if experiencing any concerning symptoms or side effects from medication changes.         No follow-ups on file.     Glenroy Jimenez MD  "

## 2025-03-06 ENCOUNTER — CLINICAL SUPPORT (OUTPATIENT)
Dept: ENDOSCOPY | Facility: HOSPITAL | Age: 75
End: 2025-03-06
Attending: INTERNAL MEDICINE
Payer: MEDICARE

## 2025-03-06 ENCOUNTER — TELEPHONE (OUTPATIENT)
Dept: ENDOSCOPY | Facility: HOSPITAL | Age: 75
End: 2025-03-06
Payer: MEDICARE

## 2025-03-06 DIAGNOSIS — R13.10 DYSPHAGIA, UNSPECIFIED TYPE: ICD-10-CM

## 2025-03-06 DIAGNOSIS — Z85.09 HISTORY OF GASTROINTESTINAL STROMAL TUMOR (GIST): ICD-10-CM

## 2025-03-06 NOTE — TELEPHONE ENCOUNTER
Referral for procedure from PAT appointment      Spoke to patient to schedule procedure(s) Upper Endoscopy (EGD)       Physician to perform procedure(s) Dr. ZEKE Chavarria  Date of Procedure (s) 3/26/25  Arrival Time 12:30 PM  Time of Procedure(s) 1:30 PM   Location of Procedure(s) [place] 2nd Floor  Type of Rx Prep sent to patient: N/A  Instructions provided to patient via [facility]    Patient was informed on the following information and verbalized understanding. Screening questionnaire reviewed with patient and complete. If procedure requires anesthesia, a responsible adult needs to be present to accompany the patient home, patient cannot drive after receiving anesthesia. Appointment details are tentative, especially check-in time. Patient will receive a prep-op call 7 days prior to confirm check-in time for procedure. If applicable the patient should contact their pharmacy to verify Rx for procedure prep is ready for pick-up. Patient was advised to call the scheduling department at [phone] if pharmacy states no Rx is available. Patient was advised to call the endoscopy scheduling department if any questions or concerns arise.       Endoscopy Scheduling Department

## 2025-03-10 ENCOUNTER — OFFICE VISIT (OUTPATIENT)
Dept: OBSTETRICS AND GYNECOLOGY | Facility: CLINIC | Age: 75
End: 2025-03-10
Payer: MEDICARE

## 2025-03-10 VITALS
SYSTOLIC BLOOD PRESSURE: 150 MMHG | HEIGHT: 65 IN | WEIGHT: 178.56 LBS | BODY MASS INDEX: 29.75 KG/M2 | DIASTOLIC BLOOD PRESSURE: 83 MMHG

## 2025-03-10 DIAGNOSIS — Z86.69 HISTORY OF GUILLAIN-BARRE SYNDROME: ICD-10-CM

## 2025-03-10 DIAGNOSIS — K21.9 GERD WITHOUT ESOPHAGITIS: ICD-10-CM

## 2025-03-10 DIAGNOSIS — Z85.09 HISTORY OF GASTROINTESTINAL STROMAL TUMOR (GIST): ICD-10-CM

## 2025-03-10 DIAGNOSIS — H40.1132 PRIMARY OPEN ANGLE GLAUCOMA (POAG) OF BOTH EYES, MODERATE STAGE: ICD-10-CM

## 2025-03-10 DIAGNOSIS — R73.03 PREDIABETES: ICD-10-CM

## 2025-03-10 DIAGNOSIS — E66.3 OVERWEIGHT (BMI 25.0-29.9): ICD-10-CM

## 2025-03-10 DIAGNOSIS — Z01.419 ENCOUNTER FOR GYNECOLOGICAL EXAMINATION WITHOUT ABNORMAL FINDING: Primary | ICD-10-CM

## 2025-03-10 PROCEDURE — G0101 CA SCREEN;PELVIC/BREAST EXAM: HCPCS | Mod: S$PBB,GZ,, | Performed by: OBSTETRICS & GYNECOLOGY

## 2025-03-10 PROCEDURE — 99999 PR PBB SHADOW E&M-EST. PATIENT-LVL III: CPT | Mod: PBBFAC,,, | Performed by: OBSTETRICS & GYNECOLOGY

## 2025-03-10 PROCEDURE — 99213 OFFICE O/P EST LOW 20 MIN: CPT | Mod: PBBFAC,PN | Performed by: OBSTETRICS & GYNECOLOGY

## 2025-03-10 NOTE — PROGRESS NOTES
HISTORY OF PRESENT ILLNESS:    Nan Palafox is a 75 y.o. female, , No LMP recorded. Patient has had a hysterectomy.,  presents for a routine exam and has no complaints.  Patient's history reviewed and updated.  She is in good spirits today.    Past Medical History:   Diagnosis Date    Arthritis     Cataract     Cervical spondylosis with radiculopathy 2016    GBS (Guillain-Warner syndrome)     History of gastrointestinal stromal tumor (GIST) 2020    Hypertension     Malignant carcinoid tumor of bronchus and lung 2020    Nuclear sclerosis - Both Eyes 2013    PNA (pneumonia)     Primary open angle glaucoma (POAG) of both eyes, moderate stage 2019       Past Surgical History:   Procedure Laterality Date    BREAST BIOPSY Right     excisional bx    BREAST SURGERY Right     Excisional bx    HYSTERECTOMY      INJECTION OF ANESTHETIC AGENT AROUND MULTIPLE INTERCOSTAL NERVES Right 2021    Procedure: BLOCK, NERVE, INTERCOSTAL, 2 OR MORE;  Surgeon: Ru Hernandez MD;  Location: [facility] OR [address] [place] [address];  Service: Thoracic;  Laterality: Right;    RETINAL DETACHMENT SURGERY      patient states that she had a retinal tear that was repaired in the past at [facility] but she is uncertain of which eye    STOMACH SURGERY      SURGICAL REMOVAL OF LYMPH NODE N/A 2021    Procedure: EXCISION, LYMPH NODE;  Surgeon: Ru Hernandez MD;  Location: [facility] OR [address] [place] [address];  Service: Thoracic;  Laterality: N/A;    TOTAL KNEE ARTHROPLASTY Right     XI ROBOTIC RATS,WITH LOBECTOMY,LUNG Right 2021    Procedure: XI ROBOTIC RATS,WITH LOBECTOMY,LUNG;  Surgeon: Ru Hernandez MD;  Location: [facility] OR [address] [place] [address];  Service: Thoracic;  Laterality: Right;  Right middle lobectomy.       MEDICATIONS AND ALLERGIES:      Current Outpatient Medications:     acetaminophen (TYLENOL) 650 MG TbSR, Take 500 mg by mouth every 8 (eight) hours., Disp: , Rfl:     amLODIPine (NORVASC) 10 MG tablet, Take 10 mg by mouth.,  Disp: , Rfl:     ASCORBATE CALCIUM (VITAMIN C ORAL), Take 1 tablet by mouth once daily at 6am., Disp: , Rfl:     aspirin (ECOTRIN) 81 MG EC tablet, Take 81 mg by mouth once daily., Disp: , Rfl:     dorzolamide-timolol 2-0.5% (COSOPT) 22.3-6.8 mg/mL ophthalmic solution, Place 1 drop into both eyes 2 (two) times daily., Disp: 20 mL, Rfl: 4    fluticasone (FLONASE) 50 mcg/actuation nasal spray, 2 sprays by Each Nare route once daily., Disp: 1 Bottle, Rfl: 12    latanoprost 0.005 % ophthalmic solution, Place 1 drop into both eyes every evening., Disp: 7.5 mL, Rfl: 4    LORazepam (ATIVAN) 1 MG tablet, TAKE 1 TABLET(1 MG) BY MOUTH EVERY 8 HOURS AS NEEDED, Disp: 60 tablet, Rfl: 1    multivitamin (THERAGRAN) tablet, Take 1 tablet by mouth once daily., Disp: , Rfl:     pantoprazole (PROTONIX) 40 MG tablet, Take 1 tab po before breakfast for reflux control., Disp: 30 tablet, Rfl: 3    rosuvastatin (CRESTOR) 40 MG Tab, Take 40 mg by mouth., Disp: , Rfl:     valsartan-hydrochlorothiazide (DIOVAN HCT) 320-12.5 mg per tablet, Take 1 tablet by mouth once daily., Disp: 90 tablet, Rfl: 3    vitamin D (VITAMIN D3) 1000 units Tab, Take 1,000 Units by mouth once daily., Disp: , Rfl:     ZINC ORAL, Take by mouth 3 (three) times daily., Disp: , Rfl:     Review of patient's allergies indicates:   Allergen Reactions    Iodine Hives       Family History   Problem Relation Name Age of Onset    Glaucoma Mother      Cataracts Mother      Blindness Mother      Cancer Mother  68        colon, myeloma    No Known Problems Father      No Known Problems Sister      Glaucoma Brother      Cataracts Brother      Cancer Brother          esophageal    No Known Problems Maternal Aunt      No Known Problems Maternal Uncle      No Known Problems Paternal Aunt      No Known Problems Paternal Uncle      No Known Problems Maternal Grandmother      No Known Problems Maternal Grandfather      No Known Problems Paternal Grandmother      No Known Problems  Paternal Grandfather      Amblyopia Neg Hx      Macular degeneration Neg Hx      Retinal detachment Neg Hx      Strabismus Neg Hx      Diabetes Neg Hx      Hypertension Neg Hx      Stroke Neg Hx      Thyroid disease Neg Hx      Breast cancer Neg Hx      Ovarian cancer Neg Hx         Social History     Socioeconomic History    Marital status:    Tobacco Use    Smoking status: Never     Passive exposure: Never    Smokeless tobacco: Never   Substance and Sexual Activity    Alcohol use: Yes     Comment: wine 3 glasses a week / weekend 3 glasses of champagne    Drug use: Never    Sexual activity: Not Currently     Partners: Male     Social Drivers of Health     Financial Resource Strain: Patient Declined (2/25/2025)    Overall Financial Resource Strain (CARDIA)     Difficulty of Paying Living Expenses: Patient declined   Food Insecurity: Patient Declined (2/25/2025)    Hunger Vital Sign     Worried About Running Out of Food in the Last Year: Patient declined     Ran Out of Food in the Last Year: Patient declined   Transportation Needs: Patient Declined (2/25/2025)    PRAPARE - Transportation     Lack of Transportation (Medical): Patient declined     Lack of Transportation (Non-Medical): Patient declined   Physical Activity: Insufficiently Active (2/25/2025)    Exercise Vital Sign     Days of Exercise per Week: 1 day     Minutes of Exercise per Session: 20 min   Stress: Stress Concern Present (2/25/2025)    [employer] - Occupational Stress Questionnaire     Feeling of Stress : Rather much   Housing Stability: Patient Declined (2/25/2025)    Housing Stability Vital Sign     Unable to Pay for Housing in the Last Year: Patient declined     Homeless in the Last Year: Patient declined       COMPREHENSIVE GYN HISTORY:  PAP History: Denies abnormal Paps.  Infection History: Denies STDs. Denies PID.  Benign History: Denies uterine fibroids. Denies ovarian cysts. Denies endometriosis. Denies  "other conditions.  Cancer History: Denies cervical cancer. Denies uterine cancer or hyperplasia. Denies ovarian cancer. Denies vulvar cancer or pre-cancer. Denies vaginal cancer or pre-cancer. Denies breast cancer. Denies colon cancer.  Sexual Activity History: Reports currently being sexually active  Menstrual History: Monthly. Mod then light flow.   Dysmenorrhea History: Reports mild dysmenorrhea.       ROS:  GENERAL: No weight changes. No swelling. No fatigue. No fever.  CARDIOVASCULAR: No chest pain. No shortness of breath. No leg cramps.   NEUROLOGICAL: No headaches. No vision changes.  BREASTS: No pain. No lumps. No discharge.  ABDOMEN: No pain. No nausea. No vomiting. No diarrhea. No constipation.  REPRODUCTIVE: No abnormal bleeding.   VULVA: No pain. No lesions. No itching.  VAGINA: No relaxation. No itching. No odor. No discharge. No lesions.  URINARY: No incontinence. No nocturia. No frequency. No dysuria.    BP (!) 150/83 (BP Location: Right arm, Patient Position: Sitting)   Ht 5' 5" (1.651 m)   Wt 81 kg (178 lb 9.2 oz)   BMI 29.72 kg/m²     PE:  APPEARANCE: Well nourished, well developed, in no acute distress.  AFFECT: WNL, alert and oriented x 3.  SKIN: No acne or hirsutism.  NECK: Neck symmetric, without masses or thyromegaly.  NODES: No inguinal, cervical, axillary or femoral lymph node enlargement.  CHEST: Good respiratory effort.   ABDOMEN: Soft. No tenderness or masses. No hepatosplenomegaly. No hernias.  BREASTS: Symmetrical, no skin changes, visible lesions, palpable masses or nipple discharge bilaterally.  PELVIC: External female genitalia without lesions.  Female hair distribution. Adequate perineal body, Normal urethral meatus. Vagina moist and well rugated without lesions or discharge.  No significant cystocele or rectocele present. Cervix & uterus absent.  Adnexa without masses or tenderness.  EXTREMITIES: No edema    DIAGNOSIS:  1. Encounter for gynecological examination without abnormal " finding    2. History of Guillain-Monett syndrome    3. Primary open angle glaucoma (POAG) of both eyes, moderate stage    4. History of gastrointestinal stromal tumor (GIST)    5. Overweight (BMI 25.0-29.9)    6. Prediabetes    7. GERD without esophagitis      PLAN:    COUNSELING:  The patient was counseled today on:  -A.C.S. Pap and pelvic exam guidelines (pap every 3 years), recomendations for yearly mammogram;  -to follow up with her PCP for other health maintenance.    FOLLOW-UP with me annually.

## 2025-03-11 NOTE — PROGRESS NOTES
[facility]  Physical Medicine [facility] Rehabilitation [facility]  [address]  [place] [address]         Full Name: Nan Palafox Gender: Female  Patient ID: [id number] YOB: 1950      Visit Date: 3/12/2025 2:41 PM  Age: 75 Years  Examining Physician: Karina Toro MD  Referring Physician: Yazmin Rincon  Height: 5 feet 5 inch  Weight: 178 lbs  Patient History: 75-year-old right-hand dominant female with past medical history malignant carcinoid tumor, prediabetes, thyroid nodule, GuillainBarre 3 years ago who presents with paresthesias in the hands and feet, some slight weakness in the right arm.  Prior EMG in 2016 with mild right and left CTS and mild right chronic radiculopathy C5-6, then EMG done in 2023 should bilateral sensory median mononeuropathy, suggestion chronic right C8-T1 radiculopathy.  She has a history of some low back pain, does not have any significant neck pain or pain radiating down the arms at this time.  Exam: 5/5 strength bilateral shoulder abduction, elbow flexion, elbow extension, wrist extension, finger flexion, finger abduction, ankle dorsiflexion, ankle plantar flexion, knee extension      Sensory NCS      Nerve / Sites Rec. Site Onset Lat Peak Lat NP Amp PP Amp Segments Distance Velocity Comment     ms ms µV µV  mm m/s    R Median - Dig II (Antidromic)      Wrist Index 2.66 3.28 19.1 30.1 Wrist - Index 140 53    R Ulnar - Dig V (Antidromic)      Wrist Dig V 2.45 3.18 18.2 29.9 Wrist - Dig V 140 57    R Sural - (Antidromic)      Calf Ankle 2.55 3.28 5.6 7.2 Calf - Ankle 140 55    L Sural - (Antidromic)      Calf Ankle 2.50 3.39 11.5 5.3 Calf - Ankle 140 56    L Median - Dig II (Antidromic)      Wrist Index 3.02 3.85 29.3 53.9 Wrist - Index 140 46    L Ulnar - Dig V (Antidromic)      Wrist Dig V 3.02 3.91 26.7 24.7 Wrist - Dig V 140 46        Motor NCS      Nerve / Sites Muscle Latency Amplitude Segments Dist. Lat Diff Velocity  Comments     ms mV  mm ms m/s    R Median - APB      Wrist APB 3.42 8.0 Wrist - APB 80         Elbow APB 7.65 6.5 Elbow - Wrist 250 4.23 59.1    R Ulnar - ADM      Wrist ADM 2.48 8.2 Wrist - ADM 80      R Peroneal - EDB      Ankle EDB 4.15 1.1 Ankle - EDB 80         B. Fib Head EDB 11.40 0.5 B. Fib Head - Ankle 330 7.25 45.5    R Tibial - AH      Ankle AH 4.13 5.1 Ankle - AH 80      L Tibial - AH      Ankle AH 5.13 7.2 Ankle - AH 80      L Peroneal - EDB      Ankle EDB 3.98 1.3 Ankle - EDB 80         B. Fib Head EDB 10.54 1.0 B. Fib Head - Ankle 313 6.56 47.7    L Median - APB      Wrist APB 3.96 5.5 Wrist - APB 80         Elbow APB 8.54 5.4 Elbow - Wrist 245 4.58 53.5    L Ulnar - ADM      Wrist ADM 3.44 9.0 Wrist - ADM 80          EMG Summary Table     Spontaneous MUAP Recruitment   Muscle IA Fib PSW Fasc H.F. Amp Dur. PPP Pattern   R. Deltoid N difficult relaxing but likely normal None None 2+ N N N N   R. Biceps brachii N None None None None N N N N   R. Triceps brachii N None None None None N N N N   R. Pronator teres N None None None None N N N N   R. First dorsal interosseous N None None None None 2+ 1+ N Reduced   R. Abductor pollicis brevis N None None None None 1+ 1+ N N   R. Tibialis anterior N None None None None N N N N   R. Vastus medialis N None None None None N N N N   L. Vastus medialis N None None None None N N N N   L. Tibialis anterior N None None None None N 1+ 2+ Reduced       Summary    The sensory conduction test was normal in all 6 of the tested nerves: R Median - Dig II (Antidromic), R Ulnar - Dig V (Antidromic), R Sural - (Antidromic), L Sural - (Antidromic), L Median - Dig II (Antidromic), L Ulnar - Dig V (Antidromic).    Motor: The bilateral median, ulnar, peroneal, and tibial motor responses were normal.     The needle EMG examination was performed in 10 muscles. It was normal in 6 muscle(s): R. Biceps brachii, R. Triceps brachii, R. Pronator teres, R. Tibialis anterior, R. Vastus  medialis, L. Vastus medialis. The study was abnormal in 4 muscle(s), with the following distribution:  Abnormal spontaneous/insertional activity was found in R. Deltoid.  The MUP waveform abnormality was found in R. First dorsal interosseous, R. Abductor pollicis brevis, L. Tibialis anterior.  Abnormal interference pattern was found in R. First dorsal interosseous, L. Tibialis anterior.  Further needle study was deferred due to patient tolerance      Conclusion:  Abnormal, limited study    There is no electrodiagnostic evidence of CIDP as queried or other peripheral polyneuropathy  There is electrodiagnostic evidence suggestive of a chronic right C8/T1 radiculopathy as seen on prior EMG  There is electrodiagnostic evidence suggestive of a possible chronic left L4/L5 radiculopathy, but limited evaluation due to patient tolerance with a needle study.  There is no electrodiagnostic evidence of a median mononeuropathy at the wrist (carpal tunnel syndrome) on either side that was seen on prior EMG  There is no electrodiagnostic evidence of an ulnar, tibial, peroneal, or sural mononeuropathy on either side  The left upper extremity needle study was deferred due to patient tolerance and limited evaluation in the bilateral lower extremities      The findings were explained to the patient.  Recommend following up with the referring physician.  Consider updated MRI lumbar and cervical spine to further evaluate for possible radiculopathies    ________________________  Karina Toro MD

## 2025-03-12 ENCOUNTER — OFFICE VISIT (OUTPATIENT)
Dept: PHYSICAL MEDICINE AND REHAB | Facility: CLINIC | Age: 75
End: 2025-03-12
Payer: MEDICARE

## 2025-03-12 ENCOUNTER — TELEPHONE (OUTPATIENT)
Dept: OPTOMETRY | Facility: CLINIC | Age: 75
End: 2025-03-12
Payer: MEDICARE

## 2025-03-12 ENCOUNTER — PATIENT MESSAGE (OUTPATIENT)
Dept: NEUROLOGY | Facility: CLINIC | Age: 75
End: 2025-03-12
Payer: MEDICARE

## 2025-03-12 VITALS — WEIGHT: 178.56 LBS | HEIGHT: 65 IN | BODY MASS INDEX: 29.75 KG/M2

## 2025-03-12 DIAGNOSIS — R29.898 WEAKNESS OF BOTH LOWER EXTREMITIES: ICD-10-CM

## 2025-03-12 DIAGNOSIS — Z86.69 HISTORY OF GUILLAIN-BARRE SYNDROME: ICD-10-CM

## 2025-03-12 DIAGNOSIS — R26.89 IMBALANCE: ICD-10-CM

## 2025-03-12 DIAGNOSIS — R20.2 PARESTHESIA OF BOTH LOWER EXTREMITIES: ICD-10-CM

## 2025-03-12 DIAGNOSIS — M54.16 LUMBAR RADICULOPATHY, CHRONIC: Primary | ICD-10-CM

## 2025-03-12 PROCEDURE — 95913 NRV CNDJ TEST 13/> STUDIES: CPT | Mod: PBBFAC | Performed by: STUDENT IN AN ORGANIZED HEALTH CARE EDUCATION/TRAINING PROGRAM

## 2025-03-12 PROCEDURE — 99999 PR PBB SHADOW E&M-EST. PATIENT-LVL III: CPT | Mod: PBBFAC,,, | Performed by: STUDENT IN AN ORGANIZED HEALTH CARE EDUCATION/TRAINING PROGRAM

## 2025-03-12 PROCEDURE — 99213 OFFICE O/P EST LOW 20 MIN: CPT | Mod: PBBFAC | Performed by: STUDENT IN AN ORGANIZED HEALTH CARE EDUCATION/TRAINING PROGRAM

## 2025-03-12 PROCEDURE — 95886 MUSC TEST DONE W/N TEST COMP: CPT | Mod: PBBFAC | Performed by: STUDENT IN AN ORGANIZED HEALTH CARE EDUCATION/TRAINING PROGRAM

## 2025-03-12 PROCEDURE — 95885 MUSC TST DONE W/NERV TST LIM: CPT | Mod: PBBFAC | Performed by: STUDENT IN AN ORGANIZED HEALTH CARE EDUCATION/TRAINING PROGRAM

## 2025-03-12 NOTE — TELEPHONE ENCOUNTER
----- Message from Med Assistant Walton sent at 3/12/2025  9:03 AM CDT -----  Needs her glasses her rechecked-unhappy with rx-thinks it may be wrong  ----- Message -----  From: Salina Garcia  Sent: 3/12/2025   8:43 AM CDT  To: [facility] DEN[facility] Staff    Type:  Sooner Apoointment RequestCaller is requesting a sooner appointment.  Name of Caller:Sahra is the first available appointment?soon Symptoms:Once receiving her glasses the patient is having problems close up with her glasses Would the patient rather a call back or a response via [facility]? callBest Call Back Number: [phone] Information:

## 2025-03-18 NOTE — TELEPHONE ENCOUNTER
It makes sense to complete imaging of her lumbar spine due to her imbalance. I already ordered it, could you please assist in scheduling? Thanks    Action 1: Continue

## 2025-03-24 ENCOUNTER — TELEPHONE (OUTPATIENT)
Dept: ENDOSCOPY | Facility: HOSPITAL | Age: 75
End: 2025-03-24
Payer: MEDICARE

## 2025-03-24 NOTE — TELEPHONE ENCOUNTER
Left message instructing patient to call dept @ [phone] between 8am-4pm.    Arrival time to be given 1230 *  (Message sent via [facility] portal)

## 2025-03-25 ENCOUNTER — TELEPHONE (OUTPATIENT)
Dept: ENDOSCOPY | Facility: HOSPITAL | Age: 75
End: 2025-03-25
Payer: MEDICARE

## 2025-03-25 NOTE — TELEPHONE ENCOUNTER
Spoke with patient and confirmed appt for EGD exam on 3/26/26 and  arrival time @ 1230. Went over instructions, verbalized understanding.

## 2025-03-26 ENCOUNTER — HOSPITAL ENCOUNTER (OUTPATIENT)
Facility: HOSPITAL | Age: 75
Discharge: HOME | End: 2025-03-26
Attending: INTERNAL MEDICINE | Admitting: INTERNAL MEDICINE
Payer: MEDICARE

## 2025-03-26 ENCOUNTER — ANESTHESIA EVENT (OUTPATIENT)
Dept: ENDOSCOPY | Facility: HOSPITAL | Age: 75
End: 2025-03-26
Payer: MEDICARE

## 2025-03-26 ENCOUNTER — ANESTHESIA (OUTPATIENT)
Dept: ENDOSCOPY | Facility: HOSPITAL | Age: 75
End: 2025-03-26
Payer: MEDICARE

## 2025-03-26 VITALS
WEIGHT: 173 LBS | HEIGHT: 65 IN | OXYGEN SATURATION: 97 % | BODY MASS INDEX: 28.82 KG/M2 | DIASTOLIC BLOOD PRESSURE: 79 MMHG | RESPIRATION RATE: 12 BRPM | HEART RATE: 80 BPM | TEMPERATURE: 98 F | SYSTOLIC BLOOD PRESSURE: 162 MMHG

## 2025-03-26 DIAGNOSIS — Z85.09 HISTORY OF GASTROINTESTINAL STROMAL TUMOR (GIST): ICD-10-CM

## 2025-03-26 DIAGNOSIS — R13.10 DYSPHAGIA: ICD-10-CM

## 2025-03-26 DIAGNOSIS — R13.10 DYSPHAGIA, UNSPECIFIED TYPE: ICD-10-CM

## 2025-03-26 PROCEDURE — 88312 SPECIAL STAINS GROUP 1: CPT | Mod: 26,,, | Performed by: STUDENT IN AN ORGANIZED HEALTH CARE EDUCATION/TRAINING PROGRAM

## 2025-03-26 PROCEDURE — 37000008 HC ANESTHESIA 1ST 15 MINUTES: Performed by: INTERNAL MEDICINE

## 2025-03-26 PROCEDURE — 63600175 PHARM REV CODE 636 W HCPCS: Performed by: STUDENT IN AN ORGANIZED HEALTH CARE EDUCATION/TRAINING PROGRAM

## 2025-03-26 PROCEDURE — 37000009 HC ANESTHESIA EA ADD 15 MINS: Performed by: INTERNAL MEDICINE

## 2025-03-26 PROCEDURE — 25000003 PHARM REV CODE 250: Performed by: STUDENT IN AN ORGANIZED HEALTH CARE EDUCATION/TRAINING PROGRAM

## 2025-03-26 PROCEDURE — 27201012 HC FORCEPS, HOT/COLD, DISP: Performed by: INTERNAL MEDICINE

## 2025-03-26 PROCEDURE — 43239 EGD BIOPSY SINGLE/MULTIPLE: CPT | Mod: ,,, | Performed by: INTERNAL MEDICINE

## 2025-03-26 PROCEDURE — 88342 IMHCHEM/IMCYTCHM 1ST ANTB: CPT | Mod: 26,,, | Performed by: STUDENT IN AN ORGANIZED HEALTH CARE EDUCATION/TRAINING PROGRAM

## 2025-03-26 PROCEDURE — 43239 EGD BIOPSY SINGLE/MULTIPLE: CPT | Performed by: INTERNAL MEDICINE

## 2025-03-26 PROCEDURE — 88305 TISSUE EXAM BY PATHOLOGIST: CPT | Mod: 26,,, | Performed by: STUDENT IN AN ORGANIZED HEALTH CARE EDUCATION/TRAINING PROGRAM

## 2025-03-26 PROCEDURE — 88305 TISSUE EXAM BY PATHOLOGIST: CPT | Mod: TC | Performed by: INTERNAL MEDICINE

## 2025-03-26 RX ORDER — TOPICAL ANESTHETIC 200 MG/ML
SPRAY DENTAL; PERIODONTAL
Status: DISCONTINUED | OUTPATIENT
Start: 2025-03-26 | End: 2025-03-26

## 2025-03-26 RX ORDER — SODIUM CHLORIDE 9 MG/ML
INJECTION, SOLUTION INTRAVENOUS CONTINUOUS
Status: DISCONTINUED | OUTPATIENT
Start: 2025-03-26 | End: 2025-03-26 | Stop reason: HOSPADM

## 2025-03-26 RX ORDER — PROPOFOL 10 MG/ML
VIAL (ML) INTRAVENOUS
Status: DISCONTINUED | OUTPATIENT
Start: 2025-03-26 | End: 2025-03-26

## 2025-03-26 RX ORDER — LIDOCAINE HYDROCHLORIDE 20 MG/ML
INJECTION INTRAVENOUS
Status: DISCONTINUED | OUTPATIENT
Start: 2025-03-26 | End: 2025-03-26

## 2025-03-26 RX ORDER — PROPOFOL 10 MG/ML
VIAL (ML) INTRAVENOUS CONTINUOUS PRN
Status: DISCONTINUED | OUTPATIENT
Start: 2025-03-26 | End: 2025-03-26

## 2025-03-26 RX ADMIN — PROPOFOL 60 MG: 10 INJECTION, EMULSION INTRAVENOUS at 02:03

## 2025-03-26 RX ADMIN — SODIUM CHLORIDE: 0.9 INJECTION, SOLUTION INTRAVENOUS at 01:03

## 2025-03-26 RX ADMIN — LIDOCAINE HYDROCHLORIDE 100 MG: 20 INJECTION, SOLUTION INTRAVENOUS at 02:03

## 2025-03-26 RX ADMIN — PROPOFOL 150 MCG/KG/MIN: 10 INJECTION, EMULSION INTRAVENOUS at 02:03

## 2025-03-26 RX ADMIN — TOPICAL ANESTHETIC 1 EACH: 200 SPRAY DENTAL; PERIODONTAL at 02:03

## 2025-03-26 RX ADMIN — GLYCOPYRROLATE 0.2 MG: 0.2 INJECTION, SOLUTION INTRAMUSCULAR; INTRAVITREAL at 02:03

## 2025-03-26 NOTE — TRANSFER OF CARE
"Anesthesia Transfer of Care Note    Patient: Nan Palafox    Procedure(s) Performed: Procedure(s) (LRB):  EGD (ESOPHAGOGASTRODUODENOSCOPY) (N/A)    Patient location: GI    Anesthesia Type: MAC    Transport from OR: Transported from OR on room air with adequate spontaneous ventilation    Post pain: adequate analgesia    Post assessment: no apparent anesthetic complications    Post vital signs: stable    Level of consciousness: responds to stimulation    Nausea/Vomiting: no nausea/vomiting    Complications: none    Transfer of care protocol was followed      Last vitals: Visit Vitals  /78 (BP Location: Left arm, Patient Position: Lying)   Pulse 69   Temp 36.8 °C (98.2 °F) (Skin)   Resp 18   Ht 5' 5" (1.651 m)   Wt 78.5 kg (173 lb)   SpO2 100%   Breastfeeding No   BMI 28.79 kg/m²     "

## 2025-03-26 NOTE — PROVATION PATIENT INSTRUCTIONS
Discharge Summary/Instructions after an Endoscopic Procedure  Patient Name: Nan Palafox  Patient MRN: [id number]  Patient YOB: 1950 Wednesday, March 26, 2025  Masood Chavarria MD  Dear patient,  As a result of recent federal legislation (The Federal Cures Act), you may   receive lab or pathology results from your procedure in your [facility]   account before your physician is able to contact you. Your physician or   their representative will relay the results to you with their   recommendations at their soonest availability.  Thank you,  Your health is very important to us during the Covid Crisis. Following your   procedure today, you will receive a daily text for 2 weeks asking about   signs or symptoms of Covid 19.  Please respond to this text when you   receive it so we can follow up and keep you as safe as possible.   RESTRICTIONS:  During your procedure today, you received medications for sedation.  These   medications may affect your judgment, balance and coordination.  Therefore,   for 24 hours, you have the following restrictions:   - DO NOT drive a car, operate machinery, make legal/financial decisions,   sign important papers or drink alcohol.    ACTIVITY:  Today: no heavy lifting, straining or running due to procedural   sedation/anesthesia.  The following day: return to full activity including work.  DIET:  Eat and drink normally unless instructed otherwise.     TREATMENT FOR COMMON SIDE EFFECTS:  - Mild abdominal pain, nausea, belching, bloating or excessive gas:  rest,   eat lightly and use a heating pad.  - Sore Throat: treat with throat lozenges and/or gargle with warm salt   water.  - Because air was used during the procedure, expelling large amounts of air   from your rectum or belching is normal.  - If a bowel prep was taken, you may not have a bowel movement for 1-3 days.    This is normal.  SYMPTOMS TO WATCH FOR AND REPORT TO YOUR PHYSICIAN:  1. Abdominal pain or bloating,  other than gas cramps.  2. Chest pain.  3. Back pain.  4. Signs of infection such as: chills or fever occurring within 24 hours   after the procedure.  5. Rectal bleeding, which would show as bright red, maroon, or black stools.   (A tablespoon of blood from the rectum is not serious, especially if   hemorrhoids are present.)  6. Vomiting.  7. Weakness or dizziness.  GO DIRECTLY TO THE NEAREST EMERGENCY ROOM IF YOU HAVE ANY OF THE FOLLOWING:      Difficulty breathing              Chills and/or fever over 101 F   Persistent vomiting and/or vomiting blood   Severe abdominal pain   Severe chest pain   Black, tarry stools   Bleeding- more than one tablespoon   Any other symptom or condition that you feel may need urgent attention  Your doctor recommends these additional instructions:  If any biopsies were taken, your doctors clinic will contact you in 1 to 2   weeks with any results.  - Discharge patient to home.   - Resume previous diet.   - Continue present medications.   - Await pathology results.   - Return to GI office PRN.  For questions, problems or results please call your physician - Masood Chavarria MD.  EMERGENCY PHONE NUMBER: [phone],  LAB RESULTS: [phone]  IF A COMPLICATION OR EMERGENCY SITUATION ARISES AND YOU ARE UNABLE TO REACH   YOUR PHYSICIAN - GO DIRECTLY TO THE EMERGENCY ROOM.  Masood Chavarria MD  3/26/2025 2:16:29 PM  This report has been verified and signed electronically.  Dear patient,  As a result of recent federal legislation (The Federal Cures Act), you may   receive lab or pathology results from your procedure in your [facility]   account before your physician is able to contact you. Your physician or   their representative will relay the results to you with their   recommendations at their soonest availability.  Thank you,  PROVATION

## 2025-03-26 NOTE — H&P
Short Stay Endoscopy History and Physical    PCP - Glenroy Jimenez MD    Procedure - EGD  ASA - II  Mallampati - per anesthesia  History of Anesthesia problems - no  Family history Anesthesia problems - no     HPI:  This is a 75 y.o. female here for evaluation of : Dysphagia      ROS:  Constitutional: No fevers, chills, No weight loss  ENT: No allergies  CV: No chest pain  Pulm: No shortness of breath  GI: see HPI  Derm: No rash    Medical History:  has a past medical history of Arthritis, Cataract, Cervical spondylosis with radiculopathy (04/11/2016), GBS (Guillain-Reno syndrome), History of gastrointestinal stromal tumor (GIST) (12/02/2020), Hypertension, Malignant carcinoid tumor of bronchus and lung (12/02/2020), Nuclear sclerosis - Both Eyes (02/08/2013), PNA (pneumonia), and Primary open angle glaucoma (POAG) of both eyes, moderate stage (03/11/2019).    Surgical History:  has a past surgical history that includes Stomach surgery; Retinal detachment surgery; Total knee arthroplasty (Right); Breast surgery (Right); Breast biopsy (Right); xi robotic rats,with lobectomy,lung (Right, 01/04/2021); Surgical removal of lymph node (N/A, 01/04/2021); Injection of anesthetic agent around multiple intercostal nerves (Right, 01/04/2021); and Hysterectomy.    Family History: family history includes Blindness in her mother; Cancer in her brother; Cancer (age of onset: 68) in her mother; Cataracts in her brother and mother; Glaucoma in her brother and mother; No Known Problems in her father, maternal aunt, maternal grandfather, maternal grandmother, maternal uncle, paternal aunt, paternal grandfather, paternal grandmother, paternal uncle, and sister.. Otherwise no colon cancer, inflammatory bowel disease, or GI malignancies.    Social History:  reports that she has never smoked. She has never been exposed to tobacco smoke. She has never used smokeless tobacco. She reports current alcohol use. She reports that she does not  use drugs.    Review of patient's allergies indicates:   Allergen Reactions    Iodine Hives       Medications:   Prescriptions Prior to Admission[1]      Objective Findings:    Vital Signs: see nursing notes  Physical Exam:  General Appearance: Well appearing in no acute distress  Eyes:    No scleral icterus  ENT: Neck supple  Lungs: CTA anteriorly  Heart:  S1, S2 normal, no murmurs heard  Abdomen: Soft, non tender, non distended with positive bowel sounds. No hepatosplenomegaly, ascites, or mass  Extremities: no edema  Skin: No rash      Labs:  Lab Results   Component Value Date    WBC 4.85 02/27/2025    HGB 12.5 02/27/2025    HCT 36.9 (L) 02/27/2025     02/27/2025    CHOL 174 09/03/2024    TRIG 47 09/03/2024    HDL 69 09/03/2024    ALT 13 02/27/2025    AST 32 02/27/2025     02/27/2025    K 4.6 02/27/2025     02/27/2025    CREATININE 0.8 02/27/2025    BUN 12 02/27/2025    CO2 24 02/27/2025    TSH 0.517 07/19/2024    INR 1.1 11/23/2020    HGBA1C 5.7 (H) 09/03/2024       I have explained the risks and benefits of endoscopy procedures to the patient including but not limited to bleeding, perforation, infection, and death.    Masood Chavarria MD         [1]   Medications Prior to Admission   Medication Sig Dispense Refill Last Dose/Taking    acetaminophen (TYLENOL) 650 MG TbSR Take 500 mg by mouth every 8 (eight) hours.   Past Month    amLODIPine (NORVASC) 10 MG tablet Take 10 mg by mouth.   3/26/2025 Morning    ASCORBATE CALCIUM (VITAMIN C ORAL) Take 1 tablet by mouth once daily at 6am.   3/26/2025 Morning    aspirin (ECOTRIN) 81 MG EC tablet Take 81 mg by mouth once daily.   3/25/2025    dorzolamide-timolol 2-0.5% (COSOPT) 22.3-6.8 mg/mL ophthalmic solution Place 1 drop into both eyes 2 (two) times daily. 20 mL 4 3/26/2025 Morning    fluticasone (FLONASE) 50 mcg/actuation nasal spray 2 sprays by Each Nare route once daily. 1 Bottle 12 Past Week    latanoprost 0.005 % ophthalmic solution  Place 1 drop into both eyes every evening. 7.5 mL 4 3/26/2025 Morning    LORazepam (ATIVAN) 1 MG tablet TAKE 1 TABLET(1 MG) BY MOUTH EVERY 8 HOURS AS NEEDED 60 tablet 1 3/25/2025 Bedtime    multivitamin (THERAGRAN) tablet Take 1 tablet by mouth once daily.   3/25/2025    pantoprazole (PROTONIX) 40 MG tablet Take 1 tab po before breakfast for reflux control. 30 tablet 3 3/25/2025    rosuvastatin (CRESTOR) 40 MG Tab Take 40 mg by mouth.   3/26/2025 Morning    valsartan-hydrochlorothiazide (DIOVAN HCT) 320-12.5 mg per tablet Take 1 tablet by mouth once daily. 90 tablet 3 3/26/2025 Morning    vitamin D (VITAMIN D3) 1000 units Tab Take 1,000 Units by mouth once daily.   Past Week    ZINC ORAL Take by mouth 3 (three) times daily.   Past Month

## 2025-03-26 NOTE — ANESTHESIA POSTPROCEDURE EVALUATION
Anesthesia Post Evaluation    Patient: Nan Palafox    Procedure(s) Performed: Procedure(s) (LRB):  EGD (ESOPHAGOGASTRODUODENOSCOPY) (N/A)    Final Anesthesia Type: general      Patient location during evaluation: PACU  Patient participation: Yes- Able to Participate  Level of consciousness: awake  Post-procedure vital signs: reviewed and stable  Pain management: adequate  Airway patency: patent    PONV status at discharge: No PONV  Anesthetic complications: no      Cardiovascular status: hypertensive and stable  Respiratory status: unassisted  Hydration status: euvolemic  Follow-up not needed.              Vitals Value Taken Time   /79 03/26/25 14:50   Temp 36.8 °C (98.2 °F) 03/26/25 14:20   Pulse 80 03/26/25 14:50   Resp 12 03/26/25 14:50   SpO2 97 % 03/26/25 14:50         No case tracking events are documented in the log.      Pain/Yessy Score: Yessy Score: 10 (3/26/2025  2:50 PM)

## 2025-03-26 NOTE — ANESTHESIA PREPROCEDURE EVALUATION
[facility]  Anesthesia Pre-Operative Evaluation         Patient Name: Nan Palafox  YOB: 1950  MRN: [id number]    SUBJECTIVE:     03/26/2025    Procedure(s) (LRB):  EGD (ESOPHAGOGASTRODUODENOSCOPY) (N/A)    Nan Palafox is a 75 y.o. female here for Procedure(s) (LRB):  EGD (ESOPHAGOGASTRODUODENOSCOPY) (N/A)    Drips:     Problem List[1]    Review of patient's allergies indicates:   Allergen Reactions    Iodine Hives       Medications Ordered Prior to Encounter[2]    Past Surgical History:   Procedure Laterality Date    BREAST BIOPSY Right     excisional bx    BREAST SURGERY Right     Excisional bx    HYSTERECTOMY      INJECTION OF ANESTHETIC AGENT AROUND MULTIPLE INTERCOSTAL NERVES Right 01/04/2021    Procedure: BLOCK, NERVE, INTERCOSTAL, 2 OR MORE;  Surgeon: Ru Hernandez MD;  Location: [facility] OR [address] [place] [address];  Service: Thoracic;  Laterality: Right;    RETINAL DETACHMENT SURGERY      patient states that she had a retinal tear that was repaired in the past at [facility] but she is uncertain of which eye    STOMACH SURGERY      SURGICAL REMOVAL OF LYMPH NODE N/A 01/04/2021    Procedure: EXCISION, LYMPH NODE;  Surgeon: Ru Hernandez MD;  Location: [facility] OR [address] [place] [address];  Service: Thoracic;  Laterality: N/A;    TOTAL KNEE ARTHROPLASTY Right     XI ROBOTIC RATS,WITH LOBECTOMY,LUNG Right 01/04/2021    Procedure: XI ROBOTIC RATS,WITH LOBECTOMY,LUNG;  Surgeon: Ru Hernandez MD;  Location: [facility] OR [address] [place] [address];  Service: Thoracic;  Laterality: Right;  Right middle lobectomy.       Social History[3]      OBJECTIVE:     Vital Signs Range (Last 24H):  Temp:  [36.8 °C (98.2 °F)] 36.8 °C (98.2 °F)  Pulse:  [69] 69  Resp:  [18] 18  SpO2:  [100 %] 100 %  BP: (128)/(78) 128/78    Significant Labs:  Lab Results   Component Value Date    WBC 4.85 02/27/2025    HGB 12.5 02/27/2025    HCT 36.9 (L) 02/27/2025     02/27/2025    CHOL 174 09/03/2024    TRIG 47 09/03/2024    HDL 69 09/03/2024    ALT 13  02/27/2025    AST 32 02/27/2025     02/27/2025    K 4.6 02/27/2025     02/27/2025    CREATININE 0.8 02/27/2025    BUN 12 02/27/2025    CO2 24 02/27/2025    TSH 0.517 07/19/2024    INR 1.1 11/23/2020    HGBA1C 5.7 (H) 09/03/2024       Diagnostic Studies:    EKG:   Results for orders placed or performed during the hospital encounter of 02/27/25   EKG 12-lead    Collection Time: 02/27/25  1:58 PM   Result Value Ref Range    QRS Duration 66 ms    [facility] QTC Calculation 406 ms    Narrative    Test Reason : R42,    Vent. Rate :  75 BPM     Atrial Rate :  75 BPM     P-R Int : 238 ms          QRS Dur :  66 ms      QT Int : 364 ms       P-R-T Axes :  57  35  51 degrees    QTcB Int : 406 ms    Sinus rhythm with 1st degree A-V block with blocked PACs  Otherwise normal ECG    Confirmed by HELEN Hebert ([id number]) on 2/27/2025 3:11:35 PM    Referred By: AAAREFERRAL SELF           Confirmed By: HELEN Hebert       2D ECHO:  TTE:  No results found for this or any previous visit.  Results for orders placed or performed during the hospital encounter of 01/30/24   Echo   Result Value Ref Range    RA Width 3.45 cm    LA Vol (MOD) 42.06 cm3    Left Atrium Major Axis 5.75 cm    Left Atrium Minor Axis 5.36 cm    RA Major Axis 3.77 cm    LV Diastolic Volume 68.26 mL    LV Systolic Volume 25.95 mL    MV Peak A Oleg 0.91 m/s    MV stenosis pressure 1/2 time 75.71 ms    TR Max Oleg 3.42 m/s    MV Peak E Oleg 0.68 m/s    Ao VTI 23.22 cm    Ao peak oleg 1.04 m/s    LVOT peak VTI 17.96 cm    LVOT peak oleg 0.68 m/s    LVOT diameter 2.11 cm    IVRT 122.74 msec    E wave deceleration time 261.07 msec    AV mean gradient 3 mmHg    TAPSE 1.89 cm    RVDD 3.90 cm    LA size 2.88 cm    Ascending aorta 3.39 cm    STJ 2.77 cm    Sinus 3.26 cm    LVIDs 2.66 2.1 - 4.0 cm    PW 0.75 0.6 - 1.1 cm    IVS 0.94 0.6 - 1.1 cm    LVIDd 3.96 3.5 - 6.0 cm    TDI LATERAL 0.07 m/s    LA WIDTH 3.29 cm    TDI SEPTAL 0.07 m/s    LV LATERAL E/E' RATIO 9.71 m/s    LV  SEPTAL E/E' RATIO 9.71 m/s    FS 33 28 - 44 %    LA Vol 44.68 cm3    LV mass 98.99 g    Left Ventricle Relative Wall Thickness 0.38 cm    AV valve area 2.70 cm²    AV Velocity Ratio 0.65     AV index (prosthetic) 0.77     MV valve area p 1/2 method 2.91 cm2    E/A ratio 0.75     Mean e' 0.07 m/s    LVOT area 3.5 cm2    LVOT stroke volume 62.77 cm3    AV peak gradient 4 mmHg    E/E' ratio 9.71 m/s    Triscuspid Valve Regurgitation Peak Gradient 47 mmHg    ADELINE by Velocity Ratio 2.29 cm²    BSA 1.96 m2    LV Systolic Volume Index 13.6 mL/m2    LV Diastolic Volume Index 35.74 mL/m2    LV Mass Index 52 g/m2    YOAN 23.4 mL/m2    YOAN (MOD) 22.0 mL/m2    ZLVIDS -1.73     ZLVIDD -3.05     TV resting pulmonary artery pressure 55 mmHg    RV TB RVSP 11 mmHg    Est. RA pres 8 mmHg    Narrative      Left Ventricle: The left ventricle is normal in size. Normal wall   thickness. Normal wall motion. There is normal systolic function with a   visually estimated ejection fraction of 55 - 60%. Grade I diastolic   dysfunction.    Right Ventricle: Normal right ventricular cavity size. Wall thickness   is normal. Right ventricle wall motion  is normal. Systolic function is   normal.    Pulmonic Valve: There is mild regurgitation.    Pulmonary Artery: The estimated pulmonary artery systolic pressure is   55 mmHg.  Doppler signals were faint, though.    IVC/SVC: Intermediate venous pressure at 8 mmHg.             Pre-op Assessment    I have reviewed the Patient Summary Reports.     I have reviewed the Nursing Notes. I have reviewed the NPO Status.   I have reviewed the Medications.     Review of Systems  Anesthesia Hx:  No problems with previous Anesthesia   History of prior surgery of interest to airway management or planning:            Denies Personal Hx of Anesthesia complications.                    Social:  Non-Smoker, No Alcohol Use       Hematology/Oncology:                        --  Cancer in past history:                  Oncology Comments: Right lung cancer   Carcinoid of stomach s/p resection      Cardiovascular:     Hypertension           hyperlipidemia         Coronary Artery Disease:                            Hypertension         Pulmonary:     Denies Asthma.   Denies Shortness of breath.   Denies Sleep Apnea.            Pulmonary Infection:  Pneumonia.     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 Denies Hypothyroidism.  Denies Hyperthyroidism.             Physical Exam  General: Well nourished, Cooperative, Alert, Oriented and Anxious    Airway:  Mallampati: III / II  Mouth Opening: Normal  TM Distance: Normal  Tongue: Normal    Dental:  Dentures, Edentulous  Bottom denture - well seated per pt and asked to keep in place.       Anesthesia Plan  Type of Anesthesia, risks & benefits discussed:    Anesthesia Type: Gen Natural Airway  Intra-op Monitoring Plan: Standard ASA Monitors  Post Op Pain Control Plan: multimodal analgesia  Induction:  IV  Informed Consent: Informed consent signed with the Patient and all parties understand the risks and agree with anesthesia plan.  All questions answered.   ASA Score: 3  Day of Surgery Review of History & Physical: H&P Update referred to the surgeon/provider.    Ready For Surgery From Anesthesia Perspective.     .           [1]   Patient Active Problem List  Diagnosis    Nuclear sclerosis - Both Eyes    History of colon polyps    Primary hypertension    Anemia of chronic disease    GERD without esophagitis    Cervical spondylosis with radiculopathy    Primary open angle glaucoma (POAG) of both eyes, moderate stage    Atherosclerosis of aorta    Tortuous aorta    Malignant carcinoid tumor of bronchus and lung    History of gastrointestinal stromal  tumor (GIST)    Coronary artery disease    Spinal stenosis of lumbar region    Anxiety    History of Guillain-[place] syndrome    Overweight (BMI 25.0-29.9)    Bilateral carpal tunnel syndrome    Cervical radiculopathy    Prediabetes    Thyroid nodule   [2]   No current facility-administered medications on file prior to encounter.     Current Outpatient Medications on File Prior to Encounter   Medication Sig Dispense Refill    acetaminophen (TYLENOL) 650 MG TbSR Take 500 mg by mouth every 8 (eight) hours.      amLODIPine (NORVASC) 10 MG tablet Take 10 mg by mouth.      ASCORBATE CALCIUM (VITAMIN C ORAL) Take 1 tablet by mouth once daily at 6am.      aspirin (ECOTRIN) 81 MG EC tablet Take 81 mg by mouth once daily.      dorzolamide-timolol 2-0.5% (COSOPT) 22.3-6.8 mg/mL ophthalmic solution Place 1 drop into both eyes 2 (two) times daily. 20 mL 4    fluticasone (FLONASE) 50 mcg/actuation nasal spray 2 sprays by Each Nare route once daily. 1 Bottle 12    latanoprost 0.005 % ophthalmic solution Place 1 drop into both eyes every evening. 7.5 mL 4    LORazepam (ATIVAN) 1 MG tablet TAKE 1 TABLET(1 MG) BY MOUTH EVERY 8 HOURS AS NEEDED 60 tablet 1    multivitamin (THERAGRAN) tablet Take 1 tablet by mouth once daily.      pantoprazole (PROTONIX) 40 MG tablet Take 1 tab po before breakfast for reflux control. 30 tablet 3    rosuvastatin (CRESTOR) 40 MG Tab Take 40 mg by mouth.      valsartan-hydrochlorothiazide (DIOVAN HCT) 320-12.5 mg per tablet Take 1 tablet by mouth once daily. 90 tablet 3    vitamin D (VITAMIN D3) 1000 units Tab Take 1,000 Units by mouth once daily.      ZINC ORAL Take by mouth 3 (three) times daily.     [3]   Social History  Socioeconomic History    Marital status:    Tobacco Use    Smoking status: Never     Passive exposure: Never    Smokeless tobacco: Never   Substance and Sexual Activity    Alcohol use: Yes     Comment: wine 3 glasses a week / weekend 3 glasses of champagne    Drug use: Never     Sexual activity: Not Currently     Partners: Male     Social Drivers of Health     Financial Resource Strain: Patient Declined (2/25/2025)    Overall Financial Resource Strain (CARDIA)     Difficulty of Paying Living Expenses: Patient declined   Food Insecurity: Patient Declined (2/25/2025)    Hunger Vital Sign     Worried About Running Out of Food in the Last Year: Patient declined     Ran Out of Food in the Last Year: Patient declined   Transportation Needs: Patient Declined (2/25/2025)    PRAPARE - Transportation     Lack of Transportation (Medical): Patient declined     Lack of Transportation (Non-Medical): Patient declined   Physical Activity: Insufficiently Active (2/25/2025)    Exercise Vital Sign     Days of Exercise per Week: 1 day     Minutes of Exercise per Session: 20 min   Stress: Stress Concern Present (2/25/2025)    [employer] - Occupational Stress Questionnaire     Feeling of Stress : Rather much   Housing Stability: Patient Declined (2/25/2025)    Housing Stability Vital Sign     Unable to Pay for Housing in the Last Year: Patient declined     Homeless in the Last Year: Patient declined

## 2025-03-28 ENCOUNTER — RESULTS FOLLOW-UP (OUTPATIENT)
Dept: GASTROENTEROLOGY | Facility: HOSPITAL | Age: 75
End: 2025-03-28

## 2025-03-28 LAB
ESTROGEN SERPL-MCNC: NORMAL PG/ML
INSULIN SERPL-ACNC: NORMAL U[IU]/ML
LAB AP CLINICAL INFORMATION: NORMAL
LAB AP GROSS DESCRIPTION: NORMAL
LAB AP PERFORMING LOCATION(S): NORMAL
LAB AP REPORT FOOTNOTES: NORMAL
T3RU NFR SERPL: NORMAL %

## 2025-03-28 RX ORDER — FLUCONAZOLE 200 MG/1
200 TABLET ORAL DAILY
Qty: 15 TABLET | Refills: 0 | Status: SHIPPED | OUTPATIENT
Start: 2025-03-28 | End: 2025-04-11

## 2025-04-01 ENCOUNTER — HOSPITAL ENCOUNTER (OUTPATIENT)
Dept: RADIOLOGY | Facility: OTHER | Age: 75
Discharge: HOME | End: 2025-04-01
Attending: STUDENT IN AN ORGANIZED HEALTH CARE EDUCATION/TRAINING PROGRAM
Payer: MEDICARE

## 2025-04-01 DIAGNOSIS — M54.16 LUMBAR RADICULOPATHY, CHRONIC: ICD-10-CM

## 2025-04-01 DIAGNOSIS — R26.89 IMBALANCE: ICD-10-CM

## 2025-04-01 DIAGNOSIS — R29.898 WEAKNESS OF BOTH LOWER EXTREMITIES: ICD-10-CM

## 2025-04-01 PROCEDURE — 72148 MRI LUMBAR SPINE W/O DYE: CPT | Mod: 26,,, | Performed by: RADIOLOGY

## 2025-04-01 PROCEDURE — 72148 MRI LUMBAR SPINE W/O DYE: CPT | Mod: TC

## 2025-04-14 ENCOUNTER — OFFICE VISIT (OUTPATIENT)
Dept: OPHTHALMOLOGY | Facility: CLINIC | Age: 75
End: 2025-04-14
Payer: MEDICARE

## 2025-04-14 ENCOUNTER — CLINICAL SUPPORT (OUTPATIENT)
Dept: OPHTHALMOLOGY | Facility: CLINIC | Age: 75
End: 2025-04-14
Payer: MEDICARE

## 2025-04-14 DIAGNOSIS — H40.1132 PRIMARY OPEN ANGLE GLAUCOMA (POAG) OF BOTH EYES, MODERATE STAGE: Primary | ICD-10-CM

## 2025-04-14 DIAGNOSIS — H25.13 NUCLEAR SCLEROSIS OF BOTH EYES: ICD-10-CM

## 2025-04-14 PROCEDURE — 99999 PR PBB SHADOW E&M-EST. PATIENT-LVL III: CPT | Mod: PBBFAC,,, | Performed by: OPHTHALMOLOGY

## 2025-04-14 PROCEDURE — 92250 FUNDUS PHOTOGRAPHY W/I&R: CPT | Mod: PBBFAC | Performed by: OPHTHALMOLOGY

## 2025-04-14 PROCEDURE — 99213 OFFICE O/P EST LOW 20 MIN: CPT | Mod: PBBFAC | Performed by: OPHTHALMOLOGY

## 2025-04-14 PROCEDURE — 92083 EXTENDED VISUAL FIELD XM: CPT | Mod: PBBFAC | Performed by: OPHTHALMOLOGY

## 2025-04-14 NOTE — PROGRESS NOTES
hvf ou done/ou/rel/fix/coop.good/patient chart checked for allergies/od.+0.25 +0.50 x165/os. +0.50 +1.00 x5/ej.        Assessment /Plan     For exam results, see Encounter Report.    There are no diagnoses linked to this encounter.

## 2025-04-14 NOTE — PROGRESS NOTES
Assessment /Plan     For exam results, see Encounter Report.    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 [employer]  --> Now honored at [employer] --> Internship with [employer]  Graduated May 2023 -->  at [employer] --> very proud    ==>   2022 AD // MI --> BD 2022  Grieving improving --> Wedding anniversary 07/10 -->  at 19 yo x 55 years    Grieving best friends x 2 loss 2/2 COVID May 2020  Difficult    ==> Carcinoid Tumor Lung  ==> 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 HVF taker --> follow OCT RNFL  Try HVF Faster    CCT  568 // 567    < 21 --> Achieved & discussed    Both eyes  --> tolerating well &  good adherence --> CSM  Xal q day  Cosopt BID    SP SLT OS 2020  --> consider SLT OD as discussed      NSC OU  CE PRN  MRx +275    Dry Eye Syndrome: discussed use of warm compresses, preserved & non-preserved artificial tears, gel and PM ointment options.      Hx Retinal Tear OD  RD precautions:  Discussed symptoms of RD with increased flashes, floaters, decreasing vision.  Patient/Family to call and return immediately to clinic should the symptoms of RD occur. Voiced good understanding with Q & A.        2025        Plan  RTC 4 months IOP &  HVF Faster  Adherence --> then IOP  RTC sooner prn with good  understanding

## 2025-05-02 NOTE — PROGRESS NOTES
Subjective:       Patient ID: Nan Palafox is a 75 y.o. female.    Chief Complaint: Follow-up    Diagnosis:  Typical carcinoid     Pre-operative therapy: none    Procedure(s) and date(s): 1/4/21: right robotic assisted middle lobectomy with MLND    Pathology: 2.0 cm well differentiated typical carcinoid, no VPI, no LVI, no margins, level 2,4,7,10,11 = negative, pT1bN0     Post-operative therapy: surveillance     HPI   75 y.o. female never smoker here today for 4 year follow-up from right robotic middle lobectomy. Uncomplicated post-operative course. 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 Covid + Jan 2022. CT shortly after with scattered inflammatory opacities. She has had stable micronodules on most recent scan. Here today with 1 year interval scan. Denies fever, chills, wheeze, cough, CP, palpitations, claudication,  N/V or changes in bowel and bladder functioning. Continued Sob, following with pulmonology. Sleep study scheduled for this Monday.      Review of Systems   Constitutional:  Negative for activity change, fatigue and fever.   Eyes:  Negative for visual disturbance.   Respiratory:  Positive for cough and shortness of breath. Negative for chest tightness and wheezing.         Occasional exertional dyspnea.  She has undergone an extensive w/u including negative right and left cardiac caths.     Cardiovascular:  Negative for chest pain, palpitations and leg swelling.   Gastrointestinal: Negative.    Genitourinary:  Negative for difficulty urinating.   Musculoskeletal:  Positive for gait problem. Negative for arthralgias and myalgias.   Neurological:  Positive for coordination difficulties. Negative for dizziness, facial asymmetry and speech difficulty.        Residual lower extremity weakness resulting in some difficulty with coordination and ambulation   Psychiatric/Behavioral:  Positive for dysphoric mood. Negative for confusion. The patient is not  "nervous/anxious.         She is having emotional challenges dealing with the death of her  and asked for help         Objective:       Vitals:    05/07/25 0824   BP: 137/77   Pulse: 64   SpO2: 97%   Weight: 79.9 kg (176 lb 2.4 oz)   Height: 5' 5" (1.651 m)   PainSc: 0-No pain           Physical Exam  Constitutional:       Appearance: Normal appearance.   Eyes:      General: No scleral icterus.     Extraocular Movements: Extraocular movements intact.      Pupils: Pupils are equal, round, and reactive to light.   Cardiovascular:      Rate and Rhythm: Normal rate and regular rhythm.      Pulses: Normal pulses.   Pulmonary:      Effort: Pulmonary effort is normal.      Breath sounds: Normal breath sounds. No wheezing or rhonchi.   Chest:      Chest wall: No tenderness.   Abdominal:      General: Abdomen is flat.      Palpations: Abdomen is soft.   Musculoskeletal:         General: Normal range of motion.      Right lower leg: No edema.      Left lower leg: No edema.   Skin:     General: Skin is warm and dry.      Capillary Refill: Capillary refill takes less than 2 seconds.   Neurological:      General: No focal deficit present.      Mental Status: She is alert and oriented to person, place, and time.      Gait: Gait normal.   Psychiatric:         Mood and Affect: Mood normal.         Chest CT 7/16/21:  1.  Stable postsurgical changes status post right middle lobectomy and mediastinal lymph node dissection.  2.   Interval development of focal consolidation within the right posterior lung base, which may represent atelectasis or infectious process, though malignancy cannot be excluded in this patient with a history of right middle lobe carcinoid tumor.  3.  Bilateral thyroid nodules.  4.  Hepatic cysts.    Chest CT 2/2/22:   1.  New left lower lobe ground-glass nodular opacities, favored to represent infectious/inflammatory etiology.  However, short-term follow-up evaluation is highly recommended.  2.  Persistent, " stable appearance of focal opacity of the right posterior base.  It may merely represent focal atelectasis.  Consider follow-up evaluation to include intravenous contrast.  3.  Other findings as above without change.     Chest CT 8/10/22:  I reviewed the images and compared to the previous chest CT  1.  Interval resolution of ground-glass opacities within the left lower lobe.  Favored to represent resolved infectious or inflammatory etiology.  2.  Stable appearance of focal opacity in the right posterior base.  Imaging appearance may favor round atelectasis.    Chest CT 02/08/23: I reviewed the images and compared to the 8/10/22 CT  Stable RLL basilar opacity, likely postsurgical scar vs persistent atelectasis  Mild PA trunk dilation    Chest CT 8/9/23:  I reviewed the images  Stable RLL postoperative changes  MAJOR    Chest CT 05/08/24: images reviewed.   Stable RLL subcentimeter nodule  No mediastinal or hilar lymphadenopathy  MAJOR    Chest CT 5/7/25: Images reviewed  Stable right lower lobe subcentimeter nodular opacity  No mediastinal or hilar lymphadenopathy        Assessment:       70 y.o. female here today for 4 year 3 month follow-up from right robotic middle lobectomy, pathology pT1bN0 typical carcinoid. Pathology reviewed with patient.   MAJOR  FOX with negative cardiac workup. Residual effects from Guillain-[place] have improved but may account for some of the exertional dyspnea  Stable chest CT findings      Plan:     RTC in 1 year with CT chest

## 2025-05-07 ENCOUNTER — HOSPITAL ENCOUNTER (OUTPATIENT)
Dept: RADIOLOGY | Facility: HOSPITAL | Age: 75
Discharge: HOME | End: 2025-05-07
Attending: THORACIC SURGERY (CARDIOTHORACIC VASCULAR SURGERY)
Payer: MEDICARE

## 2025-05-07 ENCOUNTER — OFFICE VISIT (OUTPATIENT)
Dept: CARDIOTHORACIC SURGERY | Facility: CLINIC | Age: 75
End: 2025-05-07
Attending: THORACIC SURGERY (CARDIOTHORACIC VASCULAR SURGERY)
Payer: MEDICARE

## 2025-05-07 VITALS
SYSTOLIC BLOOD PRESSURE: 137 MMHG | HEIGHT: 65 IN | DIASTOLIC BLOOD PRESSURE: 77 MMHG | BODY MASS INDEX: 29.34 KG/M2 | OXYGEN SATURATION: 97 % | WEIGHT: 176.13 LBS | HEART RATE: 64 BPM

## 2025-05-07 DIAGNOSIS — C7A.090 CARCINOID BRONCHIAL ADENOMA OF RIGHT LUNG: ICD-10-CM

## 2025-05-07 DIAGNOSIS — R91.1 PULMONARY NODULE, RIGHT: ICD-10-CM

## 2025-05-07 DIAGNOSIS — C7A.090 CARCINOID BRONCHIAL ADENOMA OF RIGHT LUNG: Primary | ICD-10-CM

## 2025-05-07 PROCEDURE — 99213 OFFICE O/P EST LOW 20 MIN: CPT | Mod: S$PBB,,, | Performed by: THORACIC SURGERY (CARDIOTHORACIC VASCULAR SURGERY)

## 2025-05-07 PROCEDURE — 99999 PR PBB SHADOW E&M-EST. PATIENT-LVL IV: CPT | Mod: PBBFAC,,, | Performed by: THORACIC SURGERY (CARDIOTHORACIC VASCULAR SURGERY)

## 2025-05-07 PROCEDURE — 99214 OFFICE O/P EST MOD 30 MIN: CPT | Mod: PBBFAC,25 | Performed by: THORACIC SURGERY (CARDIOTHORACIC VASCULAR SURGERY)

## 2025-05-07 PROCEDURE — 71250 CT THORAX DX C-: CPT | Mod: 26,,, | Performed by: RADIOLOGY

## 2025-05-07 PROCEDURE — 71250 CT THORAX DX C-: CPT | Mod: TC

## 2025-05-27 ENCOUNTER — TELEPHONE (OUTPATIENT)
Dept: OPHTHALMOLOGY | Facility: CLINIC | Age: 75
End: 2025-05-27
Payer: MEDICARE

## 2025-05-27 NOTE — TELEPHONE ENCOUNTER
----- Message from Monica sent at 5/27/2025  8:13 AM CDT -----  Regarding: Right Eye Pain  Contact: CELSA CHADWICK [[id number]]  CONSULT/ADVISORYName of Caller:  CELSA CHADWICK [[id number]]Contact Preference:  [phone] (home) Nature of Call:  Pt is experiencing right eye pain and it's very sensitive to light.  Requesting a nurse to call her back.  States she applied warm compresses on it last night and it's somewhat better but she hasn't been outside as of yet.   Please call.

## 2025-05-29 ENCOUNTER — TELEPHONE (OUTPATIENT)
Dept: OPTOMETRY | Facility: CLINIC | Age: 75
End: 2025-05-29
Payer: MEDICARE

## 2025-05-29 NOTE — TELEPHONE ENCOUNTER
----- Message from Molina Nair sent at 5/28/2025 11:25 AM CDT -----    ----- Message -----  From: Sarah Contreras  Sent: 5/28/2025  11:10 AM CDT  To: [facility] Staff    Patient is requesting a call back asap to schedule an appointment. States she is having trouble reading and her eyes are sensitive to light. Please call back at [phone]

## 2025-05-30 ENCOUNTER — TELEPHONE (OUTPATIENT)
Dept: OPTOMETRY | Facility: CLINIC | Age: 75
End: 2025-05-30
Payer: MEDICARE

## 2025-05-30 NOTE — TELEPHONE ENCOUNTER
----- Message from [place] sent at 5/30/2025 11:28 AM CDT -----  Regarding: Returning a Missed Call  Contact: Nan Palafox  Returning a Missed Call Caller:Nan Palafox   Returning call to: Marylin Caller can be reached @:[phone] (home)   Nature of the call:Patient is returning call to schedule. Requesting a call back

## 2025-06-02 ENCOUNTER — OFFICE VISIT (OUTPATIENT)
Dept: OPTOMETRY | Facility: CLINIC | Age: 75
End: 2025-06-02
Payer: MEDICARE

## 2025-06-02 DIAGNOSIS — H16.223 KERATOCONJUNCTIVITIS SICCA OF BOTH EYES NOT DUE TO SJOGREN'S SYNDROME: Primary | ICD-10-CM

## 2025-06-02 DIAGNOSIS — H52.7 REFRACTIVE ERROR: ICD-10-CM

## 2025-06-02 PROCEDURE — 99999 PR PBB SHADOW E&M-EST. PATIENT-LVL II: CPT | Mod: PBBFAC,,, | Performed by: OPTOMETRIST

## 2025-06-02 PROCEDURE — 99213 OFFICE O/P EST LOW 20 MIN: CPT | Mod: S$PBB,,, | Performed by: OPTOMETRIST

## 2025-06-02 PROCEDURE — 99212 OFFICE O/P EST SF 10 MIN: CPT | Mod: PBBFAC,PO | Performed by: OPTOMETRIST

## 2025-07-07 ENCOUNTER — OFFICE VISIT (OUTPATIENT)
Dept: INTERNAL MEDICINE | Facility: CLINIC | Age: 75
End: 2025-07-07
Payer: MEDICARE

## 2025-07-07 VITALS
TEMPERATURE: 98 F | HEIGHT: 65 IN | BODY MASS INDEX: 29.66 KG/M2 | WEIGHT: 178 LBS | OXYGEN SATURATION: 98 % | SYSTOLIC BLOOD PRESSURE: 140 MMHG | RESPIRATION RATE: 16 BRPM | HEART RATE: 63 BPM | DIASTOLIC BLOOD PRESSURE: 80 MMHG

## 2025-07-07 DIAGNOSIS — F41.9 ANXIETY: ICD-10-CM

## 2025-07-07 DIAGNOSIS — R79.9 ABNORMAL FINDING OF BLOOD CHEMISTRY, UNSPECIFIED: ICD-10-CM

## 2025-07-07 DIAGNOSIS — I10 PRIMARY HYPERTENSION: Primary | ICD-10-CM

## 2025-07-07 DIAGNOSIS — K21.9 GASTROESOPHAGEAL REFLUX DISEASE, UNSPECIFIED WHETHER ESOPHAGITIS PRESENT: ICD-10-CM

## 2025-07-07 DIAGNOSIS — Z86.69 HISTORY OF GUILLAIN-BARRE SYNDROME: ICD-10-CM

## 2025-07-07 PROCEDURE — 99214 OFFICE O/P EST MOD 30 MIN: CPT | Mod: PBBFAC,PO | Performed by: INTERNAL MEDICINE

## 2025-07-07 PROCEDURE — 99214 OFFICE O/P EST MOD 30 MIN: CPT | Mod: S$PBB,,, | Performed by: INTERNAL MEDICINE

## 2025-07-07 PROCEDURE — 99999 PR PBB SHADOW E&M-EST. PATIENT-LVL IV: CPT | Mod: PBBFAC,,, | Performed by: INTERNAL MEDICINE

## 2025-07-07 RX ORDER — LORAZEPAM 1 MG/1
TABLET ORAL
Qty: 60 TABLET | Refills: 1 | Status: SHIPPED | OUTPATIENT
Start: 2025-07-07

## 2025-07-07 NOTE — PROGRESS NOTES
Subjective:       Patient ID: Nan Palafox is a 75 y.o. female.    Chief Complaint: Hypertension (4 mos fol up. ) and Fatigue (Had about half lung removed and feels tired a lot and always off balaance and low back pain. )    History of Present Illness    CHIEF COMPLAINT:  Nan presents today for follow-up of active medical conditions which include hypertension, GERD, hyperlipidemia.  The patient is also followed by chest surgery after undergoing right middle lobectomy on 01/04/2021 for right middle lobe malignant carcinoid tumor.  She is followed by Dr. Ru Hernandez.    HYPERTENSION:  She reports fluctuating blood pressure readings ranging from 122/70 to 140/80. She takes blood pressure medication irregularly (every other day) due to experiencing significant fatigue and sluggishness. She acknowledges she should not be modifying her medication regimen independently.  She does not monitor her blood pressures regularly.  Current blood pressure medications include valsartan-hydrochlorothiazide 320-12.5 mg once daily, amlodipine 10 mg daily.  She takes rosuvastatin 40 mg daily for management of hyperlipidemia and for cardiovascular risk reduction.    SLEEP:  She has been diagnosed with sleep apnea but expresses skepticism about the diagnosis. She owns a CPAP machine with a shorter mask but has not used it for approximately three weeks. She reports fatigue potentially related to her sleep disorder.    PHYSICAL ACTIVITY:  She attends physical therapy 3 times weekly with Emmanuel Sweeney at his private physical therapy practice in [place]. She plans to start water aerobics at [place] this week.    DIET AND NUTRITION:  She reports irregular eating patterns with occasional skipped meals. Her diet primarily consists of fish and salads. She has been incorporating avocado, beets, and fruits. Typical breakfast includes a poached egg, whole wheat toast, and coffee. She is working on increasing protein intake and  consuming green vegetables or beans for dinner while avoiding late-night eating.    EDEMA:  She reports bilateral ankle swelling that is currently improving. The swelling was previously throbbing but has reduced in intensity, with better symptoms in the morning compared to nights.    RESPIRATORY:  She reports an intermittent cough. She denies any associated chest pain or respiratory distress.    GASTROINTESTINAL:  She experiences intermittent upper abdominal discomfort described as a pinching sensation, potentially related to a prior surgical procedure. She has altered bowel habits with soft, voluminous stools occurring frequently, occasionally dark-colored, lasting approximately one week. She denies passing stones or significant pain with bowel movements. She has previously undergone an endoscopic procedure.    LABS:  Recent labs was unremarkable except for a potentially concerning A1C level.    CURRENT MEDICATIONS:  She takes lorazepam (Ativan) for sleep difficulties and anxiety management, reporting it helps her calm down and sleep at night. She is currently out of this medication.      ROS:  Constitutional: +fatigue  Respiratory: +cough  Cardiovascular: -chest pain, +lower extremity edema  Gastrointestinal: +abdominal pain, -heartburn, +stool texture change  Neurological: +sleep difficulty  Psychiatric: +anxiety              Physical Exam  Vitals and nursing note reviewed.   Constitutional:       General: She is not in acute distress.     Appearance: Normal appearance. She is well-developed.   HENT:      Head: Normocephalic and atraumatic.   Eyes:      General: No scleral icterus.     Extraocular Movements: Extraocular movements intact.      Conjunctiva/sclera: Conjunctivae normal.   Neck:      Thyroid: No thyromegaly.      Vascular: No JVD.   Cardiovascular:      Rate and Rhythm: Normal rate and regular rhythm.      Heart sounds: Normal heart sounds. No murmur heard.     No friction rub. No gallop.   Pulmonary:       Effort: Pulmonary effort is normal. No respiratory distress.      Breath sounds: Normal breath sounds. No wheezing or rales.   Abdominal:      General: Bowel sounds are normal.      Palpations: Abdomen is soft. There is no mass.      Tenderness: There is no abdominal tenderness.   Musculoskeletal:         General: No tenderness. Normal range of motion.      Cervical back: Normal range of motion and neck supple.      Right lower leg: No edema.      Left lower leg: No edema.   Lymphadenopathy:      Cervical: No cervical adenopathy.   Skin:     General: Skin is warm and dry.      Findings: No rash.   Neurological:      Mental Status: She is alert and oriented to person, place, and time.   Psychiatric:         Mood and Affect: Mood normal.         Behavior: Behavior normal.           No visits with results within 3 Month(s) from this visit.   Latest known visit with results is:   Admission on 03/26/2025, Discharged on 03/26/2025   Component Date Value Ref Range Status    Case Report 03/26/2025    Final                    Value:[facility]                                 Case: [id number]                                Authorizing Provider:  Masood Chavarria,  Collected:           03/26/2025 02:10 PM                                 MD                                                                           Ordering Location:     [facility]         Received:            03/26/2025 03:09 PM                                 (Hospital)                                                                   Pathologist:           Steve Ribeiro MD                                                         Specimens:   1) - Stomach, Random gastric bx Dx: R/O HP                                                          2) - Esophagus, Proximal, Proximal esophagus bx Dx: R/O candida                            Clinical Information 03/26/2025    Final                    Value:Procedure:  EGD  (ESOPHAGOGASTRODUODENOSCOPY)  Pre-op Diagnosis:  Dysphagia, unspecified type [R13.10]  History of gastrointestinal stromal tumor (GIST) [Z85.09]  Post-op Diagnosis:  R13.10 - Dysphagia, unspecified type [ICD-10-CM]  Z85.09 - History of gastrointestinal stromal tumor (GIST) [ICD-10-CM]      Final Diagnosis 03/26/2025    Final                    Value:1. Stomach, biopsy:  - Mild chronic inactive gastritis  - Negative for Helicobacter pylori on H&E stain and immunostain  - Negative for intestinal metaplasia, dysplasia or malignancy    2. Esophagus, proximal, biopsy:  - Acute esophagitis  - Fungal hyphae and yeast forms identified  - No eosinophils identified  - Negative for intestinal metaplasia, dysplasia or malignancy        Microscopic Description 03/26/2025    Final                    Value:Microscopic examination performed.    PART 1. Immunostain for Helicobacter pylori was performed and was negative.    PART 2. Immunostain for CMV was performed and was negative.  Special stain for fungal organisms (GMS) was performed and highlighted fungal hyphae and yeast forms.      Positive controls and internal negative controls for immunostains and special stain were examined and were appropriate.      Gross Description 03/26/2025    Final                    Value:1. Stomach: Random gastric bx Dx: R/O HP  MRN # on Epic Label:  [id number]  MRN # on Pathology Label:  [id number]    Received in formalin, labeled random gastric Bx, are multiple fragments of rubbery and tan tissue measuring together 1 cm.  Submitted as received entirely.  [id number]          Grossing was completed by Ana Pozo on 3/26/2025.    2. Esophagus, Proximal: Proximal esophagus bx Dx: R/O candida  MRN # on Epic Label:  [id number]  MRN # on Pathology Label:  [id number]    Received in formalin, labeled proximal esophagus Bx, are 2 rubbery white fragment of tissue measuring together 0.6 cm.  Submitted as received entirely.  [id number]        Grossing was  "completed by Ana Pozo on 3/26/2025.        Report Footnotes 03/26/2025    Final                    Value:Unless the case is a "gross only" or additional testing only, the final diagnosis for each specimen is based on a microscopic examination of appropriate tissue sections.    CMV IHC:  This test was developed and its performance characteristics determined by [facility], Department of Pathology and Laboratory Medicine. It has not been cleared or approved by the US Food and Drug Administration. The FDA has determined that such clearance or approval is not necessary. This test is used for clinical purposes. It should not be regarded as investigational or for research. This laboratory is certified under the Clinical Laboratory Improvement Amendments (CLIA) as qualified to perform such high complexity clinical laboratory testing.      Performing Location 03/26/2025    Final                    Value:Grossing performed at [facility], [address] [place], [place], [address]    Sign out performed at [facility], [address] [place] [address], [id number]         Assessment & Plan:     Assessment & Plan     Assessed BP control, noting fluctuations and inconsistent medication adherence.   Considered impact of sleep apnea on fatigue and cardiovascular health.   Evaluated GI symptoms and dietary habits.   Reviewed recent lab work ordered by Dr. Rivas, awaiting results for comprehensive assessment.    ## HYPERTENSION:   Monitored blood pressure readings: 128/88 today (slightly elevated diastolic), 122/70 in March, and 140/80 after missing medication.   Nan admits to taking antihypertensive medication every other day due to fatigue and is attempting to wean off.   Explained importance of consistent medication adherence for proper blood pressure control.   Advised the patient to resume daily antihypertensive medication as prescribed.    ## OBSTRUCTIVE SLEEP APNEA:   Evaluated the " patient's diagnosed sleep apnea condition.   Discussed benefits of CPAP therapy, including improved rest and reduced cardiovascular stress.   Encouraged the patient to start using the CPAP machine for sleep apnea management.    ## LOCALIZED EDEMA:   Monitored the patient's reports of swelling around the ankle, sometimes throbbing and swollen.   Evaluation showed swelling localized to ankle with no significant fluid retention up the leg.   Advised the patient to elevate feet when resting to reduce swelling.    ## ACUTE COUGH:   Monitored the patient's reports of frequent coughing, noting scan in May was normal.   Chest evaluation found no issues.   Advised the patient to use medication for reflux as needed.    ## ABDOMINAL PAIN:   Monitored the patient's reports of pinching pain in abdominal area.   Evaluation of abdomen found no tenderness, enlarged liver, or unusual masses upon exam.    ## FECAL ABNORMALITIES:   Monitored the patient's reports of variation in stool consistency, sometimes soft and dark.   Stools are generally soft and formed, with no straining or passing hard stools.   Provided information on normal stool consistency and potential dietary impacts.   Instructed the patient to monitor and log bowel movements to identify potential dietary triggers.    ## ANXIETY DISORDER:   Continued lorazepam (Ativan) prescription for anxiety management.   Sent prescription to [facility] pharmacy.    ## GENERAL HEALTH MANAGEMENT:   Noted the patient attends physical therapy 3 times per week and is considering water aerobics.   Recommend maintaining a balanced diet with regular meals, including protein and greens for lunch.   Nan to continue physical therapy exercises and provide copy of recent lab work from Dr. Rivas's office for review.    ## FOLLOW-UP:   Follow up in 6 months.         Follow up in about 6 months (around 1/7/2026).     Glenroy Jimenez MD

## 2025-08-05 ENCOUNTER — PATIENT MESSAGE (OUTPATIENT)
Dept: HEMATOLOGY/ONCOLOGY | Facility: CLINIC | Age: 75
End: 2025-08-05
Payer: MEDICARE

## 2025-08-12 ENCOUNTER — OFFICE VISIT (OUTPATIENT)
Dept: PRIMARY CARE CLINIC | Facility: CLINIC | Age: 75
End: 2025-08-12
Payer: MEDICARE

## 2025-08-12 VITALS
HEART RATE: 60 BPM | OXYGEN SATURATION: 97 % | HEIGHT: 65 IN | DIASTOLIC BLOOD PRESSURE: 82 MMHG | SYSTOLIC BLOOD PRESSURE: 120 MMHG | WEIGHT: 179.44 LBS | BODY MASS INDEX: 29.9 KG/M2

## 2025-08-12 DIAGNOSIS — Z12.31 BREAST CANCER SCREENING BY MAMMOGRAM: ICD-10-CM

## 2025-08-12 DIAGNOSIS — I10 PRIMARY HYPERTENSION: ICD-10-CM

## 2025-08-12 DIAGNOSIS — F41.9 ANXIETY: ICD-10-CM

## 2025-08-12 DIAGNOSIS — C7A.090 MALIGNANT CARCINOID TUMOR OF BRONCHUS AND LUNG: ICD-10-CM

## 2025-08-12 DIAGNOSIS — Z00.00 ENCOUNTER FOR MEDICARE ANNUAL WELLNESS EXAM: ICD-10-CM

## 2025-08-12 DIAGNOSIS — Z12.11 COLON CANCER SCREENING: ICD-10-CM

## 2025-08-12 DIAGNOSIS — Z00.00 ENCOUNTER FOR ANNUAL WELLNESS VISIT (AWV) IN MEDICARE PATIENT: Primary | ICD-10-CM

## 2025-08-12 PROBLEM — Z86.69 HISTORY OF GUILLAIN-BARRE SYNDROME: Status: RESOLVED | Noted: 2022-09-14 | Resolved: 2025-08-12

## 2025-08-12 PROBLEM — Z85.09 HISTORY OF GASTROINTESTINAL STROMAL TUMOR (GIST): Status: RESOLVED | Noted: 2020-12-02 | Resolved: 2025-08-12

## 2025-08-12 PROBLEM — M54.12 CERVICAL RADICULOPATHY: Status: RESOLVED | Noted: 2023-05-04 | Resolved: 2025-08-12

## 2025-08-12 PROCEDURE — 99215 OFFICE O/P EST HI 40 MIN: CPT | Mod: PBBFAC,PN | Performed by: NURSE PRACTITIONER

## 2025-08-12 PROCEDURE — 99999 PR PBB SHADOW E&M-EST. PATIENT-LVL V: CPT | Mod: PBBFAC,,, | Performed by: NURSE PRACTITIONER

## 2025-08-18 ENCOUNTER — CLINICAL SUPPORT (OUTPATIENT)
Dept: ENDOSCOPY | Facility: HOSPITAL | Age: 75
End: 2025-08-18
Payer: MEDICARE

## 2025-08-18 ENCOUNTER — TELEPHONE (OUTPATIENT)
Dept: ENDOSCOPY | Facility: HOSPITAL | Age: 75
End: 2025-08-18

## 2025-08-18 VITALS — HEIGHT: 65 IN | BODY MASS INDEX: 29.82 KG/M2 | WEIGHT: 179 LBS

## 2025-08-18 DIAGNOSIS — Z12.11 ENCOUNTER FOR SCREENING COLONOSCOPY: Primary | ICD-10-CM

## 2025-08-18 DIAGNOSIS — Z12.11 SCREEN FOR COLON CANCER: Primary | ICD-10-CM

## 2025-08-18 RX ORDER — SODIUM, POTASSIUM,MAG SULFATES 17.5-3.13G
1 SOLUTION, RECONSTITUTED, ORAL ORAL DAILY
Qty: 1 KIT | Refills: 0 | Status: SHIPPED | OUTPATIENT
Start: 2025-08-18 | End: 2025-08-20

## 2025-08-19 ENCOUNTER — OFFICE VISIT (OUTPATIENT)
Dept: OPHTHALMOLOGY | Facility: CLINIC | Age: 75
End: 2025-08-19
Payer: MEDICARE

## 2025-08-19 DIAGNOSIS — H25.13 NUCLEAR SCLEROSIS OF BOTH EYES: ICD-10-CM

## 2025-08-19 DIAGNOSIS — H40.1132 PRIMARY OPEN ANGLE GLAUCOMA (POAG) OF BOTH EYES, MODERATE STAGE: Primary | ICD-10-CM

## 2025-08-19 PROCEDURE — 99999 PR PBB SHADOW E&M-EST. PATIENT-LVL III: CPT | Mod: PBBFAC,,, | Performed by: OPHTHALMOLOGY

## 2025-08-19 PROCEDURE — 99213 OFFICE O/P EST LOW 20 MIN: CPT | Mod: PBBFAC | Performed by: OPHTHALMOLOGY

## 2025-09-01 DIAGNOSIS — H40.053 BILATERAL OCULAR HYPERTENSION: ICD-10-CM

## 2025-09-02 RX ORDER — LATANOPROST 50 UG/ML
1 SOLUTION/ DROPS OPHTHALMIC NIGHTLY
Qty: 7.5 ML | Refills: 4 | Status: SHIPPED | OUTPATIENT
Start: 2025-09-02

## (undated) DEVICE — BAG INZII TISS RETRV 12/15MM

## (undated) DEVICE — SYR SLIP TIP 20CC

## (undated) DEVICE — DRAPE SCOPE PILLOW WARMER

## (undated) DEVICE — SPONGE DERMACEA 4X4IN 12PLY

## (undated) DEVICE — HEMOSTAT SURGICEL 2X14IN

## (undated) DEVICE — CANNULA REDUCER 12-8MM

## (undated) DEVICE — TAPE SILK 3IN

## (undated) DEVICE — Device

## (undated) DEVICE — CONTAINER SPECIMEN STRL 4OZ

## (undated) DEVICE — PORT AIRSEAL 12/120MM LPI

## (undated) DEVICE — COVER LIGHT HANDLE

## (undated) DEVICE — RELOAD SUREFORM 45 2.5 WHT 6R

## (undated) DEVICE — SUT VICRYL 2-0 27 CT-1

## (undated) DEVICE — RELOAD SUREFORM 45 4.3 GRN 6R

## (undated) DEVICE — SUT VICRYL 3-0 27 SH

## (undated) DEVICE — SPONGE IV DRAIN 4X4 STERILE

## (undated) DEVICE — DRESSING SURGICAL 1X3

## (undated) DEVICE — NDL SPINAL 18GX3.5 SPINOCAN

## (undated) DEVICE — DRAPE COLUMN DAVINCI XI

## (undated) DEVICE — SEE MEDLINE ITEM 152622

## (undated) DEVICE — KIT ANTIFOG

## (undated) DEVICE — RELOAD SUREFORM 45 3.5 BLU 6R

## (undated) DEVICE — DRESSING TRANS 4X4 TEGADERM

## (undated) DEVICE — SOL WATER STRL IRR 1000ML

## (undated) DEVICE — CANNULA SEAL 12MM

## (undated) DEVICE — DRAPE ABDOMINAL TIBURON 14X11

## (undated) DEVICE — SUT SILK 0 BLK BR FSL 18 IN

## (undated) DEVICE — DRESSING TRANS 2X2 TEGADERM

## (undated) DEVICE — ELECTRODE REM PLYHSV RETURN 9

## (undated) DEVICE — GLOVE BIOGEL SKINSENSE PI 7.5

## (undated) DEVICE — CLOSURE SKIN STERI STRIP 1/2X4

## (undated) DEVICE — DRAPE ARM DAVINCI XI

## (undated) DEVICE — DRESSING TELFA N ADH 3X8

## (undated) DEVICE — TRAY FOLEY 16FR INFECTION CONT

## (undated) DEVICE — SET TRI-LUMEN FILTERED TUBE

## (undated) DEVICE — DRESSING TEGADERM HP 2 3/8X2 3

## (undated) DEVICE — DRAPE INCISE IOBAN 2 23X17IN

## (undated) DEVICE — SUT MCRYL PLUS 4-0 PS2 27IN

## (undated) DEVICE — TRAY MINOR GEN SURG

## (undated) DEVICE — SEAL UNIVERSAL 5MM-8MM XI

## (undated) DEVICE — KIT VUETIP TROCAR SWAB

## (undated) DEVICE — SEE MEDLINE ITEM 146313

## (undated) DEVICE — GOWN SMART IMP BREATHABLE XXLG

## (undated) DEVICE — SYR ONLY LUER LOCK 20CC

## (undated) DEVICE — ELECTRODE BLADE INSULATED 1 IN

## (undated) DEVICE — SUT SILK 0 STRANDS 30IN BLK